# Patient Record
Sex: MALE | Race: BLACK OR AFRICAN AMERICAN | NOT HISPANIC OR LATINO | Employment: UNEMPLOYED | URBAN - METROPOLITAN AREA
[De-identification: names, ages, dates, MRNs, and addresses within clinical notes are randomized per-mention and may not be internally consistent; named-entity substitution may affect disease eponyms.]

---

## 2017-01-17 ENCOUNTER — HISTORICAL (OUTPATIENT)
Dept: RADIOLOGY | Facility: HOSPITAL | Age: 62
End: 2017-01-17

## 2017-01-17 ENCOUNTER — HISTORICAL (OUTPATIENT)
Dept: INTERNAL MEDICINE | Facility: CLINIC | Age: 62
End: 2017-01-17

## 2017-02-21 ENCOUNTER — HISTORICAL (OUTPATIENT)
Dept: INTERNAL MEDICINE | Facility: CLINIC | Age: 62
End: 2017-02-21

## 2017-06-08 ENCOUNTER — HISTORICAL (OUTPATIENT)
Dept: INTERNAL MEDICINE | Facility: CLINIC | Age: 62
End: 2017-06-08

## 2017-08-17 ENCOUNTER — HISTORICAL (OUTPATIENT)
Dept: RADIOLOGY | Facility: HOSPITAL | Age: 62
End: 2017-08-17

## 2017-11-16 ENCOUNTER — HISTORICAL (OUTPATIENT)
Dept: INTERNAL MEDICINE | Facility: CLINIC | Age: 62
End: 2017-11-16

## 2017-11-16 LAB
ABS NEUT (OLG): 2 X10(3)/MCL (ref 2.1–9.2)
ALBUMIN SERPL-MCNC: 3.2 GM/DL (ref 3.4–5)
ALBUMIN/GLOB SERPL: 1 RATIO (ref 1–2)
ALP SERPL-CCNC: 72 UNIT/L (ref 45–117)
ALT SERPL-CCNC: 23 UNIT/L (ref 12–78)
APPEARANCE, UA: CLEAR
AST SERPL-CCNC: 25 UNIT/L (ref 15–37)
BACTERIA #/AREA URNS AUTO: ABNORMAL /[HPF]
BASOPHILS # BLD AUTO: 0.06 X10(3)/MCL
BASOPHILS NFR BLD AUTO: 1 % (ref 0–1)
BILIRUB SERPL-MCNC: 0.3 MG/DL (ref 0.2–1)
BILIRUB UR QL STRIP: NEGATIVE
BILIRUBIN DIRECT+TOT PNL SERPL-MCNC: <0.1 MG/DL
BILIRUBIN DIRECT+TOT PNL SERPL-MCNC: ABNORMAL MG/DL
BUN SERPL-MCNC: 3 MG/DL (ref 7–18)
CALCIUM SERPL-MCNC: 8.9 MG/DL (ref 8.5–10.1)
CHLORIDE SERPL-SCNC: 101 MMOL/L (ref 98–107)
CHOLEST SERPL-MCNC: 159 MG/DL
CHOLEST/HDLC SERPL: 3.2 {RATIO} (ref 0–5)
CO2 SERPL-SCNC: 29 MMOL/L (ref 21–32)
COLOR UR: YELLOW
CREAT SERPL-MCNC: 1 MG/DL (ref 0.6–1.3)
EOSINOPHIL # BLD AUTO: 0.12 10*3/UL
EOSINOPHIL NFR BLD AUTO: 2 % (ref 0–5)
ERYTHROCYTE [DISTWIDTH] IN BLOOD BY AUTOMATED COUNT: 13.8 % (ref 11.5–14.5)
EST. AVERAGE GLUCOSE BLD GHB EST-MCNC: 131 MG/DL
GLOBULIN SER-MCNC: 4.6 GM/ML (ref 2.3–3.5)
GLUCOSE (UA): NORMAL
GLUCOSE SERPL-MCNC: 90 MG/DL (ref 74–106)
HAV IGM SERPL QL IA: NONREACTIVE
HBA1C MFR BLD: 6.2 % (ref 4.2–6.3)
HBV CORE IGM SERPL QL IA: NONREACTIVE
HBV SURFACE AG SERPL QL IA: NEGATIVE
HCT VFR BLD AUTO: 48.5 % (ref 40–51)
HCV AB SERPL QL IA: NONREACTIVE
HDLC SERPL-MCNC: 49 MG/DL
HGB BLD-MCNC: 17.6 GM/DL (ref 13.5–17.5)
HGB UR QL STRIP: NEGATIVE
HIV 1+2 AB+HIV1 P24 AG SERPL QL IA: NONREACTIVE
HYALINE CASTS #/AREA URNS LPF: ABNORMAL /[LPF]
IMM GRANULOCYTES # BLD AUTO: 0.01 10*3/UL
IMM GRANULOCYTES NFR BLD AUTO: 0 %
KETONES UR QL STRIP: NEGATIVE
LDLC SERPL CALC-MCNC: 99 MG/DL (ref 0–130)
LEUKOCYTE ESTERASE UR QL STRIP: NEGATIVE
LYMPHOCYTES # BLD AUTO: 2.52 X10(3)/MCL
LYMPHOCYTES NFR BLD AUTO: 47 % (ref 15–40)
MCH RBC QN AUTO: 28.6 PG (ref 26–34)
MCHC RBC AUTO-ENTMCNC: 36.3 GM/DL (ref 31–37)
MCV RBC AUTO: 78.9 FL (ref 80–100)
MONOCYTES # BLD AUTO: 0.68 X10(3)/MCL
MONOCYTES NFR BLD AUTO: 13 % (ref 4–12)
NEUTROPHILS # BLD AUTO: 2 X10(3)/MCL
NEUTROPHILS NFR BLD AUTO: 37 X10(3)/MCL
NITRITE UR QL STRIP: NEGATIVE
PH UR STRIP: 5.5 [PH] (ref 4.5–8)
PLATELET # BLD AUTO: 272 X10(3)/MCL (ref 130–400)
PMV BLD AUTO: 9.2 FL (ref 7.4–10.4)
POTASSIUM SERPL-SCNC: 5.2 MMOL/L (ref 3.5–5.1)
PROT SERPL-MCNC: 7.8 GM/DL (ref 6.4–8.2)
PROT UR QL STRIP: 20 MG/DL
PSA SERPL-MCNC: 0.8 NG/ML
RBC # BLD AUTO: 6.15 X10(6)/MCL (ref 4.5–5.9)
RBC #/AREA URNS AUTO: ABNORMAL /[HPF]
SODIUM SERPL-SCNC: 137 MMOL/L (ref 136–145)
SP GR UR STRIP: 1.01 (ref 1–1.03)
SQUAMOUS #/AREA URNS LPF: ABNORMAL /[LPF]
TRIGL SERPL-MCNC: 57 MG/DL
TSH SERPL-ACNC: 1.02 MIU/L (ref 0.36–3.74)
UROBILINOGEN UR STRIP-ACNC: NORMAL
VLDLC SERPL CALC-MCNC: 11 MG/DL
WBC # SPEC AUTO: 5.4 X10(3)/MCL (ref 4.5–11)
WBC #/AREA URNS AUTO: ABNORMAL /HPF

## 2017-11-28 LAB
COLOR STL: NORMAL
CONSISTENCY STL: NORMAL
HEMOCCULT SP1 STL QL: NEGATIVE

## 2017-11-29 LAB
COLOR STL: NORMAL
CONSISTENCY STL: NORMAL
HEMOCCULT SP2 STL QL: NEGATIVE

## 2017-11-30 LAB
COLOR STL: NORMAL
CONSISTENCY STL: NORMAL

## 2017-12-01 ENCOUNTER — HISTORICAL (OUTPATIENT)
Dept: INTERNAL MEDICINE | Facility: CLINIC | Age: 62
End: 2017-12-01

## 2017-12-01 LAB
ABS NEUT (OLG): 3.34 X10(3)/MCL (ref 2.1–9.2)
ALBUMIN SERPL-MCNC: 3.5 GM/DL (ref 3.4–5)
ALBUMIN/GLOB SERPL: 1 RATIO (ref 1–2)
ALP SERPL-CCNC: 85 UNIT/L (ref 45–117)
ALT SERPL-CCNC: 20 UNIT/L (ref 12–78)
AST SERPL-CCNC: 20 UNIT/L (ref 15–37)
BASOPHILS # BLD AUTO: 0.09 X10(3)/MCL
BASOPHILS NFR BLD AUTO: 1 % (ref 0–1)
BILIRUB SERPL-MCNC: 0.5 MG/DL (ref 0.2–1)
BILIRUBIN DIRECT+TOT PNL SERPL-MCNC: 0.2 MG/DL
BILIRUBIN DIRECT+TOT PNL SERPL-MCNC: 0.3 MG/DL
BUN SERPL-MCNC: 6 MG/DL (ref 7–18)
CALCIUM SERPL-MCNC: 8.8 MG/DL (ref 8.5–10.1)
CHLORIDE SERPL-SCNC: 99 MMOL/L (ref 98–107)
CO2 SERPL-SCNC: 28 MMOL/L (ref 21–32)
CREAT SERPL-MCNC: 1.2 MG/DL (ref 0.6–1.3)
EOSINOPHIL # BLD AUTO: 0.11 X10(3)/MCL
EOSINOPHIL NFR BLD AUTO: 2 % (ref 0–5)
ERYTHROCYTE [DISTWIDTH] IN BLOOD BY AUTOMATED COUNT: 13.9 % (ref 11.5–14.5)
GLOBULIN SER-MCNC: 5.4 GM/ML (ref 2.3–3.5)
GLUCOSE SERPL-MCNC: 127 MG/DL (ref 74–106)
HCT VFR BLD AUTO: 47.2 % (ref 40–51)
HGB BLD-MCNC: 17.3 GM/DL (ref 13.5–17.5)
IMM GRANULOCYTES # BLD AUTO: 0.02 10*3/UL
IMM GRANULOCYTES NFR BLD AUTO: 0 %
LYMPHOCYTES # BLD AUTO: 2.6 X10(3)/MCL
LYMPHOCYTES NFR BLD AUTO: 38 % (ref 15–40)
MCH RBC QN AUTO: 29.1 PG (ref 26–34)
MCHC RBC AUTO-ENTMCNC: 36.7 GM/DL (ref 31–37)
MCV RBC AUTO: 79.3 FL (ref 80–100)
MONOCYTES # BLD AUTO: 0.72 X10(3)/MCL
MONOCYTES NFR BLD AUTO: 10 % (ref 4–12)
NEUTROPHILS # BLD AUTO: 3.34 X10(3)/MCL
NEUTROPHILS NFR BLD AUTO: 48 X10(3)/MCL
PLATELET # BLD AUTO: 240 X10(3)/MCL (ref 130–400)
PMV BLD AUTO: 9.3 FL (ref 7.4–10.4)
POTASSIUM SERPL-SCNC: 4.3 MMOL/L (ref 3.5–5.1)
PROT SERPL-MCNC: 8.9 GM/DL (ref 6.4–8.2)
RBC # BLD AUTO: 5.95 X10(6)/MCL (ref 4.5–5.9)
SODIUM SERPL-SCNC: 136 MMOL/L (ref 136–145)
WBC # SPEC AUTO: 6.9 X10(3)/MCL (ref 4.5–11)

## 2018-05-09 ENCOUNTER — HISTORICAL (OUTPATIENT)
Dept: INTERNAL MEDICINE | Facility: CLINIC | Age: 63
End: 2018-05-09

## 2018-05-09 LAB
ALBUMIN SERPL-MCNC: 3.1 GM/DL (ref 3.4–5)
ALBUMIN/GLOB SERPL: 1 RATIO (ref 1–2)
ALP SERPL-CCNC: 100 UNIT/L (ref 45–117)
ALT SERPL-CCNC: 26 UNIT/L (ref 12–78)
AST SERPL-CCNC: 32 UNIT/L (ref 15–37)
BILIRUB SERPL-MCNC: 0.5 MG/DL (ref 0.2–1)
BILIRUBIN DIRECT+TOT PNL SERPL-MCNC: 0.2 MG/DL
BILIRUBIN DIRECT+TOT PNL SERPL-MCNC: 0.3 MG/DL
BUN SERPL-MCNC: 4 MG/DL (ref 7–18)
CALCIUM SERPL-MCNC: 8.8 MG/DL (ref 8.5–10.1)
CHLORIDE SERPL-SCNC: 100 MMOL/L (ref 98–107)
CO2 SERPL-SCNC: 28 MMOL/L (ref 21–32)
CREAT SERPL-MCNC: 1 MG/DL (ref 0.6–1.3)
EST. AVERAGE GLUCOSE BLD GHB EST-MCNC: 128 MG/DL
GLOBULIN SER-MCNC: 5.8 GM/ML (ref 2.3–3.5)
GLUCOSE SERPL-MCNC: 133 MG/DL (ref 74–106)
HBA1C MFR BLD: 6.1 % (ref 4.2–6.3)
POTASSIUM SERPL-SCNC: 4 MMOL/L (ref 3.5–5.1)
PROT SERPL-MCNC: 8.9 GM/DL (ref 6.4–8.2)
SODIUM SERPL-SCNC: 131 MMOL/L (ref 136–145)

## 2018-11-26 ENCOUNTER — HISTORICAL (OUTPATIENT)
Dept: INTERNAL MEDICINE | Facility: CLINIC | Age: 63
End: 2018-11-26

## 2018-11-26 LAB
ABS NEUT (OLG): 3.66 X10(3)/MCL (ref 2.1–9.2)
ALBUMIN SERPL-MCNC: 3.1 GM/DL (ref 3.4–5)
ALBUMIN/GLOB SERPL: 1 RATIO (ref 1–2)
ALP SERPL-CCNC: 72 UNIT/L (ref 45–117)
ALT SERPL-CCNC: 18 UNIT/L (ref 12–78)
APPEARANCE, UA: CLEAR
AST SERPL-CCNC: 25 UNIT/L (ref 15–37)
BACTERIA #/AREA URNS AUTO: ABNORMAL /[HPF]
BASOPHILS # BLD AUTO: 0.1 X10(3)/MCL
BASOPHILS NFR BLD AUTO: 1 %
BILIRUB SERPL-MCNC: 0.4 MG/DL (ref 0.2–1)
BILIRUB UR QL STRIP: NEGATIVE
BILIRUBIN DIRECT+TOT PNL SERPL-MCNC: 0.2 MG/DL
BILIRUBIN DIRECT+TOT PNL SERPL-MCNC: 0.2 MG/DL
BUN SERPL-MCNC: 4 MG/DL (ref 7–18)
CALCIUM SERPL-MCNC: 8.8 MG/DL (ref 8.5–10.1)
CHLORIDE SERPL-SCNC: 100 MMOL/L (ref 98–107)
CHOLEST SERPL-MCNC: 126 MG/DL
CHOLEST/HDLC SERPL: 3.8 {RATIO} (ref 0–5)
CO2 SERPL-SCNC: 30 MMOL/L (ref 21–32)
COLOR UR: YELLOW
CREAT SERPL-MCNC: 1.1 MG/DL (ref 0.6–1.3)
EOSINOPHIL # BLD AUTO: 0.15 10*3/UL
EOSINOPHIL NFR BLD AUTO: 2 %
ERYTHROCYTE [DISTWIDTH] IN BLOOD BY AUTOMATED COUNT: 15.8 % (ref 11.5–14.5)
EST. AVERAGE GLUCOSE BLD GHB EST-MCNC: 126 MG/DL
GLOBULIN SER-MCNC: 6.1 GM/ML (ref 2.3–3.5)
GLUCOSE (UA): NORMAL
GLUCOSE SERPL-MCNC: 92 MG/DL (ref 74–106)
HBA1C MFR BLD: 6 % (ref 4.2–6.3)
HCT VFR BLD AUTO: 38.1 % (ref 40–51)
HDLC SERPL-MCNC: 33 MG/DL
HGB BLD-MCNC: 12.6 GM/DL (ref 13.5–17.5)
HGB UR QL STRIP: 0.1 MG/DL
HIV 1+2 AB+HIV1 P24 AG SERPL QL IA: NONREACTIVE
HYALINE CASTS #/AREA URNS LPF: ABNORMAL /[LPF]
IMM GRANULOCYTES # BLD AUTO: 0.03 10*3/UL
IMM GRANULOCYTES NFR BLD AUTO: 0 %
KETONES UR QL STRIP: NEGATIVE
LDLC SERPL CALC-MCNC: 80 MG/DL (ref 0–130)
LEUKOCYTE ESTERASE UR QL STRIP: NEGATIVE
LYMPHOCYTES # BLD AUTO: 2.5 X10(3)/MCL
LYMPHOCYTES NFR BLD AUTO: 34 % (ref 13–40)
MCH RBC QN AUTO: 24 PG (ref 26–34)
MCHC RBC AUTO-ENTMCNC: 33.1 GM/DL (ref 31–37)
MCV RBC AUTO: 72.7 FL (ref 80–100)
MONOCYTES # BLD AUTO: 0.82 X10(3)/MCL
MONOCYTES NFR BLD AUTO: 11 % (ref 4–12)
NEUTROPHILS # BLD AUTO: 3.66 X10(3)/MCL
NEUTROPHILS NFR BLD AUTO: 50 X10(3)/MCL
NITRITE UR QL STRIP: NEGATIVE
PH UR STRIP: 5.5 [PH] (ref 4.5–8)
PLATELET # BLD AUTO: 334 X10(3)/MCL (ref 130–400)
PMV BLD AUTO: 8.6 FL (ref 7.4–10.4)
POTASSIUM SERPL-SCNC: 5 MMOL/L (ref 3.5–5.1)
PROT SERPL-MCNC: 9.2 GM/DL (ref 6.4–8.2)
PROT UR QL STRIP: 30 MG/DL
PSA SERPL-MCNC: 0.7 NG/ML
RBC # BLD AUTO: 5.24 X10(6)/MCL (ref 4.5–5.9)
RBC #/AREA URNS AUTO: ABNORMAL /[HPF]
SODIUM SERPL-SCNC: 134 MMOL/L (ref 136–145)
SP GR UR STRIP: 1.02 (ref 1–1.03)
SQUAMOUS #/AREA URNS LPF: ABNORMAL /[LPF]
TRIGL SERPL-MCNC: 65 MG/DL
TSH SERPL-ACNC: 1.88 MIU/L (ref 0.36–3.74)
UROBILINOGEN UR STRIP-ACNC: NORMAL
VLDLC SERPL CALC-MCNC: 13 MG/DL
WBC # SPEC AUTO: 7.3 X10(3)/MCL (ref 4.5–11)
WBC #/AREA URNS AUTO: ABNORMAL /HPF

## 2019-01-17 ENCOUNTER — HISTORICAL (OUTPATIENT)
Dept: INTERNAL MEDICINE | Facility: CLINIC | Age: 64
End: 2019-01-17

## 2019-01-17 LAB
APPEARANCE, UA: CLEAR
BACTERIA #/AREA URNS AUTO: ABNORMAL /[HPF]
BILIRUB UR QL STRIP: NEGATIVE
COLOR UR: NORMAL
GLUCOSE (UA): NORMAL
HGB UR QL STRIP: NEGATIVE
HYALINE CASTS #/AREA URNS LPF: ABNORMAL /[LPF]
KETONES UR QL STRIP: NEGATIVE
LEUKOCYTE ESTERASE UR QL STRIP: NEGATIVE
NITRITE UR QL STRIP: NEGATIVE
PH UR STRIP: 6.5 [PH] (ref 4.5–8)
PROT UR QL STRIP: NEGATIVE
RBC #/AREA URNS AUTO: ABNORMAL /[HPF]
SP GR UR STRIP: 1.01 (ref 1–1.03)
SQUAMOUS #/AREA URNS LPF: ABNORMAL /[LPF]
UROBILINOGEN UR STRIP-ACNC: NORMAL
WBC #/AREA URNS AUTO: ABNORMAL /HPF

## 2019-01-19 LAB — FINAL CULTURE: NORMAL

## 2019-01-31 ENCOUNTER — HISTORICAL (OUTPATIENT)
Dept: RADIOLOGY | Facility: HOSPITAL | Age: 64
End: 2019-01-31

## 2019-02-06 ENCOUNTER — HISTORICAL (OUTPATIENT)
Dept: RADIOLOGY | Facility: HOSPITAL | Age: 64
End: 2019-02-06

## 2019-02-14 ENCOUNTER — HISTORICAL (OUTPATIENT)
Dept: RADIOLOGY | Facility: HOSPITAL | Age: 64
End: 2019-02-14

## 2019-05-13 ENCOUNTER — HISTORICAL (OUTPATIENT)
Dept: INTERNAL MEDICINE | Facility: CLINIC | Age: 64
End: 2019-05-13

## 2019-05-13 LAB
ABS NEUT (OLG): 2.93 X10(3)/MCL (ref 2.1–9.2)
ALBUMIN SERPL-MCNC: 3.2 GM/DL (ref 3.4–5)
ALBUMIN/GLOB SERPL: 0.6 RATIO (ref 1.1–2)
ALP SERPL-CCNC: 62 UNIT/L (ref 45–117)
ALT SERPL-CCNC: 25 UNIT/L (ref 12–78)
APPEARANCE, UA: CLEAR
AST SERPL-CCNC: 43 UNIT/L (ref 15–37)
BACTERIA #/AREA URNS AUTO: ABNORMAL /[HPF]
BASOPHILS # BLD AUTO: 0.07 X10(3)/MCL
BASOPHILS NFR BLD AUTO: 1 %
BILIRUB SERPL-MCNC: 0.4 MG/DL (ref 0.2–1)
BILIRUB UR QL STRIP: NEGATIVE
BILIRUBIN DIRECT+TOT PNL SERPL-MCNC: 0.2 MG/DL
BILIRUBIN DIRECT+TOT PNL SERPL-MCNC: 0.2 MG/DL
BUN SERPL-MCNC: 5 MG/DL (ref 7–18)
CALCIUM SERPL-MCNC: 9 MG/DL (ref 8.5–10.1)
CHLORIDE SERPL-SCNC: 103 MMOL/L (ref 98–107)
CHOLEST SERPL-MCNC: 102 MG/DL
CHOLEST/HDLC SERPL: 2.8 {RATIO} (ref 0–5)
CO2 SERPL-SCNC: 23 MMOL/L (ref 21–32)
COLOR UR: ABNORMAL
CREAT SERPL-MCNC: 1 MG/DL (ref 0.6–1.3)
EOSINOPHIL # BLD AUTO: 0.11 10*3/UL
EOSINOPHIL NFR BLD AUTO: 2 %
ERYTHROCYTE [DISTWIDTH] IN BLOOD BY AUTOMATED COUNT: 19.5 % (ref 11.5–14.5)
EST. AVERAGE GLUCOSE BLD GHB EST-MCNC: 114 MG/DL
GLOBULIN SER-MCNC: 5.8 GM/ML (ref 2.3–3.5)
GLUCOSE (UA): NORMAL
GLUCOSE SERPL-MCNC: 120 MG/DL (ref 74–106)
HBA1C MFR BLD: 5.6 % (ref 4.2–6.3)
HCT VFR BLD AUTO: 30.6 % (ref 40–51)
HDLC SERPL-MCNC: 37 MG/DL
HGB BLD-MCNC: 9 GM/DL (ref 13.5–17.5)
HGB UR QL STRIP: NEGATIVE
HYALINE CASTS #/AREA URNS LPF: ABNORMAL /[LPF]
IMM GRANULOCYTES # BLD AUTO: 0.02 10*3/UL
IMM GRANULOCYTES NFR BLD AUTO: 0 %
KETONES UR QL STRIP: NEGATIVE
LDLC SERPL CALC-MCNC: 52 MG/DL (ref 0–130)
LEUKOCYTE ESTERASE UR QL STRIP: NEGATIVE
LYMPHOCYTES # BLD AUTO: 2.24 X10(3)/MCL
LYMPHOCYTES NFR BLD AUTO: 38 % (ref 13–40)
MCH RBC QN AUTO: 18.6 PG (ref 26–34)
MCHC RBC AUTO-ENTMCNC: 29.4 GM/DL (ref 31–37)
MCV RBC AUTO: 63.4 FL (ref 80–100)
MONOCYTES # BLD AUTO: 0.58 X10(3)/MCL
MONOCYTES NFR BLD AUTO: 10 % (ref 4–12)
NEUTROPHILS # BLD AUTO: 2.93 X10(3)/MCL
NEUTROPHILS NFR BLD AUTO: 49 X10(3)/MCL
NITRITE UR QL STRIP: NEGATIVE
PH UR STRIP: 5 [PH] (ref 4.5–8)
PLATELET # BLD AUTO: 297 X10(3)/MCL (ref 130–400)
PMV BLD AUTO: 9.2 FL (ref 7.4–10.4)
POTASSIUM SERPL-SCNC: 4 MMOL/L (ref 3.5–5.1)
PROT SERPL-MCNC: 9 GM/DL (ref 6.4–8.2)
PROT UR QL STRIP: NEGATIVE
RBC # BLD AUTO: 4.83 X10(6)/MCL (ref 4.5–5.9)
RBC #/AREA URNS AUTO: ABNORMAL /[HPF]
SODIUM SERPL-SCNC: 131 MMOL/L (ref 136–145)
SP GR UR STRIP: 1 (ref 1–1.03)
SQUAMOUS #/AREA URNS LPF: ABNORMAL /[LPF]
TESTOST SERPL-MCNC: 1179 NG/DL (ref 241–827)
TRIGL SERPL-MCNC: 67 MG/DL
UROBILINOGEN UR STRIP-ACNC: NORMAL
VLDLC SERPL CALC-MCNC: 13 MG/DL
WBC # SPEC AUTO: 6 X10(3)/MCL (ref 4.5–11)
WBC #/AREA URNS AUTO: ABNORMAL /HPF

## 2019-05-27 ENCOUNTER — HISTORICAL (OUTPATIENT)
Dept: INTERNAL MEDICINE | Facility: CLINIC | Age: 64
End: 2019-05-27

## 2019-08-19 ENCOUNTER — HISTORICAL (OUTPATIENT)
Dept: INTERNAL MEDICINE | Facility: CLINIC | Age: 64
End: 2019-08-19

## 2019-08-19 LAB
ABS NEUT (OLG): 3.56 X10(3)/MCL
ALBUMIN SERPL-MCNC: 2.9 GM/DL (ref 3.4–5)
ALBUMIN/GLOB SERPL: 0.5 RATIO (ref 1.1–2)
ALP SERPL-CCNC: 53 UNIT/L (ref 45–117)
ALT SERPL-CCNC: 20 UNIT/L (ref 12–78)
ANISOCYTOSIS BLD QL SMEAR: NORMAL
AST SERPL-CCNC: 27 UNIT/L (ref 15–37)
BASOPHILS # BLD AUTO: 0.04 X10(3)/MCL
BASOPHILS NFR BLD AUTO: 1 %
BILIRUB SERPL-MCNC: 0.4 MG/DL (ref 0.2–1)
BILIRUBIN DIRECT+TOT PNL SERPL-MCNC: 0.2 MG/DL
BILIRUBIN DIRECT+TOT PNL SERPL-MCNC: 0.2 MG/DL
BUN SERPL-MCNC: 2 MG/DL (ref 7–18)
CALCIUM SERPL-MCNC: 8.5 MG/DL (ref 8.5–10.1)
CHLORIDE SERPL-SCNC: 105 MMOL/L (ref 98–107)
CHOLEST SERPL-MCNC: 86 MG/DL
CHOLEST/HDLC SERPL: 2.2 {RATIO} (ref 0–5)
CO2 SERPL-SCNC: 22 MMOL/L (ref 21–32)
CREAT SERPL-MCNC: 0.9 MG/DL (ref 0.6–1.3)
EOSINOPHIL # BLD AUTO: 0.09 X10(3)/MCL
EOSINOPHIL NFR BLD AUTO: 2 %
ERYTHROCYTE [DISTWIDTH] IN BLOOD BY AUTOMATED COUNT: 20.2 % (ref 11.5–14.5)
EST. AVERAGE GLUCOSE BLD GHB EST-MCNC: 94 MG/DL
FERRITIN SERPL-MCNC: 6.8 NG/ML (ref 26–388)
FOLATE SERPL-MCNC: 11.4 NG/ML (ref 3.1–17.5)
GLOBULIN SER-MCNC: 5.5 GM/ML (ref 2.3–3.5)
GLUCOSE SERPL-MCNC: 100 MG/DL (ref 74–106)
HAV IGM SERPL QL IA: NONREACTIVE
HBA1C MFR BLD: 4.9 % (ref 4.2–6.3)
HBV CORE IGM SERPL QL IA: NONREACTIVE
HBV SURFACE AG SERPL QL IA: NEGATIVE
HCT VFR BLD AUTO: 32.2 % (ref 40–51)
HCV AB SERPL QL IA: NONREACTIVE
HDLC SERPL-MCNC: 39 MG/DL
HGB BLD-MCNC: 9.4 GM/DL (ref 13.5–17.5)
HYPOCHROMIA BLD QL SMEAR: NORMAL
IMM GRANULOCYTES # BLD AUTO: 0.04 10*3/UL
IMM GRANULOCYTES NFR BLD AUTO: 1 %
IRON SATN MFR SERPL: 5 % (ref 15–50)
IRON SERPL-MCNC: 16 MCG/DL (ref 65–175)
LDLC SERPL CALC-MCNC: 34 MG/DL (ref 0–130)
LYMPHOCYTES # BLD AUTO: 1.88 X10(3)/MCL
LYMPHOCYTES NFR BLD AUTO: 31 % (ref 13–40)
MCH RBC QN AUTO: 19.4 PG (ref 26–34)
MCHC RBC AUTO-ENTMCNC: 29.2 GM/DL (ref 31–37)
MCV RBC AUTO: 66.4 FL (ref 80–100)
MICROCYTES BLD QL SMEAR: NORMAL
MONOCYTES # BLD AUTO: 0.53 X10(3)/MCL
MONOCYTES NFR BLD AUTO: 9 % (ref 0–24)
NEUTROPHILS # BLD AUTO: 3.56 X10(3)/MCL
NEUTROPHILS NFR BLD AUTO: 58 %
PLATELET # BLD AUTO: 381 X10(3)/MCL (ref 130–400)
PLATELET # BLD EST: ADEQUATE 10*3/UL
PMV BLD AUTO: 9.1 FL (ref 7.4–10.4)
POIKILOCYTOSIS BLD QL SMEAR: NORMAL
POLYCHROMASIA BLD QL SMEAR: NORMAL
POTASSIUM SERPL-SCNC: 3.9 MMOL/L (ref 3.5–5.1)
PROT SERPL-MCNC: 8.4 GM/DL (ref 6.4–8.2)
RBC # BLD AUTO: 4.85 X10(6)/MCL (ref 4.5–5.9)
RBC MORPH BLD: NORMAL
SCHISTOCYTES BLD QL AUTO: NORMAL
SODIUM SERPL-SCNC: 134 MMOL/L (ref 136–145)
TARGETS BLD QL SMEAR: NORMAL
TIBC SERPL-MCNC: 320 MCG/DL (ref 250–450)
TRANSFERRIN SERPL-MCNC: 252 MG/DL (ref 200–360)
TRIGL SERPL-MCNC: 63 MG/DL
VIT B12 SERPL-MCNC: 756 PG/ML (ref 193–986)
VLDLC SERPL CALC-MCNC: 13 MG/DL
WBC # SPEC AUTO: 6.1 X10(3)/MCL (ref 4.5–11)

## 2019-10-22 ENCOUNTER — HISTORICAL (OUTPATIENT)
Dept: RADIOLOGY | Facility: HOSPITAL | Age: 64
End: 2019-10-22

## 2019-10-28 ENCOUNTER — HISTORICAL (OUTPATIENT)
Dept: RADIOLOGY | Facility: HOSPITAL | Age: 64
End: 2019-10-28

## 2020-01-13 ENCOUNTER — HISTORICAL (OUTPATIENT)
Dept: INTERNAL MEDICINE | Facility: CLINIC | Age: 65
End: 2020-01-13

## 2020-01-13 LAB
ABS NEUT (OLG): 3.17 X10(3)/MCL (ref 2.1–9.2)
ALBUMIN SERPL-MCNC: 3.3 GM/DL (ref 3.4–5)
ALBUMIN/GLOB SERPL: 0.6 RATIO (ref 1.1–2)
ALP SERPL-CCNC: 106 UNIT/L (ref 45–117)
ALT SERPL-CCNC: 17 UNIT/L (ref 12–78)
AST SERPL-CCNC: 20 UNIT/L (ref 15–37)
BASOPHILS # BLD AUTO: 0.1 X10(3)/MCL (ref 0–0.2)
BASOPHILS NFR BLD AUTO: 1 %
BILIRUB SERPL-MCNC: 0.5 MG/DL (ref 0.2–1)
BILIRUBIN DIRECT+TOT PNL SERPL-MCNC: 0.2 MG/DL (ref 0–0.2)
BILIRUBIN DIRECT+TOT PNL SERPL-MCNC: 0.3 MG/DL
BUN SERPL-MCNC: 4 MG/DL (ref 7–18)
CALCIUM SERPL-MCNC: 9.5 MG/DL (ref 8.5–10.1)
CHLORIDE SERPL-SCNC: 103 MMOL/L (ref 98–107)
CHOLEST SERPL-MCNC: 152 MG/DL
CHOLEST/HDLC SERPL: 3.1 {RATIO} (ref 0–5)
CO2 SERPL-SCNC: 30 MMOL/L (ref 21–32)
CREAT SERPL-MCNC: 0.9 MG/DL (ref 0.6–1.3)
EOSINOPHIL # BLD AUTO: 0.2 X10(3)/MCL (ref 0–0.9)
EOSINOPHIL NFR BLD AUTO: 2 %
ERYTHROCYTE [DISTWIDTH] IN BLOOD BY AUTOMATED COUNT: 18 % (ref 11.5–14.5)
GLOBULIN SER-MCNC: 5.5 GM/ML (ref 2.3–3.5)
GLUCOSE SERPL-MCNC: 100 MG/DL (ref 74–106)
HCT VFR BLD AUTO: 53.6 % (ref 40–51)
HDLC SERPL-MCNC: 49 MG/DL (ref 40–59)
HGB BLD-MCNC: 18.5 GM/DL (ref 13.5–17.5)
IMM GRANULOCYTES # BLD AUTO: 0.02 10*3/UL
IMM GRANULOCYTES NFR BLD AUTO: 0 %
LDLC SERPL CALC-MCNC: 85 MG/DL
LYMPHOCYTES # BLD AUTO: 2.3 X10(3)/MCL (ref 0.6–4.6)
LYMPHOCYTES NFR BLD AUTO: 34 %
MCH RBC QN AUTO: 27.1 PG (ref 26–34)
MCHC RBC AUTO-ENTMCNC: 34.5 GM/DL (ref 31–37)
MCV RBC AUTO: 78.5 FL (ref 80–100)
MONOCYTES # BLD AUTO: 1 X10(3)/MCL (ref 0.1–1.3)
MONOCYTES NFR BLD AUTO: 14 %
NEUTROPHILS # BLD AUTO: 3.17 X10(3)/MCL (ref 2.1–9.2)
NEUTROPHILS NFR BLD AUTO: 48 %
PLATELET # BLD AUTO: 269 X10(3)/MCL (ref 130–400)
PMV BLD AUTO: 8.7 FL (ref 7.4–10.4)
POTASSIUM SERPL-SCNC: 4.1 MMOL/L (ref 3.5–5.1)
PROT SERPL-MCNC: 8.8 GM/DL (ref 6.4–8.2)
PSA SERPL-MCNC: 0.9 NG/ML
RBC # BLD AUTO: 6.83 X10(6)/MCL (ref 4.5–5.9)
SODIUM SERPL-SCNC: 140 MMOL/L (ref 136–145)
TRIGL SERPL-MCNC: 88 MG/DL
TSH SERPL-ACNC: 1.3 MIU/L (ref 0.36–3.74)
VLDLC SERPL CALC-MCNC: 18 MG/DL
WBC # SPEC AUTO: 6.7 X10(3)/MCL (ref 4.5–11)

## 2020-01-20 LAB — CRC RECOMMENDATION EXT: NORMAL

## 2020-02-19 ENCOUNTER — HISTORICAL (OUTPATIENT)
Dept: RADIOLOGY | Facility: HOSPITAL | Age: 65
End: 2020-02-19

## 2020-04-14 ENCOUNTER — HISTORICAL (OUTPATIENT)
Dept: INTERNAL MEDICINE | Facility: CLINIC | Age: 65
End: 2020-04-14

## 2020-04-14 LAB
ABS NEUT (OLG): 2.23 X10(3)/MCL (ref 2.1–9.2)
ALBUMIN SERPL-MCNC: 3.6 GM/DL (ref 3.4–5)
ALBUMIN/GLOB SERPL: 0.6 RATIO (ref 1.1–2)
ALP SERPL-CCNC: 79 UNIT/L (ref 45–117)
ALT SERPL-CCNC: 24 UNIT/L (ref 12–78)
AST SERPL-CCNC: 29 UNIT/L (ref 15–37)
BASOPHILS # BLD AUTO: 0.1 X10(3)/MCL (ref 0–0.2)
BASOPHILS NFR BLD AUTO: 1 %
BILIRUB SERPL-MCNC: 0.5 MG/DL (ref 0.2–1)
BILIRUBIN DIRECT+TOT PNL SERPL-MCNC: 0.1 MG/DL (ref 0–0.2)
BILIRUBIN DIRECT+TOT PNL SERPL-MCNC: 0.4 MG/DL
BUN SERPL-MCNC: 5 MG/DL (ref 7–18)
CALCIUM SERPL-MCNC: 9.6 MG/DL (ref 8.5–10.1)
CHLORIDE SERPL-SCNC: 103 MMOL/L (ref 98–107)
CHOLEST SERPL-MCNC: 164 MG/DL
CHOLEST/HDLC SERPL: 3.1 {RATIO} (ref 0–5)
CO2 SERPL-SCNC: 30 MMOL/L (ref 21–32)
CREAT SERPL-MCNC: 0.9 MG/DL (ref 0.6–1.3)
EOSINOPHIL # BLD AUTO: 0.2 X10(3)/MCL (ref 0–0.9)
EOSINOPHIL NFR BLD AUTO: 4 %
ERYTHROCYTE [DISTWIDTH] IN BLOOD BY AUTOMATED COUNT: 14.7 % (ref 11.5–14.5)
FERRITIN SERPL-MCNC: 49.2 NG/ML (ref 26–388)
GLOBULIN SER-MCNC: 5.7 GM/ML (ref 2.3–3.5)
GLUCOSE SERPL-MCNC: 103 MG/DL (ref 74–106)
HCT VFR BLD AUTO: 52.7 % (ref 40–51)
HDLC SERPL-MCNC: 53 MG/DL (ref 40–59)
HGB BLD-MCNC: 18.5 GM/DL (ref 13.5–17.5)
IMM GRANULOCYTES # BLD AUTO: 0.01 10*3/UL
IMM GRANULOCYTES NFR BLD AUTO: 0 %
IRON SATN MFR SERPL: 43.4 % (ref 15–50)
IRON SERPL-MCNC: 138 MCG/DL (ref 65–175)
LDLC SERPL CALC-MCNC: 94 MG/DL
LYMPHOCYTES # BLD AUTO: 2.3 X10(3)/MCL (ref 0.6–4.6)
LYMPHOCYTES NFR BLD AUTO: 42 %
MCH RBC QN AUTO: 28.5 PG (ref 26–34)
MCHC RBC AUTO-ENTMCNC: 35.1 GM/DL (ref 31–37)
MCV RBC AUTO: 81.1 FL (ref 80–100)
MONOCYTES # BLD AUTO: 0.7 X10(3)/MCL (ref 0.1–1.3)
MONOCYTES NFR BLD AUTO: 13 %
NEUTROPHILS # BLD AUTO: 2.23 X10(3)/MCL (ref 2.1–9.2)
NEUTROPHILS NFR BLD AUTO: 40 %
PLATELET # BLD AUTO: 219 X10(3)/MCL (ref 130–400)
PMV BLD AUTO: 9.4 FL (ref 7.4–10.4)
POTASSIUM SERPL-SCNC: 4.5 MMOL/L (ref 3.5–5.1)
PROT SERPL-MCNC: 9.3 GM/DL (ref 6.4–8.2)
RBC # BLD AUTO: 6.5 X10(6)/MCL (ref 4.5–5.9)
SODIUM SERPL-SCNC: 136 MMOL/L (ref 136–145)
TIBC SERPL-MCNC: 318 MCG/DL (ref 250–450)
TRANSFERRIN SERPL-MCNC: 236 MG/DL (ref 200–360)
TRIGL SERPL-MCNC: 85 MG/DL
VLDLC SERPL CALC-MCNC: 17 MG/DL
WBC # SPEC AUTO: 5.6 X10(3)/MCL (ref 4.5–11)

## 2020-06-03 ENCOUNTER — HISTORICAL (OUTPATIENT)
Dept: RADIOLOGY | Facility: HOSPITAL | Age: 65
End: 2020-06-03

## 2020-06-18 ENCOUNTER — HISTORICAL (OUTPATIENT)
Dept: INTERNAL MEDICINE | Facility: CLINIC | Age: 65
End: 2020-06-18

## 2020-06-18 LAB
AFP-TM SERPL-MCNC: 2.79 NG/ML
APTT PPP: 26.8 SECOND(S) (ref 23.3–37)
B-HCG FREE SERPL-ACNC: <1 MIU/ML
INR PPP: 0.96 (ref 0.9–1.2)
LDH SERPL-CCNC: 159 UNIT/L (ref 87–241)
LH SERPL-ACNC: 9.7 MIU/ML (ref 1.2–10.6)
PROTHROMBIN TIME: 12.4 SECOND(S) (ref 11.9–14.4)

## 2020-06-19 ENCOUNTER — HISTORICAL (OUTPATIENT)
Dept: SLEEP MEDICINE | Facility: HOSPITAL | Age: 65
End: 2020-06-19

## 2020-09-10 ENCOUNTER — HISTORICAL (OUTPATIENT)
Dept: INTERNAL MEDICINE | Facility: CLINIC | Age: 65
End: 2020-09-10

## 2020-09-10 LAB
ABS NEUT (OLG): 4.99 X10(3)/MCL (ref 2.1–9.2)
ALBUMIN SERPL-MCNC: 3.4 GM/DL (ref 3.4–5)
ALBUMIN/GLOB SERPL: 0.7 RATIO (ref 1.1–2)
ALP SERPL-CCNC: 66 UNIT/L (ref 45–117)
ALT SERPL-CCNC: 24 UNIT/L (ref 12–78)
AST SERPL-CCNC: 36 UNIT/L (ref 15–37)
BASOPHILS # BLD AUTO: 0.1 X10(3)/MCL (ref 0–0.2)
BASOPHILS NFR BLD AUTO: 1 %
BILIRUB SERPL-MCNC: 0.5 MG/DL (ref 0.2–1)
BILIRUBIN DIRECT+TOT PNL SERPL-MCNC: <0.1 MG/DL (ref 0–0.2)
BILIRUBIN DIRECT+TOT PNL SERPL-MCNC: ABNORMAL MG/DL
BUN SERPL-MCNC: 7 MG/DL (ref 7–18)
CALCIUM SERPL-MCNC: 9.6 MG/DL (ref 8.5–10.1)
CHLORIDE SERPL-SCNC: 104 MMOL/L (ref 98–107)
CHOLEST SERPL-MCNC: 154 MG/DL
CHOLEST/HDLC SERPL: 3.3 {RATIO} (ref 0–5)
CO2 SERPL-SCNC: 28 MMOL/L (ref 21–32)
CREAT SERPL-MCNC: 1 MG/DL (ref 0.6–1.3)
EOSINOPHIL # BLD AUTO: 0.1 X10(3)/MCL (ref 0–0.9)
EOSINOPHIL NFR BLD AUTO: 1 %
ERYTHROCYTE [DISTWIDTH] IN BLOOD BY AUTOMATED COUNT: 13.8 % (ref 11.5–14.5)
GLOBULIN SER-MCNC: 5.2 GM/ML (ref 2.3–3.5)
GLUCOSE SERPL-MCNC: 89 MG/DL (ref 74–106)
HCT VFR BLD AUTO: 49.8 % (ref 40–51)
HDLC SERPL-MCNC: 46 MG/DL (ref 40–59)
HGB BLD-MCNC: 17.5 GM/DL (ref 13.5–17.5)
HIV 1+2 AB+HIV1 P24 AG SERPL QL IA: NONREACTIVE
IMM GRANULOCYTES # BLD AUTO: 0.02 10*3/UL
IMM GRANULOCYTES NFR BLD AUTO: 0 %
LDLC SERPL CALC-MCNC: 92 MG/DL
LYMPHOCYTES # BLD AUTO: 3 X10(3)/MCL (ref 0.6–4.6)
LYMPHOCYTES NFR BLD AUTO: 34 %
MCH RBC QN AUTO: 28.9 PG (ref 26–34)
MCHC RBC AUTO-ENTMCNC: 35.1 GM/DL (ref 31–37)
MCV RBC AUTO: 82.3 FL (ref 80–100)
MONOCYTES # BLD AUTO: 0.7 X10(3)/MCL (ref 0.1–1.3)
MONOCYTES NFR BLD AUTO: 8 %
NEUTROPHILS # BLD AUTO: 4.99 X10(3)/MCL (ref 2.1–9.2)
NEUTROPHILS NFR BLD AUTO: 56 %
PLATELET # BLD AUTO: 258 X10(3)/MCL (ref 130–400)
PMV BLD AUTO: 9.2 FL (ref 7.4–10.4)
POTASSIUM SERPL-SCNC: 5.1 MMOL/L (ref 3.5–5.1)
PROT SERPL-MCNC: 8.6 GM/DL (ref 6.4–8.2)
RBC # BLD AUTO: 6.05 X10(6)/MCL (ref 4.5–5.9)
SODIUM SERPL-SCNC: 139 MMOL/L (ref 136–145)
T PALLIDUM AB SER QL: NONREACTIVE
TRIGL SERPL-MCNC: 78 MG/DL
VLDLC SERPL CALC-MCNC: 16 MG/DL
WBC # SPEC AUTO: 8.8 X10(3)/MCL (ref 4.5–11)

## 2020-09-11 ENCOUNTER — HISTORICAL (OUTPATIENT)
Dept: ADMINISTRATIVE | Facility: HOSPITAL | Age: 65
End: 2020-09-11

## 2020-09-11 LAB
APPEARANCE, UA: CLEAR
BACTERIA #/AREA URNS AUTO: ABNORMAL /HPF
BILIRUB UR QL STRIP: NEGATIVE
COLOR UR: ABNORMAL
GLUCOSE (UA): ABNORMAL MG/DL
HGB UR QL STRIP: NEGATIVE
HYALINE CASTS #/AREA URNS LPF: ABNORMAL /LPF
KETONES UR QL STRIP: NEGATIVE
LEUKOCYTE ESTERASE UR QL STRIP: NEGATIVE
NITRITE UR QL STRIP: NEGATIVE
PH UR STRIP: 6 [PH] (ref 4.5–8)
PROT UR QL STRIP: NEGATIVE
RBC #/AREA URNS AUTO: ABNORMAL /HPF
SP GR UR STRIP: 1 (ref 1–1.03)
SQUAMOUS #/AREA URNS LPF: ABNORMAL /LPF
UROBILINOGEN UR STRIP-ACNC: NORMAL
WBC #/AREA URNS AUTO: ABNORMAL /HPF

## 2021-01-08 ENCOUNTER — HISTORICAL (OUTPATIENT)
Dept: INTERNAL MEDICINE | Facility: CLINIC | Age: 66
End: 2021-01-08

## 2021-01-08 LAB
ABS NEUT (OLG): 2.69 X10(3)/MCL (ref 2.1–9.2)
ALBUMIN SERPL-MCNC: 3.4 GM/DL (ref 3.4–4.8)
ALBUMIN/GLOB SERPL: 0.6 RATIO (ref 1.1–2)
ALP SERPL-CCNC: 95 UNIT/L (ref 40–150)
ALT SERPL-CCNC: 11 UNIT/L (ref 0–55)
AST SERPL-CCNC: 18 UNIT/L (ref 5–34)
BASOPHILS # BLD AUTO: 0.1 X10(3)/MCL (ref 0–0.2)
BASOPHILS NFR BLD AUTO: 1 %
BILIRUB SERPL-MCNC: 0.7 MG/DL
BILIRUBIN DIRECT+TOT PNL SERPL-MCNC: 0.3 MG/DL (ref 0–0.5)
BILIRUBIN DIRECT+TOT PNL SERPL-MCNC: 0.4 MG/DL (ref 0–0.8)
BUN SERPL-MCNC: 6 MG/DL (ref 8.4–25.7)
CALCIUM SERPL-MCNC: 9.7 MG/DL (ref 8.8–10)
CHLORIDE SERPL-SCNC: 99 MMOL/L (ref 98–107)
CHOLEST SERPL-MCNC: 139 MG/DL
CHOLEST/HDLC SERPL: 4 {RATIO} (ref 0–5)
CO2 SERPL-SCNC: 30 MMOL/L (ref 23–31)
CREAT SERPL-MCNC: 0.96 MG/DL (ref 0.73–1.18)
EOSINOPHIL # BLD AUTO: 0.1 X10(3)/MCL (ref 0–0.9)
EOSINOPHIL NFR BLD AUTO: 2 %
ERYTHROCYTE [DISTWIDTH] IN BLOOD BY AUTOMATED COUNT: 14.6 % (ref 11.5–14.5)
FERRITIN SERPL-MCNC: 17.94 NG/ML (ref 21.81–274.66)
GLOBULIN SER-MCNC: 5.4 GM/DL (ref 2.4–3.5)
GLUCOSE SERPL-MCNC: 83 MG/DL (ref 82–115)
HCT VFR BLD AUTO: 49.7 % (ref 40–51)
HDLC SERPL-MCNC: 31 MG/DL (ref 35–60)
HGB BLD-MCNC: 17.2 GM/DL (ref 13.5–17.5)
IMM GRANULOCYTES # BLD AUTO: 0.01 10*3/UL
IMM GRANULOCYTES NFR BLD AUTO: 0 %
IRON SATN MFR SERPL: 16 % (ref 20–50)
IRON SERPL-MCNC: 48 UG/DL (ref 65–175)
LDLC SERPL CALC-MCNC: 92 MG/DL (ref 50–140)
LYMPHOCYTES # BLD AUTO: 2.5 X10(3)/MCL (ref 0.6–4.6)
LYMPHOCYTES NFR BLD AUTO: 39 %
MCH RBC QN AUTO: 26.5 PG (ref 26–34)
MCHC RBC AUTO-ENTMCNC: 34.6 GM/DL (ref 31–37)
MCV RBC AUTO: 76.6 FL (ref 80–100)
MONOCYTES # BLD AUTO: 1 X10(3)/MCL (ref 0.1–1.3)
MONOCYTES NFR BLD AUTO: 15 %
NEUTROPHILS # BLD AUTO: 2.69 X10(3)/MCL (ref 2.1–9.2)
NEUTROPHILS NFR BLD AUTO: 43 %
PLATELET # BLD AUTO: 308 X10(3)/MCL (ref 130–400)
PMV BLD AUTO: 9.3 FL (ref 7.4–10.4)
POTASSIUM SERPL-SCNC: 4.4 MMOL/L (ref 3.5–5.1)
PROT SERPL-MCNC: 8.8 GM/DL (ref 5.8–7.6)
RBC # BLD AUTO: 6.49 X10(6)/MCL (ref 4.5–5.9)
SODIUM SERPL-SCNC: 136 MMOL/L (ref 136–145)
TIBC SERPL-MCNC: 253 UG/DL (ref 69–240)
TIBC SERPL-MCNC: 301 UG/DL (ref 250–450)
TRANSFERRIN SERPL-MCNC: 275 MG/DL (ref 163–344)
TRIGL SERPL-MCNC: 78 MG/DL (ref 34–140)
VLDLC SERPL CALC-MCNC: 16 MG/DL
WBC # SPEC AUTO: 6.3 X10(3)/MCL (ref 4.5–11)

## 2021-05-06 ENCOUNTER — HISTORICAL (OUTPATIENT)
Dept: INTERNAL MEDICINE | Facility: CLINIC | Age: 66
End: 2021-05-06

## 2021-05-06 LAB
ABS NEUT (OLG): 2.44 X10(3)/MCL (ref 2.1–9.2)
ALBUMIN SERPL-MCNC: 3.7 GM/DL (ref 3.4–4.8)
ALBUMIN/GLOB SERPL: 0.7 RATIO (ref 1.1–2)
ALP SERPL-CCNC: 83 UNIT/L (ref 40–150)
ALT SERPL-CCNC: 17 UNIT/L (ref 0–55)
AST SERPL-CCNC: 29 UNIT/L (ref 5–34)
BASOPHILS # BLD AUTO: 0.1 X10(3)/MCL (ref 0–0.2)
BASOPHILS NFR BLD AUTO: 1 %
BILIRUB SERPL-MCNC: 0.6 MG/DL
BILIRUBIN DIRECT+TOT PNL SERPL-MCNC: 0.3 MG/DL (ref 0–0.5)
BILIRUBIN DIRECT+TOT PNL SERPL-MCNC: 0.3 MG/DL (ref 0–0.8)
BUN SERPL-MCNC: 5.8 MG/DL (ref 8.4–25.7)
CALCIUM SERPL-MCNC: 10.1 MG/DL (ref 8.8–10)
CHLORIDE SERPL-SCNC: 105 MMOL/L (ref 98–107)
CHOLEST SERPL-MCNC: 151 MG/DL
CHOLEST/HDLC SERPL: 4 {RATIO} (ref 0–5)
CO2 SERPL-SCNC: 26 MMOL/L (ref 23–31)
CREAT SERPL-MCNC: 0.86 MG/DL (ref 0.73–1.18)
EOSINOPHIL # BLD AUTO: 0.2 X10(3)/MCL (ref 0–0.9)
EOSINOPHIL NFR BLD AUTO: 4 %
ERYTHROCYTE [DISTWIDTH] IN BLOOD BY AUTOMATED COUNT: 18.3 % (ref 11.5–14.5)
GLOBULIN SER-MCNC: 5.1 GM/DL (ref 2.4–3.5)
GLUCOSE SERPL-MCNC: 117 MG/DL (ref 82–115)
HCT VFR BLD AUTO: 48.5 % (ref 40–51)
HDLC SERPL-MCNC: 43 MG/DL (ref 35–60)
HGB BLD-MCNC: 16.6 GM/DL (ref 13.5–17.5)
IMM GRANULOCYTES # BLD AUTO: 0.01 10*3/UL
IMM GRANULOCYTES NFR BLD AUTO: 0 %
LDLC SERPL CALC-MCNC: 90 MG/DL (ref 50–140)
LYMPHOCYTES # BLD AUTO: 2 X10(3)/MCL (ref 0.6–4.6)
LYMPHOCYTES NFR BLD AUTO: 38 %
MCH RBC QN AUTO: 25.1 PG (ref 26–34)
MCHC RBC AUTO-ENTMCNC: 34.2 GM/DL (ref 31–37)
MCV RBC AUTO: 73.3 FL (ref 80–100)
MONOCYTES # BLD AUTO: 0.5 X10(3)/MCL (ref 0.1–1.3)
MONOCYTES NFR BLD AUTO: 10 %
NEUTROPHILS # BLD AUTO: 2.44 X10(3)/MCL (ref 2.1–9.2)
NEUTROPHILS NFR BLD AUTO: 47 %
PLATELET # BLD AUTO: 303 X10(3)/MCL (ref 130–400)
PMV BLD AUTO: 9.5 FL (ref 7.4–10.4)
POTASSIUM SERPL-SCNC: 4.8 MMOL/L (ref 3.5–5.1)
PROT SERPL-MCNC: 8.8 GM/DL (ref 5.8–7.6)
PSA SERPL-MCNC: 0.66 NG/ML
RBC # BLD AUTO: 6.62 X10(6)/MCL (ref 4.5–5.9)
SODIUM SERPL-SCNC: 139 MMOL/L (ref 136–145)
TRIGL SERPL-MCNC: 89 MG/DL (ref 34–140)
TSH SERPL-ACNC: 1.26 UIU/ML (ref 0.35–4.94)
VLDLC SERPL CALC-MCNC: 18 MG/DL
WBC # SPEC AUTO: 5.2 X10(3)/MCL (ref 4.5–11)

## 2021-06-10 ENCOUNTER — HISTORICAL (OUTPATIENT)
Dept: ADMINISTRATIVE | Facility: HOSPITAL | Age: 66
End: 2021-06-10

## 2021-06-10 LAB
APPEARANCE, UA: CLEAR
BACTERIA #/AREA URNS AUTO: ABNORMAL /HPF
BILIRUB UR QL STRIP: NEGATIVE
COLOR UR: COLORLESS
GLUCOSE (UA): NEGATIVE
HGB UR QL STRIP: NEGATIVE
HYALINE CASTS #/AREA URNS LPF: ABNORMAL /LPF
KETONES UR QL STRIP: NEGATIVE
LEUKOCYTE ESTERASE UR QL STRIP: NEGATIVE
NITRITE UR QL STRIP: NEGATIVE
PH UR STRIP: 7 [PH] (ref 4.5–8)
PROT UR QL STRIP: NEGATIVE
RBC #/AREA URNS AUTO: ABNORMAL /HPF
SP GR UR STRIP: 1 (ref 1–1.03)
SQUAMOUS #/AREA URNS LPF: ABNORMAL /LPF
UROBILINOGEN UR STRIP-ACNC: NORMAL
WBC #/AREA URNS AUTO: ABNORMAL /HPF

## 2021-10-07 ENCOUNTER — HISTORICAL (OUTPATIENT)
Dept: INTERNAL MEDICINE | Facility: CLINIC | Age: 66
End: 2021-10-07

## 2021-10-07 LAB
ABS NEUT (OLG): 2.2 X10(3)/MCL (ref 2.1–9.2)
ALBUMIN SERPL-MCNC: 3.4 GM/DL (ref 3.4–4.8)
ALBUMIN/GLOB SERPL: 0.6 RATIO (ref 1.1–2)
ALP SERPL-CCNC: 89 UNIT/L (ref 40–150)
ALT SERPL-CCNC: 17 UNIT/L (ref 0–55)
AST SERPL-CCNC: 29 UNIT/L (ref 5–34)
BASOPHILS # BLD AUTO: 0.1 X10(3)/MCL (ref 0–0.2)
BASOPHILS NFR BLD AUTO: 2 %
BILIRUB SERPL-MCNC: 0.6 MG/DL
BILIRUBIN DIRECT+TOT PNL SERPL-MCNC: 0.3 MG/DL (ref 0–0.5)
BILIRUBIN DIRECT+TOT PNL SERPL-MCNC: 0.3 MG/DL (ref 0–0.8)
BUN SERPL-MCNC: 3.8 MG/DL (ref 8.4–25.7)
CALCIUM SERPL-MCNC: 10 MG/DL (ref 8.8–10)
CHLORIDE SERPL-SCNC: 102 MMOL/L (ref 98–107)
CHOLEST SERPL-MCNC: 135 MG/DL
CHOLEST/HDLC SERPL: 3 {RATIO} (ref 0–5)
CO2 SERPL-SCNC: 26 MMOL/L (ref 23–31)
CREAT SERPL-MCNC: 0.94 MG/DL (ref 0.73–1.18)
DEPRECATED CALCIDIOL+CALCIFEROL SERPL-MC: 10.8 NG/ML (ref 30–80)
EOSINOPHIL # BLD AUTO: 0.2 X10(3)/MCL (ref 0–0.9)
EOSINOPHIL NFR BLD AUTO: 3 %
ERYTHROCYTE [DISTWIDTH] IN BLOOD BY AUTOMATED COUNT: 17.6 % (ref 11.5–14.5)
EST. AVERAGE GLUCOSE BLD GHB EST-MCNC: 125.5 MG/DL
GLOBULIN SER-MCNC: 5.3 GM/DL (ref 2.4–3.5)
GLUCOSE SERPL-MCNC: 108 MG/DL (ref 82–115)
HAV IGM SERPL QL IA: NONREACTIVE
HBA1C MFR BLD: 6 %
HBV CORE IGM SERPL QL IA: NONREACTIVE
HBV SURFACE AG SERPL QL IA: NONREACTIVE
HCT VFR BLD AUTO: 46.8 % (ref 40–51)
HCV AB SERPL QL IA: NONREACTIVE
HDLC SERPL-MCNC: 40 MG/DL (ref 35–60)
HGB BLD-MCNC: 16 GM/DL (ref 13.5–17.5)
HIV 1+2 AB+HIV1 P24 AG SERPL QL IA: NONREACTIVE
IMM GRANULOCYTES # BLD AUTO: 0.01 10*3/UL
IMM GRANULOCYTES NFR BLD AUTO: 0 %
LDLC SERPL CALC-MCNC: 82 MG/DL (ref 50–140)
LYMPHOCYTES # BLD AUTO: 2 X10(3)/MCL (ref 0.6–4.6)
LYMPHOCYTES NFR BLD AUTO: 39 %
MCH RBC QN AUTO: 24.3 PG (ref 26–34)
MCHC RBC AUTO-ENTMCNC: 34.2 GM/DL (ref 31–37)
MCV RBC AUTO: 71.1 FL (ref 80–100)
MONOCYTES # BLD AUTO: 0.6 X10(3)/MCL (ref 0.1–1.3)
MONOCYTES NFR BLD AUTO: 13 %
NEUTROPHILS # BLD AUTO: 2.2 X10(3)/MCL (ref 2.1–9.2)
NEUTROPHILS NFR BLD AUTO: 43 %
NRBC BLD AUTO-RTO: 0 % (ref 0–0.2)
PLATELET # BLD AUTO: 336 X10(3)/MCL (ref 130–400)
PMV BLD AUTO: 8.6 FL (ref 7.4–10.4)
POTASSIUM SERPL-SCNC: 4.4 MMOL/L (ref 3.5–5.1)
PROT SERPL-MCNC: 8.7 GM/DL (ref 5.8–7.6)
RBC # BLD AUTO: 6.58 X10(6)/MCL (ref 4.5–5.9)
SODIUM SERPL-SCNC: 136 MMOL/L (ref 136–145)
T PALLIDUM AB SER QL: NONREACTIVE
TRIGL SERPL-MCNC: 65 MG/DL (ref 34–140)
VLDLC SERPL CALC-MCNC: 13 MG/DL
WBC # SPEC AUTO: 5.1 X10(3)/MCL (ref 4.5–11)

## 2022-01-01 ENCOUNTER — TELEPHONE (OUTPATIENT)
Dept: INTERNAL MEDICINE | Facility: CLINIC | Age: 67
End: 2022-01-01
Payer: COMMERCIAL

## 2022-01-01 ENCOUNTER — DOCUMENTATION ONLY (OUTPATIENT)
Dept: INTERNAL MEDICINE | Facility: CLINIC | Age: 67
End: 2022-01-01
Payer: COMMERCIAL

## 2022-01-01 DIAGNOSIS — M50.30 DDD (DEGENERATIVE DISC DISEASE), CERVICAL: Primary | ICD-10-CM

## 2022-01-01 LAB
APPEARANCE UR: CLEAR
BACTERIA #/AREA URNS AUTO: NORMAL /HPF
BILIRUB UR QL STRIP.AUTO: NEGATIVE MG/DL
COLOR UR AUTO: NORMAL
GLUCOSE UR QL STRIP.AUTO: NORMAL MG/DL
HYALINE CASTS #/AREA URNS LPF: NORMAL /LPF
KETONES UR QL STRIP.AUTO: NEGATIVE MG/DL
LEUKOCYTE ESTERASE UR QL STRIP.AUTO: NEGATIVE UNIT/L
NITRITE UR QL STRIP.AUTO: NEGATIVE
PH UR STRIP.AUTO: 6 [PH]
PROT UR QL STRIP.AUTO: NEGATIVE MG/DL
RBC #/AREA URNS AUTO: NORMAL /HPF
RBC UR QL AUTO: NEGATIVE UNIT/L
SP GR UR STRIP.AUTO: 1.01
SQUAMOUS #/AREA URNS LPF: NORMAL /HPF
UROBILINOGEN UR STRIP-ACNC: NORMAL MG/DL
WBC #/AREA URNS AUTO: NORMAL /HPF

## 2022-02-09 ENCOUNTER — HISTORICAL (OUTPATIENT)
Dept: INTERNAL MEDICINE | Facility: CLINIC | Age: 67
End: 2022-02-09

## 2022-02-09 LAB
ABS NEUT (OLG): 1.72 (ref 2.1–9.2)
ALBUMIN SERPL-MCNC: 3.5 G/DL (ref 3.4–4.8)
ALBUMIN/GLOB SERPL: 0.7 {RATIO} (ref 1.1–2)
ALP SERPL-CCNC: 72 U/L (ref 40–150)
ALT SERPL-CCNC: 22 U/L (ref 0–55)
AST SERPL-CCNC: 36 U/L (ref 5–34)
BASOPHILS # BLD AUTO: 0.1 10*3/UL (ref 0–0.2)
BASOPHILS NFR BLD AUTO: 2 %
BILIRUB SERPL-MCNC: 0.4 MG/DL
BILIRUBIN DIRECT+TOT PNL SERPL-MCNC: 0.2 (ref 0–0.5)
BILIRUBIN DIRECT+TOT PNL SERPL-MCNC: 0.2 (ref 0–0.8)
BUN SERPL-MCNC: 5.8 MG/DL (ref 8.4–25.7)
CALCIUM SERPL-MCNC: 10 MG/DL (ref 8.7–10.5)
CHLORIDE SERPL-SCNC: 102 MMOL/L (ref 98–107)
CHOLEST SERPL-MCNC: 142 MG/DL
CHOLEST/HDLC SERPL: 4 {RATIO} (ref 0–5)
CO2 SERPL-SCNC: 25 MMOL/L (ref 23–31)
CREAT SERPL-MCNC: 0.94 MG/DL (ref 0.73–1.18)
DEPRECATED CALCIDIOL+CALCIFEROL SERPL-MC: 70.6 NG/ML (ref 30–80)
EOSINOPHIL # BLD AUTO: 0.2 10*3/UL (ref 0–0.9)
EOSINOPHIL NFR BLD AUTO: 4 %
ERYTHROCYTE [DISTWIDTH] IN BLOOD BY AUTOMATED COUNT: 18.7 % (ref 11.5–14.5)
EST. AVERAGE GLUCOSE BLD GHB EST-MCNC: 125.5 MG/DL
FLAG2 (OHS): 50
FLAG3 (OHS): 80
FLAGS (OHS): 80
GLOBULIN SER-MCNC: 5.2 G/DL (ref 2.4–3.5)
GLUCOSE SERPL-MCNC: 101 MG/DL (ref 82–115)
HBA1C MFR BLD: 6 %
HCT VFR BLD AUTO: 45.5 % (ref 40–51)
HDLC SERPL-MCNC: 32 MG/DL (ref 35–60)
HEMOLYSIS INTERF INDEX SERPL-ACNC: 20
HGB BLD-MCNC: 14.9 G/DL (ref 13.5–17.5)
ICTERIC INTERF INDEX SERPL-ACNC: 0
IMM GRANULOCYTES # BLD AUTO: 0.01 10*3/UL
IMM GRANULOCYTES NFR BLD AUTO: 0 %
IMM. NE 2 SUSPECT FLAG (OHS): 30
LDLC SERPL CALC-MCNC: 95 MG/DL (ref 50–140)
LIPEMIC INTERF INDEX SERPL-ACNC: 9
LOW EVENT # SUSPECT FLAG (OHS): 90
LYMPHOCYTES # BLD AUTO: 2.2 10*3/UL (ref 0.6–4.6)
LYMPHOCYTES NFR BLD AUTO: 45 %
MANUAL DIFF? (OHS): NO
MCH RBC QN AUTO: 21.9 PG (ref 26–34)
MCHC RBC AUTO-ENTMCNC: 32.7 G/DL (ref 31–37)
MCV RBC AUTO: 67 FL (ref 80–100)
MO BLASTS SUSPECT FLAG (OHS): 80
MONOCYTES # BLD AUTO: 0.6 10*3/UL (ref 0.1–1.3)
MONOCYTES NFR BLD AUTO: 13 %
NEUTROPHILS # BLD AUTO: 1.72 10*3/UL (ref 2.1–9.2)
NEUTROPHILS NFR BLD AUTO: 36 %
NRBC BLD AUTO-RTO: 0 % (ref 0–0.2)
PLATELET # BLD AUTO: 333 10*3/UL (ref 130–400)
PLATELET CLUMPS SUSPECT FLAG (OHS): 20
PMV BLD AUTO: 8.9 FL (ref 7.4–10.4)
POTASSIUM SERPL-SCNC: 4.5 MMOL/L (ref 3.5–5.1)
PROT SERPL-MCNC: 8.7 G/DL (ref 5.8–7.6)
RBC # BLD AUTO: 6.79 10*6/UL (ref 4.5–5.9)
SODIUM SERPL-SCNC: 136 MMOL/L (ref 136–145)
TRIGL SERPL-MCNC: 74 MG/DL (ref 34–140)
VLDLC SERPL CALC-MCNC: 15 MG/DL
WBC # SPEC AUTO: 4.8 10*3/UL (ref 4.5–11)
ZZGIANT PLATELETS (OHS): 20

## 2022-03-10 ENCOUNTER — HISTORICAL (OUTPATIENT)
Dept: ADMINISTRATIVE | Facility: HOSPITAL | Age: 67
End: 2022-03-10

## 2022-03-10 ENCOUNTER — HISTORICAL (OUTPATIENT)
Dept: RADIOLOGY | Facility: HOSPITAL | Age: 67
End: 2022-03-10

## 2022-04-11 ENCOUNTER — HISTORICAL (OUTPATIENT)
Dept: ADMINISTRATIVE | Facility: HOSPITAL | Age: 67
End: 2022-04-11
Payer: COMMERCIAL

## 2022-04-24 VITALS
WEIGHT: 200.81 LBS | HEIGHT: 70 IN | BODY MASS INDEX: 28.75 KG/M2 | DIASTOLIC BLOOD PRESSURE: 73 MMHG | SYSTOLIC BLOOD PRESSURE: 132 MMHG | OXYGEN SATURATION: 100 %

## 2022-04-25 ENCOUNTER — HISTORICAL (OUTPATIENT)
Dept: ADMINISTRATIVE | Facility: HOSPITAL | Age: 67
End: 2022-04-25
Payer: COMMERCIAL

## 2022-04-25 ENCOUNTER — HISTORICAL (OUTPATIENT)
Dept: RADIOLOGY | Facility: HOSPITAL | Age: 67
End: 2022-04-25
Payer: COMMERCIAL

## 2022-05-19 ENCOUNTER — TELEPHONE (OUTPATIENT)
Dept: INTERNAL MEDICINE | Facility: CLINIC | Age: 67
End: 2022-05-19
Payer: COMMERCIAL

## 2022-05-19 NOTE — TELEPHONE ENCOUNTER
Pt left a VM requesting a refill on Amlodipine 10 mg but it is not in pt medication, preferred pharmacy is [facility] in [place]. Please advise

## 2022-05-27 RX ORDER — AMLODIPINE BESYLATE 10 MG/1
10 TABLET ORAL DAILY
Qty: 30 TABLET | Refills: 3 | Status: SHIPPED | OUTPATIENT
Start: 2022-05-27 | End: 2022-05-27

## 2022-05-27 RX ORDER — AMLODIPINE BESYLATE 10 MG/1
10 TABLET ORAL DAILY
Qty: 30 TABLET | Refills: 11 | Status: SHIPPED | OUTPATIENT
Start: 2022-05-27 | End: 2022-06-16

## 2022-05-27 RX ORDER — AMLODIPINE BESYLATE 10 MG/1
10 TABLET ORAL DAILY
Qty: 30 TABLET | Refills: 3 | Status: SHIPPED | OUTPATIENT
Start: 2022-05-27 | End: 2022-06-16 | Stop reason: SDUPTHER

## 2022-05-27 NOTE — TELEPHONE ENCOUNTER
Amlodipine sent to [facility]. I set it to normal to sent to [facility] but it printed it. Please verify at [facility] that they received script for amlodipine 10 once daily. #30 with 3 refills. If they did not then just give a verbal order. thanks

## 2022-05-31 NOTE — TELEPHONE ENCOUNTER
Called the pharmacy to verify if the script was received, they did not receive anything. Was told the pharmacist was busy and sent to the VM and was unable to leave a VM, will call back later to give verbal.

## 2022-06-09 ENCOUNTER — LAB VISIT (OUTPATIENT)
Dept: LAB | Facility: HOSPITAL | Age: 67
End: 2022-06-09
Attending: NURSE PRACTITIONER
Payer: COMMERCIAL

## 2022-06-09 DIAGNOSIS — E55.9 VITAMIN D DEFICIENCY: ICD-10-CM

## 2022-06-09 DIAGNOSIS — E78.5 HYPERLIPIDEMIA, UNSPECIFIED HYPERLIPIDEMIA TYPE: ICD-10-CM

## 2022-06-09 DIAGNOSIS — I10 HYPERTENSION, UNSPECIFIED TYPE: Primary | ICD-10-CM

## 2022-06-09 LAB
ABS NEUT CALC (OHS): 1.52 X10(3)/MCL (ref 2.1–9.2)
ALBUMIN SERPL-MCNC: 3.7 GM/DL (ref 3.4–4.8)
ALBUMIN/GLOB SERPL: 0.7 RATIO (ref 1.1–2)
ALP SERPL-CCNC: 65 UNIT/L (ref 40–150)
ALT SERPL-CCNC: 20 UNIT/L (ref 0–55)
ANISOCYTOSIS BLD QL SMEAR: ABNORMAL
APPEARANCE UR: CLEAR
AST SERPL-CCNC: 33 UNIT/L (ref 5–34)
BACTERIA #/AREA URNS AUTO: ABNORMAL /HPF
BILIRUB UR QL STRIP.AUTO: NEGATIVE MG/DL
BILIRUBIN DIRECT+TOT PNL SERPL-MCNC: 0.6 MG/DL
BUN SERPL-MCNC: 5.6 MG/DL (ref 8.4–25.7)
CALCIUM SERPL-MCNC: 9.6 MG/DL (ref 8.8–10)
CHLORIDE SERPL-SCNC: 99 MMOL/L (ref 98–107)
CHOLEST SERPL-MCNC: 130 MG/DL
CHOLEST/HDLC SERPL: 4 {RATIO} (ref 0–5)
CO2 SERPL-SCNC: 27 MMOL/L (ref 23–31)
COLOR UR AUTO: YELLOW
CREAT SERPL-MCNC: 0.98 MG/DL (ref 0.73–1.18)
DEPRECATED CALCIDIOL+CALCIFEROL SERPL-MC: 41.4 NG/ML (ref 30–80)
EOSINOPHIL NFR BLD MANUAL: 0.25 X10(3)/MCL (ref 0–0.9)
EOSINOPHIL NFR BLD MANUAL: 6 % (ref 0–8)
ERYTHROCYTE [DISTWIDTH] IN BLOOD BY AUTOMATED COUNT: 18.9 % (ref 11.5–17)
EST. AVERAGE GLUCOSE BLD GHB EST-MCNC: 116.9 MG/DL
GLOBULIN SER-MCNC: 5.3 GM/DL (ref 2.4–3.5)
GLUCOSE SERPL-MCNC: 97 MG/DL (ref 82–115)
GLUCOSE UR QL STRIP.AUTO: NORMAL MG/DL
HBA1C MFR BLD: 5.7 %
HCT VFR BLD AUTO: 44.3 % (ref 42–52)
HDLC SERPL-MCNC: 36 MG/DL (ref 35–60)
HGB BLD-MCNC: 13.8 GM/DL (ref 14–18)
HYALINE CASTS #/AREA URNS LPF: ABNORMAL /LPF
IMM GRANULOCYTES # BLD AUTO: 0.01 X10(3)/MCL (ref 0–0.02)
IMM GRANULOCYTES NFR BLD AUTO: 0.2 % (ref 0–0.43)
KETONES UR QL STRIP.AUTO: NEGATIVE MG/DL
LDLC SERPL CALC-MCNC: 81 MG/DL (ref 50–140)
LEUKOCYTE ESTERASE UR QL STRIP.AUTO: NEGATIVE UNIT/L
LYMPHOCYTES NFR BLD MANUAL: 2.09 X10(3)/MCL
LYMPHOCYTES NFR BLD MANUAL: 51 % (ref 13–40)
MCH RBC QN AUTO: 20.1 PG (ref 27–31)
MCHC RBC AUTO-ENTMCNC: 31.2 MG/DL (ref 33–36)
MCV RBC AUTO: 64.7 FL (ref 80–94)
MONOCYTES NFR BLD MANUAL: 0.25 X10(3)/MCL (ref 0.1–1.3)
MONOCYTES NFR BLD MANUAL: 6 % (ref 2–11)
MUCOUS THREADS URNS QL MICRO: ABNORMAL /LPF
NEUTROPHILS NFR BLD MANUAL: 37 % (ref 47–80)
NITRITE UR QL STRIP.AUTO: NEGATIVE
NRBC BLD AUTO-RTO: 0 %
PH UR STRIP.AUTO: 6.5 [PH]
PLATELET # BLD AUTO: 339 X10(3)/MCL (ref 130–400)
PMV BLD AUTO: 9 FL (ref 9.4–12.4)
POTASSIUM SERPL-SCNC: 4.3 MMOL/L (ref 3.5–5.1)
PROT SERPL-MCNC: 9 GM/DL (ref 5.8–7.6)
PROT UR QL STRIP.AUTO: ABNORMAL MG/DL
RBC # BLD AUTO: 6.85 X10(6)/MCL (ref 4.7–6.1)
RBC #/AREA URNS AUTO: ABNORMAL /HPF
RBC UR QL AUTO: NEGATIVE UNIT/L
ROULEAUX BLD QL SMEAR: ABNORMAL
SODIUM SERPL-SCNC: 136 MMOL/L (ref 136–145)
SP GR UR STRIP.AUTO: 1.02
SQUAMOUS #/AREA URNS LPF: ABNORMAL /HPF
TRIGL SERPL-MCNC: 65 MG/DL (ref 34–140)
TSH SERPL-ACNC: 1.6 UIU/ML (ref 0.35–4.94)
UROBILINOGEN UR STRIP-ACNC: NORMAL MG/DL
VLDLC SERPL CALC-MCNC: 13 MG/DL
WBC # SPEC AUTO: 4.1 X10(3)/MCL (ref 4.5–11.5)
WBC #/AREA URNS AUTO: ABNORMAL /HPF

## 2022-06-09 PROCEDURE — 84443 ASSAY THYROID STIM HORMONE: CPT

## 2022-06-09 PROCEDURE — 80053 COMPREHEN METABOLIC PANEL: CPT

## 2022-06-09 PROCEDURE — 83036 HEMOGLOBIN GLYCOSYLATED A1C: CPT

## 2022-06-09 PROCEDURE — 85027 COMPLETE CBC AUTOMATED: CPT

## 2022-06-09 PROCEDURE — 80061 LIPID PANEL: CPT

## 2022-06-09 PROCEDURE — 81001 URINALYSIS AUTO W/SCOPE: CPT

## 2022-06-09 PROCEDURE — 36415 COLL VENOUS BLD VENIPUNCTURE: CPT

## 2022-06-09 PROCEDURE — 82306 VITAMIN D 25 HYDROXY: CPT

## 2022-06-16 ENCOUNTER — OFFICE VISIT (OUTPATIENT)
Dept: INTERNAL MEDICINE | Facility: CLINIC | Age: 67
End: 2022-06-16
Payer: COMMERCIAL

## 2022-06-16 VITALS
BODY MASS INDEX: 28.54 KG/M2 | TEMPERATURE: 98 F | SYSTOLIC BLOOD PRESSURE: 129 MMHG | HEIGHT: 70 IN | WEIGHT: 199.38 LBS | HEART RATE: 67 BPM | DIASTOLIC BLOOD PRESSURE: 69 MMHG | RESPIRATION RATE: 16 BRPM

## 2022-06-16 DIAGNOSIS — I10 HYPERTENSION, UNSPECIFIED TYPE: ICD-10-CM

## 2022-06-16 DIAGNOSIS — M50.30 DDD (DEGENERATIVE DISC DISEASE), CERVICAL: ICD-10-CM

## 2022-06-16 DIAGNOSIS — E55.9 VITAMIN D DEFICIENCY: ICD-10-CM

## 2022-06-16 DIAGNOSIS — R73.03 PREDIABETES: ICD-10-CM

## 2022-06-16 DIAGNOSIS — E78.5 HYPERLIPIDEMIA, UNSPECIFIED HYPERLIPIDEMIA TYPE: ICD-10-CM

## 2022-06-16 DIAGNOSIS — B49 FUNGUS INFECTION: ICD-10-CM

## 2022-06-16 DIAGNOSIS — J43.9 PULMONARY EMPHYSEMA, UNSPECIFIED EMPHYSEMA TYPE: ICD-10-CM

## 2022-06-16 DIAGNOSIS — F10.10 ETOH ABUSE: ICD-10-CM

## 2022-06-16 DIAGNOSIS — K21.9 GASTROESOPHAGEAL REFLUX DISEASE, UNSPECIFIED WHETHER ESOPHAGITIS PRESENT: ICD-10-CM

## 2022-06-16 DIAGNOSIS — Z12.5 PROSTATE CANCER SCREENING: ICD-10-CM

## 2022-06-16 DIAGNOSIS — Z72.0 TOBACCO ABUSE: ICD-10-CM

## 2022-06-16 PROCEDURE — 99214 OFFICE O/P EST MOD 30 MIN: CPT | Mod: S$PBB,,, | Performed by: NURSE PRACTITIONER

## 2022-06-16 PROCEDURE — 3008F BODY MASS INDEX DOCD: CPT | Mod: CPTII,,, | Performed by: NURSE PRACTITIONER

## 2022-06-16 PROCEDURE — 3078F DIAST BP <80 MM HG: CPT | Mod: CPTII,,, | Performed by: NURSE PRACTITIONER

## 2022-06-16 PROCEDURE — 3008F PR BODY MASS INDEX (BMI) DOCUMENTED: ICD-10-PCS | Mod: CPTII,,, | Performed by: NURSE PRACTITIONER

## 2022-06-16 PROCEDURE — 3074F SYST BP LT 130 MM HG: CPT | Mod: CPTII,,, | Performed by: NURSE PRACTITIONER

## 2022-06-16 PROCEDURE — 1159F PR MEDICATION LIST DOCUMENTED IN MEDICAL RECORD: ICD-10-PCS | Mod: CPTII,,, | Performed by: NURSE PRACTITIONER

## 2022-06-16 PROCEDURE — 99214 PR OFFICE/OUTPT VISIT, EST, LEVL IV, 30-39 MIN: ICD-10-PCS | Mod: S$PBB,,, | Performed by: NURSE PRACTITIONER

## 2022-06-16 PROCEDURE — 4010F ACE/ARB THERAPY RXD/TAKEN: CPT | Mod: CPTII,,, | Performed by: NURSE PRACTITIONER

## 2022-06-16 PROCEDURE — 4010F PR ACE/ARB THEARPY RXD/TAKEN: ICD-10-PCS | Mod: CPTII,,, | Performed by: NURSE PRACTITIONER

## 2022-06-16 PROCEDURE — 1160F PR REVIEW ALL MEDS BY PRESCRIBER/CLIN PHARMACIST DOCUMENTED: ICD-10-PCS | Mod: CPTII,,, | Performed by: NURSE PRACTITIONER

## 2022-06-16 PROCEDURE — 3078F PR MOST RECENT DIASTOLIC BLOOD PRESSURE < 80 MM HG: ICD-10-PCS | Mod: CPTII,,, | Performed by: NURSE PRACTITIONER

## 2022-06-16 PROCEDURE — 1160F RVW MEDS BY RX/DR IN RCRD: CPT | Mod: CPTII,,, | Performed by: NURSE PRACTITIONER

## 2022-06-16 PROCEDURE — 99215 OFFICE O/P EST HI 40 MIN: CPT | Mod: PBBFAC | Performed by: NURSE PRACTITIONER

## 2022-06-16 PROCEDURE — 1159F MED LIST DOCD IN RCRD: CPT | Mod: CPTII,,, | Performed by: NURSE PRACTITIONER

## 2022-06-16 PROCEDURE — 3074F PR MOST RECENT SYSTOLIC BLOOD PRESSURE < 130 MM HG: ICD-10-PCS | Mod: CPTII,,, | Performed by: NURSE PRACTITIONER

## 2022-06-16 RX ORDER — SILDENAFIL 100 MG/1
TABLET, FILM COATED ORAL
COMMUNITY
Start: 2022-06-01 | End: 2022-01-01 | Stop reason: SDUPTHER

## 2022-06-16 RX ORDER — METOPROLOL SUCCINATE 25 MG/1
25 TABLET, EXTENDED RELEASE ORAL DAILY
COMMUNITY
Start: 2022-03-18 | End: 2022-06-16 | Stop reason: SDUPTHER

## 2022-06-16 RX ORDER — CETIRIZINE HYDROCHLORIDE 10 MG/1
10 TABLET ORAL DAILY
COMMUNITY
Start: 2022-04-17 | End: 2022-01-01 | Stop reason: SDUPTHER

## 2022-06-16 RX ORDER — METOPROLOL SUCCINATE 25 MG/1
25 TABLET, EXTENDED RELEASE ORAL DAILY
Qty: 90 TABLET | Refills: 1 | Status: SHIPPED | OUTPATIENT
Start: 2022-06-16 | End: 2022-01-01 | Stop reason: SDUPTHER

## 2022-06-16 RX ORDER — BACLOFEN 10 MG/1
TABLET ORAL
COMMUNITY
Start: 2022-04-22 | End: 2022-06-16 | Stop reason: SDUPTHER

## 2022-06-16 RX ORDER — FLUTICASONE PROPIONATE 50 MCG
1 SPRAY, SUSPENSION (ML) NASAL
COMMUNITY
End: 2022-01-01 | Stop reason: SDUPTHER

## 2022-06-16 RX ORDER — LOSARTAN POTASSIUM 50 MG/1
50 TABLET ORAL DAILY
Qty: 90 TABLET | Refills: 1 | Status: SHIPPED | OUTPATIENT
Start: 2022-06-16 | End: 2022-01-01 | Stop reason: SDUPTHER

## 2022-06-16 RX ORDER — AMLODIPINE BESYLATE 10 MG/1
10 TABLET ORAL DAILY
Qty: 90 TABLET | Refills: 1 | Status: SHIPPED | OUTPATIENT
Start: 2022-06-16 | End: 2022-01-01

## 2022-06-16 RX ORDER — ASPIRIN 81 MG/1
81 TABLET ORAL DAILY
COMMUNITY

## 2022-06-16 RX ORDER — BACLOFEN 10 MG/1
10 TABLET ORAL 3 TIMES DAILY PRN
Qty: 90 TABLET | Refills: 4 | Status: SHIPPED | OUTPATIENT
Start: 2022-06-16 | End: 2022-01-01 | Stop reason: SDUPTHER

## 2022-06-16 RX ORDER — LOVASTATIN 10 MG/1
10 TABLET ORAL NIGHTLY
COMMUNITY
Start: 2022-03-29 | End: 2022-06-16 | Stop reason: SDUPTHER

## 2022-06-16 RX ORDER — LOSARTAN POTASSIUM 50 MG/1
50 TABLET ORAL DAILY
COMMUNITY
Start: 2022-03-27 | End: 2022-06-16 | Stop reason: SDUPTHER

## 2022-06-16 RX ORDER — KETOCONAZOLE 20 MG/G
CREAM TOPICAL 2 TIMES DAILY
Qty: 60 G | Refills: 2 | Status: SHIPPED | OUTPATIENT
Start: 2022-06-16 | End: 2023-01-01 | Stop reason: SDUPTHER

## 2022-06-16 RX ORDER — OMEPRAZOLE 20 MG/1
20 CAPSULE, DELAYED RELEASE ORAL DAILY
Qty: 90 CAPSULE | Refills: 1 | Status: SHIPPED | OUTPATIENT
Start: 2022-06-16 | End: 2022-01-01 | Stop reason: SDUPTHER

## 2022-06-16 RX ORDER — OMEPRAZOLE 20 MG/1
20 CAPSULE, DELAYED RELEASE ORAL DAILY
COMMUNITY
Start: 2022-03-27 | End: 2022-06-16 | Stop reason: SDUPTHER

## 2022-06-16 RX ORDER — LOVASTATIN 10 MG/1
10 TABLET ORAL NIGHTLY
Qty: 90 TABLET | Refills: 1 | Status: SHIPPED | OUTPATIENT
Start: 2022-06-16 | End: 2022-01-01 | Stop reason: SDUPTHER

## 2022-06-16 RX ORDER — ALBUTEROL SULFATE 90 UG/1
1 AEROSOL, METERED RESPIRATORY (INHALATION) EVERY 12 HOURS PRN
Qty: 18 G | Refills: 0 | Status: SHIPPED | OUTPATIENT
Start: 2022-06-16 | End: 2022-01-01 | Stop reason: SDUPTHER

## 2022-06-16 NOTE — PROGRESS NOTES
Internal Medicine Clinic  BRISEYDA Sawant     Patient Name: Tom Anna   : 1955  MRN:[id number]     CC:  Chief Complaint   Patient presents with    Follow-up     Lab results        HPI  66 year old AAM, presents in clinic for lab f/u. c/o chronic neck pain and decrease neck ROM since work related accident, whiplash, driving a tractor, 10 yrs ago. Cervical X-ray displays Multilevel degenerative disc changes with bulky anterior bridging osteophytes from C2 to T1. No evidence of acute injury. Pt plans to see a chiropractor. Denies neck pain, states decrease neck ROM is his main concern.    PMH HTN, HLD, CVA (19), PVD, emphysema, claudication, ED, BPH, obesity, GERD, tinea pedis, tobacco and alcohol abuse.   Smokes 1ppd x 43 years. Unsuccessful attempts to quit in the past. Not ready to quit.  I also noted in 2017 he was seen in hematology clinic for workup of polycythemia, per hematology note polycythemia likely secondary to chronic tobacco use, obstructive sleep apnea. UZMA 2 kinase tests all negative.He was anemic in 2019 and after starting iron and omeprazole his H&H normalized. Pt is no longer on iron supplementation. Admits to drinking (6) 16oz beers per day. Denies chest pain, shortness of breath, cough, fever, night sweats, fatigue, headache, dizziness, weakness, abdominal pain, nausea, vomiting, diarrhea, constipation, dysuria, blood in stool or urine, depression, anxiety.  EGD/colonoscopy done on 2020 per Dr. Covington. EGD showed esophageal hiatal hernia, gastritis (biopsy neg), and esophagitis. Colonoscopy results showed diverticulosis of whole colon, 4mm polyp (polypectomy) and grade 1 internal hemorrhoids.  Following Cardiology, MD Ye, apt every 4-6 months; Rx viagra 100 mg prn.  NEURO apt q6months; last seen 3/12/2021, Dr. Deluna at [facility]. Scheduled 2022 for PT at [place] physical therapy; for huy lower extremity weakness.  Patient denies any family history of  testicular cancer. Pt is a .  Of note, patient was seen by ENDO clinic for elevated testosterone; note reports sex hormone binding globulin is also elevated, likely to his chronic alcohol abuse resulting in increased testosterone production.  Referred to see [facility] UROLOGY clinic for further work up of erectile dysfunction, elevated testosterone levels, and testicular lesion as noted on ultrasound, apt 6/28/2021  Sleep study 6/19/2020 displayed primary snoring without apnea.  Patient reports since Baclofen was added at prior apt, hiccups and chronic low back pain improved.  LDCT: 03/13/2022 Lung-RADS 2: Benign, repeat in 12 months.                     ROS  Review of Systems   Constitutional: Negative.    HENT: Negative.    Eyes: Negative.    Respiratory: Negative.    Cardiovascular: Negative.    Gastrointestinal: Negative.    Endocrine: Negative.    Genitourinary: Negative.    Musculoskeletal: Negative.    Integumentary:  Negative.   Allergic/Immunologic: Negative.    Neurological: Negative.    Hematological: Negative.    Psychiatric/Behavioral: Negative.    All other systems reviewed and are negative.       Physical Examination:  Vitals:    06/16/22 0951   BP: 129/69   Pulse: 67   Resp: 16   Temp: 98.2 °F (36.8 °C)          Physical Exam  Vitals and nursing note reviewed.   Constitutional:       Appearance: Normal appearance.   HENT:      Head: Normocephalic.      Right Ear: Tympanic membrane, ear canal and external ear normal.      Left Ear: Tympanic membrane, ear canal and external ear normal.      Nose: Nose normal.      Mouth/Throat:      Mouth: Mucous membranes are moist.      Pharynx: Oropharynx is clear.   Eyes:      Extraocular Movements: Extraocular movements intact.      Conjunctiva/sclera: Conjunctivae normal.      Pupils: Pupils are equal, round, and reactive to light.   Cardiovascular:      Rate and Rhythm: Normal rate and regular rhythm.      Pulses: Normal pulses.      Heart sounds: Normal heart  sounds.   Pulmonary:      Effort: Pulmonary effort is normal.      Breath sounds: Normal breath sounds.   Abdominal:      General: Bowel sounds are normal.      Palpations: Abdomen is soft.   Musculoskeletal:         General: Normal range of motion.      Cervical back: Normal range of motion and neck supple.   Skin:     General: Skin is warm and dry.      Capillary Refill: Capillary refill takes less than 2 seconds.   Neurological:      General: No focal deficit present.      Mental Status: He is alert and oriented to person, place, and time. Mental status is at baseline.   Psychiatric:         Mood and Affect: Mood normal.         Behavior: Behavior normal.         Thought Content: Thought content normal.         Judgment: Judgment normal.            Labs/Imaging:  Reviewed    Assessment/Plan:  1. Hypertension, unspecified type  - CBC Auto Differential; Future  - Comprehensive Metabolic Panel; Future  - Lipid Panel; Future  - Hemoglobin A1C; Future  BP stable. Med refills.  DASH diet: Eat more fruits, vegetables, and low fat dairy foods.  (Less than 2 grams of sodium per day).  Maintain healthy weight with goal BMI <30.   Exercise 30 minutes per day 5 days per week.  Home medications refilled and continued.   Home BP monitoring encouraged with BP parameters given.       2. Hyperlipidemia, unspecified hyperlipidemia type  - CBC Auto Differential; Future  - Comprehensive Metabolic Panel; Future  - Lipid Panel; Future  - Hemoglobin A1C; Future  Lab Results   Component Value Date    LDL 81.00 06/09/2022    HDL 36 06/09/2022    TRIG 65 06/09/2022       Cont RX daily; refills given. Take Omega 3 daily.   Stressed importance of dietary modifications. Follow a low cholesterol, low saturated fat diet with less that 200mg of cholesterol a day.  Avoid fried foods and high saturated fats (high saturated fats less than 7% of calories).  Add Flax Seed/Fish Oil supplements to diet. Increase dietary fiber.  Regular exercise can  reduce LDL and raise HDL. Stressed importance of physical activity 5 times per week for 30 minutes per day.   3. Gastroesophageal reflux disease, unspecified whether esophagitis present  Continue PPI.   Avoid spicy, acidic, fried foods and alcohol.  Eat 2-3 hours before going to bed.  Avoid tight clothing, chew food thoroughly.  Reduce caffeine intake, avoid soda.    4. BMI 28.0-28.9,adult  Goal BMI <30.  Exercise 5 times a week for 30 minutes per day.  Avoid soda, simple sugars, excessive rice, potatoes or bread. Limit fast foods and fried foods.  Choose complex carbs in moderation (example: green vegetables, beans, oatmeal). Eat plenty of fresh fruits and vegetables with lean meats daily.  Do not skip meals. Eat a balanced portion size.  Avoid fad diets. Consider permanent healthy life style changes.     5. Tobacco abuse  - Complete PFT with bronchodilator; Future  - albuterol (VENTOLIN HFA) 90 mcg/actuation inhaler; Inhale 1 puff into the lungs every 12 (twelve) hours as needed for Wheezing or Shortness of Breath. Rescue  Dispense: 18 g; Refill: 0    6. ETOH abuse  Cessation encouraged.    7. Prediabetes  - Hemoglobin A1C; Future  A1C decreased at 5.7. Close f/u.  Low carb diet.    8. Fungus infection  - ketoconazole (NIZORAL) 2 % cream; Apply topically 2 (two) times daily.  Dispense: 60 g; Refill: 2    9. Pulmonary emphysema, unspecified emphysema type  - Complete PFT with bronchodilator; Future  - albuterol (VENTOLIN HFA) 90 mcg/actuation inhaler; Inhale 1 puff into the lungs every 12 (twelve) hours as needed for Wheezing or Shortness of Breath. Rescue  Dispense: 18 g; Refill: 0  Smoking cessation advised. Instructed on smoking cessation program through [facility] and pharmacological interventions to aid in cessation.    10. Prostate cancer screening  - PSA, Screening; Future    11. Vitamin D deficiency   Vit d 41 at goal. Take OTC VIT D.    12. Cervical DDD  Xray results reviewed.  HEP  Consider PT  Baclofen  prn    Medication List with Changes/Refills   New Medications    ALBUTEROL (VENTOLIN HFA) 90 MCG/ACTUATION INHALER    Inhale 1 puff into the lungs every 12 (twelve) hours as needed for Wheezing or Shortness of Breath. Rescue   Current Medications    ASPIRIN (ECOTRIN) 81 MG EC TABLET    Take 81 mg by mouth.    CETIRIZINE (ZYRTEC) 10 MG TABLET    Take 10 mg by mouth once daily.    FLUTICASONE PROPIONATE (FLONASE) 50 MCG/ACTUATION NASAL SPRAY    1 spray by Nasal route.    MULTIVITAMIN CAPSULE    Take 1 capsule by mouth once daily.    SILDENAFIL (VIAGRA) 100 MG TABLET    TAKE 1 TABLET BY MOUTH AS NEEDED   Changed and/or Refilled Medications    Modified Medication Previous Medication    AMLODIPINE (NORVASC) 10 MG TABLET amLODIPine (NORVASC) 10 MG tablet       Take 1 tablet (10 mg total) by mouth once daily.    Take 1 tablet (10 mg total) by mouth once daily.    BACLOFEN (LIORESAL) 10 MG TABLET baclofen (LIORESAL) 10 MG tablet       Take 1 tablet (10 mg total) by mouth 3 (three) times daily as needed (muscle spasms, hiccups).    TAKE 1 TABLET BY MOUTH THREE TIMES DAILY AS NEEDED FOR MUSCLE SPASM / HICCUPS    KETOCONAZOLE (NIZORAL) 2 % CREAM ketoconazole (NIZORAL) 2 % cream       Apply topically 2 (two) times daily.    APPLY TOPICALLY TO AFFECTED AREA DAILY    LOSARTAN (COZAAR) 50 MG TABLET losartan (COZAAR) 50 MG tablet       Take 1 tablet (50 mg total) by mouth once daily.    Take 50 mg by mouth once daily.    LOVASTATIN (MEVACOR) 10 MG TABLET lovastatin (MEVACOR) 10 MG tablet       Take 1 tablet (10 mg total) by mouth nightly.    Take 10 mg by mouth nightly.    METOPROLOL SUCCINATE (TOPROL-XL) 25 MG 24 HR TABLET metoprolol succinate (TOPROL-XL) 25 MG 24 hr tablet       Take 1 tablet (25 mg total) by mouth once daily.    Take 25 mg by mouth once daily.    OMEPRAZOLE (PRILOSEC) 20 MG CAPSULE omeprazole (PRILOSEC) 20 MG capsule       Take 1 capsule (20 mg total) by mouth once daily.    Take 20 mg by mouth once daily.    Discontinued Medications    AMLODIPINE (NORVASC) 10 MG TABLET    Take 1 tablet (10 mg total) by mouth once daily.        Future Appointments   Date Time Provider Department Center   10/17/2022  9:00 AM [facility]         Labs thoroughly reviewed with patient. Medication refills addressed today.  RTC prn and 3-4 months, with labs 1 week prior to the apt.  COVID 19 precautions given to patient.  Patient voices understanding of all discharge instructions.

## 2022-06-24 NOTE — TELEPHONE ENCOUNTER
At our recent visit, he told me he was going to see a chiropractor, but he wants to see a PT for his neck, he will need to check with his insurance provider to see who is in network and near by his home. He will then need to notify us where he would like the referral sent to. thanks

## 2022-06-29 NOTE — TELEPHONE ENCOUNTER
Pt says he would like the referral to be sent to [facility]. That who he used previously and they accept his insurance.

## 2022-07-01 NOTE — TELEPHONE ENCOUNTER
I ordered the referral to [facility], but I did not know the location, there are multiple sites. Please fax to pts choice location. thanks

## 2023-01-01 ENCOUNTER — TELEMEDICINE (OUTPATIENT)
Dept: NEUROLOGY | Facility: HOSPITAL | Age: 68
End: 2023-01-01
Payer: COMMERCIAL

## 2023-01-01 ENCOUNTER — OFFICE VISIT (OUTPATIENT)
Dept: INTERNAL MEDICINE | Facility: CLINIC | Age: 68
End: 2023-01-01
Payer: COMMERCIAL

## 2023-01-01 ENCOUNTER — OFFICE VISIT (OUTPATIENT)
Dept: CARDIAC SURGERY | Facility: CLINIC | Age: 68
End: 2023-01-01
Payer: COMMERCIAL

## 2023-01-01 ENCOUNTER — HOSPITAL ENCOUNTER (OUTPATIENT)
Dept: TELEMEDICINE | Facility: HOSPITAL | Age: 68
Discharge: HOME | End: 2023-03-15
Payer: COMMERCIAL

## 2023-01-01 ENCOUNTER — TELEPHONE (OUTPATIENT)
Dept: CARDIAC SURGERY | Facility: CLINIC | Age: 68
End: 2023-01-01
Payer: COMMERCIAL

## 2023-01-01 ENCOUNTER — ANESTHESIA (OUTPATIENT)
Dept: SURGERY | Facility: HOSPITAL | Age: 68
DRG: 235 | End: 2023-01-01
Payer: COMMERCIAL

## 2023-01-01 ENCOUNTER — HOSPITAL ENCOUNTER (INPATIENT)
Facility: HOSPITAL | Age: 68
LOS: 18 days | DRG: 235 | End: 2023-06-19
Attending: THORACIC SURGERY (CARDIOTHORACIC VASCULAR SURGERY) | Admitting: THORACIC SURGERY (CARDIOTHORACIC VASCULAR SURGERY)
Payer: COMMERCIAL

## 2023-01-01 ENCOUNTER — ANESTHESIA EVENT (OUTPATIENT)
Dept: SURGERY | Facility: HOSPITAL | Age: 68
DRG: 235 | End: 2023-01-01
Payer: COMMERCIAL

## 2023-01-01 ENCOUNTER — HOSPITAL ENCOUNTER (OUTPATIENT)
Dept: RADIOLOGY | Facility: HOSPITAL | Age: 68
Discharge: HOME | End: 2023-05-26
Attending: THORACIC SURGERY (CARDIOTHORACIC VASCULAR SURGERY)
Payer: COMMERCIAL

## 2023-01-01 VITALS
DIASTOLIC BLOOD PRESSURE: 67 MMHG | BODY MASS INDEX: 29.23 KG/M2 | WEIGHT: 204.19 LBS | HEIGHT: 70 IN | RESPIRATION RATE: 16 BRPM | HEART RATE: 80 BPM | TEMPERATURE: 99 F | SYSTOLIC BLOOD PRESSURE: 117 MMHG

## 2023-01-01 VITALS
SYSTOLIC BLOOD PRESSURE: 138 MMHG | WEIGHT: 189 LBS | DIASTOLIC BLOOD PRESSURE: 67 MMHG | OXYGEN SATURATION: 98 % | HEIGHT: 70 IN | HEART RATE: 67 BPM | BODY MASS INDEX: 27.06 KG/M2

## 2023-01-01 DIAGNOSIS — H51.0 GAZE PALSY: ICD-10-CM

## 2023-01-01 DIAGNOSIS — F17.200 NICOTINE DEPENDENCE, UNCOMPLICATED, UNSPECIFIED NICOTINE PRODUCT TYPE: ICD-10-CM

## 2023-01-01 DIAGNOSIS — R41.82 ALTERED MENTAL STATUS, UNSPECIFIED ALTERED MENTAL STATUS TYPE: ICD-10-CM

## 2023-01-01 DIAGNOSIS — I31.39 PERICARDIAL EFFUSION: ICD-10-CM

## 2023-01-01 DIAGNOSIS — E55.9 VITAMIN D DEFICIENCY: ICD-10-CM

## 2023-01-01 DIAGNOSIS — I63.02 STROKE DUE TO THROMBOSIS OF BASILAR ARTERY: Primary | ICD-10-CM

## 2023-01-01 DIAGNOSIS — I48.91 A-FIB: ICD-10-CM

## 2023-01-01 DIAGNOSIS — I25.10 CAD (CORONARY ARTERY DISEASE): ICD-10-CM

## 2023-01-01 DIAGNOSIS — I10 HYPERTENSION, UNSPECIFIED TYPE: ICD-10-CM

## 2023-01-01 DIAGNOSIS — K21.9 GASTROESOPHAGEAL REFLUX DISEASE, UNSPECIFIED WHETHER ESOPHAGITIS PRESENT: ICD-10-CM

## 2023-01-01 DIAGNOSIS — I25.10 CORONARY ARTERY DISEASE: Primary | ICD-10-CM

## 2023-01-01 DIAGNOSIS — B49 FUNGUS INFECTION: ICD-10-CM

## 2023-01-01 DIAGNOSIS — R06.03 RESPIRATORY DISTRESS: ICD-10-CM

## 2023-01-01 DIAGNOSIS — I25.10 CORONARY ARTERY DISEASE: ICD-10-CM

## 2023-01-01 DIAGNOSIS — F17.200 TOBACCO DEPENDENCY: ICD-10-CM

## 2023-01-01 DIAGNOSIS — E78.5 HYPERLIPIDEMIA, UNSPECIFIED HYPERLIPIDEMIA TYPE: ICD-10-CM

## 2023-01-01 DIAGNOSIS — R09.89 PULSE IRREGULARITY: ICD-10-CM

## 2023-01-01 DIAGNOSIS — I10 PRIMARY HYPERTENSION: ICD-10-CM

## 2023-01-01 DIAGNOSIS — I95.9 HYPOTENSION, UNSPECIFIED HYPOTENSION TYPE: ICD-10-CM

## 2023-01-01 DIAGNOSIS — R73.03 PREDIABETES: ICD-10-CM

## 2023-01-01 DIAGNOSIS — J43.9 PULMONARY EMPHYSEMA, UNSPECIFIED EMPHYSEMA TYPE: ICD-10-CM

## 2023-01-01 DIAGNOSIS — H51.21 INO (INTERNUCLEAR OPHTHALMOPLEGIA), RIGHT: ICD-10-CM

## 2023-01-01 DIAGNOSIS — R73.9 HYPERGLYCEMIA: ICD-10-CM

## 2023-01-01 DIAGNOSIS — Z95.1 HX OF CABG: ICD-10-CM

## 2023-01-01 DIAGNOSIS — R94.31 QT PROLONGATION: ICD-10-CM

## 2023-01-01 DIAGNOSIS — Z86.73 HISTORY OF STROKE WITHOUT RESIDUAL DEFICITS: ICD-10-CM

## 2023-01-01 DIAGNOSIS — Z13.6 SCREENING FOR AAA (ABDOMINAL AORTIC ANEURYSM): ICD-10-CM

## 2023-01-01 DIAGNOSIS — Z95.1 S/P CABG X 3: ICD-10-CM

## 2023-01-01 DIAGNOSIS — Z72.0 TOBACCO ABUSE: ICD-10-CM

## 2023-01-01 LAB
ABO + RH BLD: NORMAL
ABS NEUT (OLG): 11.21 X10(3)/MCL (ref 2.1–9.2)
ABS NEUT (OLG): 15.95 X10(3)/MCL (ref 2.1–9.2)
ABS NEUT (OLG): 22.25 X10(3)/MCL (ref 2.1–9.2)
ABS NEUT (OLG): 4.08 X10(3)/MCL (ref 2.1–9.2)
ABS NEUT (OLG): 9.41 X10(3)/MCL (ref 2.1–9.2)
ACANTHOCYTES (OLG): ABNORMAL
ACANTHOCYTES (OLG): ABNORMAL
ALBUMIN SERPL-MCNC: 1.8 G/DL (ref 3.4–4.8)
ALBUMIN SERPL-MCNC: 1.9 G/DL (ref 3.4–4.8)
ALBUMIN SERPL-MCNC: 1.9 G/DL (ref 3.4–4.8)
ALBUMIN SERPL-MCNC: 2 G/DL (ref 3.4–4.8)
ALBUMIN SERPL-MCNC: 2.2 G/DL (ref 3.4–4.8)
ALBUMIN SERPL-MCNC: 2.2 G/DL (ref 3.4–4.8)
ALBUMIN SERPL-MCNC: 2.3 G/DL (ref 3.4–4.8)
ALBUMIN SERPL-MCNC: 2.4 G/DL (ref 3.4–4.8)
ALBUMIN/GLOB SERPL: 0.3 RATIO (ref 1.1–2)
ALBUMIN/GLOB SERPL: 0.4 RATIO (ref 1.1–2)
ALBUMIN/GLOB SERPL: 0.4 RATIO (ref 1.1–2)
ALBUMIN/GLOB SERPL: 0.5 RATIO (ref 1.1–2)
ALBUMIN/GLOB SERPL: 0.6 RATIO (ref 1.1–2)
ALBUMIN/GLOB SERPL: 1.1 RATIO (ref 1.1–2)
ALLENS TEST BLOOD GAS (OHS): ABNORMAL
ALLENS TEST BLOOD GAS (OHS): ABNORMAL
ALLENS TEST BLOOD GAS (OHS): YES
ALP SERPL-CCNC: 105 UNIT/L (ref 40–150)
ALP SERPL-CCNC: 109 UNIT/L (ref 40–150)
ALP SERPL-CCNC: 45 UNIT/L (ref 40–150)
ALP SERPL-CCNC: 49 UNIT/L (ref 40–150)
ALP SERPL-CCNC: 52 UNIT/L (ref 40–150)
ALP SERPL-CCNC: 55 UNIT/L (ref 40–150)
ALP SERPL-CCNC: 55 UNIT/L (ref 40–150)
ALP SERPL-CCNC: 56 UNIT/L (ref 40–150)
ALP SERPL-CCNC: 65 UNIT/L (ref 40–150)
ALP SERPL-CCNC: 74 UNIT/L (ref 40–150)
ALP SERPL-CCNC: 80 UNIT/L (ref 40–150)
ALP SERPL-CCNC: 90 UNIT/L (ref 40–150)
ALT SERPL-CCNC: 103 UNIT/L (ref 0–55)
ALT SERPL-CCNC: 109 UNIT/L (ref 0–55)
ALT SERPL-CCNC: 112 UNIT/L (ref 0–55)
ALT SERPL-CCNC: 122 UNIT/L (ref 0–55)
ALT SERPL-CCNC: 170 UNIT/L (ref 0–55)
ALT SERPL-CCNC: 178 UNIT/L (ref 0–55)
ALT SERPL-CCNC: 24 UNIT/L (ref 0–55)
ALT SERPL-CCNC: 244 UNIT/L (ref 0–55)
ALT SERPL-CCNC: 287 UNIT/L (ref 0–55)
ALT SERPL-CCNC: 310 UNIT/L (ref 0–55)
ALT SERPL-CCNC: 40 UNIT/L (ref 0–55)
ALT SERPL-CCNC: 5 UNIT/L (ref 0–55)
ANION GAP SERPL CALC-SCNC: 10 MEQ/L
ANION GAP SERPL CALC-SCNC: 11 MEQ/L
ANION GAP SERPL CALC-SCNC: 12 MEQ/L
ANION GAP SERPL CALC-SCNC: 13 MEQ/L
ANION GAP SERPL CALC-SCNC: 14 MEQ/L
ANION GAP SERPL CALC-SCNC: 14 MEQ/L
ANION GAP SERPL CALC-SCNC: 15 MEQ/L
ANION GAP SERPL CALC-SCNC: 16 MEQ/L
ANION GAP SERPL CALC-SCNC: 17 MEQ/L
ANION GAP SERPL CALC-SCNC: 18 MEQ/L
ANION GAP SERPL CALC-SCNC: 19 MEQ/L
ANION GAP SERPL CALC-SCNC: 19 MEQ/L
ANION GAP SERPL CALC-SCNC: 20 MEQ/L
ANION GAP SERPL CALC-SCNC: 24 MEQ/L
ANION GAP SERPL CALC-SCNC: 6 MEQ/L
ANION GAP SERPL CALC-SCNC: 8 MEQ/L
ANION GAP SERPL CALC-SCNC: 8 MEQ/L
ANISOCYTOSIS BLD QL SMEAR: ABNORMAL
APPEARANCE UR: CLEAR
APPEARANCE UR: CLEAR
APTT PPP: 33.3 SECONDS (ref 23.2–33.7)
APTT PPP: 33.6 SECONDS (ref 23.2–33.7)
AST SERPL-CCNC: 148 UNIT/L (ref 5–34)
AST SERPL-CCNC: 18 UNIT/L (ref 5–34)
AST SERPL-CCNC: 210 UNIT/L (ref 5–34)
AST SERPL-CCNC: 213 UNIT/L (ref 5–34)
AST SERPL-CCNC: 347 UNIT/L (ref 5–34)
AST SERPL-CCNC: 35 UNIT/L (ref 5–34)
AST SERPL-CCNC: 56 UNIT/L (ref 5–34)
AST SERPL-CCNC: 56 UNIT/L (ref 5–34)
AST SERPL-CCNC: 64 UNIT/L (ref 5–34)
AST SERPL-CCNC: 66 UNIT/L (ref 5–34)
AST SERPL-CCNC: 67 UNIT/L (ref 5–34)
AST SERPL-CCNC: 78 UNIT/L (ref 5–34)
B PERT.PT PRMT NPH QL NAA+NON-PROBE: NOT DETECTED
BACTERIA #/AREA URNS AUTO: NORMAL /HPF
BACTERIA #/AREA URNS AUTO: NORMAL /HPF
BACTERIA BLD CULT: NORMAL
BACTERIA BLD CULT: NORMAL
BACTERIA SPEC CULT: ABNORMAL
BACTERIA SPEC CULT: ABNORMAL
BACTERIA SPT CULT: ABNORMAL
BASE EXCESS BLD CALC-SCNC: -0.2 MMOL/L (ref -2–2)
BASE EXCESS BLD CALC-SCNC: -0.2 MMOL/L (ref -2–2)
BASE EXCESS BLD CALC-SCNC: -1 MMOL/L
BASE EXCESS BLD CALC-SCNC: -1.5 MMOL/L
BASE EXCESS BLD CALC-SCNC: -13.5 MMOL/L (ref -2–2)
BASE EXCESS BLD CALC-SCNC: -2.1 MMOL/L (ref -2–2)
BASE EXCESS BLD CALC-SCNC: -2.6 MMOL/L
BASE EXCESS BLD CALC-SCNC: -8 MMOL/L (ref -2–2)
BASE EXCESS BLD CALC-SCNC: 0.3 MMOL/L (ref -2–2)
BASE EXCESS BLD CALC-SCNC: 1.5 MMOL/L (ref -2–2)
BASE EXCESS BLD CALC-SCNC: 1.6 MMOL/L (ref -2–2)
BASE EXCESS BLD CALC-SCNC: 2.3 MMOL/L (ref -2–2)
BASE EXCESS BLD CALC-SCNC: 2.5 MMOL/L (ref -2–2)
BASE EXCESS BLD CALC-SCNC: 3.3 MMOL/L (ref -2–2)
BASE EXCESS BLD CALC-SCNC: 4 MMOL/L
BASE EXCESS BLD CALC-SCNC: 4.7 MMOL/L
BASOPHILS # BLD AUTO: 0 X10(3)/MCL
BASOPHILS # BLD AUTO: 0.02 X10(3)/MCL
BASOPHILS # BLD AUTO: 0.03 X10(3)/MCL
BASOPHILS # BLD AUTO: 0.05 X10(3)/MCL
BASOPHILS # BLD AUTO: 0.06 X10(3)/MCL
BASOPHILS # BLD AUTO: 0.08 X10(3)/MCL
BASOPHILS NFR BLD AUTO: 0 %
BASOPHILS NFR BLD AUTO: 0.1 %
BASOPHILS NFR BLD AUTO: 0.2 %
BASOPHILS NFR BLD AUTO: 0.3 %
BASOPHILS NFR BLD AUTO: 0.4 %
BASOPHILS NFR BLD AUTO: 0.5 %
BASOPHILS NFR BLD MANUAL: 0.38 X10(3)/MCL (ref 0–0.2)
BASOPHILS NFR BLD MANUAL: 2 %
BILIRUB UR QL STRIP.AUTO: NEGATIVE MG/DL
BILIRUB UR QL STRIP.AUTO: NEGATIVE MG/DL
BILIRUBIN DIRECT+TOT PNL SERPL-MCNC: 0.7 MG/DL
BILIRUBIN DIRECT+TOT PNL SERPL-MCNC: 0.8 MG/DL
BILIRUBIN DIRECT+TOT PNL SERPL-MCNC: 0.8 MG/DL
BILIRUBIN DIRECT+TOT PNL SERPL-MCNC: 1 MG/DL
BILIRUBIN DIRECT+TOT PNL SERPL-MCNC: 1.1 MG/DL
BILIRUBIN DIRECT+TOT PNL SERPL-MCNC: 1.1 MG/DL
BILIRUBIN DIRECT+TOT PNL SERPL-MCNC: 1.3 MG/DL
BIPAP(E) BLOOD GAS (OHS): 8 CM H2O
BIPAP(I) BLOOD GAS (OHS): 14 CM H2O
BLD PROD TYP BPU: NORMAL
BLOOD GAS SAMPLE TYPE (OHS): ABNORMAL
BLOOD UNIT EXPIRATION DATE: NORMAL
BLOOD UNIT TYPE CODE: 6200
BSA FOR ECHO PROCEDURE: 2.07 M2
BSA FOR ECHO PROCEDURE: 2.07 M2
BUN SERPL-MCNC: 101.4 MG/DL (ref 8.4–25.7)
BUN SERPL-MCNC: 101.8 MG/DL (ref 8.4–25.7)
BUN SERPL-MCNC: 106.8 MG/DL (ref 8.4–25.7)
BUN SERPL-MCNC: 11.2 MG/DL (ref 8.4–25.7)
BUN SERPL-MCNC: 111.2 MG/DL (ref 8.4–25.7)
BUN SERPL-MCNC: 115.8 MG/DL (ref 8.4–25.7)
BUN SERPL-MCNC: 118.1 MG/DL (ref 8.4–25.7)
BUN SERPL-MCNC: 118.7 MG/DL (ref 8.4–25.7)
BUN SERPL-MCNC: 120 MG/DL (ref 8.4–25.7)
BUN SERPL-MCNC: 121.6 MG/DL (ref 8.4–25.7)
BUN SERPL-MCNC: 18.4 MG/DL (ref 8.4–25.7)
BUN SERPL-MCNC: 20.9 MG/DL (ref 8.4–25.7)
BUN SERPL-MCNC: 30 MG/DL (ref 8.4–25.7)
BUN SERPL-MCNC: 30.6 MG/DL (ref 8.4–25.7)
BUN SERPL-MCNC: 31.9 MG/DL (ref 8.4–25.7)
BUN SERPL-MCNC: 33.1 MG/DL (ref 8.4–25.7)
BUN SERPL-MCNC: 34.2 MG/DL (ref 8.4–25.7)
BUN SERPL-MCNC: 34.3 MG/DL (ref 8.4–25.7)
BUN SERPL-MCNC: 35.7 MG/DL (ref 8.4–25.7)
BUN SERPL-MCNC: 36.4 MG/DL (ref 8.4–25.7)
BUN SERPL-MCNC: 5 MG/DL (ref 8.4–25.7)
BUN SERPL-MCNC: 5 MG/DL (ref 8.4–25.7)
BUN SERPL-MCNC: 5.6 MG/DL (ref 8.4–25.7)
BUN SERPL-MCNC: 51.8 MG/DL (ref 8.4–25.7)
BUN SERPL-MCNC: 54.1 MG/DL (ref 8.4–25.7)
BUN SERPL-MCNC: 56.6 MG/DL (ref 8.4–25.7)
BUN SERPL-MCNC: 67.3 MG/DL (ref 8.4–25.7)
BUN SERPL-MCNC: 70.5 MG/DL (ref 8.4–25.7)
BUN SERPL-MCNC: 72.7 MG/DL (ref 8.4–25.7)
BUN SERPL-MCNC: 74.3 MG/DL (ref 8.4–25.7)
BUN SERPL-MCNC: 81.1 MG/DL (ref 8.4–25.7)
BUN SERPL-MCNC: 84.3 MG/DL (ref 8.4–25.7)
BUN SERPL-MCNC: 84.4 MG/DL (ref 8.4–25.7)
BUN SERPL-MCNC: 9.6 MG/DL (ref 8.4–25.7)
BUN SERPL-MCNC: 94.8 MG/DL (ref 8.4–25.7)
BUN SERPL-MCNC: 95.3 MG/DL (ref 8.4–25.7)
BUN SERPL-MCNC: 96.2 MG/DL (ref 8.4–25.7)
BUN SERPL-MCNC: 98 MG/DL (ref 8.4–25.7)
BUN SERPL-MCNC: >125 MG/DL (ref 8.4–25.7)
BURR CELLS (OLG): ABNORMAL
C PNEUM DNA NPH QL NAA+NON-PROBE: NOT DETECTED
CA-I BLD-SCNC: 0.92 MMOL/L (ref 1.12–1.23)
CA-I BLD-SCNC: 1.01 MMOL/L (ref 1.12–1.23)
CA-I BLD-SCNC: 1.06 MMOL/L (ref 1.12–1.23)
CA-I BLD-SCNC: 1.06 MMOL/L (ref 1.12–1.23)
CA-I BLD-SCNC: 1.07 MMOL/L (ref 1.12–1.23)
CA-I BLD-SCNC: 1.09 MMOL/L (ref 1.12–1.23)
CA-I BLD-SCNC: 1.09 MMOL/L (ref 1.12–1.23)
CA-I BLD-SCNC: 1.23 MMOL/L (ref 1.12–1.23)
CA-I BLD-SCNC: 1.24 MMOL/L (ref 1.12–1.23)
CA-I BLD-SCNC: 1.24 MMOL/L (ref 1.12–1.23)
CA-I BLD-SCNC: 1.25 MMOL/L (ref 1.12–1.23)
CA-I BLD-SCNC: 1.31 MMOL/L (ref 1.12–1.23)
CA-I BLD-SCNC: 1.32 MMOL/L (ref 1.12–1.23)
CA-I BLD-SCNC: 1.33 MMOL/L (ref 1.12–1.23)
CALCIUM SERPL-MCNC: 6.7 MG/DL (ref 8.8–10)
CALCIUM SERPL-MCNC: 6.8 MG/DL (ref 8.8–10)
CALCIUM SERPL-MCNC: 6.8 MG/DL (ref 8.8–10)
CALCIUM SERPL-MCNC: 6.9 MG/DL (ref 8.8–10)
CALCIUM SERPL-MCNC: 7.2 MG/DL (ref 8.8–10)
CALCIUM SERPL-MCNC: 7.2 MG/DL (ref 8.8–10)
CALCIUM SERPL-MCNC: 7.4 MG/DL (ref 8.8–10)
CALCIUM SERPL-MCNC: 7.5 MG/DL (ref 8.8–10)
CALCIUM SERPL-MCNC: 7.6 MG/DL (ref 8.8–10)
CALCIUM SERPL-MCNC: 7.7 MG/DL (ref 8.8–10)
CALCIUM SERPL-MCNC: 7.8 MG/DL (ref 8.8–10)
CALCIUM SERPL-MCNC: 7.9 MG/DL (ref 8.8–10)
CALCIUM SERPL-MCNC: 8 MG/DL (ref 8.8–10)
CALCIUM SERPL-MCNC: 8 MG/DL (ref 8.8–10)
CALCIUM SERPL-MCNC: 8.1 MG/DL (ref 8.8–10)
CALCIUM SERPL-MCNC: 8.2 MG/DL (ref 8.8–10)
CALCIUM SERPL-MCNC: 8.2 MG/DL (ref 8.8–10)
CALCIUM SERPL-MCNC: 8.3 MG/DL (ref 8.8–10)
CALCIUM SERPL-MCNC: 8.5 MG/DL (ref 8.8–10)
CALCIUM SERPL-MCNC: 8.9 MG/DL (ref 8.8–10)
CALCIUM SERPL-MCNC: 8.9 MG/DL (ref 8.8–10)
CALCIUM SERPL-MCNC: 9.1 MG/DL (ref 8.8–10)
CALCIUM SERPL-MCNC: 9.1 MG/DL (ref 8.8–10)
CALCIUM SERPL-MCNC: 9.5 MG/DL (ref 8.8–10)
CHLORIDE SERPL-SCNC: 102 MMOL/L (ref 98–107)
CHLORIDE SERPL-SCNC: 103 MMOL/L (ref 98–107)
CHLORIDE SERPL-SCNC: 104 MMOL/L (ref 98–107)
CHLORIDE SERPL-SCNC: 104 MMOL/L (ref 98–107)
CHLORIDE SERPL-SCNC: 105 MMOL/L (ref 98–107)
CHLORIDE SERPL-SCNC: 106 MMOL/L (ref 98–107)
CHLORIDE SERPL-SCNC: 110 MMOL/L (ref 98–107)
CHLORIDE SERPL-SCNC: 111 MMOL/L (ref 98–107)
CHLORIDE SERPL-SCNC: 111 MMOL/L (ref 98–107)
CHLORIDE SERPL-SCNC: 112 MMOL/L (ref 98–107)
CHLORIDE SERPL-SCNC: 112 MMOL/L (ref 98–107)
CHLORIDE SERPL-SCNC: 114 MMOL/L (ref 98–107)
CHLORIDE SERPL-SCNC: 114 MMOL/L (ref 98–107)
CHLORIDE SERPL-SCNC: 115 MMOL/L (ref 98–107)
CHLORIDE SERPL-SCNC: 116 MMOL/L (ref 98–107)
CHLORIDE SERPL-SCNC: 117 MMOL/L (ref 98–107)
CHLORIDE SERPL-SCNC: 118 MMOL/L (ref 98–107)
CHLORIDE SERPL-SCNC: 119 MMOL/L (ref 98–107)
CHLORIDE SERPL-SCNC: 120 MMOL/L (ref 98–107)
CHLORIDE SERPL-SCNC: 122 MMOL/L (ref 98–107)
CHLORIDE SERPL-SCNC: 123 MMOL/L (ref 98–107)
CHLORIDE SERPL-SCNC: 97 MMOL/L (ref 98–107)
CHLORIDE SERPL-SCNC: 98 MMOL/L (ref 98–107)
CO2 BLDA-SCNC: 15.9 MMOL/L
CO2 BLDA-SCNC: 20.2 MMOL/L
CO2 BLDA-SCNC: 23.6 MMOL/L
CO2 BLDA-SCNC: 24.5 MMOL/L
CO2 BLDA-SCNC: 25 MMOL/L
CO2 BLDA-SCNC: 25.4 MMOL/L
CO2 BLDA-SCNC: 25.4 MMOL/L
CO2 BLDA-SCNC: 25.6 MMOL/L
CO2 BLDA-SCNC: 25.6 MMOL/L
CO2 BLDA-SCNC: 25.7 MMOL/L
CO2 BLDA-SCNC: 25.7 MMOL/L
CO2 BLDA-SCNC: 26.1 MMOL/L
CO2 BLDA-SCNC: 28 MMOL/L
CO2 BLDA-SCNC: 28.5 MMOL/L
CO2 BLDA-SCNC: 29.3 MMOL/L
CO2 BLDA-SCNC: 29.4 MMOL/L
CO2 SERPL-SCNC: 13 MMOL/L (ref 23–31)
CO2 SERPL-SCNC: 15 MMOL/L (ref 23–31)
CO2 SERPL-SCNC: 15 MMOL/L (ref 23–31)
CO2 SERPL-SCNC: 16 MMOL/L (ref 23–31)
CO2 SERPL-SCNC: 16 MMOL/L (ref 23–31)
CO2 SERPL-SCNC: 17 MMOL/L (ref 23–31)
CO2 SERPL-SCNC: 18 MMOL/L (ref 23–31)
CO2 SERPL-SCNC: 19 MMOL/L (ref 23–31)
CO2 SERPL-SCNC: 20 MMOL/L (ref 23–31)
CO2 SERPL-SCNC: 21 MMOL/L (ref 23–31)
CO2 SERPL-SCNC: 22 MMOL/L (ref 23–31)
CO2 SERPL-SCNC: 22 MMOL/L (ref 23–31)
CO2 SERPL-SCNC: 23 MMOL/L (ref 23–31)
CO2 SERPL-SCNC: 24 MMOL/L (ref 23–31)
CO2 SERPL-SCNC: 25 MMOL/L (ref 23–31)
CO2 SERPL-SCNC: 25 MMOL/L (ref 23–31)
COHGB MFR BLDA: 1.1 % (ref 0.5–1.5)
COHGB MFR BLDA: 1.4 % (ref 0.5–1.5)
COHGB MFR BLDA: 1.6 % (ref 0.5–1.5)
COHGB MFR BLDA: 1.6 % (ref 0.5–1.5)
COHGB MFR BLDA: 1.7 % (ref 0.5–1.5)
COHGB MFR BLDA: 1.8 % (ref 0.5–1.5)
COHGB MFR BLDA: 1.9 % (ref 0.5–1.5)
COHGB MFR BLDA: 1.9 % (ref 0.5–1.5)
COHGB MFR BLDA: 2.4 % (ref 0.5–1.5)
COHGB MFR BLDA: 4.3 % (ref 0.5–1.5)
COLOR UR: YELLOW
COLOR UR: YELLOW
CREAT SERPL-MCNC: 0.73 MG/DL (ref 0.73–1.18)
CREAT SERPL-MCNC: 0.78 MG/DL (ref 0.73–1.18)
CREAT SERPL-MCNC: 0.83 MG/DL (ref 0.73–1.18)
CREAT SERPL-MCNC: 0.83 MG/DL (ref 0.73–1.18)
CREAT SERPL-MCNC: 0.86 MG/DL (ref 0.73–1.18)
CREAT SERPL-MCNC: 0.86 MG/DL (ref 0.73–1.18)
CREAT SERPL-MCNC: 0.87 MG/DL (ref 0.73–1.18)
CREAT SERPL-MCNC: 0.88 MG/DL (ref 0.73–1.18)
CREAT SERPL-MCNC: 0.9 MG/DL (ref 0.73–1.18)
CREAT SERPL-MCNC: 0.94 MG/DL (ref 0.73–1.18)
CREAT SERPL-MCNC: 0.96 MG/DL (ref 0.73–1.18)
CREAT SERPL-MCNC: 0.98 MG/DL (ref 0.73–1.18)
CREAT SERPL-MCNC: 1.06 MG/DL (ref 0.73–1.18)
CREAT SERPL-MCNC: 1.12 MG/DL (ref 0.73–1.18)
CREAT SERPL-MCNC: 1.35 MG/DL (ref 0.73–1.18)
CREAT SERPL-MCNC: 1.36 MG/DL (ref 0.73–1.18)
CREAT SERPL-MCNC: 1.52 MG/DL (ref 0.73–1.18)
CREAT SERPL-MCNC: 1.79 MG/DL (ref 0.73–1.18)
CREAT SERPL-MCNC: 2.24 MG/DL (ref 0.73–1.18)
CREAT SERPL-MCNC: 2.45 MG/DL (ref 0.73–1.18)
CREAT SERPL-MCNC: 2.48 MG/DL (ref 0.73–1.18)
CREAT SERPL-MCNC: 2.68 MG/DL (ref 0.73–1.18)
CREAT SERPL-MCNC: 3.04 MG/DL (ref 0.73–1.18)
CREAT SERPL-MCNC: 3.21 MG/DL (ref 0.73–1.18)
CREAT SERPL-MCNC: 3.34 MG/DL (ref 0.73–1.18)
CREAT SERPL-MCNC: 3.41 MG/DL (ref 0.73–1.18)
CREAT SERPL-MCNC: 3.52 MG/DL (ref 0.73–1.18)
CREAT SERPL-MCNC: 3.71 MG/DL (ref 0.73–1.18)
CREAT SERPL-MCNC: 3.91 MG/DL (ref 0.73–1.18)
CREAT SERPL-MCNC: 4.14 MG/DL (ref 0.73–1.18)
CREAT SERPL-MCNC: 4.29 MG/DL (ref 0.73–1.18)
CREAT SERPL-MCNC: 4.33 MG/DL (ref 0.73–1.18)
CREAT SERPL-MCNC: 4.39 MG/DL (ref 0.73–1.18)
CREAT SERPL-MCNC: 4.45 MG/DL (ref 0.73–1.18)
CREAT SERPL-MCNC: 4.73 MG/DL (ref 0.73–1.18)
CREAT SERPL-MCNC: 4.74 MG/DL (ref 0.73–1.18)
CREAT SERPL-MCNC: 4.77 MG/DL (ref 0.73–1.18)
CREAT SERPL-MCNC: 4.99 MG/DL (ref 0.73–1.18)
CREAT SERPL-MCNC: 5.11 MG/DL (ref 0.73–1.18)
CREAT/UREA NIT SERPL: 13
CREAT/UREA NIT SERPL: 21
CREAT/UREA NIT SERPL: 23
CREAT/UREA NIT SERPL: 24
CREAT/UREA NIT SERPL: 25
CREAT/UREA NIT SERPL: 26
CREAT/UREA NIT SERPL: 27
CREAT/UREA NIT SERPL: 28
CREAT/UREA NIT SERPL: 29
CREAT/UREA NIT SERPL: 29
CREAT/UREA NIT SERPL: 30
CREAT/UREA NIT SERPL: 32
CREAT/UREA NIT SERPL: 37
CREAT/UREA NIT SERPL: 6
CREAT/UREA NIT SERPL: 6
CREAT/UREA NIT SERPL: 9
CREAT/UREA NIT SERPL: >25
CROSSMATCH INTERPRETATION: NORMAL
D DIMER PPP IA.FEU-MCNC: 3.75 UG/ML FEU (ref 0–0.5)
DISPENSE STATUS: NORMAL
DRAWN BY BLOOD GAS (OHS): ABNORMAL
EJECTION FRACTION: 25 %
EJECTION FRACTION: 25 %
EOSINOPHIL # BLD AUTO: 0 X10(3)/MCL (ref 0–0.9)
EOSINOPHIL # BLD AUTO: 0.01 X10(3)/MCL (ref 0–0.9)
EOSINOPHIL # BLD AUTO: 0.01 X10(3)/MCL (ref 0–0.9)
EOSINOPHIL # BLD AUTO: 0.06 X10(3)/MCL (ref 0–0.9)
EOSINOPHIL # BLD AUTO: 0.07 X10(3)/MCL (ref 0–0.9)
EOSINOPHIL # BLD AUTO: 0.1 X10(3)/MCL (ref 0–0.9)
EOSINOPHIL # BLD AUTO: 0.13 X10(3)/MCL (ref 0–0.9)
EOSINOPHIL # BLD AUTO: 0.14 X10(3)/MCL (ref 0–0.9)
EOSINOPHIL # BLD AUTO: 0.16 X10(3)/MCL (ref 0–0.9)
EOSINOPHIL # BLD AUTO: 0.39 X10(3)/MCL (ref 0–0.9)
EOSINOPHIL NFR BLD AUTO: 0 %
EOSINOPHIL NFR BLD AUTO: 0.3 %
EOSINOPHIL NFR BLD AUTO: 0.5 %
EOSINOPHIL NFR BLD AUTO: 0.6 %
EOSINOPHIL NFR BLD AUTO: 0.7 %
EOSINOPHIL NFR BLD AUTO: 0.7 %
EOSINOPHIL NFR BLD AUTO: 1.2 %
EOSINOPHIL NFR BLD AUTO: 2.1 %
EOSINOPHIL NFR BLD MANUAL: 0.26 X10(3)/MCL (ref 0–0.9)
EOSINOPHIL NFR BLD MANUAL: 2 %
EPAP (OHS): 8 CMH2O
ERYTHROCYTE [DISTWIDTH] IN BLOOD BY AUTOMATED COUNT: 23 % (ref 11.5–17)
ERYTHROCYTE [DISTWIDTH] IN BLOOD BY AUTOMATED COUNT: 24.1 % (ref 11.5–17)
ERYTHROCYTE [DISTWIDTH] IN BLOOD BY AUTOMATED COUNT: 24.6 % (ref 11.5–17)
ERYTHROCYTE [DISTWIDTH] IN BLOOD BY AUTOMATED COUNT: 24.7 % (ref 11.5–17)
ERYTHROCYTE [DISTWIDTH] IN BLOOD BY AUTOMATED COUNT: 25.2 % (ref 11.5–17)
ERYTHROCYTE [DISTWIDTH] IN BLOOD BY AUTOMATED COUNT: 25.2 % (ref 11.5–17)
ERYTHROCYTE [DISTWIDTH] IN BLOOD BY AUTOMATED COUNT: 25.7 % (ref 11.5–17)
ERYTHROCYTE [DISTWIDTH] IN BLOOD BY AUTOMATED COUNT: 25.8 % (ref 11.5–17)
ERYTHROCYTE [DISTWIDTH] IN BLOOD BY AUTOMATED COUNT: 28.7 % (ref 11.5–17)
ERYTHROCYTE [DISTWIDTH] IN BLOOD BY AUTOMATED COUNT: ABNORMAL %
FIO2 BLOOD GAS (OHS): 100 %
FIO2 BLOOD GAS (OHS): 21 %
FIO2 BLOOD GAS (OHS): 30 %
FIO2 BLOOD GAS (OHS): 50 %
FIO2 BLOOD GAS (OHS): 60 %
FIO2 BLOOD GAS (OHS): 60 %
FIO2 BLOOD GAS (OHS): 70 %
FIO2: 60
FIO2: 60
FIO2: 65
GFR SERPLBLD CREATININE-BSD FMLA CKD-EPI: 12 MLS/MIN/1.73/M2
GFR SERPLBLD CREATININE-BSD FMLA CKD-EPI: 12 MLS/MIN/1.73/M2
GFR SERPLBLD CREATININE-BSD FMLA CKD-EPI: 13 MLS/MIN/1.73/M2
GFR SERPLBLD CREATININE-BSD FMLA CKD-EPI: 14 MLS/MIN/1.73/M2
GFR SERPLBLD CREATININE-BSD FMLA CKD-EPI: 15 MLS/MIN/1.73/M2
GFR SERPLBLD CREATININE-BSD FMLA CKD-EPI: 15 MLS/MIN/1.73/M2
GFR SERPLBLD CREATININE-BSD FMLA CKD-EPI: 16 MLS/MIN/1.73/M2
GFR SERPLBLD CREATININE-BSD FMLA CKD-EPI: 17 MLS/MIN/1.73/M2
GFR SERPLBLD CREATININE-BSD FMLA CKD-EPI: 18 MLS/MIN/1.73/M2
GFR SERPLBLD CREATININE-BSD FMLA CKD-EPI: 19 MLS/MIN/1.73/M2
GFR SERPLBLD CREATININE-BSD FMLA CKD-EPI: 20 MLS/MIN/1.73/M2
GFR SERPLBLD CREATININE-BSD FMLA CKD-EPI: 21 MLS/MIN/1.73/M2
GFR SERPLBLD CREATININE-BSD FMLA CKD-EPI: 22 MLS/MIN/1.73/M2
GFR SERPLBLD CREATININE-BSD FMLA CKD-EPI: 26 MLS/MIN/1.73/M2
GFR SERPLBLD CREATININE-BSD FMLA CKD-EPI: 28 MLS/MIN/1.73/M2
GFR SERPLBLD CREATININE-BSD FMLA CKD-EPI: 28 MLS/MIN/1.73/M2
GFR SERPLBLD CREATININE-BSD FMLA CKD-EPI: 32 MLS/MIN/1.73/M2
GFR SERPLBLD CREATININE-BSD FMLA CKD-EPI: 42 MLS/MIN/1.73/M2
GFR SERPLBLD CREATININE-BSD FMLA CKD-EPI: 51 MLS/MIN/1.73/M2
GFR SERPLBLD CREATININE-BSD FMLA CKD-EPI: 58 MLS/MIN/1.73/M2
GFR SERPLBLD CREATININE-BSD FMLA CKD-EPI: 58 MLS/MIN/1.73/M2
GFR SERPLBLD CREATININE-BSD FMLA CKD-EPI: >60 MLS/MIN/1.73/M2
GLOBULIN SER-MCNC: 2.2 GM/DL (ref 2.4–3.5)
GLOBULIN SER-MCNC: 3.4 GM/DL (ref 2.4–3.5)
GLOBULIN SER-MCNC: 3.5 GM/DL (ref 2.4–3.5)
GLOBULIN SER-MCNC: 3.9 GM/DL (ref 2.4–3.5)
GLOBULIN SER-MCNC: 4 GM/DL (ref 2.4–3.5)
GLOBULIN SER-MCNC: 4 GM/DL (ref 2.4–3.5)
GLOBULIN SER-MCNC: 4.1 GM/DL (ref 2.4–3.5)
GLOBULIN SER-MCNC: 4.2 GM/DL (ref 2.4–3.5)
GLOBULIN SER-MCNC: 4.4 GM/DL (ref 2.4–3.5)
GLOBULIN SER-MCNC: 4.7 GM/DL (ref 2.4–3.5)
GLOBULIN SER-MCNC: 4.9 GM/DL (ref 2.4–3.5)
GLOBULIN SER-MCNC: 5.3 GM/DL (ref 2.4–3.5)
GLUCOSE SERPL-MCNC: 120 MG/DL (ref 70–110)
GLUCOSE SERPL-MCNC: 126 MG/DL (ref 82–115)
GLUCOSE SERPL-MCNC: 129 MG/DL (ref 82–115)
GLUCOSE SERPL-MCNC: 144 MG/DL (ref 82–115)
GLUCOSE SERPL-MCNC: 145 MG/DL (ref 82–115)
GLUCOSE SERPL-MCNC: 151 MG/DL (ref 82–115)
GLUCOSE SERPL-MCNC: 152 MG/DL (ref 82–115)
GLUCOSE SERPL-MCNC: 157 MG/DL (ref 82–115)
GLUCOSE SERPL-MCNC: 159 MG/DL (ref 82–115)
GLUCOSE SERPL-MCNC: 162 MG/DL (ref 82–115)
GLUCOSE SERPL-MCNC: 163 MG/DL (ref 82–115)
GLUCOSE SERPL-MCNC: 166 MG/DL (ref 82–115)
GLUCOSE SERPL-MCNC: 169 MG/DL (ref 82–115)
GLUCOSE SERPL-MCNC: 176 MG/DL (ref 82–115)
GLUCOSE SERPL-MCNC: 185 MG/DL (ref 82–115)
GLUCOSE SERPL-MCNC: 186 MG/DL (ref 70–110)
GLUCOSE SERPL-MCNC: 187 MG/DL (ref 70–110)
GLUCOSE SERPL-MCNC: 190 MG/DL (ref 82–115)
GLUCOSE SERPL-MCNC: 194 MG/DL (ref 82–115)
GLUCOSE SERPL-MCNC: 195 MG/DL (ref 70–110)
GLUCOSE SERPL-MCNC: 195 MG/DL (ref 82–115)
GLUCOSE SERPL-MCNC: 208 MG/DL (ref 82–115)
GLUCOSE SERPL-MCNC: 221 MG/DL (ref 82–115)
GLUCOSE SERPL-MCNC: 230 MG/DL (ref 82–115)
GLUCOSE SERPL-MCNC: 234 MG/DL (ref 82–115)
GLUCOSE SERPL-MCNC: 239 MG/DL (ref 82–115)
GLUCOSE SERPL-MCNC: 250 MG/DL (ref 82–115)
GLUCOSE SERPL-MCNC: 269 MG/DL (ref 82–115)
GLUCOSE SERPL-MCNC: 273 MG/DL (ref 82–115)
GLUCOSE SERPL-MCNC: 279 MG/DL (ref 82–115)
GLUCOSE SERPL-MCNC: 287 MG/DL (ref 82–115)
GLUCOSE SERPL-MCNC: 287 MG/DL (ref 82–115)
GLUCOSE SERPL-MCNC: 295 MG/DL (ref 82–115)
GLUCOSE SERPL-MCNC: 305 MG/DL (ref 82–115)
GLUCOSE SERPL-MCNC: 306 MG/DL (ref 82–115)
GLUCOSE SERPL-MCNC: 310 MG/DL (ref 82–115)
GLUCOSE SERPL-MCNC: 339 MG/DL (ref 82–115)
GLUCOSE SERPL-MCNC: 341 MG/DL (ref 82–115)
GLUCOSE SERPL-MCNC: 351 MG/DL (ref 82–115)
GLUCOSE SERPL-MCNC: 374 MG/DL (ref 82–115)
GLUCOSE SERPL-MCNC: 378 MG/DL (ref 82–115)
GLUCOSE SERPL-MCNC: 399 MG/DL (ref 70–110)
GLUCOSE SERPL-MCNC: 399 MG/DL (ref 82–115)
GLUCOSE SERPL-MCNC: 474 MG/DL (ref 82–115)
GLUCOSE UR QL STRIP.AUTO: ABNORMAL MG/DL
GLUCOSE UR QL STRIP.AUTO: NEGATIVE MG/DL
GRAM STN SPEC: ABNORMAL
GRAM STN SPEC: NORMAL
GROUP & RH: NORMAL
GROUP & RH: NORMAL
HADV DNA NPH QL NAA+NON-PROBE: NOT DETECTED
HBV SURFACE AG SERPL QL IA: NONREACTIVE
HCO3 BLDA-SCNC: 14.6 MMOL/L (ref 22–26)
HCO3 BLDA-SCNC: 18.9 MMOL/L (ref 22–26)
HCO3 BLDA-SCNC: 22.6 MMOL/L (ref 22–26)
HCO3 BLDA-SCNC: 23.5 MMOL/L (ref 22–26)
HCO3 BLDA-SCNC: 23.9 MMOL/L (ref 22–26)
HCO3 BLDA-SCNC: 24.2 MMOL/L (ref 22–26)
HCO3 BLDA-SCNC: 24.3 MMOL/L
HCO3 BLDA-SCNC: 24.3 MMOL/L
HCO3 BLDA-SCNC: 24.4 MMOL/L (ref 22–26)
HCO3 BLDA-SCNC: 24.5 MMOL/L (ref 22–26)
HCO3 BLDA-SCNC: 24.6 MMOL/L (ref 22–26)
HCO3 BLDA-SCNC: 25.1 MMOL/L (ref 22–26)
HCO3 BLDA-SCNC: 26.9 MMOL/L (ref 22–26)
HCO3 BLDA-SCNC: 27.2 MMOL/L
HCO3 BLDA-SCNC: 27.4 MMOL/L
HCO3 BLDA-SCNC: 28.2 MMOL/L
HCO3 UR-SCNC: 19.9 MMOL/L (ref 24–28)
HCO3 UR-SCNC: 21 MMOL/L (ref 24–28)
HCO3 UR-SCNC: 21.8 MMOL/L (ref 24–28)
HCO3 UR-SCNC: 28.5 MMOL/L (ref 24–28)
HCOV 229E RNA NPH QL NAA+NON-PROBE: NOT DETECTED
HCOV HKU1 RNA NPH QL NAA+NON-PROBE: NOT DETECTED
HCOV NL63 RNA NPH QL NAA+NON-PROBE: NOT DETECTED
HCOV OC43 RNA NPH QL NAA+NON-PROBE: NOT DETECTED
HCT VFR BLD AUTO: 23.1 % (ref 42–52)
HCT VFR BLD AUTO: 23.5 % (ref 42–52)
HCT VFR BLD AUTO: 24.2 % (ref 42–52)
HCT VFR BLD AUTO: 25.2 % (ref 42–52)
HCT VFR BLD AUTO: 26.7 % (ref 42–52)
HCT VFR BLD AUTO: 26.7 % (ref 42–52)
HCT VFR BLD AUTO: 26.8 % (ref 42–52)
HCT VFR BLD AUTO: 30.4 % (ref 42–52)
HCT VFR BLD AUTO: 30.4 % (ref 42–52)
HCT VFR BLD AUTO: 32.2 % (ref 42–52)
HCT VFR BLD AUTO: 36.8 % (ref 42–52)
HCT VFR BLD AUTO: 37.4 % (ref 42–52)
HCT VFR BLD AUTO: 41.5 % (ref 42–52)
HCT VFR BLD AUTO: 42 % (ref 42–52)
HCT VFR BLD AUTO: 42.2 % (ref 42–52)
HCT VFR BLD AUTO: 42.9 % (ref 42–52)
HCT VFR BLD AUTO: 42.9 % (ref 42–52)
HCT VFR BLD AUTO: 44.5 % (ref 42–52)
HCT VFR BLD AUTO: 47.9 % (ref 42–52)
HCT VFR BLD AUTO: 51.5 % (ref 42–52)
HCT VFR BLD CALC: 21 %PCV (ref 36–54)
HCT VFR BLD CALC: 22 %PCV (ref 36–54)
HCT VFR BLD CALC: 26 %PCV (ref 36–54)
HCT VFR BLD CALC: 26 %PCV (ref 36–54)
HGB BLD-MCNC: 10.7 G/DL (ref 14–18)
HGB BLD-MCNC: 11.7 G/DL (ref 14–18)
HGB BLD-MCNC: 11.9 G/DL (ref 14–18)
HGB BLD-MCNC: 12.5 G/DL (ref 14–18)
HGB BLD-MCNC: 13.3 G/DL (ref 14–18)
HGB BLD-MCNC: 13.4 G/DL (ref 14–18)
HGB BLD-MCNC: 13.7 G/DL (ref 14–18)
HGB BLD-MCNC: 13.7 G/DL (ref 14–18)
HGB BLD-MCNC: 13.9 G/DL (ref 14–18)
HGB BLD-MCNC: 14.7 G/DL (ref 14–18)
HGB BLD-MCNC: 15.6 G/DL (ref 14–18)
HGB BLD-MCNC: 7 G/DL
HGB BLD-MCNC: 7.2 G/DL (ref 14–18)
HGB BLD-MCNC: 7.3 G/DL (ref 14–18)
HGB BLD-MCNC: 7.7 G/DL (ref 14–18)
HGB BLD-MCNC: 7.7 G/DL (ref 14–18)
HGB BLD-MCNC: 8 G/DL
HGB BLD-MCNC: 8.2 G/DL (ref 14–18)
HGB BLD-MCNC: 8.6 G/DL (ref 14–18)
HGB BLD-MCNC: 8.6 G/DL (ref 14–18)
HGB BLD-MCNC: 9 G/DL
HGB BLD-MCNC: 9 G/DL
HGB BLD-MCNC: 9.3 G/DL (ref 14–18)
HGB BLD-MCNC: 9.8 G/DL (ref 14–18)
HMPV RNA NPH QL NAA+NON-PROBE: NOT DETECTED
HPIV1 RNA NPH QL NAA+NON-PROBE: NOT DETECTED
HPIV2 RNA NPH QL NAA+NON-PROBE: NOT DETECTED
HPIV3 RNA NPH QL NAA+NON-PROBE: NOT DETECTED
HPIV4 RNA NPH QL NAA+NON-PROBE: NOT DETECTED
HYPOCHROMIA BLD QL SMEAR: ABNORMAL
IMM GRANULOCYTES # BLD AUTO: 0.03 X10(3)/MCL (ref 0–0.04)
IMM GRANULOCYTES # BLD AUTO: 0.04 X10(3)/MCL (ref 0–0.04)
IMM GRANULOCYTES # BLD AUTO: 0.07 X10(3)/MCL (ref 0–0.04)
IMM GRANULOCYTES # BLD AUTO: 0.08 X10(3)/MCL (ref 0–0.04)
IMM GRANULOCYTES # BLD AUTO: 0.1 X10(3)/MCL (ref 0–0.04)
IMM GRANULOCYTES # BLD AUTO: 0.14 X10(3)/MCL (ref 0–0.04)
IMM GRANULOCYTES # BLD AUTO: 0.16 X10(3)/MCL (ref 0–0.04)
IMM GRANULOCYTES # BLD AUTO: 0.18 X10(3)/MCL (ref 0–0.04)
IMM GRANULOCYTES # BLD AUTO: 0.21 X10(3)/MCL (ref 0–0.04)
IMM GRANULOCYTES # BLD AUTO: 0.22 X10(3)/MCL (ref 0–0.04)
IMM GRANULOCYTES # BLD AUTO: 0.23 X10(3)/MCL (ref 0–0.04)
IMM GRANULOCYTES # BLD AUTO: 0.28 X10(3)/MCL (ref 0–0.04)
IMM GRANULOCYTES # BLD AUTO: 0.38 X10(3)/MCL (ref 0–0.04)
IMM GRANULOCYTES # BLD AUTO: 0.39 X10(3)/MCL (ref 0–0.04)
IMM GRANULOCYTES NFR BLD AUTO: 0.3 %
IMM GRANULOCYTES NFR BLD AUTO: 0.4 %
IMM GRANULOCYTES NFR BLD AUTO: 0.6 %
IMM GRANULOCYTES NFR BLD AUTO: 0.6 %
IMM GRANULOCYTES NFR BLD AUTO: 0.8 %
IMM GRANULOCYTES NFR BLD AUTO: 0.8 %
IMM GRANULOCYTES NFR BLD AUTO: 1 %
IMM GRANULOCYTES NFR BLD AUTO: 1 %
IMM GRANULOCYTES NFR BLD AUTO: 1.1 %
IMM GRANULOCYTES NFR BLD AUTO: 1.1 %
IMM GRANULOCYTES NFR BLD AUTO: 1.3 %
IMM GRANULOCYTES NFR BLD AUTO: 1.5 %
IMM GRANULOCYTES NFR BLD AUTO: 2.1 %
IMM GRANULOCYTES NFR BLD AUTO: 2.2 %
INDIRECT COOMBS GEL: NORMAL
INDIRECT COOMBS GEL: NORMAL
INR BLD: 1.19 (ref 0–1.3)
INR BLD: 1.59 (ref 0–1.3)
INSTRUMENT WBC (OLG): 10.46 X10(3)/MCL
INSTRUMENT WBC (OLG): 12.89 X10(3)/MCL
INSTRUMENT WBC (OLG): 18.99 X10(3)/MCL
INSTRUMENT WBC (OLG): 23.42 X10(3)/MCL
INSTRUMENT WBC (OLG): 4.44 X10(3)/MCL
IPAP (OHS): 14 CMH2O
KETONES UR QL STRIP.AUTO: NEGATIVE MG/DL
KETONES UR QL STRIP.AUTO: NEGATIVE MG/DL
LACTATE SERPL-SCNC: 2.2 MMOL/L (ref 0.5–2.2)
LACTATE SERPL-SCNC: 2.2 MMOL/L (ref 0.5–2.2)
LACTATE SERPL-SCNC: 3.6 MMOL/L (ref 0.5–2.2)
LEUKOCYTE ESTERASE UR QL STRIP.AUTO: NEGATIVE UNIT/L
LEUKOCYTE ESTERASE UR QL STRIP.AUTO: NEGATIVE UNIT/L
LPM (OHS): 10
LPM (OHS): 7
LYMPHOCYTES # BLD AUTO: 0.46 X10(3)/MCL (ref 0.6–4.6)
LYMPHOCYTES # BLD AUTO: 0.55 X10(3)/MCL (ref 0.6–4.6)
LYMPHOCYTES # BLD AUTO: 0.55 X10(3)/MCL (ref 0.6–4.6)
LYMPHOCYTES # BLD AUTO: 0.63 X10(3)/MCL (ref 0.6–4.6)
LYMPHOCYTES # BLD AUTO: 0.66 X10(3)/MCL (ref 0.6–4.6)
LYMPHOCYTES # BLD AUTO: 0.85 X10(3)/MCL (ref 0.6–4.6)
LYMPHOCYTES # BLD AUTO: 0.86 X10(3)/MCL (ref 0.6–4.6)
LYMPHOCYTES # BLD AUTO: 0.94 X10(3)/MCL (ref 0.6–4.6)
LYMPHOCYTES # BLD AUTO: 1.03 X10(3)/MCL (ref 0.6–4.6)
LYMPHOCYTES # BLD AUTO: 1.24 X10(3)/MCL (ref 0.6–4.6)
LYMPHOCYTES # BLD AUTO: 1.4 X10(3)/MCL (ref 0.6–4.6)
LYMPHOCYTES # BLD AUTO: 1.4 X10(3)/MCL (ref 0.6–4.6)
LYMPHOCYTES # BLD AUTO: 1.56 X10(3)/MCL (ref 0.6–4.6)
LYMPHOCYTES # BLD AUTO: 2.21 X10(3)/MCL (ref 0.6–4.6)
LYMPHOCYTES NFR BLD AUTO: 16.4 %
LYMPHOCYTES NFR BLD AUTO: 18.9 %
LYMPHOCYTES NFR BLD AUTO: 3.7 %
LYMPHOCYTES NFR BLD AUTO: 4.3 %
LYMPHOCYTES NFR BLD AUTO: 4.4 %
LYMPHOCYTES NFR BLD AUTO: 4.6 %
LYMPHOCYTES NFR BLD AUTO: 4.6 %
LYMPHOCYTES NFR BLD AUTO: 4.7 %
LYMPHOCYTES NFR BLD AUTO: 4.9 %
LYMPHOCYTES NFR BLD AUTO: 5.7 %
LYMPHOCYTES NFR BLD AUTO: 5.8 %
LYMPHOCYTES NFR BLD AUTO: 6.4 %
LYMPHOCYTES NFR BLD AUTO: 6.5 %
LYMPHOCYTES NFR BLD AUTO: 7.3 %
LYMPHOCYTES NFR BLD MANUAL: 0.31 X10(3)/MCL
LYMPHOCYTES NFR BLD MANUAL: 0.73 X10(3)/MCL
LYMPHOCYTES NFR BLD MANUAL: 0.94 X10(3)/MCL
LYMPHOCYTES NFR BLD MANUAL: 1.03 X10(3)/MCL
LYMPHOCYTES NFR BLD MANUAL: 1.52 X10(3)/MCL
LYMPHOCYTES NFR BLD MANUAL: 4 %
LYMPHOCYTES NFR BLD MANUAL: 7 %
LYMPHOCYTES NFR BLD MANUAL: 7 %
LYMPHOCYTES NFR BLD MANUAL: 8 %
LYMPHOCYTES NFR BLD MANUAL: 8 %
M PNEUMO DNA NPH QL NAA+NON-PROBE: NOT DETECTED
MACROCYTES BLD QL SMEAR: ABNORMAL
MAGNESIUM SERPL-MCNC: 1.8 MG/DL (ref 1.6–2.6)
MAGNESIUM SERPL-MCNC: 2.1 MG/DL (ref 1.6–2.6)
MAGNESIUM SERPL-MCNC: 2.5 MG/DL (ref 1.6–2.6)
MAGNESIUM SERPL-MCNC: 2.5 MG/DL (ref 1.6–2.6)
MAGNESIUM SERPL-MCNC: 2.6 MG/DL (ref 1.6–2.6)
MAGNESIUM SERPL-MCNC: 2.7 MG/DL (ref 1.6–2.6)
MAGNESIUM SERPL-MCNC: 2.7 MG/DL (ref 1.6–2.6)
MAGNESIUM SERPL-MCNC: 2.9 MG/DL (ref 1.6–2.6)
MCH RBC QN AUTO: 19.1 PG (ref 27–31)
MCH RBC QN AUTO: 19.2 PG (ref 27–31)
MCH RBC QN AUTO: 19.6 PG (ref 27–31)
MCH RBC QN AUTO: 19.9 PG (ref 27–31)
MCH RBC QN AUTO: 19.9 PG (ref 27–31)
MCH RBC QN AUTO: 20.1 PG (ref 27–31)
MCH RBC QN AUTO: 22.1 PG (ref 27–31)
MCH RBC QN AUTO: 23.7 PG (ref 27–31)
MCH RBC QN AUTO: 23.9 PG (ref 27–31)
MCH RBC QN AUTO: 24 PG (ref 27–31)
MCH RBC QN AUTO: 24 PG (ref 27–31)
MCH RBC QN AUTO: 24.1 PG (ref 27–31)
MCH RBC QN AUTO: 24.2 PG (ref 27–31)
MCHC RBC AUTO-ENTMCNC: 29.8 G/DL (ref 33–36)
MCHC RBC AUTO-ENTMCNC: 30.3 G/DL (ref 33–36)
MCHC RBC AUTO-ENTMCNC: 30.6 G/DL (ref 33–36)
MCHC RBC AUTO-ENTMCNC: 30.7 G/DL (ref 33–36)
MCHC RBC AUTO-ENTMCNC: 30.8 G/DL (ref 33–36)
MCHC RBC AUTO-ENTMCNC: 31.1 G/DL (ref 33–36)
MCHC RBC AUTO-ENTMCNC: 31.2 G/DL (ref 33–36)
MCHC RBC AUTO-ENTMCNC: 31.2 G/DL (ref 33–36)
MCHC RBC AUTO-ENTMCNC: 31.8 G/DL (ref 33–36)
MCHC RBC AUTO-ENTMCNC: 31.9 G/DL (ref 33–36)
MCHC RBC AUTO-ENTMCNC: 32 G/DL (ref 33–36)
MCHC RBC AUTO-ENTMCNC: 32.2 G/DL (ref 33–36)
MCHC RBC AUTO-ENTMCNC: 32.2 G/DL (ref 33–36)
MCHC RBC AUTO-ENTMCNC: 32.9 G/DL (ref 33–36)
MCHC RBC AUTO-ENTMCNC: 33.2 G/DL (ref 33–36)
MCV RBC AUTO: 61.5 FL (ref 80–94)
MCV RBC AUTO: 61.8 FL (ref 80–94)
MCV RBC AUTO: 62.5 FL (ref 80–94)
MCV RBC AUTO: 62.7 FL (ref 80–94)
MCV RBC AUTO: 62.7 FL (ref 80–94)
MCV RBC AUTO: 62.8 FL (ref 80–94)
MCV RBC AUTO: 66.4 FL (ref 80–94)
MCV RBC AUTO: 72.6 FL (ref 80–94)
MCV RBC AUTO: 74.7 FL (ref 80–94)
MCV RBC AUTO: 74.8 FL (ref 80–94)
MCV RBC AUTO: 75.1 FL (ref 80–94)
MCV RBC AUTO: 75.6 FL (ref 80–94)
MCV RBC AUTO: 75.9 FL (ref 80–94)
MCV RBC AUTO: 77.9 FL (ref 80–94)
MCV RBC AUTO: 78.5 FL (ref 80–94)
MCV RBC AUTO: 79.5 FL (ref 80–94)
MCV RBC AUTO: 80.2 FL (ref 80–94)
MECH RR (OHS): 20 B/MIN
MECH RR (OHS): 20 B/MIN
MECH RR (OHS): 26 B/MIN
MECH VT (OHS): 450 ML
MECH VT (OHS): 500 ML
METAMYELOCYTES NFR BLD MANUAL: 1 %
METAMYELOCYTES NFR BLD MANUAL: 7 %
METHGB MFR BLDA: 1 % (ref 0.4–1.5)
METHGB MFR BLDA: 1 % (ref 0.4–1.5)
METHGB MFR BLDA: 1.4 % (ref 0.4–1.5)
METHGB MFR BLDA: 1.5 % (ref 0.4–1.5)
METHGB MFR BLDA: 1.5 % (ref 0.4–1.5)
METHGB MFR BLDA: 1.7 % (ref 0.4–1.5)
METHGB MFR BLDA: 2.3 % (ref 0.4–1.5)
MICROCYTES BLD QL SMEAR: ABNORMAL
MICROCYTES BLD QL SMEAR: ABNORMAL
MODE (OHS): ABNORMAL
MODE (OHS): AC
MONOCYTES # BLD AUTO: 0.23 X10(3)/MCL (ref 0.1–1.3)
MONOCYTES # BLD AUTO: 0.38 X10(3)/MCL (ref 0.1–1.3)
MONOCYTES # BLD AUTO: 0.56 X10(3)/MCL (ref 0.1–1.3)
MONOCYTES # BLD AUTO: 0.58 X10(3)/MCL (ref 0.1–1.3)
MONOCYTES # BLD AUTO: 0.61 X10(3)/MCL (ref 0.1–1.3)
MONOCYTES # BLD AUTO: 0.61 X10(3)/MCL (ref 0.1–1.3)
MONOCYTES # BLD AUTO: 0.65 X10(3)/MCL (ref 0.1–1.3)
MONOCYTES # BLD AUTO: 0.69 X10(3)/MCL (ref 0.1–1.3)
MONOCYTES # BLD AUTO: 0.87 X10(3)/MCL (ref 0.1–1.3)
MONOCYTES # BLD AUTO: 0.95 X10(3)/MCL (ref 0.1–1.3)
MONOCYTES # BLD AUTO: 0.95 X10(3)/MCL (ref 0.1–1.3)
MONOCYTES # BLD AUTO: 1.1 X10(3)/MCL (ref 0.1–1.3)
MONOCYTES # BLD AUTO: 1.47 X10(3)/MCL (ref 0.1–1.3)
MONOCYTES # BLD AUTO: 1.47 X10(3)/MCL (ref 0.1–1.3)
MONOCYTES NFR BLD AUTO: 2 %
MONOCYTES NFR BLD AUTO: 3.3 %
MONOCYTES NFR BLD AUTO: 3.5 %
MONOCYTES NFR BLD AUTO: 4 %
MONOCYTES NFR BLD AUTO: 4.1 %
MONOCYTES NFR BLD AUTO: 4.1 %
MONOCYTES NFR BLD AUTO: 4.3 %
MONOCYTES NFR BLD AUTO: 5 %
MONOCYTES NFR BLD AUTO: 5 %
MONOCYTES NFR BLD AUTO: 5.7 %
MONOCYTES NFR BLD AUTO: 6.7 %
MONOCYTES NFR BLD AUTO: 6.9 %
MONOCYTES NFR BLD AUTO: 7.9 %
MONOCYTES NFR BLD AUTO: 7.9 %
MONOCYTES NFR BLD MANUAL: 0.04 X10(3)/MCL (ref 0.1–1.3)
MONOCYTES NFR BLD MANUAL: 0.31 X10(3)/MCL (ref 0.1–1.3)
MONOCYTES NFR BLD MANUAL: 0.39 X10(3)/MCL (ref 0.1–1.3)
MONOCYTES NFR BLD MANUAL: 0.47 X10(3)/MCL (ref 0.1–1.3)
MONOCYTES NFR BLD MANUAL: 1 %
MONOCYTES NFR BLD MANUAL: 1.33 X10(3)/MCL (ref 0.1–1.3)
MONOCYTES NFR BLD MANUAL: 2 %
MONOCYTES NFR BLD MANUAL: 3 %
MONOCYTES NFR BLD MANUAL: 3 %
MONOCYTES NFR BLD MANUAL: 7 %
MRSA PCR SCRN (OHS): NOT DETECTED
MRSA PCR SCRN (OHS): NOT DETECTED
MYELOCYTES NFR BLD MANUAL: 1 %
MYELOCYTES NFR BLD MANUAL: 1 %
NEUTROPHILS # BLD AUTO: 10.39 X10(3)/MCL (ref 2.1–9.2)
NEUTROPHILS # BLD AUTO: 13.11 X10(3)/MCL (ref 2.1–9.2)
NEUTROPHILS # BLD AUTO: 15.89 X10(3)/MCL (ref 2.1–9.2)
NEUTROPHILS # BLD AUTO: 16.16 X10(3)/MCL (ref 2.1–9.2)
NEUTROPHILS # BLD AUTO: 18.64 X10(3)/MCL (ref 2.1–9.2)
NEUTROPHILS # BLD AUTO: 18.7 X10(3)/MCL (ref 2.1–9.2)
NEUTROPHILS # BLD AUTO: 18.76 X10(3)/MCL (ref 2.1–9.2)
NEUTROPHILS # BLD AUTO: 19.14 X10(3)/MCL (ref 2.1–9.2)
NEUTROPHILS # BLD AUTO: 20.02 X10(3)/MCL (ref 2.1–9.2)
NEUTROPHILS # BLD AUTO: 7.3 X10(3)/MCL (ref 2.1–9.2)
NEUTROPHILS # BLD AUTO: 7.42 X10(3)/MCL (ref 2.1–9.2)
NEUTROPHILS # BLD AUTO: 8.85 X10(3)/MCL (ref 2.1–9.2)
NEUTROPHILS # BLD AUTO: 9.62 X10(3)/MCL (ref 2.1–9.2)
NEUTROPHILS # BLD AUTO: 9.92 X10(3)/MCL (ref 2.1–9.2)
NEUTROPHILS NFR BLD AUTO: 75.9 %
NEUTROPHILS NFR BLD AUTO: 77.9 %
NEUTROPHILS NFR BLD AUTO: 84.2 %
NEUTROPHILS NFR BLD AUTO: 85.4 %
NEUTROPHILS NFR BLD AUTO: 86.5 %
NEUTROPHILS NFR BLD AUTO: 87.3 %
NEUTROPHILS NFR BLD AUTO: 87.3 %
NEUTROPHILS NFR BLD AUTO: 87.6 %
NEUTROPHILS NFR BLD AUTO: 87.6 %
NEUTROPHILS NFR BLD AUTO: 88.7 %
NEUTROPHILS NFR BLD AUTO: 89.3 %
NEUTROPHILS NFR BLD AUTO: 89.6 %
NEUTROPHILS NFR BLD AUTO: 89.9 %
NEUTROPHILS NFR BLD AUTO: 90.1 %
NEUTROPHILS NFR BLD MANUAL: 84 %
NEUTROPHILS NFR BLD MANUAL: 84 %
NEUTROPHILS NFR BLD MANUAL: 87 %
NEUTROPHILS NFR BLD MANUAL: 88 %
NEUTROPHILS NFR BLD MANUAL: 95 %
NITRITE UR QL STRIP.AUTO: NEGATIVE
NITRITE UR QL STRIP.AUTO: NEGATIVE
NRBC BLD AUTO-RTO: 0.1 %
NRBC BLD AUTO-RTO: 0.1 %
NRBC BLD AUTO-RTO: 0.2 %
NRBC BLD AUTO-RTO: 0.3 %
NRBC BLD AUTO-RTO: 0.4 %
NRBC BLD AUTO-RTO: 0.4 %
NRBC BLD AUTO-RTO: 0.5 %
NRBC BLD AUTO-RTO: 0.5 %
NRBC BLD AUTO-RTO: 0.9 %
NRBC BLD AUTO-RTO: 0.9 %
NRBC BLD AUTO-RTO: 1.2 %
NRBC BLD AUTO-RTO: 1.3 %
NRBC BLD AUTO-RTO: 2.2 %
NRBC BLD AUTO-RTO: 4.1 %
NRBC BLD AUTO-RTO: 5.4 %
NRBC BLD AUTO-RTO: 5.4 %
NRBC BLD AUTO-RTO: 7.2 %
NRBC BLD MANUAL-RTO: 1 %
NRBC BLD MANUAL-RTO: 1 %
NRBC BLD MANUAL-RTO: 27 %
O2 HB BLOOD GAS (OHS): 86.4 % (ref 94–97)
O2 HB BLOOD GAS (OHS): 91.2 % (ref 94–97)
O2 HB BLOOD GAS (OHS): 94.2 % (ref 94–97)
O2 HB BLOOD GAS (OHS): 94.2 % (ref 94–97)
O2 HB BLOOD GAS (OHS): 94.6 % (ref 94–97)
O2 HB BLOOD GAS (OHS): 94.9 % (ref 94–97)
O2 HB BLOOD GAS (OHS): 95.2 % (ref 94–97)
O2 HB BLOOD GAS (OHS): 96.2 % (ref 94–97)
O2 HB BLOOD GAS (OHS): 96.3 % (ref 94–97)
O2 HB BLOOD GAS (OHS): 96.4 % (ref 94–97)
O2 HB BLOOD GAS (OHS): 98 % (ref 94–97)
OXYGEN DEVICE BLOOD GAS (OHS): ABNORMAL
OXYHGB MFR BLDA: 12.1 G/DL (ref 12–16)
OXYHGB MFR BLDA: 12.4 G/DL (ref 12–16)
OXYHGB MFR BLDA: 12.9 G/DL (ref 12–16)
OXYHGB MFR BLDA: 14 G/DL (ref 12–16)
OXYHGB MFR BLDA: 14.2 G/DL (ref 12–16)
OXYHGB MFR BLDA: 14.2 G/DL (ref 12–16)
OXYHGB MFR BLDA: 14.4 G/DL (ref 12–16)
OXYHGB MFR BLDA: 14.8 G/DL (ref 12–16)
OXYHGB MFR BLDA: 8 G/DL (ref 12–16)
OXYHGB MFR BLDA: 9.5 G/DL (ref 12–16)
PCO2 BLDA: 30 MMHG (ref 35–45)
PCO2 BLDA: 31 MMHG (ref 35–45)
PCO2 BLDA: 32 MMHG (ref 35–45)
PCO2 BLDA: 33 MMHG (ref 35–45)
PCO2 BLDA: 36 MMHG (ref 35–45)
PCO2 BLDA: 36 MMHG (ref 35–45)
PCO2 BLDA: 37 MMHG (ref 35–45)
PCO2 BLDA: 37 MMHG (ref 35–45)
PCO2 BLDA: 41 MMHG (ref 35–45)
PCO2 BLDA: 42 MMHG (ref 35–45)
PCO2 BLDA: 43 MMHG (ref 35–45)
PCO2 BLDA: 43.4 MMHG (ref 35–45)
PCO2 BLDA: 44 MMHG (ref 35–45)
PCO2 BLDA: 46 MMHG (ref 35–45)
PCO2 BLDA: 47.9 MMHG (ref 35–45)
PCO2 BLDA: 48 MMHG (ref 35–45)
PCO2 BLDA: 49.1 MMHG (ref 35–45)
PCO2 BLDA: 73 MMHG (ref 35–45)
PEEP (OHS): 10 CMH2O
PEEP (OHS): 14 CMH2O
PEEP (OHS): 14 CMH2O
PEEP (OHS): 5 CMH2O
PEEP (OHS): 7 CMH2O
PEEP (OHS): 8 CMH2O
PH BLDA: 7.16 [PH] (ref 7.35–7.45)
PH BLDA: 7.18 [PH] (ref 7.35–7.45)
PH BLDA: 7.25 [PH] (ref 7.35–7.45)
PH BLDA: 7.31 [PH] (ref 7.35–7.45)
PH BLDA: 7.35 [PH] (ref 7.35–7.45)
PH BLDA: 7.37 [PH] (ref 7.35–7.45)
PH BLDA: 7.43 [PH] (ref 7.35–7.45)
PH BLDA: 7.46 [PH] (ref 7.35–7.45)
PH BLDA: 7.47 [PH] (ref 7.35–7.45)
PH BLDA: 7.47 [PH] (ref 7.35–7.45)
PH BLDA: 7.48 [PH] (ref 7.35–7.45)
PH BLDA: 7.49 [PH] (ref 7.35–7.45)
PH BLDA: 7.52 [PH] (ref 7.35–7.45)
PH SMN: 7.22 [PH] (ref 7.35–7.45)
PH SMN: 7.25 [PH] (ref 7.35–7.45)
PH SMN: 7.28 [PH] (ref 7.35–7.45)
PH SMN: 7.42 [PH] (ref 7.35–7.45)
PH UR STRIP.AUTO: 5.5 [PH]
PH UR STRIP.AUTO: 5.5 [PH]
PHOSPHATE SERPL-MCNC: 10.5 MG/DL (ref 2.3–4.7)
PHOSPHATE SERPL-MCNC: 2.2 MG/DL (ref 2.3–4.7)
PHOSPHATE SERPL-MCNC: 2.5 MG/DL (ref 2.3–4.7)
PHOSPHATE SERPL-MCNC: 2.9 MG/DL (ref 2.3–4.7)
PHOSPHATE SERPL-MCNC: 3.4 MG/DL (ref 2.3–4.7)
PHOSPHATE SERPL-MCNC: 3.4 MG/DL (ref 2.3–4.7)
PHOSPHATE SERPL-MCNC: 8.1 MG/DL (ref 2.3–4.7)
PIP (OHS): 14 CMH20
PLATELET # BLD AUTO: 149 X10(3)/MCL (ref 130–400)
PLATELET # BLD AUTO: 157 X10(3)/MCL (ref 130–400)
PLATELET # BLD AUTO: 160 X10(3)/MCL (ref 130–400)
PLATELET # BLD AUTO: 184 X10(3)/MCL (ref 130–400)
PLATELET # BLD AUTO: 185 X10(3)/MCL (ref 130–400)
PLATELET # BLD AUTO: 188 X10(3)/MCL (ref 130–400)
PLATELET # BLD AUTO: 191 X10(3)/MCL (ref 130–400)
PLATELET # BLD AUTO: 194 X10(3)/MCL (ref 130–400)
PLATELET # BLD AUTO: 203 X10(3)/MCL (ref 130–400)
PLATELET # BLD AUTO: 204 X10(3)/MCL (ref 130–400)
PLATELET # BLD AUTO: 210 X10(3)/MCL (ref 130–400)
PLATELET # BLD AUTO: 212 X10(3)/MCL (ref 130–400)
PLATELET # BLD AUTO: 213 X10(3)/MCL (ref 130–400)
PLATELET # BLD AUTO: 217 X10(3)/MCL (ref 130–400)
PLATELET # BLD AUTO: 240 X10(3)/MCL (ref 130–400)
PLATELET # BLD AUTO: 262 X10(3)/MCL (ref 130–400)
PLATELET # BLD AUTO: 296 X10(3)/MCL (ref 130–400)
PLATELET # BLD AUTO: 307 X10(3)/MCL (ref 130–400)
PLATELET # BLD AUTO: 335 X10(3)/MCL (ref 130–400)
PLATELET # BLD EST: ADEQUATE 10*3/UL
PLATELET # BLD EST: NORMAL 10*3/UL
PMV BLD AUTO: 10.1 FL (ref 7.4–10.4)
PMV BLD AUTO: 8.9 FL (ref 7.4–10.4)
PMV BLD AUTO: 9 FL (ref 7.4–10.4)
PMV BLD AUTO: 9.6 FL (ref 7.4–10.4)
PMV BLD AUTO: 9.8 FL (ref 7.4–10.4)
PMV BLD AUTO: ABNORMAL FL
PO2 BLDA: 100 MMHG (ref 80–100)
PO2 BLDA: 109 MMHG (ref 80–100)
PO2 BLDA: 119 MMHG (ref 80–100)
PO2 BLDA: 178 MMHG (ref 80–100)
PO2 BLDA: 196 MMHG (ref 80–100)
PO2 BLDA: 216 MMHG (ref 80–100)
PO2 BLDA: 265 MMHG (ref 80–100)
PO2 BLDA: 404 MMHG (ref 80–100)
PO2 BLDA: 436 MMHG (ref 80–100)
PO2 BLDA: 50 MMHG (ref 40–60)
PO2 BLDA: 61 MMHG (ref 80–100)
PO2 BLDA: 70 MMHG (ref 80–100)
PO2 BLDA: 78 MMHG (ref 80–100)
PO2 BLDA: 84 MMHG (ref 80–100)
PO2 BLDA: 84 MMHG (ref 80–100)
PO2 BLDA: 85 MMHG (ref 80–100)
PO2 BLDA: 89 MMHG (ref 80–100)
PO2 BLDA: 90 MMHG (ref 80–100)
PO2 BLDA: 95 MMHG (ref 80–100)
PO2 BLDA: 99 MMHG (ref 80–100)
POC BE: -5 MMOL/L
POC BE: -6 MMOL/L
POC BE: -8 MMOL/L
POC BE: 4 MMOL/L
POC IONIZED CALCIUM: 0.65 MMOL/L (ref 1.06–1.42)
POC IONIZED CALCIUM: 0.9 MMOL/L (ref 1.06–1.42)
POC IONIZED CALCIUM: 0.92 MMOL/L (ref 1.06–1.42)
POC IONIZED CALCIUM: 1.09 MMOL/L (ref 1.06–1.42)
POC SATURATED O2: 100 % (ref 95–100)
POC SATURATED O2: 80 % (ref 95–100)
POC TCO2: 21 MMOL/L (ref 23–27)
POC TCO2: 22 MMOL/L (ref 23–27)
POC TCO2: 23 MMOL/L (ref 24–29)
POC TCO2: 30 MMOL/L (ref 23–27)
POCT GLUCOSE: 103 MG/DL (ref 70–110)
POCT GLUCOSE: 106 MG/DL (ref 70–110)
POCT GLUCOSE: 115 MG/DL (ref 70–110)
POCT GLUCOSE: 124 MG/DL (ref 70–110)
POCT GLUCOSE: 125 MG/DL (ref 70–110)
POCT GLUCOSE: 125 MG/DL (ref 70–110)
POCT GLUCOSE: 129 MG/DL (ref 70–110)
POCT GLUCOSE: 130 MG/DL (ref 70–110)
POCT GLUCOSE: 133 MG/DL (ref 70–110)
POCT GLUCOSE: 133 MG/DL (ref 70–110)
POCT GLUCOSE: 137 MG/DL (ref 70–110)
POCT GLUCOSE: 139 MG/DL (ref 70–110)
POCT GLUCOSE: 142 MG/DL (ref 70–110)
POCT GLUCOSE: 145 MG/DL (ref 70–110)
POCT GLUCOSE: 146 MG/DL (ref 70–110)
POCT GLUCOSE: 146 MG/DL (ref 70–110)
POCT GLUCOSE: 148 MG/DL (ref 70–110)
POCT GLUCOSE: 150 MG/DL (ref 70–110)
POCT GLUCOSE: 152 MG/DL (ref 70–110)
POCT GLUCOSE: 153 MG/DL (ref 70–110)
POCT GLUCOSE: 154 MG/DL (ref 70–110)
POCT GLUCOSE: 156 MG/DL (ref 70–110)
POCT GLUCOSE: 156 MG/DL (ref 70–110)
POCT GLUCOSE: 157 MG/DL (ref 70–110)
POCT GLUCOSE: 161 MG/DL (ref 70–110)
POCT GLUCOSE: 162 MG/DL (ref 70–110)
POCT GLUCOSE: 170 MG/DL (ref 70–110)
POCT GLUCOSE: 178 MG/DL (ref 70–110)
POCT GLUCOSE: 180 MG/DL (ref 70–110)
POCT GLUCOSE: 183 MG/DL (ref 70–110)
POCT GLUCOSE: 185 MG/DL (ref 70–110)
POCT GLUCOSE: 187 MG/DL (ref 70–110)
POCT GLUCOSE: 191 MG/DL (ref 70–110)
POCT GLUCOSE: 197 MG/DL (ref 70–110)
POCT GLUCOSE: 199 MG/DL (ref 70–110)
POCT GLUCOSE: 207 MG/DL (ref 70–110)
POCT GLUCOSE: 209 MG/DL (ref 70–110)
POCT GLUCOSE: 210 MG/DL (ref 70–110)
POCT GLUCOSE: 211 MG/DL (ref 70–110)
POCT GLUCOSE: 213 MG/DL (ref 70–110)
POCT GLUCOSE: 221 MG/DL (ref 70–110)
POCT GLUCOSE: 225 MG/DL (ref 70–110)
POCT GLUCOSE: 248 MG/DL (ref 70–110)
POCT GLUCOSE: 253 MG/DL (ref 70–110)
POCT GLUCOSE: 256 MG/DL (ref 70–110)
POCT GLUCOSE: 264 MG/DL (ref 70–110)
POCT GLUCOSE: 270 MG/DL (ref 70–110)
POCT GLUCOSE: 274 MG/DL (ref 70–110)
POCT GLUCOSE: 274 MG/DL (ref 70–110)
POCT GLUCOSE: 277 MG/DL (ref 70–110)
POCT GLUCOSE: 286 MG/DL (ref 70–110)
POCT GLUCOSE: 289 MG/DL (ref 70–110)
POCT GLUCOSE: 293 MG/DL (ref 70–110)
POCT GLUCOSE: 295 MG/DL (ref 70–110)
POCT GLUCOSE: 296 MG/DL (ref 70–110)
POCT GLUCOSE: 299 MG/DL (ref 70–110)
POCT GLUCOSE: 305 MG/DL (ref 70–110)
POCT GLUCOSE: 307 MG/DL (ref 70–110)
POCT GLUCOSE: 317 MG/DL (ref 70–110)
POCT GLUCOSE: 325 MG/DL (ref 70–110)
POCT GLUCOSE: 329 MG/DL (ref 70–110)
POCT GLUCOSE: 336 MG/DL (ref 70–110)
POCT GLUCOSE: 341 MG/DL (ref 70–110)
POCT GLUCOSE: 363 MG/DL (ref 70–110)
POCT GLUCOSE: 399 MG/DL (ref 70–110)
POCT GLUCOSE: 406 MG/DL (ref 70–110)
POCT GLUCOSE: 416 MG/DL (ref 70–110)
POCT GLUCOSE: 417 MG/DL (ref 70–110)
POCT GLUCOSE: 418 MG/DL (ref 70–110)
POCT GLUCOSE: 446 MG/DL (ref 70–110)
POCT GLUCOSE: 468 MG/DL (ref 70–110)
POCT GLUCOSE: 93 MG/DL (ref 70–110)
POCT GLUCOSE: 95 MG/DL (ref 70–110)
POCT GLUCOSE: 98 MG/DL (ref 70–110)
POIKILOCYTOSIS BLD QL SMEAR: ABNORMAL
POLYCHROMASIA BLD QL SMEAR: ABNORMAL
POTASSIUM BLD-SCNC: 3.8 MMOL/L (ref 3.5–5.1)
POTASSIUM BLD-SCNC: 5.5 MMOL/L (ref 3.5–5.1)
POTASSIUM BLD-SCNC: 5.5 MMOL/L (ref 3.5–5.1)
POTASSIUM BLD-SCNC: 8.1 MMOL/L (ref 3.5–5.1)
POTASSIUM BLOOD GAS (OHS): 3.4 MMOL/L (ref 3.5–5)
POTASSIUM BLOOD GAS (OHS): 3.5 MMOL/L (ref 3.5–5)
POTASSIUM BLOOD GAS (OHS): 3.9 MMOL/L (ref 3.5–5)
POTASSIUM BLOOD GAS (OHS): 4.1 MMOL/L (ref 3.5–5)
POTASSIUM BLOOD GAS (OHS): 4.2 MMOL/L (ref 3.5–5)
POTASSIUM BLOOD GAS (OHS): 4.2 MMOL/L (ref 3.5–5)
POTASSIUM BLOOD GAS (OHS): 4.3 MMOL/L (ref 3.5–5)
POTASSIUM BLOOD GAS (OHS): 4.4 MMOL/L (ref 3.5–5)
POTASSIUM BLOOD GAS (OHS): 4.6 MMOL/L (ref 3.5–5)
POTASSIUM BLOOD GAS (OHS): 4.6 MMOL/L (ref 3.5–5)
POTASSIUM BLOOD GAS (OHS): 4.7 MMOL/L (ref 3.5–5)
POTASSIUM BLOOD GAS (OHS): 4.8 MMOL/L (ref 3.5–5)
POTASSIUM BLOOD GAS (OHS): 4.9 MMOL/L (ref 3.5–5)
POTASSIUM BLOOD GAS (OHS): 5 MMOL/L (ref 3.5–5)
POTASSIUM BLOOD GAS (OHS): 5.4 MMOL/L (ref 3.5–5)
POTASSIUM BLOOD GAS (OHS): 6.2 MMOL/L (ref 3.5–5)
POTASSIUM SERPL-SCNC: 3.6 MMOL/L (ref 3.5–5.1)
POTASSIUM SERPL-SCNC: 3.7 MMOL/L (ref 3.5–5.1)
POTASSIUM SERPL-SCNC: 4 MMOL/L (ref 3.5–5.1)
POTASSIUM SERPL-SCNC: 4.1 MMOL/L (ref 3.5–5.1)
POTASSIUM SERPL-SCNC: 4.3 MMOL/L (ref 3.5–5.1)
POTASSIUM SERPL-SCNC: 4.4 MMOL/L (ref 3.5–5.1)
POTASSIUM SERPL-SCNC: 4.4 MMOL/L (ref 3.5–5.1)
POTASSIUM SERPL-SCNC: 4.5 MMOL/L (ref 3.5–5.1)
POTASSIUM SERPL-SCNC: 4.6 MMOL/L (ref 3.5–5.1)
POTASSIUM SERPL-SCNC: 4.7 MMOL/L (ref 3.5–5.1)
POTASSIUM SERPL-SCNC: 4.7 MMOL/L (ref 3.5–5.1)
POTASSIUM SERPL-SCNC: 4.9 MMOL/L (ref 3.5–5.1)
POTASSIUM SERPL-SCNC: 5 MMOL/L (ref 3.5–5.1)
POTASSIUM SERPL-SCNC: 5.1 MMOL/L (ref 3.5–5.1)
POTASSIUM SERPL-SCNC: 5.1 MMOL/L (ref 3.5–5.1)
POTASSIUM SERPL-SCNC: 5.2 MMOL/L (ref 3.5–5.1)
POTASSIUM SERPL-SCNC: 5.2 MMOL/L (ref 3.5–5.1)
POTASSIUM SERPL-SCNC: 5.3 MMOL/L (ref 3.5–5.1)
POTASSIUM SERPL-SCNC: 5.5 MMOL/L (ref 3.5–5.1)
POTASSIUM SERPL-SCNC: 5.6 MMOL/L (ref 3.5–5.1)
POTASSIUM SERPL-SCNC: 5.7 MMOL/L (ref 3.5–5.1)
POTASSIUM SERPL-SCNC: 5.7 MMOL/L (ref 3.5–5.1)
POTASSIUM SERPL-SCNC: 5.8 MMOL/L (ref 3.5–5.1)
POTASSIUM SERPL-SCNC: 6 MMOL/L (ref 3.5–5.1)
POTASSIUM SERPL-SCNC: 6.1 MMOL/L (ref 3.5–5.1)
POTASSIUM SERPL-SCNC: 6.2 MMOL/L (ref 3.5–5.1)
POTASSIUM SERPL-SCNC: 6.3 MMOL/L (ref 3.5–5.1)
POTASSIUM SERPL-SCNC: 6.3 MMOL/L (ref 3.5–5.1)
POTASSIUM SERPL-SCNC: 6.4 MMOL/L (ref 3.5–5.1)
POTASSIUM SERPL-SCNC: 6.6 MMOL/L (ref 3.5–5.1)
POTASSIUM SERPL-SCNC: 6.7 MMOL/L (ref 3.5–5.1)
PROT SERPL-MCNC: 4.6 GM/DL (ref 5.8–7.6)
PROT SERPL-MCNC: 5.6 GM/DL (ref 5.8–7.6)
PROT SERPL-MCNC: 5.7 GM/DL (ref 5.8–7.6)
PROT SERPL-MCNC: 5.9 GM/DL (ref 5.8–7.6)
PROT SERPL-MCNC: 6 GM/DL (ref 5.8–7.6)
PROT SERPL-MCNC: 6.1 GM/DL (ref 5.8–7.6)
PROT SERPL-MCNC: 6.2 GM/DL (ref 5.8–7.6)
PROT SERPL-MCNC: 6.3 GM/DL (ref 5.8–7.6)
PROT SERPL-MCNC: 6.4 GM/DL (ref 5.8–7.6)
PROT SERPL-MCNC: 6.6 GM/DL (ref 5.8–7.6)
PROT SERPL-MCNC: 6.8 GM/DL (ref 5.8–7.6)
PROT SERPL-MCNC: 7.1 GM/DL (ref 5.8–7.6)
PROT UR QL STRIP.AUTO: NEGATIVE MG/DL
PROT UR QL STRIP.AUTO: NEGATIVE MG/DL
PROTHROMBIN TIME: 15 SECONDS (ref 12.5–14.5)
PROTHROMBIN TIME: 18.7 SECONDS (ref 12.5–14.5)
PS (OHS): 10 CMH2O
RA PRESSURE: 3 MMHG
RBC # BLD AUTO: 3.68 X10(6)/MCL (ref 4.7–6.1)
RBC # BLD AUTO: 3.82 X10(6)/MCL (ref 4.7–6.1)
RBC # BLD AUTO: 3.86 X10(6)/MCL (ref 4.7–6.1)
RBC # BLD AUTO: 4.02 X10(6)/MCL (ref 4.7–6.1)
RBC # BLD AUTO: 4.27 X10(6)/MCL (ref 4.7–6.1)
RBC # BLD AUTO: 4.27 X10(6)/MCL (ref 4.7–6.1)
RBC # BLD AUTO: 4.32 X10(6)/MCL (ref 4.7–6.1)
RBC # BLD AUTO: 4.84 X10(6)/MCL (ref 4.7–6.1)
RBC # BLD AUTO: 4.85 X10(6)/MCL (ref 4.7–6.1)
RBC # BLD AUTO: 4.9 X10(6)/MCL (ref 4.7–6.1)
RBC # BLD AUTO: 4.95 X10(6)/MCL (ref 4.7–6.1)
RBC # BLD AUTO: 5.24 X10(6)/MCL (ref 4.7–6.1)
RBC # BLD AUTO: 5.55 X10(6)/MCL (ref 4.7–6.1)
RBC # BLD AUTO: 5.65 X10(6)/MCL (ref 4.7–6.1)
RBC # BLD AUTO: 5.67 X10(6)/MCL (ref 4.7–6.1)
RBC # BLD AUTO: 5.74 X10(6)/MCL (ref 4.7–6.1)
RBC # BLD AUTO: 5.81 X10(6)/MCL (ref 4.7–6.1)
RBC # BLD AUTO: 6.15 X10(6)/MCL (ref 4.7–6.1)
RBC # BLD AUTO: 6.48 X10(6)/MCL (ref 4.7–6.1)
RBC #/AREA URNS AUTO: <5 /HPF
RBC #/AREA URNS AUTO: <5 /HPF
RBC MORPH BLD: ABNORMAL
RBC UR QL AUTO: NEGATIVE UNIT/L
RBC UR QL AUTO: NEGATIVE UNIT/L
RSV RNA NPH QL NAA+NON-PROBE: NOT DETECTED
RV+EV RNA NPH QL NAA+NON-PROBE: NOT DETECTED
SAMPLE SITE BLOOD GAS (OHS): ABNORMAL
SAMPLE: ABNORMAL
SAO2 % BLDA: 100 %
SAO2 % BLDA: 82.7 %
SAO2 % BLDA: 93 %
SAO2 % BLDA: 95 %
SAO2 % BLDA: 96 %
SAO2 % BLDA: 96.3 %
SAO2 % BLDA: 96.9 %
SAO2 % BLDA: 97.2 %
SAO2 % BLDA: 97.3 %
SAO2 % BLDA: 97.8 %
SAO2 % BLDA: 98 %
SAO2 % BLDA: 98.2 %
SAO2 % BLDA: 99 %
SAO2 % BLDA: 99.6 %
SAO2 % BLDA: 99.6 %
SODIUM BLD-SCNC: 135 MMOL/L (ref 136–145)
SODIUM BLD-SCNC: 136 MMOL/L (ref 136–145)
SODIUM BLD-SCNC: 137 MMOL/L (ref 136–145)
SODIUM BLD-SCNC: 140 MMOL/L (ref 136–145)
SODIUM BLOOD GAS (OHS): 129 MMOL/L (ref 137–145)
SODIUM BLOOD GAS (OHS): 131 MMOL/L (ref 137–145)
SODIUM BLOOD GAS (OHS): 131 MMOL/L (ref 137–145)
SODIUM BLOOD GAS (OHS): 132 MMOL/L (ref 137–145)
SODIUM BLOOD GAS (OHS): 133 MMOL/L (ref 137–145)
SODIUM BLOOD GAS (OHS): 134 MMOL/L (ref 137–145)
SODIUM BLOOD GAS (OHS): 135 MMOL/L (ref 137–145)
SODIUM BLOOD GAS (OHS): 138 MMOL/L (ref 137–145)
SODIUM BLOOD GAS (OHS): 139 MMOL/L (ref 137–145)
SODIUM BLOOD GAS (OHS): 145 MMOL/L (ref 137–145)
SODIUM BLOOD GAS (OHS): 148 MMOL/L (ref 137–145)
SODIUM BLOOD GAS (OHS): 148 MMOL/L (ref 137–145)
SODIUM BLOOD GAS (OHS): 150 MMOL/L (ref 137–145)
SODIUM BLOOD GAS (OHS): 157 MMOL/L (ref 137–145)
SODIUM SERPL-SCNC: 133 MMOL/L (ref 136–145)
SODIUM SERPL-SCNC: 133 MMOL/L (ref 136–145)
SODIUM SERPL-SCNC: 134 MMOL/L (ref 136–145)
SODIUM SERPL-SCNC: 135 MMOL/L (ref 136–145)
SODIUM SERPL-SCNC: 136 MMOL/L (ref 136–145)
SODIUM SERPL-SCNC: 137 MMOL/L (ref 136–145)
SODIUM SERPL-SCNC: 137 MMOL/L (ref 136–145)
SODIUM SERPL-SCNC: 139 MMOL/L (ref 136–145)
SODIUM SERPL-SCNC: 140 MMOL/L (ref 136–145)
SODIUM SERPL-SCNC: 141 MMOL/L (ref 136–145)
SODIUM SERPL-SCNC: 142 MMOL/L (ref 136–145)
SODIUM SERPL-SCNC: 143 MMOL/L (ref 136–145)
SODIUM SERPL-SCNC: 143 MMOL/L (ref 136–145)
SODIUM SERPL-SCNC: 146 MMOL/L (ref 136–145)
SODIUM SERPL-SCNC: 147 MMOL/L (ref 136–145)
SODIUM SERPL-SCNC: 148 MMOL/L (ref 136–145)
SODIUM SERPL-SCNC: 148 MMOL/L (ref 136–145)
SODIUM SERPL-SCNC: 149 MMOL/L (ref 136–145)
SODIUM SERPL-SCNC: 150 MMOL/L (ref 136–145)
SODIUM SERPL-SCNC: 150 MMOL/L (ref 136–145)
SODIUM SERPL-SCNC: 151 MMOL/L (ref 136–145)
SODIUM SERPL-SCNC: 151 MMOL/L (ref 136–145)
SODIUM SERPL-SCNC: 153 MMOL/L (ref 136–145)
SODIUM SERPL-SCNC: 155 MMOL/L (ref 136–145)
SODIUM SERPL-SCNC: 160 MMOL/L (ref 136–145)
SP GR UR STRIP.AUTO: 1.01 (ref 1–1.03)
SP GR UR STRIP.AUTO: 1.02 (ref 1–1.03)
SPECIMEN OUTDATE: NORMAL
SPECIMEN OUTDATE: NORMAL
SPONT RR (OHS): 22 B/MIN
SPONT RR (OHS): 28 B/MIN
SPONT VT (OHS): 680 ML
SQUAMOUS #/AREA URNS AUTO: <5 /HPF
SQUAMOUS #/AREA URNS AUTO: <5 /HPF
TARGETS BLD QL SMEAR: ABNORMAL
TOTAL MINUTE VOL (OHS): 16.1 L
TRICUSPID ANNULAR PLANE SYSTOLIC EXCURSION: 1.24 CM
UNIT NUMBER: NORMAL
UROBILINOGEN UR STRIP-ACNC: 0.2 MG/DL
UROBILINOGEN UR STRIP-ACNC: 0.2 MG/DL
VANCOMYCIN TROUGH SERPL-MCNC: 13.7 UG/ML (ref 15–20)
VANCOMYCIN TROUGH SERPL-MCNC: 32.3 UG/ML (ref 15–20)
WBC # SPEC AUTO: 10.46 X10(3)/MCL (ref 4.5–11.5)
WBC # SPEC AUTO: 10.73 X10(3)/MCL (ref 4.5–11.5)
WBC # SPEC AUTO: 11.12 X10(3)/MCL (ref 4.5–11.5)
WBC # SPEC AUTO: 11.67 X10(3)/MCL (ref 4.5–11.5)
WBC # SPEC AUTO: 12.01 X10(3)/MCL (ref 4.5–11.5)
WBC # SPEC AUTO: 12.89 X10(3)/MCL (ref 4.5–11.5)
WBC # SPEC AUTO: 14.78 X10(3)/MCL (ref 4.5–11.5)
WBC # SPEC AUTO: 17.68 X10(3)/MCL (ref 4.5–11.5)
WBC # SPEC AUTO: 18.49 X10(3)/MCL (ref 4.5–11.5)
WBC # SPEC AUTO: 18.99 X10(3)/MCL (ref 4.5–11.5)
WBC # SPEC AUTO: 21.38 X10(3)/MCL (ref 4.5–11.5)
WBC # SPEC AUTO: 21.42 X10(3)/MCL (ref 4.5–11.5)
WBC # SPEC AUTO: 21.83 X10(3)/MCL (ref 4.5–11.5)
WBC # SPEC AUTO: 21.91 X10(3)/MCL (ref 4.5–11.5)
WBC # SPEC AUTO: 22.23 X10(3)/MCL (ref 4.5–11.5)
WBC # SPEC AUTO: 23.42 X10(3)/MCL (ref 4.5–11.5)
WBC # SPEC AUTO: 4.44 X10(3)/MCL (ref 4.5–11.5)
WBC # SPEC AUTO: 8.68 X10(3)/MCL (ref 4.5–11.5)
WBC # SPEC AUTO: 9.53 X10(3)/MCL (ref 4.5–11.5)
WBC #/AREA URNS AUTO: <5 /HPF
WBC #/AREA URNS AUTO: <5 /HPF
WBC VACUOLES (OHS): ABNORMAL

## 2023-01-01 PROCEDURE — 99900026 HC AIRWAY MAINTENANCE (STAT)

## 2023-01-01 PROCEDURE — 94003 VENT MGMT INPAT SUBQ DAY: CPT

## 2023-01-01 PROCEDURE — 82803 BLOOD GASES ANY COMBINATION: CPT

## 2023-01-01 PROCEDURE — 63600175 PHARM REV CODE 636 W HCPCS: Performed by: THORACIC SURGERY (CARDIOTHORACIC VASCULAR SURGERY)

## 2023-01-01 PROCEDURE — 63600175 PHARM REV CODE 636 W HCPCS: Performed by: STUDENT IN AN ORGANIZED HEALTH CARE EDUCATION/TRAINING PROGRAM

## 2023-01-01 PROCEDURE — D9220A PRA ANESTHESIA: ICD-10-PCS | Mod: CRNA,,, | Performed by: NURSE ANESTHETIST, CERTIFIED REGISTERED

## 2023-01-01 PROCEDURE — 80048 BASIC METABOLIC PNL TOTAL CA: CPT | Performed by: THORACIC SURGERY (CARDIOTHORACIC VASCULAR SURGERY)

## 2023-01-01 PROCEDURE — 25000003 PHARM REV CODE 250: Performed by: NURSE PRACTITIONER

## 2023-01-01 PROCEDURE — 25000003 PHARM REV CODE 250: Performed by: STUDENT IN AN ORGANIZED HEALTH CARE EDUCATION/TRAINING PROGRAM

## 2023-01-01 PROCEDURE — 25000003 PHARM REV CODE 250: Performed by: INTERNAL MEDICINE

## 2023-01-01 PROCEDURE — 94761 N-INVAS EAR/PLS OXIMETRY MLT: CPT

## 2023-01-01 PROCEDURE — 21400001 HC TELEMETRY ROOM

## 2023-01-01 PROCEDURE — 27100092 HC HIGH FLOW DELIVERY CANNULA

## 2023-01-01 PROCEDURE — 83735 ASSAY OF MAGNESIUM: CPT | Performed by: STUDENT IN AN ORGANIZED HEALTH CARE EDUCATION/TRAINING PROGRAM

## 2023-01-01 PROCEDURE — 99024 POSTOP FOLLOW-UP VISIT: CPT | Mod: ,,, | Performed by: PHYSICIAN ASSISTANT

## 2023-01-01 PROCEDURE — 63600175 PHARM REV CODE 636 W HCPCS: Performed by: NURSE PRACTITIONER

## 2023-01-01 PROCEDURE — 87070 CULTURE OTHR SPECIMN AEROBIC: CPT | Performed by: STUDENT IN AN ORGANIZED HEALTH CARE EDUCATION/TRAINING PROGRAM

## 2023-01-01 PROCEDURE — 27000221 HC OXYGEN, UP TO 24 HOURS

## 2023-01-01 PROCEDURE — 3288F PR FALLS RISK ASSESSMENT DOCUMENTED: ICD-10-PCS | Mod: CPTII,,, | Performed by: NURSE PRACTITIONER

## 2023-01-01 PROCEDURE — 84100 ASSAY OF PHOSPHORUS: CPT | Performed by: STUDENT IN AN ORGANIZED HEALTH CARE EDUCATION/TRAINING PROGRAM

## 2023-01-01 PROCEDURE — 1101F PR PT FALLS ASSESS DOC 0-1 FALLS W/OUT INJ PAST YR: ICD-10-PCS | Mod: CPTII,,, | Performed by: NURSE PRACTITIONER

## 2023-01-01 PROCEDURE — 99900035 HC TECH TIME PER 15 MIN (STAT)

## 2023-01-01 PROCEDURE — 97112 NEUROMUSCULAR REEDUCATION: CPT

## 2023-01-01 PROCEDURE — 86900 BLOOD TYPING SEROLOGIC ABO: CPT | Performed by: THORACIC SURGERY (CARDIOTHORACIC VASCULAR SURGERY)

## 2023-01-01 PROCEDURE — 85730 THROMBOPLASTIN TIME PARTIAL: CPT | Performed by: THORACIC SURGERY (CARDIOTHORACIC VASCULAR SURGERY)

## 2023-01-01 PROCEDURE — 36600 WITHDRAWAL OF ARTERIAL BLOOD: CPT

## 2023-01-01 PROCEDURE — 94640 AIRWAY INHALATION TREATMENT: CPT

## 2023-01-01 PROCEDURE — 86923 COMPATIBILITY TEST ELECTRIC: CPT | Mod: 91 | Performed by: STUDENT IN AN ORGANIZED HEALTH CARE EDUCATION/TRAINING PROGRAM

## 2023-01-01 PROCEDURE — 51702 INSERT TEMP BLADDER CATH: CPT

## 2023-01-01 PROCEDURE — 80048 BASIC METABOLIC PNL TOTAL CA: CPT | Performed by: INTERNAL MEDICINE

## 2023-01-01 PROCEDURE — 25000242 PHARM REV CODE 250 ALT 637 W/ HCPCS: Performed by: INTERNAL MEDICINE

## 2023-01-01 PROCEDURE — 85025 COMPLETE CBC W/AUTO DIFF WBC: CPT | Performed by: STUDENT IN AN ORGANIZED HEALTH CARE EDUCATION/TRAINING PROGRAM

## 2023-01-01 PROCEDURE — 87040 BLOOD CULTURE FOR BACTERIA: CPT | Performed by: STUDENT IN AN ORGANIZED HEALTH CARE EDUCATION/TRAINING PROGRAM

## 2023-01-01 PROCEDURE — 94660 CPAP INITIATION&MGMT: CPT

## 2023-01-01 PROCEDURE — 63600175 PHARM REV CODE 636 W HCPCS: Performed by: PHYSICIAN ASSISTANT

## 2023-01-01 PROCEDURE — 27000190 HC CPAP FULL FACE MASK W/VALVE

## 2023-01-01 PROCEDURE — A4216 STERILE WATER/SALINE, 10 ML: HCPCS | Performed by: INTERNAL MEDICINE

## 2023-01-01 PROCEDURE — D9220A PRA ANESTHESIA: ICD-10-PCS | Mod: ANES,,, | Performed by: ANESTHESIOLOGY

## 2023-01-01 PROCEDURE — 97116 GAIT TRAINING THERAPY: CPT | Mod: CQ

## 2023-01-01 PROCEDURE — 93010 ELECTROCARDIOGRAM REPORT: CPT | Mod: ,,, | Performed by: INTERNAL MEDICINE

## 2023-01-01 PROCEDURE — 99214 OFFICE O/P EST MOD 30 MIN: CPT | Mod: S$PBB,25,, | Performed by: NURSE PRACTITIONER

## 2023-01-01 PROCEDURE — 81001 URINALYSIS AUTO W/SCOPE: CPT | Performed by: STUDENT IN AN ORGANIZED HEALTH CARE EDUCATION/TRAINING PROGRAM

## 2023-01-01 PROCEDURE — 25000003 PHARM REV CODE 250

## 2023-01-01 PROCEDURE — 99900031 HC PATIENT EDUCATION (STAT)

## 2023-01-01 PROCEDURE — 3074F SYST BP LT 130 MM HG: CPT | Mod: CPTII,,, | Performed by: NURSE PRACTITIONER

## 2023-01-01 PROCEDURE — 25000242 PHARM REV CODE 250 ALT 637 W/ HCPCS: Performed by: PHYSICIAN ASSISTANT

## 2023-01-01 PROCEDURE — 85025 COMPLETE CBC W/AUTO DIFF WBC: CPT | Performed by: PHYSICIAN ASSISTANT

## 2023-01-01 PROCEDURE — 25000003 PHARM REV CODE 250: Performed by: PHYSICIAN ASSISTANT

## 2023-01-01 PROCEDURE — 94799 UNLISTED PULMONARY SVC/PX: CPT

## 2023-01-01 PROCEDURE — 85025 COMPLETE CBC W/AUTO DIFF WBC: CPT | Performed by: INTERNAL MEDICINE

## 2023-01-01 PROCEDURE — 27200966 HC CLOSED SUCTION SYSTEM

## 2023-01-01 PROCEDURE — 1159F MED LIST DOCD IN RCRD: CPT | Mod: CPTII,,, | Performed by: NURSE PRACTITIONER

## 2023-01-01 PROCEDURE — 3008F BODY MASS INDEX DOCD: CPT | Mod: CPTII,,, | Performed by: THORACIC SURGERY (CARDIOTHORACIC VASCULAR SURGERY)

## 2023-01-01 PROCEDURE — 3078F PR MOST RECENT DIASTOLIC BLOOD PRESSURE < 80 MM HG: ICD-10-PCS | Mod: CPTII,,, | Performed by: THORACIC SURGERY (CARDIOTHORACIC VASCULAR SURGERY)

## 2023-01-01 PROCEDURE — 80202 ASSAY OF VANCOMYCIN: CPT | Performed by: INTERNAL MEDICINE

## 2023-01-01 PROCEDURE — 87340 HEPATITIS B SURFACE AG IA: CPT | Performed by: STUDENT IN AN ORGANIZED HEALTH CARE EDUCATION/TRAINING PROGRAM

## 2023-01-01 PROCEDURE — 97530 THERAPEUTIC ACTIVITIES: CPT

## 2023-01-01 PROCEDURE — 63600175 PHARM REV CODE 636 W HCPCS: Performed by: INTERNAL MEDICINE

## 2023-01-01 PROCEDURE — C1751 CATH, INF, PER/CENT/MIDLINE: HCPCS

## 2023-01-01 PROCEDURE — 86923 COMPATIBILITY TEST ELECTRIC: CPT | Mod: 91 | Performed by: THORACIC SURGERY (CARDIOTHORACIC VASCULAR SURGERY)

## 2023-01-01 PROCEDURE — 93005 ELECTROCARDIOGRAM TRACING: CPT

## 2023-01-01 PROCEDURE — 1160F RVW MEDS BY RX/DR IN RCRD: CPT | Mod: CPTII,,, | Performed by: THORACIC SURGERY (CARDIOTHORACIC VASCULAR SURGERY)

## 2023-01-01 PROCEDURE — 3078F DIAST BP <80 MM HG: CPT | Mod: CPTII,,, | Performed by: THORACIC SURGERY (CARDIOTHORACIC VASCULAR SURGERY)

## 2023-01-01 PROCEDURE — 25000003 PHARM REV CODE 250: Performed by: NURSE ANESTHETIST, CERTIFIED REGISTERED

## 2023-01-01 PROCEDURE — 99024 PR POST-OP FOLLOW-UP VISIT: ICD-10-PCS | Mod: ,,, | Performed by: PHYSICIAN ASSISTANT

## 2023-01-01 PROCEDURE — 80048 BASIC METABOLIC PNL TOTAL CA: CPT | Performed by: PHYSICIAN ASSISTANT

## 2023-01-01 PROCEDURE — 84100 ASSAY OF PHOSPHORUS: CPT | Performed by: NURSE PRACTITIONER

## 2023-01-01 PROCEDURE — 97530 THERAPEUTIC ACTIVITIES: CPT | Mod: CQ

## 2023-01-01 PROCEDURE — 97110 THERAPEUTIC EXERCISES: CPT

## 2023-01-01 PROCEDURE — 63600175 PHARM REV CODE 636 W HCPCS: Performed by: HOSPITALIST

## 2023-01-01 PROCEDURE — 63600175 PHARM REV CODE 636 W HCPCS: Performed by: NURSE ANESTHETIST, CERTIFIED REGISTERED

## 2023-01-01 PROCEDURE — 25000003 PHARM REV CODE 250: Performed by: HOSPITALIST

## 2023-01-01 PROCEDURE — 80048 BASIC METABOLIC PNL TOTAL CA: CPT | Performed by: STUDENT IN AN ORGANIZED HEALTH CARE EDUCATION/TRAINING PROGRAM

## 2023-01-01 PROCEDURE — 93503 INSERT/PLACE HEART CATHETER: CPT | Mod: 59,,, | Performed by: ANESTHESIOLOGY

## 2023-01-01 PROCEDURE — 93312 TEE: ICD-10-PCS | Mod: 26,59,, | Performed by: ANESTHESIOLOGY

## 2023-01-01 PROCEDURE — C1751 CATH, INF, PER/CENT/MIDLINE: HCPCS | Performed by: THORACIC SURGERY (CARDIOTHORACIC VASCULAR SURGERY)

## 2023-01-01 PROCEDURE — 87205 SMEAR GRAM STAIN: CPT | Performed by: STUDENT IN AN ORGANIZED HEALTH CARE EDUCATION/TRAINING PROGRAM

## 2023-01-01 PROCEDURE — 93503 SWAN GANZ LINE: ICD-10-PCS | Mod: 59,,, | Performed by: ANESTHESIOLOGY

## 2023-01-01 PROCEDURE — 3008F PR BODY MASS INDEX (BMI) DOCUMENTED: ICD-10-PCS | Mod: CPTII,,, | Performed by: NURSE PRACTITIONER

## 2023-01-01 PROCEDURE — 94667 MNPJ CHEST WALL 1ST: CPT

## 2023-01-01 PROCEDURE — 25000242 PHARM REV CODE 250 ALT 637 W/ HCPCS

## 2023-01-01 PROCEDURE — 84100 ASSAY OF PHOSPHORUS: CPT | Performed by: INTERNAL MEDICINE

## 2023-01-01 PROCEDURE — 92610 EVALUATE SWALLOWING FUNCTION: CPT

## 2023-01-01 PROCEDURE — 93325 DOPPLER ECHO COLOR FLOW MAPG: CPT | Mod: 26,,, | Performed by: ANESTHESIOLOGY

## 2023-01-01 PROCEDURE — 33518 CABG ARTERY-VEIN TWO: CPT | Mod: ,,, | Performed by: THORACIC SURGERY (CARDIOTHORACIC VASCULAR SURGERY)

## 2023-01-01 PROCEDURE — 87077 CULTURE AEROBIC IDENTIFY: CPT | Performed by: INTERNAL MEDICINE

## 2023-01-01 PROCEDURE — 87641 MR-STAPH DNA AMP PROBE: CPT | Performed by: NURSE PRACTITIONER

## 2023-01-01 PROCEDURE — 27100171 HC OXYGEN HIGH FLOW UP TO 24 HOURS

## 2023-01-01 PROCEDURE — 83735 ASSAY OF MAGNESIUM: CPT | Performed by: INTERNAL MEDICINE

## 2023-01-01 PROCEDURE — 3008F BODY MASS INDEX DOCD: CPT | Mod: CPTII,,, | Performed by: NURSE PRACTITIONER

## 2023-01-01 PROCEDURE — D9220A PRA ANESTHESIA: Mod: CRNA,,, | Performed by: NURSE ANESTHETIST, CERTIFIED REGISTERED

## 2023-01-01 PROCEDURE — 33533 PR CABG, ARTERIAL, SINGLE: ICD-10-PCS | Mod: ,,, | Performed by: THORACIC SURGERY (CARDIOTHORACIC VASCULAR SURGERY)

## 2023-01-01 PROCEDURE — 27200667 HC PACEMAKER, TEMPORARY MONITORING, PER SHIFT

## 2023-01-01 PROCEDURE — G0427 INPT/ED TELECONSULT70: HCPCS | Mod: 95,,, | Performed by: NURSE PRACTITIONER

## 2023-01-01 PROCEDURE — 94668 MNPJ CHEST WALL SBSQ: CPT

## 2023-01-01 PROCEDURE — 83605 ASSAY OF LACTIC ACID: CPT | Performed by: INTERNAL MEDICINE

## 2023-01-01 PROCEDURE — 92611 MOTION FLUOROSCOPY/SWALLOW: CPT

## 2023-01-01 PROCEDURE — 1159F PR MEDICATION LIST DOCUMENTED IN MEDICAL RECORD: ICD-10-PCS | Mod: CPTII,,, | Performed by: THORACIC SURGERY (CARDIOTHORACIC VASCULAR SURGERY)

## 2023-01-01 PROCEDURE — 80053 COMPREHEN METABOLIC PANEL: CPT | Performed by: INTERNAL MEDICINE

## 2023-01-01 PROCEDURE — 80053 COMPREHEN METABOLIC PANEL: CPT | Performed by: NURSE PRACTITIONER

## 2023-01-01 PROCEDURE — 87641 MR-STAPH DNA AMP PROBE: CPT | Performed by: STUDENT IN AN ORGANIZED HEALTH CARE EDUCATION/TRAINING PROGRAM

## 2023-01-01 PROCEDURE — 3288F PR FALLS RISK ASSESSMENT DOCUMENTED: ICD-10-PCS | Mod: CPTII,,, | Performed by: THORACIC SURGERY (CARDIOTHORACIC VASCULAR SURGERY)

## 2023-01-01 PROCEDURE — 80053 COMPREHEN METABOLIC PANEL: CPT | Performed by: STUDENT IN AN ORGANIZED HEALTH CARE EDUCATION/TRAINING PROGRAM

## 2023-01-01 PROCEDURE — 1101F PR PT FALLS ASSESS DOC 0-1 FALLS W/OUT INJ PAST YR: ICD-10-PCS | Mod: CPTII,,, | Performed by: THORACIC SURGERY (CARDIOTHORACIC VASCULAR SURGERY)

## 2023-01-01 PROCEDURE — 85610 PROTHROMBIN TIME: CPT | Performed by: HOSPITALIST

## 2023-01-01 PROCEDURE — 3288F FALL RISK ASSESSMENT DOCD: CPT | Mod: CPTII,,, | Performed by: NURSE PRACTITIONER

## 2023-01-01 PROCEDURE — 33533 CABG ARTERIAL SINGLE: CPT | Mod: AS,,, | Performed by: PHYSICIAN ASSISTANT

## 2023-01-01 PROCEDURE — 3078F DIAST BP <80 MM HG: CPT | Mod: CPTII,,, | Performed by: NURSE PRACTITIONER

## 2023-01-01 PROCEDURE — 93320 PR DOPPLER ECHO HEART,COMPLETE: ICD-10-PCS | Mod: 26,,, | Performed by: ANESTHESIOLOGY

## 2023-01-01 PROCEDURE — 93010 EKG 12-LEAD: ICD-10-PCS | Mod: ,,, | Performed by: INTERNAL MEDICINE

## 2023-01-01 PROCEDURE — 99215 OFFICE O/P EST HI 40 MIN: CPT | Mod: PBBFAC | Performed by: NURSE PRACTITIONER

## 2023-01-01 PROCEDURE — 25000003 PHARM REV CODE 250: Performed by: THORACIC SURGERY (CARDIOTHORACIC VASCULAR SURGERY)

## 2023-01-01 PROCEDURE — 80053 COMPREHEN METABOLIC PANEL: CPT | Performed by: THORACIC SURGERY (CARDIOTHORACIC VASCULAR SURGERY)

## 2023-01-01 PROCEDURE — 71046 X-RAY EXAM CHEST 2 VIEWS: CPT | Mod: TC

## 2023-01-01 PROCEDURE — P9016 RBC LEUKOCYTES REDUCED: HCPCS | Performed by: THORACIC SURGERY (CARDIOTHORACIC VASCULAR SURGERY)

## 2023-01-01 PROCEDURE — 93325 TEE: ICD-10-PCS | Mod: 26,,, | Performed by: ANESTHESIOLOGY

## 2023-01-01 PROCEDURE — 27800903 OPTIME MED/SURG SUP & DEVICES OTHER IMPLANTS: Performed by: THORACIC SURGERY (CARDIOTHORACIC VASCULAR SURGERY)

## 2023-01-01 PROCEDURE — 31720 CLEARANCE OF AIRWAYS: CPT

## 2023-01-01 PROCEDURE — 3008F PR BODY MASS INDEX (BMI) DOCUMENTED: ICD-10-PCS | Mod: CPTII,,, | Performed by: THORACIC SURGERY (CARDIOTHORACIC VASCULAR SURGERY)

## 2023-01-01 PROCEDURE — C1887 CATHETER, GUIDING: HCPCS | Performed by: THORACIC SURGERY (CARDIOTHORACIC VASCULAR SURGERY)

## 2023-01-01 PROCEDURE — 37000009 HC ANESTHESIA EA ADD 15 MINS: Performed by: THORACIC SURGERY (CARDIOTHORACIC VASCULAR SURGERY)

## 2023-01-01 PROCEDURE — 37799 UNLISTED PX VASCULAR SURGERY: CPT

## 2023-01-01 PROCEDURE — 33508 PR ENDOSCOPY W/VIDEO-ASST VEIN HARVEST,CABG: ICD-10-PCS | Mod: 59,,, | Performed by: THORACIC SURGERY (CARDIOTHORACIC VASCULAR SURGERY)

## 2023-01-01 PROCEDURE — 86900 BLOOD TYPING SEROLOGIC ABO: CPT | Performed by: STUDENT IN AN ORGANIZED HEALTH CARE EDUCATION/TRAINING PROGRAM

## 2023-01-01 PROCEDURE — 20000000 HC ICU ROOM

## 2023-01-01 PROCEDURE — 36569 INSJ PICC 5 YR+ W/O IMAGING: CPT

## 2023-01-01 PROCEDURE — 3075F PR MOST RECENT SYSTOLIC BLOOD PRESS GE 130-139MM HG: ICD-10-PCS | Mod: CPTII,,, | Performed by: THORACIC SURGERY (CARDIOTHORACIC VASCULAR SURGERY)

## 2023-01-01 PROCEDURE — 36000712 HC OR TIME LEV V 1ST 15 MIN: Performed by: THORACIC SURGERY (CARDIOTHORACIC VASCULAR SURGERY)

## 2023-01-01 PROCEDURE — 80202 ASSAY OF VANCOMYCIN: CPT | Performed by: HOSPITALIST

## 2023-01-01 PROCEDURE — 36000713 HC OR TIME LEV V EA ADD 15 MIN: Performed by: THORACIC SURGERY (CARDIOTHORACIC VASCULAR SURGERY)

## 2023-01-01 PROCEDURE — 97163 PT EVAL HIGH COMPLEX 45 MIN: CPT

## 2023-01-01 PROCEDURE — 85027 COMPLETE CBC AUTOMATED: CPT | Performed by: STUDENT IN AN ORGANIZED HEALTH CARE EDUCATION/TRAINING PROGRAM

## 2023-01-01 PROCEDURE — 99214 PR OFFICE/OUTPT VISIT, EST, LEVL IV, 30-39 MIN: ICD-10-PCS | Mod: S$PBB,25,, | Performed by: NURSE PRACTITIONER

## 2023-01-01 PROCEDURE — C9248 INJ, CLEVIDIPINE BUTYRATE: HCPCS | Mod: JZ,JG | Performed by: THORACIC SURGERY (CARDIOTHORACIC VASCULAR SURGERY)

## 2023-01-01 PROCEDURE — 90935 HEMODIALYSIS ONE EVALUATION: CPT

## 2023-01-01 PROCEDURE — 33533 CABG ARTERIAL SINGLE: CPT | Mod: ,,, | Performed by: THORACIC SURGERY (CARDIOTHORACIC VASCULAR SURGERY)

## 2023-01-01 PROCEDURE — 85014 HEMATOCRIT: CPT | Performed by: PHYSICIAN ASSISTANT

## 2023-01-01 PROCEDURE — 93312 ECHO TRANSESOPHAGEAL: CPT | Mod: 26,59,, | Performed by: ANESTHESIOLOGY

## 2023-01-01 PROCEDURE — P9045 ALBUMIN (HUMAN), 5%, 250 ML: HCPCS | Mod: JZ,JG | Performed by: PHYSICIAN ASSISTANT

## 2023-01-01 PROCEDURE — 33518 PR CABG, ARTERY-VEIN, TWO: ICD-10-PCS | Mod: AS,,, | Performed by: PHYSICIAN ASSISTANT

## 2023-01-01 PROCEDURE — 97167 OT EVAL HIGH COMPLEX 60 MIN: CPT

## 2023-01-01 PROCEDURE — 87798 DETECT AGENT NOS DNA AMP: CPT | Performed by: STUDENT IN AN ORGANIZED HEALTH CARE EDUCATION/TRAINING PROGRAM

## 2023-01-01 PROCEDURE — 1159F PR MEDICATION LIST DOCUMENTED IN MEDICAL RECORD: ICD-10-PCS | Mod: CPTII,,, | Performed by: NURSE PRACTITIONER

## 2023-01-01 PROCEDURE — 85379 FIBRIN DEGRADATION QUANT: CPT | Performed by: STUDENT IN AN ORGANIZED HEALTH CARE EDUCATION/TRAINING PROGRAM

## 2023-01-01 PROCEDURE — 99900025 HC BRONCHOSCOPY-ASST (STAT)

## 2023-01-01 PROCEDURE — 1101F PT FALLS ASSESS-DOCD LE1/YR: CPT | Mod: CPTII,,, | Performed by: THORACIC SURGERY (CARDIOTHORACIC VASCULAR SURGERY)

## 2023-01-01 PROCEDURE — 63600175 PHARM REV CODE 636 W HCPCS

## 2023-01-01 PROCEDURE — 3078F PR MOST RECENT DIASTOLIC BLOOD PRESSURE < 80 MM HG: ICD-10-PCS | Mod: CPTII,,, | Performed by: NURSE PRACTITIONER

## 2023-01-01 PROCEDURE — C1729 CATH, DRAINAGE: HCPCS | Performed by: THORACIC SURGERY (CARDIOTHORACIC VASCULAR SURGERY)

## 2023-01-01 PROCEDURE — 3074F PR MOST RECENT SYSTOLIC BLOOD PRESSURE < 130 MM HG: ICD-10-PCS | Mod: CPTII,,, | Performed by: NURSE PRACTITIONER

## 2023-01-01 PROCEDURE — 33518 PR CABG, ARTERY-VEIN, TWO: ICD-10-PCS | Mod: ,,, | Performed by: THORACIC SURGERY (CARDIOTHORACIC VASCULAR SURGERY)

## 2023-01-01 PROCEDURE — 25000242 PHARM REV CODE 250 ALT 637 W/ HCPCS: Performed by: STUDENT IN AN ORGANIZED HEALTH CARE EDUCATION/TRAINING PROGRAM

## 2023-01-01 PROCEDURE — C1894 INTRO/SHEATH, NON-LASER: HCPCS | Performed by: THORACIC SURGERY (CARDIOTHORACIC VASCULAR SURGERY)

## 2023-01-01 PROCEDURE — 99204 OFFICE O/P NEW MOD 45 MIN: CPT | Mod: ,,, | Performed by: THORACIC SURGERY (CARDIOTHORACIC VASCULAR SURGERY)

## 2023-01-01 PROCEDURE — 25500020 PHARM REV CODE 255: Performed by: INTERNAL MEDICINE

## 2023-01-01 PROCEDURE — 36430 TRANSFUSION BLD/BLD COMPNT: CPT

## 2023-01-01 PROCEDURE — 4010F PR ACE/ARB THEARPY RXD/TAKEN: ICD-10-PCS | Mod: CPTII,,, | Performed by: THORACIC SURGERY (CARDIOTHORACIC VASCULAR SURGERY)

## 2023-01-01 PROCEDURE — 1159F MED LIST DOCD IN RCRD: CPT | Mod: CPTII,,, | Performed by: THORACIC SURGERY (CARDIOTHORACIC VASCULAR SURGERY)

## 2023-01-01 PROCEDURE — 85610 PROTHROMBIN TIME: CPT | Performed by: PHYSICIAN ASSISTANT

## 2023-01-01 PROCEDURE — 1101F PT FALLS ASSESS-DOCD LE1/YR: CPT | Mod: CPTII,,, | Performed by: NURSE PRACTITIONER

## 2023-01-01 PROCEDURE — 97168 OT RE-EVAL EST PLAN CARE: CPT

## 2023-01-01 PROCEDURE — 37000008 HC ANESTHESIA 1ST 15 MINUTES: Performed by: THORACIC SURGERY (CARDIOTHORACIC VASCULAR SURGERY)

## 2023-01-01 PROCEDURE — 27000249 HC VAPOTHERM CIRCUIT

## 2023-01-01 PROCEDURE — 3288F FALL RISK ASSESSMENT DOCD: CPT | Mod: CPTII,,, | Performed by: THORACIC SURGERY (CARDIOTHORACIC VASCULAR SURGERY)

## 2023-01-01 PROCEDURE — D9220A PRA ANESTHESIA: Mod: ANES,,, | Performed by: ANESTHESIOLOGY

## 2023-01-01 PROCEDURE — 93320 DOPPLER ECHO COMPLETE: CPT | Mod: 26,,, | Performed by: ANESTHESIOLOGY

## 2023-01-01 PROCEDURE — 1160F RVW MEDS BY RX/DR IN RCRD: CPT | Mod: CPTII,,, | Performed by: NURSE PRACTITIONER

## 2023-01-01 PROCEDURE — G0426 INPT/ED TELECONSULT50: HCPCS | Mod: GT,,, | Performed by: PSYCHIATRY & NEUROLOGY

## 2023-01-01 PROCEDURE — 85025 COMPLETE CBC W/AUTO DIFF WBC: CPT | Performed by: THORACIC SURGERY (CARDIOTHORACIC VASCULAR SURGERY)

## 2023-01-01 PROCEDURE — 3075F SYST BP GE 130 - 139MM HG: CPT | Mod: CPTII,,, | Performed by: THORACIC SURGERY (CARDIOTHORACIC VASCULAR SURGERY)

## 2023-01-01 PROCEDURE — 1160F PR REVIEW ALL MEDS BY PRESCRIBER/CLIN PHARMACIST DOCUMENTED: ICD-10-PCS | Mod: CPTII,,, | Performed by: NURSE PRACTITIONER

## 2023-01-01 PROCEDURE — 33508 ENDOSCOPIC VEIN HARVEST: CPT | Mod: 59,,, | Performed by: THORACIC SURGERY (CARDIOTHORACIC VASCULAR SURGERY)

## 2023-01-01 PROCEDURE — 33533 PR CABG, ARTERIAL, SINGLE: ICD-10-PCS | Mod: AS,,, | Performed by: PHYSICIAN ASSISTANT

## 2023-01-01 PROCEDURE — 94760 N-INVAS EAR/PLS OXIMETRY 1: CPT

## 2023-01-01 PROCEDURE — 82962 GLUCOSE BLOOD TEST: CPT | Performed by: THORACIC SURGERY (CARDIOTHORACIC VASCULAR SURGERY)

## 2023-01-01 PROCEDURE — 85027 COMPLETE CBC AUTOMATED: CPT | Performed by: INTERNAL MEDICINE

## 2023-01-01 PROCEDURE — 27202055 HC BRONCHOSCOPE, DISP

## 2023-01-01 PROCEDURE — 36620 INSERTION CATHETER ARTERY: CPT

## 2023-01-01 PROCEDURE — 84100 ASSAY OF PHOSPHORUS: CPT | Performed by: PHYSICIAN ASSISTANT

## 2023-01-01 PROCEDURE — 27000200 HC HIGH FLOW DEL DISP CIRCUIT

## 2023-01-01 PROCEDURE — 97164 PT RE-EVAL EST PLAN CARE: CPT

## 2023-01-01 PROCEDURE — 99406 PR TOBACCO USE CESSATION INTERMEDIATE 3-10 MINUTES: ICD-10-PCS | Mod: S$PBB,,, | Performed by: NURSE PRACTITIONER

## 2023-01-01 PROCEDURE — 85027 COMPLETE CBC AUTOMATED: CPT

## 2023-01-01 PROCEDURE — S5010 5% DEXTROSE AND 0.45% SALINE: HCPCS | Performed by: PHYSICIAN ASSISTANT

## 2023-01-01 PROCEDURE — 27000646 HC AEROBIKA DEVICE

## 2023-01-01 PROCEDURE — 99204 PR OFFICE/OUTPT VISIT, NEW, LEVL IV, 45-59 MIN: ICD-10-PCS | Mod: ,,, | Performed by: THORACIC SURGERY (CARDIOTHORACIC VASCULAR SURGERY)

## 2023-01-01 PROCEDURE — 31500 INSERT EMERGENCY AIRWAY: CPT

## 2023-01-01 PROCEDURE — 1126F AMNT PAIN NOTED NONE PRSNT: CPT | Mod: CPTII,,, | Performed by: NURSE PRACTITIONER

## 2023-01-01 PROCEDURE — 83605 ASSAY OF LACTIC ACID: CPT | Performed by: NURSE PRACTITIONER

## 2023-01-01 PROCEDURE — 94660 CPAP INITIATION&MGMT: CPT | Mod: XB

## 2023-01-01 PROCEDURE — 94664 DEMO&/EVAL PT USE INHALER: CPT

## 2023-01-01 PROCEDURE — G0427 PR INPT TELEHEALTH CON 70/>M: ICD-10-PCS | Mod: 95,,, | Performed by: NURSE PRACTITIONER

## 2023-01-01 PROCEDURE — 99406 BEHAV CHNG SMOKING 3-10 MIN: CPT | Mod: S$PBB,,, | Performed by: NURSE PRACTITIONER

## 2023-01-01 PROCEDURE — 97530 THERAPEUTIC ACTIVITIES: CPT | Mod: CO

## 2023-01-01 PROCEDURE — 11000001 HC ACUTE MED/SURG PRIVATE ROOM

## 2023-01-01 PROCEDURE — 4010F ACE/ARB THERAPY RXD/TAKEN: CPT | Mod: CPTII,,, | Performed by: THORACIC SURGERY (CARDIOTHORACIC VASCULAR SURGERY)

## 2023-01-01 PROCEDURE — 84132 ASSAY OF SERUM POTASSIUM: CPT | Performed by: PHYSICIAN ASSISTANT

## 2023-01-01 PROCEDURE — 33518 CABG ARTERY-VEIN TWO: CPT | Mod: AS,,, | Performed by: PHYSICIAN ASSISTANT

## 2023-01-01 PROCEDURE — G0426 PR INPT TELEHEALTH CONSULT 50M: ICD-10-PCS | Mod: GT,,, | Performed by: PSYCHIATRY & NEUROLOGY

## 2023-01-01 PROCEDURE — 92526 ORAL FUNCTION THERAPY: CPT

## 2023-01-01 PROCEDURE — 25000242 PHARM REV CODE 250 ALT 637 W/ HCPCS: Performed by: HOSPITALIST

## 2023-01-01 PROCEDURE — 85730 THROMBOPLASTIN TIME PARTIAL: CPT | Performed by: PHYSICIAN ASSISTANT

## 2023-01-01 PROCEDURE — C1768 GRAFT, VASCULAR: HCPCS | Performed by: THORACIC SURGERY (CARDIOTHORACIC VASCULAR SURGERY)

## 2023-01-01 PROCEDURE — 85027 COMPLETE CBC AUTOMATED: CPT | Performed by: NURSE PRACTITIONER

## 2023-01-01 PROCEDURE — 27201423 OPTIME MED/SURG SUP & DEVICES STERILE SUPPLY: Performed by: THORACIC SURGERY (CARDIOTHORACIC VASCULAR SURGERY)

## 2023-01-01 PROCEDURE — 63600175 PHARM REV CODE 636 W HCPCS: Mod: JZ,JG | Performed by: THORACIC SURGERY (CARDIOTHORACIC VASCULAR SURGERY)

## 2023-01-01 PROCEDURE — 1160F PR REVIEW ALL MEDS BY PRESCRIBER/CLIN PHARMACIST DOCUMENTED: ICD-10-PCS | Mod: CPTII,,, | Performed by: THORACIC SURGERY (CARDIOTHORACIC VASCULAR SURGERY)

## 2023-01-01 PROCEDURE — 83735 ASSAY OF MAGNESIUM: CPT | Performed by: NURSE PRACTITIONER

## 2023-01-01 PROCEDURE — 83735 ASSAY OF MAGNESIUM: CPT | Performed by: PHYSICIAN ASSISTANT

## 2023-01-01 PROCEDURE — 80100014 HC HEMODIALYSIS 1:1

## 2023-01-01 PROCEDURE — 86713 LEGIONELLA ANTIBODY: CPT | Performed by: STUDENT IN AN ORGANIZED HEALTH CARE EDUCATION/TRAINING PROGRAM

## 2023-01-01 PROCEDURE — 94002 VENT MGMT INPAT INIT DAY: CPT

## 2023-01-01 PROCEDURE — 1126F PR PAIN SEVERITY QUANTIFIED, NO PAIN PRESENT: ICD-10-PCS | Mod: CPTII,,, | Performed by: NURSE PRACTITIONER

## 2023-01-01 DEVICE — GRAFT THORAGRAFT STRNL FUS: Type: IMPLANTABLE DEVICE | Site: CHEST | Status: FUNCTIONAL

## 2023-01-01 DEVICE — CLIP LAA EXCLUSION SYS 45 FLEX: Type: IMPLANTABLE DEVICE | Site: CHEST | Status: FUNCTIONAL

## 2023-01-01 RX ORDER — SUCRALFATE 1 G/1
1 TABLET ORAL
Status: DISCONTINUED | OUTPATIENT
Start: 2023-01-01 | End: 2023-01-01

## 2023-01-01 RX ORDER — FAMOTIDINE 10 MG/ML
20 INJECTION INTRAVENOUS EVERY 12 HOURS
Status: DISCONTINUED | OUTPATIENT
Start: 2023-01-01 | End: 2023-01-01

## 2023-01-01 RX ORDER — HALOPERIDOL 5 MG/ML
5 INJECTION INTRAMUSCULAR
Status: DISPENSED | OUTPATIENT
Start: 2023-01-01 | End: 2023-01-01

## 2023-01-01 RX ORDER — POTASSIUM CHLORIDE 7.45 MG/ML
40 INJECTION INTRAVENOUS
Status: DISCONTINUED | OUTPATIENT
Start: 2023-01-01 | End: 2023-06-19 | Stop reason: HOSPADM

## 2023-01-01 RX ORDER — METOPROLOL TARTRATE 25 MG/1
12.5 TABLET ORAL 2 TIMES DAILY
Status: DISCONTINUED | OUTPATIENT
Start: 2023-01-01 | End: 2023-01-01

## 2023-01-01 RX ORDER — DOCUSATE SODIUM 50 MG/5ML
100 LIQUID ORAL DAILY
Status: DISCONTINUED | OUTPATIENT
Start: 2023-01-01 | End: 2023-06-19 | Stop reason: HOSPADM

## 2023-01-01 RX ORDER — ENOXAPARIN SODIUM 100 MG/ML
30 INJECTION SUBCUTANEOUS EVERY 24 HOURS
Status: DISCONTINUED | OUTPATIENT
Start: 2023-01-01 | End: 2023-06-19 | Stop reason: HOSPADM

## 2023-01-01 RX ORDER — ACETAMINOPHEN 650 MG/20.3ML
650 LIQUID ORAL EVERY 6 HOURS PRN
Status: DISCONTINUED | OUTPATIENT
Start: 2023-01-01 | End: 2023-06-19 | Stop reason: HOSPADM

## 2023-01-01 RX ORDER — CALCIUM GLUCONATE 20 MG/ML
INJECTION, SOLUTION INTRAVENOUS
Status: COMPLETED
Start: 2023-01-01 | End: 2023-01-01

## 2023-01-01 RX ORDER — FUROSEMIDE 10 MG/ML
20 INJECTION INTRAMUSCULAR; INTRAVENOUS ONCE
Status: COMPLETED | OUTPATIENT
Start: 2023-01-01 | End: 2023-01-01

## 2023-01-01 RX ORDER — GLUCAGON 1 MG
1 KIT INJECTION
Status: DISCONTINUED | OUTPATIENT
Start: 2023-01-01 | End: 2023-06-19 | Stop reason: HOSPADM

## 2023-01-01 RX ORDER — INSULIN ASPART 100 [IU]/ML
1-10 INJECTION, SOLUTION INTRAVENOUS; SUBCUTANEOUS
Status: DISCONTINUED | OUTPATIENT
Start: 2023-01-01 | End: 2023-01-01

## 2023-01-01 RX ORDER — FOLIC ACID 1 MG/1
1 TABLET ORAL DAILY
Status: DISCONTINUED | OUTPATIENT
Start: 2023-01-01 | End: 2023-01-01

## 2023-01-01 RX ORDER — CALCIUM GLUCONATE 20 MG/ML
1 INJECTION, SOLUTION INTRAVENOUS
Status: DISCONTINUED | OUTPATIENT
Start: 2023-01-01 | End: 2023-01-01

## 2023-01-01 RX ORDER — FUROSEMIDE 10 MG/ML
40 INJECTION INTRAMUSCULAR; INTRAVENOUS ONCE
Status: COMPLETED | OUTPATIENT
Start: 2023-01-01 | End: 2023-01-01

## 2023-01-01 RX ORDER — PROPOFOL 10 MG/ML
0-50 INJECTION, EMULSION INTRAVENOUS CONTINUOUS
Status: DISCONTINUED | OUTPATIENT
Start: 2023-01-01 | End: 2023-01-01

## 2023-01-01 RX ORDER — FUROSEMIDE 10 MG/ML
40 INJECTION INTRAMUSCULAR; INTRAVENOUS
Status: DISCONTINUED | OUTPATIENT
Start: 2023-01-01 | End: 2023-06-19 | Stop reason: HOSPADM

## 2023-01-01 RX ORDER — IBUPROFEN 200 MG
24 TABLET ORAL
Status: DISCONTINUED | OUTPATIENT
Start: 2023-01-01 | End: 2023-01-01

## 2023-01-01 RX ORDER — DEXTROSE MONOHYDRATE 100 MG/ML
INJECTION, SOLUTION INTRAVENOUS
Status: DISCONTINUED | OUTPATIENT
Start: 2023-01-01 | End: 2023-06-19 | Stop reason: HOSPADM

## 2023-01-01 RX ORDER — POTASSIUM CHLORIDE 750 MG/1
10 TABLET, EXTENDED RELEASE ORAL ONCE
Status: DISCONTINUED | OUTPATIENT
Start: 2023-01-01 | End: 2023-01-01

## 2023-01-01 RX ORDER — PHENYLEPHRINE HYDROCHLORIDE 10 MG/ML
INJECTION INTRAVENOUS
Status: DISCONTINUED | OUTPATIENT
Start: 2023-01-01 | End: 2023-01-01

## 2023-01-01 RX ORDER — OMEPRAZOLE 20 MG/1
20 CAPSULE, DELAYED RELEASE ORAL DAILY
Qty: 90 CAPSULE | Refills: 1 | Status: SHIPPED | OUTPATIENT
Start: 2023-01-01

## 2023-01-01 RX ORDER — MAGNESIUM SULFATE HEPTAHYDRATE 40 MG/ML
4 INJECTION, SOLUTION INTRAVENOUS
Status: DISCONTINUED | OUTPATIENT
Start: 2023-01-01 | End: 2023-01-01

## 2023-01-01 RX ORDER — MIDAZOLAM HYDROCHLORIDE 1 MG/ML
INJECTION INTRAMUSCULAR; INTRAVENOUS
Status: DISCONTINUED | OUTPATIENT
Start: 2023-01-01 | End: 2023-01-01

## 2023-01-01 RX ORDER — ROCURONIUM BROMIDE 10 MG/ML
INJECTION, SOLUTION INTRAVENOUS
Status: DISCONTINUED | OUTPATIENT
Start: 2023-01-01 | End: 2023-01-01

## 2023-01-01 RX ORDER — POTASSIUM CHLORIDE 7.45 MG/ML
60 INJECTION INTRAVENOUS
Status: DISCONTINUED | OUTPATIENT
Start: 2023-01-01 | End: 2023-06-19 | Stop reason: HOSPADM

## 2023-01-01 RX ORDER — CETIRIZINE HYDROCHLORIDE 10 MG/1
10 TABLET, CHEWABLE ORAL DAILY
COMMUNITY

## 2023-01-01 RX ORDER — DEXTROSE MONOHYDRATE AND SODIUM CHLORIDE 5; .45 G/100ML; G/100ML
INJECTION, SOLUTION INTRAVENOUS CONTINUOUS PRN
Status: DISCONTINUED | OUTPATIENT
Start: 2023-01-01 | End: 2023-06-19 | Stop reason: HOSPADM

## 2023-01-01 RX ORDER — POTASSIUM CHLORIDE 14.9 MG/ML
20 INJECTION INTRAVENOUS ONCE
Status: DISCONTINUED | OUTPATIENT
Start: 2023-01-01 | End: 2023-01-01

## 2023-01-01 RX ORDER — PROPOFOL 10 MG/ML
0-50 INJECTION, EMULSION INTRAVENOUS CONTINUOUS
Status: DISCONTINUED | OUTPATIENT
Start: 2023-01-01 | End: 2023-06-19 | Stop reason: HOSPADM

## 2023-01-01 RX ORDER — SODIUM CHLORIDE 0.9 % (FLUSH) 0.9 %
10 SYRINGE (ML) INJECTION EVERY 6 HOURS
Status: DISCONTINUED | OUTPATIENT
Start: 2023-01-01 | End: 2023-06-19 | Stop reason: HOSPADM

## 2023-01-01 RX ORDER — METOPROLOL SUCCINATE 25 MG/1
25 TABLET, EXTENDED RELEASE ORAL DAILY
Qty: 90 TABLET | Refills: 1 | Status: ON HOLD | OUTPATIENT
Start: 2023-01-01 | End: 2023-01-01 | Stop reason: CLARIF

## 2023-01-01 RX ORDER — SODIUM CHLORIDE 9 MG/ML
INJECTION, SOLUTION INTRAVENOUS ONCE
Status: COMPLETED | OUTPATIENT
Start: 2023-01-01 | End: 2023-01-01

## 2023-01-01 RX ORDER — INDOMETHACIN 25 MG/1
25 CAPSULE ORAL ONCE
Status: COMPLETED | OUTPATIENT
Start: 2023-01-01 | End: 2023-01-01

## 2023-01-01 RX ORDER — CALCIUM GLUCONATE 20 MG/ML
1 INJECTION, SOLUTION INTRAVENOUS
Status: COMPLETED | OUTPATIENT
Start: 2023-01-01 | End: 2023-01-01

## 2023-01-01 RX ORDER — DIGOXIN 0.25 MG/ML
500 INJECTION INTRAMUSCULAR; INTRAVENOUS ONCE
Status: COMPLETED | OUTPATIENT
Start: 2023-01-01 | End: 2023-01-01

## 2023-01-01 RX ORDER — MAGNESIUM SULFATE HEPTAHYDRATE 40 MG/ML
2 INJECTION, SOLUTION INTRAVENOUS
Status: DISCONTINUED | OUTPATIENT
Start: 2023-01-01 | End: 2023-01-01

## 2023-01-01 RX ORDER — IBUPROFEN 200 MG
16 TABLET ORAL
Status: DISCONTINUED | OUTPATIENT
Start: 2023-01-01 | End: 2023-01-01

## 2023-01-01 RX ORDER — MUPIROCIN 20 MG/G
OINTMENT TOPICAL
Status: CANCELLED | OUTPATIENT
Start: 2023-01-01 | End: 2023-01-01

## 2023-01-01 RX ORDER — MILRINONE LACTATE 0.2 MG/ML
0.38 INJECTION, SOLUTION INTRAVENOUS CONTINUOUS
Status: DISCONTINUED | OUTPATIENT
Start: 2023-01-01 | End: 2023-06-19 | Stop reason: HOSPADM

## 2023-01-01 RX ORDER — IPRATROPIUM BROMIDE 0.5 MG/2.5ML
0.5 SOLUTION RESPIRATORY (INHALATION) EVERY 6 HOURS
Status: DISCONTINUED | OUTPATIENT
Start: 2023-01-01 | End: 2023-01-01

## 2023-01-01 RX ORDER — DEXMEDETOMIDINE HYDROCHLORIDE 4 UG/ML
0-1.4 INJECTION, SOLUTION INTRAVENOUS CONTINUOUS
Status: DISCONTINUED | OUTPATIENT
Start: 2023-01-01 | End: 2023-01-01

## 2023-01-01 RX ORDER — NAPROXEN SODIUM 220 MG/1
81 TABLET, FILM COATED ORAL DAILY
Status: DISCONTINUED | OUTPATIENT
Start: 2023-01-01 | End: 2023-06-19 | Stop reason: HOSPADM

## 2023-01-01 RX ORDER — HEPARIN SODIUM 1000 [USP'U]/ML
INJECTION, SOLUTION INTRAVENOUS; SUBCUTANEOUS
Status: DISCONTINUED | OUTPATIENT
Start: 2023-01-01 | End: 2023-01-01

## 2023-01-01 RX ORDER — DEXAMETHASONE SODIUM PHOSPHATE 4 MG/ML
8 INJECTION, SOLUTION INTRA-ARTICULAR; INTRALESIONAL; INTRAMUSCULAR; INTRAVENOUS; SOFT TISSUE EVERY 12 HOURS
Status: DISCONTINUED | OUTPATIENT
Start: 2023-01-01 | End: 2023-01-01

## 2023-01-01 RX ORDER — LOSARTAN POTASSIUM 50 MG/1
50 TABLET ORAL DAILY
Qty: 90 TABLET | Refills: 1 | Status: SHIPPED | OUTPATIENT
Start: 2023-01-01 | End: 2023-01-01

## 2023-01-01 RX ORDER — NOREPINEPHRINE BITARTRATE/D5W 8 MG/250ML
PLASTIC BAG, INJECTION (ML) INTRAVENOUS
Status: DISCONTINUED
Start: 2023-01-01 | End: 2023-06-19 | Stop reason: HOSPADM

## 2023-01-01 RX ORDER — ETOMIDATE 2 MG/ML
20 INJECTION INTRAVENOUS ONCE
Status: DISCONTINUED | OUTPATIENT
Start: 2023-01-01 | End: 2023-01-01

## 2023-01-01 RX ORDER — FUROSEMIDE 10 MG/ML
60 INJECTION INTRAMUSCULAR; INTRAVENOUS ONCE
Status: DISCONTINUED | OUTPATIENT
Start: 2023-01-01 | End: 2023-01-01

## 2023-01-01 RX ORDER — POTASSIUM CHLORIDE 14.9 MG/ML
20 INJECTION INTRAVENOUS
Status: DISCONTINUED | OUTPATIENT
Start: 2023-01-01 | End: 2023-01-01

## 2023-01-01 RX ORDER — HYDROCODONE BITARTRATE AND ACETAMINOPHEN 5; 325 MG/1; MG/1
1 TABLET ORAL EVERY 4 HOURS PRN
Status: DISCONTINUED | OUTPATIENT
Start: 2023-01-01 | End: 2023-01-01

## 2023-01-01 RX ORDER — METOCLOPRAMIDE HYDROCHLORIDE 5 MG/ML
5 INJECTION INTRAMUSCULAR; INTRAVENOUS EVERY 6 HOURS PRN
Status: DISCONTINUED | OUTPATIENT
Start: 2023-01-01 | End: 2023-06-19 | Stop reason: HOSPADM

## 2023-01-01 RX ORDER — POTASSIUM CHLORIDE 20 MEQ/1
20 TABLET, EXTENDED RELEASE ORAL ONCE
Status: COMPLETED | OUTPATIENT
Start: 2023-01-01 | End: 2023-01-01

## 2023-01-01 RX ORDER — HEPARIN SODIUM 5000 [USP'U]/ML
INJECTION, SOLUTION INTRAVENOUS; SUBCUTANEOUS
Status: DISCONTINUED | OUTPATIENT
Start: 2023-01-01 | End: 2023-01-01 | Stop reason: HOSPADM

## 2023-01-01 RX ORDER — HALOPERIDOL 5 MG/ML
5 INJECTION INTRAMUSCULAR
Status: COMPLETED | OUTPATIENT
Start: 2023-01-01 | End: 2023-01-01

## 2023-01-01 RX ORDER — LANTHANUM CARBONATE 500 MG/1
1000 TABLET, CHEWABLE ORAL ONCE
Status: DISCONTINUED | OUTPATIENT
Start: 2023-01-01 | End: 2023-06-19 | Stop reason: HOSPADM

## 2023-01-01 RX ORDER — GUAIFENESIN 100 MG/5ML
200 SOLUTION ORAL EVERY 4 HOURS PRN
Status: DISCONTINUED | OUTPATIENT
Start: 2023-01-01 | End: 2023-06-19 | Stop reason: HOSPADM

## 2023-01-01 RX ORDER — IBUPROFEN 200 MG
24 TABLET ORAL
Status: DISCONTINUED | OUTPATIENT
Start: 2023-01-01 | End: 2023-06-19 | Stop reason: HOSPADM

## 2023-01-01 RX ORDER — ACETYLCYSTEINE 100 MG/ML
4 SOLUTION ORAL; RESPIRATORY (INHALATION)
Status: DISCONTINUED | OUTPATIENT
Start: 2023-01-01 | End: 2023-01-01

## 2023-01-01 RX ORDER — BACLOFEN 10 MG/1
10 TABLET ORAL 3 TIMES DAILY PRN
Status: DISCONTINUED | OUTPATIENT
Start: 2023-01-01 | End: 2023-06-19 | Stop reason: HOSPADM

## 2023-01-01 RX ORDER — FENTANYL CITRATE 50 UG/ML
50 INJECTION, SOLUTION INTRAMUSCULAR; INTRAVENOUS
Status: DISPENSED | OUTPATIENT
Start: 2023-01-01 | End: 2023-01-01

## 2023-01-01 RX ORDER — NOREPINEPHRINE BITARTRATE/D5W 8 MG/250ML
0-3 PLASTIC BAG, INJECTION (ML) INTRAVENOUS CONTINUOUS
Status: DISCONTINUED | OUTPATIENT
Start: 2023-01-01 | End: 2023-01-01

## 2023-01-01 RX ORDER — DEXMEDETOMIDINE HYDROCHLORIDE 4 UG/ML
0-1.4 INJECTION, SOLUTION INTRAVENOUS CONTINUOUS
Status: DISCONTINUED | OUTPATIENT
Start: 2023-01-01 | End: 2023-06-19 | Stop reason: HOSPADM

## 2023-01-01 RX ORDER — HYDROCODONE BITARTRATE AND ACETAMINOPHEN 500; 5 MG/1; MG/1
TABLET ORAL
Status: DISCONTINUED | OUTPATIENT
Start: 2023-01-01 | End: 2023-06-19 | Stop reason: HOSPADM

## 2023-01-01 RX ORDER — OXYCODONE HYDROCHLORIDE 10 MG/1
10 TABLET ORAL EVERY 4 HOURS PRN
Status: DISCONTINUED | OUTPATIENT
Start: 2023-01-01 | End: 2023-06-19 | Stop reason: HOSPADM

## 2023-01-01 RX ORDER — FENTANYL CITRATE 50 UG/ML
INJECTION, SOLUTION INTRAMUSCULAR; INTRAVENOUS
Status: DISCONTINUED | OUTPATIENT
Start: 2023-01-01 | End: 2023-01-01

## 2023-01-01 RX ORDER — CALCIUM CARBONATE 200(500)MG
500 TABLET,CHEWABLE ORAL 3 TIMES DAILY
Status: DISCONTINUED | OUTPATIENT
Start: 2023-01-01 | End: 2023-01-01

## 2023-01-01 RX ORDER — DIPHENHYDRAMINE HYDROCHLORIDE 50 MG/ML
12.5 INJECTION INTRAMUSCULAR; INTRAVENOUS EVERY 6 HOURS PRN
Status: DISCONTINUED | OUTPATIENT
Start: 2023-01-01 | End: 2023-01-01

## 2023-01-01 RX ORDER — INSULIN ASPART 100 [IU]/ML
0-5 INJECTION, SOLUTION INTRAVENOUS; SUBCUTANEOUS
Status: DISCONTINUED | OUTPATIENT
Start: 2023-01-01 | End: 2023-01-01

## 2023-01-01 RX ORDER — FUROSEMIDE 20 MG/1
20 TABLET ORAL DAILY
COMMUNITY
Start: 2023-01-01

## 2023-01-01 RX ORDER — HYDROCODONE BITARTRATE AND ACETAMINOPHEN 500; 5 MG/1; MG/1
TABLET ORAL
Status: DISCONTINUED | OUTPATIENT
Start: 2023-01-01 | End: 2023-01-01 | Stop reason: HOSPADM

## 2023-01-01 RX ORDER — CALCIUM GLUCONATE 20 MG/ML
1 INJECTION, SOLUTION INTRAVENOUS ONCE
Status: COMPLETED | OUTPATIENT
Start: 2023-01-01 | End: 2023-01-01

## 2023-01-01 RX ORDER — DEXAMETHASONE SODIUM PHOSPHATE 4 MG/ML
4 INJECTION, SOLUTION INTRA-ARTICULAR; INTRALESIONAL; INTRAMUSCULAR; INTRAVENOUS; SOFT TISSUE
Status: DISCONTINUED | OUTPATIENT
Start: 2023-01-01 | End: 2023-01-01

## 2023-01-01 RX ORDER — SACUBITRIL AND VALSARTAN 49; 51 MG/1; MG/1
1 TABLET, FILM COATED ORAL 2 TIMES DAILY
COMMUNITY
Start: 2023-01-01

## 2023-01-01 RX ORDER — NOREPINEPHRINE BITARTRATE/D5W 8 MG/250ML
PLASTIC BAG, INJECTION (ML) INTRAVENOUS
Status: DISPENSED
Start: 2023-01-01 | End: 2023-01-01

## 2023-01-01 RX ORDER — INSULIN ASPART 100 [IU]/ML
1-10 INJECTION, SOLUTION INTRAVENOUS; SUBCUTANEOUS EVERY 6 HOURS PRN
Status: DISCONTINUED | OUTPATIENT
Start: 2023-01-01 | End: 2023-06-19 | Stop reason: HOSPADM

## 2023-01-01 RX ORDER — HYDROCODONE BITARTRATE AND ACETAMINOPHEN 5; 325 MG/1; MG/1
1 TABLET ORAL EVERY 4 HOURS PRN
Status: DISCONTINUED | OUTPATIENT
Start: 2023-01-01 | End: 2023-06-19 | Stop reason: HOSPADM

## 2023-01-01 RX ORDER — MUPIROCIN 20 MG/G
OINTMENT TOPICAL 2 TIMES DAILY
Status: COMPLETED | OUTPATIENT
Start: 2023-01-01 | End: 2023-01-01

## 2023-01-01 RX ORDER — VANCOMYCIN HCL IN 5 % DEXTROSE 1G/250ML
1000 PLASTIC BAG, INJECTION (ML) INTRAVENOUS
Status: DISCONTINUED | OUTPATIENT
Start: 2023-01-01 | End: 2023-01-01

## 2023-01-01 RX ORDER — LOPERAMIDE HYDROCHLORIDE 2 MG/1
2 CAPSULE ORAL CONTINUOUS PRN
Status: DISCONTINUED | OUTPATIENT
Start: 2023-01-01 | End: 2023-06-19 | Stop reason: HOSPADM

## 2023-01-01 RX ORDER — SODIUM CHLORIDE 0.9 % (FLUSH) 0.9 %
10 SYRINGE (ML) INJECTION
Status: DISCONTINUED | OUTPATIENT
Start: 2023-01-01 | End: 2023-06-19 | Stop reason: HOSPADM

## 2023-01-01 RX ORDER — POTASSIUM CHLORIDE 7.45 MG/ML
80 INJECTION INTRAVENOUS
Status: DISCONTINUED | OUTPATIENT
Start: 2023-01-01 | End: 2023-06-19 | Stop reason: HOSPADM

## 2023-01-01 RX ORDER — ALBUTEROL SULFATE 0.83 MG/ML
2.5 SOLUTION RESPIRATORY (INHALATION) EVERY 6 HOURS
Status: DISCONTINUED | OUTPATIENT
Start: 2023-01-01 | End: 2023-01-01

## 2023-01-01 RX ORDER — FAMOTIDINE 10 MG/ML
20 INJECTION INTRAVENOUS DAILY
Status: DISCONTINUED | OUTPATIENT
Start: 2023-01-01 | End: 2023-06-19 | Stop reason: HOSPADM

## 2023-01-01 RX ORDER — CALCIUM GLUCONATE 20 MG/ML
3 INJECTION, SOLUTION INTRAVENOUS
Status: DISCONTINUED | OUTPATIENT
Start: 2023-01-01 | End: 2023-01-01

## 2023-01-01 RX ORDER — POTASSIUM CHLORIDE 14.9 MG/ML
20 INJECTION INTRAVENOUS
Status: COMPLETED | OUTPATIENT
Start: 2023-01-01 | End: 2023-01-01

## 2023-01-01 RX ORDER — QUETIAPINE FUMARATE 25 MG/1
25 TABLET, FILM COATED ORAL ONCE
Status: COMPLETED | OUTPATIENT
Start: 2023-01-01 | End: 2023-01-01

## 2023-01-01 RX ORDER — CLOPIDOGREL BISULFATE 75 MG/1
75 TABLET ORAL
COMMUNITY
Start: 2023-01-01

## 2023-01-01 RX ORDER — CEFAZOLIN SODIUM 2 G/50ML
2 SOLUTION INTRAVENOUS
Status: COMPLETED | OUTPATIENT
Start: 2023-01-01 | End: 2023-01-01

## 2023-01-01 RX ORDER — POTASSIUM CHLORIDE 14.9 MG/ML
40 INJECTION INTRAVENOUS
Status: DISCONTINUED | OUTPATIENT
Start: 2023-01-01 | End: 2023-01-01

## 2023-01-01 RX ORDER — SODIUM CHLORIDE FOR INHALATION 3 %
4 VIAL, NEBULIZER (ML) INHALATION EVERY 6 HOURS
Status: DISCONTINUED | OUTPATIENT
Start: 2023-01-01 | End: 2023-01-01

## 2023-01-01 RX ORDER — INDOMETHACIN 25 MG/1
CAPSULE ORAL
Status: COMPLETED
Start: 2023-01-01 | End: 2023-01-01

## 2023-01-01 RX ORDER — PROTAMINE SULFATE 10 MG/ML
INJECTION, SOLUTION INTRAVENOUS
Status: DISCONTINUED | OUTPATIENT
Start: 2023-01-01 | End: 2023-01-01

## 2023-01-01 RX ORDER — ASPIRIN 81 MG/1
81 TABLET ORAL DAILY
Status: DISCONTINUED | OUTPATIENT
Start: 2023-01-01 | End: 2023-01-01

## 2023-01-01 RX ORDER — FUROSEMIDE 10 MG/ML
40 INJECTION INTRAMUSCULAR; INTRAVENOUS ONCE
Status: DISCONTINUED | OUTPATIENT
Start: 2023-01-01 | End: 2023-01-01

## 2023-01-01 RX ORDER — GLUCAGON 1 MG
1 KIT INJECTION
Status: DISCONTINUED | OUTPATIENT
Start: 2023-01-01 | End: 2023-01-01

## 2023-01-01 RX ORDER — KETOCONAZOLE 20 MG/G
CREAM TOPICAL 2 TIMES DAILY
Qty: 60 G | Refills: 2 | Status: SHIPPED | OUTPATIENT
Start: 2023-01-01

## 2023-01-01 RX ORDER — MIDAZOLAM HYDROCHLORIDE 1 MG/ML
INJECTION INTRAMUSCULAR; INTRAVENOUS
Status: COMPLETED
Start: 2023-01-01 | End: 2023-01-01

## 2023-01-01 RX ORDER — MUPIROCIN 20 MG/G
OINTMENT TOPICAL
Status: COMPLETED | OUTPATIENT
Start: 2023-01-01 | End: 2023-01-01

## 2023-01-01 RX ORDER — IBUPROFEN 200 MG
16 TABLET ORAL
Status: DISCONTINUED | OUTPATIENT
Start: 2023-01-01 | End: 2023-06-19 | Stop reason: HOSPADM

## 2023-01-01 RX ORDER — VASOPRESSIN 20 [USP'U]/ML
INJECTION, SOLUTION INTRAMUSCULAR; SUBCUTANEOUS
Status: DISCONTINUED | OUTPATIENT
Start: 2023-01-01 | End: 2023-01-01

## 2023-01-01 RX ORDER — CALCIUM CARBONATE 200(500)MG
1000 TABLET,CHEWABLE ORAL 3 TIMES DAILY
Status: COMPLETED | OUTPATIENT
Start: 2023-01-01 | End: 2023-01-01

## 2023-01-01 RX ORDER — PAPAVERINE HYDROCHLORIDE 30 MG/ML
INJECTION INTRAMUSCULAR; INTRAVENOUS
Status: DISCONTINUED | OUTPATIENT
Start: 2023-01-01 | End: 2023-01-01 | Stop reason: HOSPADM

## 2023-01-01 RX ORDER — ALBUTEROL SULFATE 0.83 MG/ML
7.5 SOLUTION RESPIRATORY (INHALATION) ONCE
Status: COMPLETED | OUTPATIENT
Start: 2023-01-01 | End: 2023-01-01

## 2023-01-01 RX ORDER — ETOMIDATE 2 MG/ML
INJECTION INTRAVENOUS
Status: COMPLETED
Start: 2023-01-01 | End: 2023-01-01

## 2023-01-01 RX ORDER — ALBUMIN HUMAN 50 G/1000ML
12.5 SOLUTION INTRAVENOUS
Status: DISCONTINUED | OUTPATIENT
Start: 2023-01-01 | End: 2023-01-01

## 2023-01-01 RX ORDER — AMLODIPINE BESYLATE 10 MG/1
10 TABLET ORAL DAILY
Qty: 90 TABLET | Refills: 1 | Status: SHIPPED | OUTPATIENT
Start: 2023-01-01 | End: 2023-07-08

## 2023-01-01 RX ORDER — IBUPROFEN 200 MG
15 TABLET ORAL
Status: DISCONTINUED | OUTPATIENT
Start: 2023-01-01 | End: 2023-06-19 | Stop reason: HOSPADM

## 2023-01-01 RX ORDER — PROPOFOL 10 MG/ML
INJECTION, EMULSION INTRAVENOUS
Status: DISPENSED
Start: 2023-01-01 | End: 2023-01-01

## 2023-01-01 RX ORDER — ALBUTEROL SULFATE 0.83 MG/ML
2.5 SOLUTION RESPIRATORY (INHALATION) EVERY 4 HOURS PRN
Status: DISCONTINUED | OUTPATIENT
Start: 2023-01-01 | End: 2023-01-01

## 2023-01-01 RX ORDER — DEXMEDETOMIDINE HYDROCHLORIDE 4 UG/ML
INJECTION, SOLUTION INTRAVENOUS
Status: COMPLETED
Start: 2023-01-01 | End: 2023-01-01

## 2023-01-01 RX ORDER — MORPHINE SULFATE 10 MG/ML
4 INJECTION INTRAMUSCULAR; INTRAVENOUS; SUBCUTANEOUS EVERY 4 HOURS PRN
Status: DISCONTINUED | OUTPATIENT
Start: 2023-01-01 | End: 2023-01-01

## 2023-01-01 RX ORDER — ADENOSINE 3 MG/ML
INJECTION, SOLUTION INTRAVENOUS
Status: DISCONTINUED
Start: 2023-01-01 | End: 2023-01-01 | Stop reason: WASHOUT

## 2023-01-01 RX ORDER — FOLIC ACID 1 MG/1
1 TABLET ORAL DAILY
Status: DISCONTINUED | OUTPATIENT
Start: 2023-01-01 | End: 2023-06-19 | Stop reason: HOSPADM

## 2023-01-01 RX ORDER — OXYCODONE HYDROCHLORIDE 10 MG/1
10 TABLET ORAL EVERY 4 HOURS PRN
Status: DISCONTINUED | OUTPATIENT
Start: 2023-01-01 | End: 2023-01-01

## 2023-01-01 RX ORDER — NOREPINEPHRINE BITARTRATE/D5W 8 MG/250ML
PLASTIC BAG, INJECTION (ML) INTRAVENOUS
Status: COMPLETED
Start: 2023-01-01 | End: 2023-01-01

## 2023-01-01 RX ORDER — METOPROLOL TARTRATE 50 MG/1
100 TABLET ORAL 2 TIMES DAILY
Status: DISCONTINUED | OUTPATIENT
Start: 2023-01-01 | End: 2023-06-19 | Stop reason: HOSPADM

## 2023-01-01 RX ORDER — LOVASTATIN 10 MG/1
10 TABLET ORAL NIGHTLY
Qty: 90 TABLET | Refills: 1 | Status: SHIPPED | OUTPATIENT
Start: 2023-01-01

## 2023-01-01 RX ORDER — PROPOFOL 10 MG/ML
VIAL (ML) INTRAVENOUS
Status: DISCONTINUED | OUTPATIENT
Start: 2023-01-01 | End: 2023-01-01

## 2023-01-01 RX ORDER — HYDROCODONE BITARTRATE AND ACETAMINOPHEN 500; 5 MG/1; MG/1
TABLET ORAL
Status: CANCELLED | OUTPATIENT
Start: 2023-01-01

## 2023-01-01 RX ORDER — ONDANSETRON 2 MG/ML
INJECTION INTRAMUSCULAR; INTRAVENOUS
Status: DISCONTINUED | OUTPATIENT
Start: 2023-01-01 | End: 2023-01-01

## 2023-01-01 RX ORDER — NOREPINEPHRINE BITARTRATE/D5W 4MG/250ML
0-.2 PLASTIC BAG, INJECTION (ML) INTRAVENOUS CONTINUOUS
Status: DISCONTINUED | OUTPATIENT
Start: 2023-01-01 | End: 2023-01-01

## 2023-01-01 RX ORDER — BACLOFEN 10 MG/1
10 TABLET ORAL 3 TIMES DAILY PRN
Qty: 90 TABLET | Refills: 4 | Status: SHIPPED | OUTPATIENT
Start: 2023-01-01

## 2023-01-01 RX ORDER — CALCIUM GLUCONATE 20 MG/ML
2 INJECTION, SOLUTION INTRAVENOUS
Status: DISCONTINUED | OUTPATIENT
Start: 2023-01-01 | End: 2023-01-01

## 2023-01-01 RX ORDER — LACTULOSE 10 G/15ML
20 SOLUTION ORAL EVERY 6 HOURS PRN
Status: DISCONTINUED | OUTPATIENT
Start: 2023-01-01 | End: 2023-01-01

## 2023-01-01 RX ORDER — FENTANYL CITRATE 50 UG/ML
50 INJECTION, SOLUTION INTRAMUSCULAR; INTRAVENOUS
Status: DISCONTINUED | OUTPATIENT
Start: 2023-01-01 | End: 2023-06-19 | Stop reason: HOSPADM

## 2023-01-01 RX ORDER — KETOROLAC TROMETHAMINE 30 MG/ML
15 INJECTION, SOLUTION INTRAMUSCULAR; INTRAVENOUS EVERY 6 HOURS PRN
Status: DISPENSED | OUTPATIENT
Start: 2023-01-01 | End: 2023-01-01

## 2023-01-01 RX ORDER — CHLORHEXIDINE GLUCONATE ORAL RINSE 1.2 MG/ML
15 SOLUTION DENTAL 2 TIMES DAILY
Status: DISCONTINUED | OUTPATIENT
Start: 2023-01-01 | End: 2023-06-19 | Stop reason: HOSPADM

## 2023-01-01 RX ORDER — BACLOFEN 10 MG/1
10 TABLET ORAL 3 TIMES DAILY PRN
Status: DISCONTINUED | OUTPATIENT
Start: 2023-01-01 | End: 2023-01-01

## 2023-01-01 RX ORDER — DEXAMETHASONE SODIUM PHOSPHATE 4 MG/ML
4 INJECTION, SOLUTION INTRA-ARTICULAR; INTRALESIONAL; INTRAMUSCULAR; INTRAVENOUS; SOFT TISSUE EVERY 24 HOURS
Status: DISCONTINUED | OUTPATIENT
Start: 2023-01-01 | End: 2023-06-19 | Stop reason: HOSPADM

## 2023-01-01 RX ORDER — LOPERAMIDE HYDROCHLORIDE 2 MG/1
2 CAPSULE ORAL CONTINUOUS PRN
Status: DISCONTINUED | OUTPATIENT
Start: 2023-01-01 | End: 2023-01-01

## 2023-01-01 RX ORDER — ATORVASTATIN CALCIUM 80 MG/1
80 TABLET, FILM COATED ORAL NIGHTLY
COMMUNITY
Start: 2023-01-01

## 2023-01-01 RX ORDER — SPIRONOLACTONE 25 MG/1
25 TABLET ORAL DAILY
COMMUNITY
Start: 2023-01-01

## 2023-01-01 RX ORDER — DEXTROSE MONOHYDRATE AND SODIUM CHLORIDE 5; .45 G/100ML; G/100ML
INJECTION, SOLUTION INTRAVENOUS CONTINUOUS
Status: DISCONTINUED | OUTPATIENT
Start: 2023-01-01 | End: 2023-01-01

## 2023-01-01 RX ORDER — METOPROLOL SUCCINATE 100 MG/1
100 TABLET, EXTENDED RELEASE ORAL DAILY
COMMUNITY
Start: 2023-01-01

## 2023-01-01 RX ORDER — DOCUSATE SODIUM 100 MG/1
100 CAPSULE, LIQUID FILLED ORAL 2 TIMES DAILY
Status: DISCONTINUED | OUTPATIENT
Start: 2023-01-01 | End: 2023-01-01

## 2023-01-01 RX ORDER — EPINEPHRINE 1 MG/ML
INJECTION, SOLUTION, CONCENTRATE INTRAVENOUS
Status: DISCONTINUED | OUTPATIENT
Start: 2023-01-01 | End: 2023-01-01

## 2023-01-01 RX ORDER — ENOXAPARIN SODIUM 100 MG/ML
40 INJECTION SUBCUTANEOUS EVERY 24 HOURS
Status: DISCONTINUED | OUTPATIENT
Start: 2023-01-01 | End: 2023-01-01

## 2023-01-01 RX ORDER — ALBUTEROL SULFATE 90 UG/1
1 AEROSOL, METERED RESPIRATORY (INHALATION) EVERY 12 HOURS PRN
Qty: 18 G | Refills: 6 | Status: SHIPPED | OUTPATIENT
Start: 2023-01-01 | End: 2024-01-09

## 2023-01-01 RX ORDER — METOPROLOL TARTRATE 1 MG/ML
2.5 INJECTION, SOLUTION INTRAVENOUS 2 TIMES DAILY
Status: DISCONTINUED | OUTPATIENT
Start: 2023-01-01 | End: 2023-01-01

## 2023-01-01 RX ORDER — HALOPERIDOL 5 MG/ML
5 INJECTION INTRAMUSCULAR ONCE
Status: COMPLETED | OUTPATIENT
Start: 2023-01-01 | End: 2023-01-01

## 2023-01-01 RX ORDER — METOPROLOL TARTRATE 50 MG/1
50 TABLET ORAL 2 TIMES DAILY
Status: DISCONTINUED | OUTPATIENT
Start: 2023-01-01 | End: 2023-01-01

## 2023-01-01 RX ORDER — FLUTICASONE PROPIONATE 50 MCG
2 SPRAY, SUSPENSION (ML) NASAL DAILY
Status: DISCONTINUED | OUTPATIENT
Start: 2023-01-01 | End: 2023-01-01

## 2023-01-01 RX ORDER — TRANEXAMIC ACID 100 MG/ML
INJECTION, SOLUTION INTRAVENOUS
Status: DISCONTINUED | OUTPATIENT
Start: 2023-01-01 | End: 2023-01-01

## 2023-01-01 RX ORDER — ONDANSETRON 2 MG/ML
4 INJECTION INTRAMUSCULAR; INTRAVENOUS EVERY 4 HOURS PRN
Status: DISCONTINUED | OUTPATIENT
Start: 2023-01-01 | End: 2023-06-19 | Stop reason: HOSPADM

## 2023-01-01 RX ORDER — IBUPROFEN 200 MG
28 TABLET ORAL
Status: DISCONTINUED | OUTPATIENT
Start: 2023-01-01 | End: 2023-06-19 | Stop reason: HOSPADM

## 2023-01-01 RX ORDER — LABETALOL HYDROCHLORIDE 5 MG/ML
10 INJECTION, SOLUTION INTRAVENOUS EVERY 4 HOURS PRN
Status: DISCONTINUED | OUTPATIENT
Start: 2023-01-01 | End: 2023-01-01

## 2023-01-01 RX ORDER — SUCCINYLCHOLINE CHLORIDE 20 MG/ML
200 INJECTION INTRAMUSCULAR; INTRAVENOUS ONCE
Status: COMPLETED | OUTPATIENT
Start: 2023-01-01 | End: 2023-01-01

## 2023-01-01 RX ORDER — LACTULOSE 10 G/15ML
20 SOLUTION ORAL EVERY 6 HOURS PRN
Status: DISCONTINUED | OUTPATIENT
Start: 2023-01-01 | End: 2023-06-19 | Stop reason: HOSPADM

## 2023-01-01 RX ADMIN — INSULIN HUMAN 0.1 UNITS/KG/HR: 1 INJECTION, SOLUTION INTRAVENOUS at 06:06

## 2023-01-01 RX ADMIN — MILRINONE LACTATE IN DEXTROSE 0.38 MCG/KG/MIN: 200 INJECTION, SOLUTION INTRAVENOUS at 02:06

## 2023-01-01 RX ADMIN — FUROSEMIDE 40 MG: 10 INJECTION, SOLUTION INTRAMUSCULAR; INTRAVENOUS at 05:06

## 2023-01-01 RX ADMIN — METOPROLOL TARTRATE 12.5 MG: 25 TABLET, FILM COATED ORAL at 08:06

## 2023-01-01 RX ADMIN — FAMOTIDINE 20 MG: 10 INJECTION, SOLUTION INTRAVENOUS at 08:06

## 2023-01-01 RX ADMIN — FUROSEMIDE 40 MG: 10 INJECTION, SOLUTION INTRAMUSCULAR; INTRAVENOUS at 10:06

## 2023-01-01 RX ADMIN — INSULIN DETEMIR 25 UNITS: 100 INJECTION, SOLUTION SUBCUTANEOUS at 08:06

## 2023-01-01 RX ADMIN — DEXMEDETOMIDINE HYDROCHLORIDE 1.2 MCG/KG/HR: 400 INJECTION INTRAVENOUS at 02:06

## 2023-01-01 RX ADMIN — VASOPRESSIN 1 UNITS: 20 INJECTION INTRAVENOUS at 12:06

## 2023-01-01 RX ADMIN — ASPIRIN 81 MG: 81 TABLET, COATED ORAL at 08:06

## 2023-01-01 RX ADMIN — SODIUM CHLORIDE, PRESERVATIVE FREE 10 ML: 5 INJECTION INTRAVENOUS at 11:06

## 2023-01-01 RX ADMIN — SACUBITRIL AND VALSARTAN 1 TABLET: 24; 26 TABLET, FILM COATED ORAL at 09:06

## 2023-01-01 RX ADMIN — ENOXAPARIN SODIUM 40 MG: 40 INJECTION SUBCUTANEOUS at 05:06

## 2023-01-01 RX ADMIN — DEXTROSE MONOHYDRATE 250 ML: 100 INJECTION, SOLUTION INTRAVENOUS at 08:06

## 2023-01-01 RX ADMIN — HYDROCODONE BITARTRATE AND ACETAMINOPHEN 1 TABLET: 5; 325 TABLET ORAL at 03:06

## 2023-01-01 RX ADMIN — VANCOMYCIN HYDROCHLORIDE 1500 MG: 1.5 INJECTION, POWDER, LYOPHILIZED, FOR SOLUTION INTRAVENOUS at 04:06

## 2023-01-01 RX ADMIN — SODIUM CHLORIDE 30 MG/ML INHALATION SOLUTION 4 ML: 30 SOLUTION INHALANT at 07:06

## 2023-01-01 RX ADMIN — HALOPERIDOL LACTATE 5 MG: 5 INJECTION, SOLUTION INTRAMUSCULAR at 11:06

## 2023-01-01 RX ADMIN — CEFAZOLIN SODIUM 2 G: 2 SOLUTION INTRAVENOUS at 08:06

## 2023-01-01 RX ADMIN — EPINEPHRINE 2 MCG: 1 INJECTION, SOLUTION, CONCENTRATE INTRAVENOUS at 09:06

## 2023-01-01 RX ADMIN — HALOPERIDOL LACTATE 5 MG: 5 INJECTION, SOLUTION INTRAMUSCULAR at 04:06

## 2023-01-01 RX ADMIN — DEXMEDETOMIDINE HYDROCHLORIDE 0.2 MCG/KG/HR: 400 INJECTION INTRAVENOUS at 01:06

## 2023-01-01 RX ADMIN — ALBUTEROL SULFATE 2.5 MG: 2.5 SOLUTION RESPIRATORY (INHALATION) at 02:06

## 2023-01-01 RX ADMIN — ASPIRIN 81 MG CHEWABLE TABLET 81 MG: 81 TABLET CHEWABLE at 08:06

## 2023-01-01 RX ADMIN — INSULIN ASPART 1 UNITS: 100 INJECTION, SOLUTION INTRAVENOUS; SUBCUTANEOUS at 08:06

## 2023-01-01 RX ADMIN — MIDAZOLAM HYDROCHLORIDE 1 MG: 1 INJECTION, SOLUTION INTRAMUSCULAR; INTRAVENOUS at 09:06

## 2023-01-01 RX ADMIN — MUPIROCIN: 20 OINTMENT TOPICAL at 07:06

## 2023-01-01 RX ADMIN — INSULIN HUMAN 10 UNITS: 100 INJECTION, SOLUTION PARENTERAL at 04:06

## 2023-01-01 RX ADMIN — PIPERACILLIN AND TAZOBACTAM 4.5 G: 4; .5 INJECTION, POWDER, LYOPHILIZED, FOR SOLUTION INTRAVENOUS; PARENTERAL at 03:06

## 2023-01-01 RX ADMIN — ALBUTEROL SULFATE 2.5 MG: 2.5 SOLUTION RESPIRATORY (INHALATION) at 07:06

## 2023-01-01 RX ADMIN — IPRATROPIUM BROMIDE 0.5 MG: 0.5 SOLUTION RESPIRATORY (INHALATION) at 12:06

## 2023-01-01 RX ADMIN — FLUTICASONE PROPIONATE 100 MCG: 50 SPRAY, METERED NASAL at 11:06

## 2023-01-01 RX ADMIN — ROCURONIUM BROMIDE 25 MG: 10 SOLUTION INTRAVENOUS at 10:06

## 2023-01-01 RX ADMIN — IPRATROPIUM BROMIDE 0.5 MG: 0.5 SOLUTION RESPIRATORY (INHALATION) at 08:06

## 2023-01-01 RX ADMIN — CALCIUM GLUCONATE 1 G: 20 INJECTION, SOLUTION INTRAVENOUS at 11:06

## 2023-01-01 RX ADMIN — INSULIN HUMAN 0.1 UNITS/KG/HR: 1 INJECTION, SOLUTION INTRAVENOUS at 11:06

## 2023-01-01 RX ADMIN — INSULIN ASPART 3 UNITS: 100 INJECTION, SOLUTION INTRAVENOUS; SUBCUTANEOUS at 06:06

## 2023-01-01 RX ADMIN — SODIUM CHLORIDE, PRESERVATIVE FREE 10 ML: 5 INJECTION INTRAVENOUS at 06:06

## 2023-01-01 RX ADMIN — INSULIN ASPART 2 UNITS: 100 INJECTION, SOLUTION INTRAVENOUS; SUBCUTANEOUS at 09:06

## 2023-01-01 RX ADMIN — FOLIC ACID 1 MG: 1 TABLET ORAL at 08:06

## 2023-01-01 RX ADMIN — SUCRALFATE 1 G: 1 TABLET ORAL at 08:06

## 2023-01-01 RX ADMIN — DOCUSATE SODIUM LIQUID 100 MG: 100 LIQUID ORAL at 11:06

## 2023-01-01 RX ADMIN — DEXMEDETOMIDINE HYDROCHLORIDE 0.6 MCG/KG/HR: 400 INJECTION INTRAVENOUS at 08:06

## 2023-01-01 RX ADMIN — DEXMEDETOMIDINE HYDROCHLORIDE 0.6 MCG/KG/HR: 400 INJECTION INTRAVENOUS at 05:06

## 2023-01-01 RX ADMIN — SUCRALFATE 1 G: 1 TABLET ORAL at 06:06

## 2023-01-01 RX ADMIN — DEXAMETHASONE SODIUM PHOSPHATE 4 MG: 4 INJECTION, SOLUTION INTRA-ARTICULAR; INTRALESIONAL; INTRAMUSCULAR; INTRAVENOUS; SOFT TISSUE at 02:06

## 2023-01-01 RX ADMIN — ACETAMINOPHEN 650 MG: 650 SOLUTION ORAL at 08:06

## 2023-01-01 RX ADMIN — INSULIN HUMAN 10 UNITS: 100 INJECTION, SOLUTION PARENTERAL at 12:06

## 2023-01-01 RX ADMIN — CHLORHEXIDINE GLUCONATE 0.12% ORAL RINSE 15 ML: 1.2 LIQUID ORAL at 08:06

## 2023-01-01 RX ADMIN — CALCIUM GLUCONATE 1 G: 20 INJECTION, SOLUTION INTRAVENOUS at 02:06

## 2023-01-01 RX ADMIN — INSULIN HUMAN 10 UNITS: 100 INJECTION, SOLUTION PARENTERAL at 05:06

## 2023-01-01 RX ADMIN — DEXAMETHASONE SODIUM PHOSPHATE 4 MG: 4 INJECTION, SOLUTION INTRA-ARTICULAR; INTRALESIONAL; INTRAMUSCULAR; INTRAVENOUS; SOFT TISSUE at 08:06

## 2023-01-01 RX ADMIN — MUPIROCIN: 20 OINTMENT TOPICAL at 01:06

## 2023-01-01 RX ADMIN — SACUBITRIL AND VALSARTAN 1 TABLET: 24; 26 TABLET, FILM COATED ORAL at 12:06

## 2023-01-01 RX ADMIN — ALBUTEROL SULFATE 2.5 MG: 2.5 SOLUTION RESPIRATORY (INHALATION) at 08:06

## 2023-01-01 RX ADMIN — SODIUM ZIRCONIUM CYCLOSILICATE 10 G: 10 POWDER, FOR SUSPENSION ORAL at 02:06

## 2023-01-01 RX ADMIN — PIPERACILLIN AND TAZOBACTAM 4.5 G: 4; .5 INJECTION, POWDER, LYOPHILIZED, FOR SOLUTION INTRAVENOUS; PARENTERAL at 06:06

## 2023-01-01 RX ADMIN — CEFEPIME 1 G: 1 INJECTION, POWDER, FOR SOLUTION INTRAMUSCULAR; INTRAVENOUS at 12:06

## 2023-01-01 RX ADMIN — KETOROLAC TROMETHAMINE 15 MG: 30 INJECTION, SOLUTION INTRAMUSCULAR; INTRAVENOUS at 03:06

## 2023-01-01 RX ADMIN — CEFEPIME 1 G: 1 INJECTION, POWDER, FOR SOLUTION INTRAMUSCULAR; INTRAVENOUS at 11:06

## 2023-01-01 RX ADMIN — ONDANSETRON 200 MG: 2 INJECTION INTRAMUSCULAR; INTRAVENOUS at 11:06

## 2023-01-01 RX ADMIN — VANCOMYCIN HYDROCHLORIDE 2250 MG: 1.25 INJECTION, POWDER, LYOPHILIZED, FOR SOLUTION INTRAVENOUS at 06:06

## 2023-01-01 RX ADMIN — DEXAMETHASONE SODIUM PHOSPHATE 4 MG: 4 INJECTION, SOLUTION INTRA-ARTICULAR; INTRALESIONAL; INTRAMUSCULAR; INTRAVENOUS; SOFT TISSUE at 01:06

## 2023-01-01 RX ADMIN — QUETIAPINE FUMARATE 25 MG: 25 TABLET ORAL at 09:06

## 2023-01-01 RX ADMIN — DOCUSATE SODIUM LIQUID 100 MG: 100 LIQUID ORAL at 08:06

## 2023-01-01 RX ADMIN — PROPOFOL 40 MCG/KG/MIN: 10 INJECTION, EMULSION INTRAVENOUS at 01:06

## 2023-01-01 RX ADMIN — DEXTROSE MONOHYDRATE 250 ML: 100 INJECTION, SOLUTION INTRAVENOUS at 12:06

## 2023-01-01 RX ADMIN — MILRINONE LACTATE IN DEXTROSE 0.38 MCG/KG/MIN: 200 INJECTION, SOLUTION INTRAVENOUS at 07:06

## 2023-01-01 RX ADMIN — DOCUSATE SODIUM 100 MG: 100 CAPSULE, LIQUID FILLED ORAL at 08:06

## 2023-01-01 RX ADMIN — NOREPINEPHRINE BITARTRATE 0.12 MCG/KG/MIN: 8 INJECTION, SOLUTION INTRAVENOUS at 04:06

## 2023-01-01 RX ADMIN — FENTANYL CITRATE 150 MCG: 50 INJECTION, SOLUTION INTRAMUSCULAR; INTRAVENOUS at 09:06

## 2023-01-01 RX ADMIN — METOPROLOL TARTRATE 50 MG: 50 TABLET, FILM COATED ORAL at 09:06

## 2023-01-01 RX ADMIN — SACUBITRIL AND VALSARTAN 1 TABLET: 24; 26 TABLET, FILM COATED ORAL at 08:06

## 2023-01-01 RX ADMIN — SODIUM CHLORIDE, PRESERVATIVE FREE 10 ML: 5 INJECTION INTRAVENOUS at 05:06

## 2023-01-01 RX ADMIN — INSULIN HUMAN 0.02 UNITS/KG/HR: 1 INJECTION, SOLUTION INTRAVENOUS at 01:06

## 2023-01-01 RX ADMIN — SODIUM CHLORIDE 30 MG/ML INHALATION SOLUTION 4 ML: 30 SOLUTION INHALANT at 12:06

## 2023-01-01 RX ADMIN — INSULIN ASPART 4 UNITS: 100 INJECTION, SOLUTION INTRAVENOUS; SUBCUTANEOUS at 06:06

## 2023-01-01 RX ADMIN — FAMOTIDINE 20 MG: 10 INJECTION, SOLUTION INTRAVENOUS at 09:06

## 2023-01-01 RX ADMIN — NOREPINEPHRINE BITARTRATE 0.42 MCG/KG/MIN: 8 INJECTION, SOLUTION INTRAVENOUS at 05:06

## 2023-01-01 RX ADMIN — ROCURONIUM BROMIDE 25 MG: 10 SOLUTION INTRAVENOUS at 11:06

## 2023-01-01 RX ADMIN — INSULIN ASPART 3 UNITS: 100 INJECTION, SOLUTION INTRAVENOUS; SUBCUTANEOUS at 12:06

## 2023-01-01 RX ADMIN — IPRATROPIUM BROMIDE 0.5 MG: 0.5 SOLUTION RESPIRATORY (INHALATION) at 02:06

## 2023-01-01 RX ADMIN — VASOPRESSIN 0.5 UNITS: 20 INJECTION INTRAVENOUS at 12:06

## 2023-01-01 RX ADMIN — CALCIUM GLUCONATE 1 G: 20 INJECTION, SOLUTION INTRAVENOUS at 08:06

## 2023-01-01 RX ADMIN — DIPHENHYDRAMINE HYDROCHLORIDE 12.5 MG: 50 INJECTION INTRAMUSCULAR; INTRAVENOUS at 11:06

## 2023-01-01 RX ADMIN — NOREPINEPHRINE BITARTRATE 0.04 MCG/KG/MIN: 8 INJECTION, SOLUTION INTRAVENOUS at 07:06

## 2023-01-01 RX ADMIN — MAGNESIUM SULFATE HEPTAHYDRATE 2 G: 40 INJECTION, SOLUTION INTRAVENOUS at 10:06

## 2023-01-01 RX ADMIN — FLUTICASONE PROPIONATE 100 MCG: 50 SPRAY, METERED NASAL at 09:06

## 2023-01-01 RX ADMIN — CALCIUM GLUCONATE 1 G: 20 INJECTION, SOLUTION INTRAVENOUS at 05:06

## 2023-01-01 RX ADMIN — PIPERACILLIN AND TAZOBACTAM 4.5 G: 4; .5 INJECTION, POWDER, LYOPHILIZED, FOR SOLUTION INTRAVENOUS; PARENTERAL at 12:06

## 2023-01-01 RX ADMIN — SUCRALFATE 1 G: 1 TABLET ORAL at 12:06

## 2023-01-01 RX ADMIN — HALOPERIDOL LACTATE 5 MG: 5 INJECTION, SOLUTION INTRAMUSCULAR at 05:06

## 2023-01-01 RX ADMIN — GUAIFENESIN 200 MG: 200 SOLUTION ORAL at 09:06

## 2023-01-01 RX ADMIN — PIPERACILLIN AND TAZOBACTAM 4.5 G: 4; .5 INJECTION, POWDER, LYOPHILIZED, FOR SOLUTION INTRAVENOUS; PARENTERAL at 02:06

## 2023-01-01 RX ADMIN — IPRATROPIUM BROMIDE 0.5 MG: 0.5 SOLUTION RESPIRATORY (INHALATION) at 09:06

## 2023-01-01 RX ADMIN — MUPIROCIN: 20 OINTMENT TOPICAL at 08:06

## 2023-01-01 RX ADMIN — PIPERACILLIN AND TAZOBACTAM 4.5 G: 4; .5 INJECTION, POWDER, LYOPHILIZED, FOR SOLUTION INTRAVENOUS; PARENTERAL at 10:06

## 2023-01-01 RX ADMIN — DIGOXIN 500 MCG: 0.25 INJECTION INTRAMUSCULAR; INTRAVENOUS at 04:06

## 2023-01-01 RX ADMIN — NOREPINEPHRINE BITARTRATE 0.3 MCG/KG/MIN: 8 INJECTION, SOLUTION INTRAVENOUS at 05:06

## 2023-01-01 RX ADMIN — ANTACID TABLETS 500 MG: 500 TABLET, CHEWABLE ORAL at 03:06

## 2023-01-01 RX ADMIN — PROPOFOL 50 MCG/KG/MIN: 10 INJECTION, EMULSION INTRAVENOUS at 02:06

## 2023-01-01 RX ADMIN — ENOXAPARIN SODIUM 40 MG: 40 INJECTION SUBCUTANEOUS at 04:06

## 2023-01-01 RX ADMIN — INSULIN ASPART 2 UNITS: 100 INJECTION, SOLUTION INTRAVENOUS; SUBCUTANEOUS at 12:06

## 2023-01-01 RX ADMIN — IPRATROPIUM BROMIDE 0.5 MG: 0.5 SOLUTION RESPIRATORY (INHALATION) at 07:06

## 2023-01-01 RX ADMIN — PIPERACILLIN AND TAZOBACTAM 4.5 G: 4; .5 INJECTION, POWDER, LYOPHILIZED, FOR SOLUTION INTRAVENOUS; PARENTERAL at 08:06

## 2023-01-01 RX ADMIN — LEUCINE, PHENYLALANINE, LYSINE, METHIONINE, ISOLEUCINE, VALINE, HISTIDINE, THREONINE, TRYPTOPHAN, ALANINE, GLYCINE, ARGININE, PROLINE, SERINE, TYROSINE, DEXTROSE: 311; 238; 247; 170; 255; 247; 204; 179; 77; 880; 438; 489; 289; 213; 17; 5 INJECTION INTRAVENOUS at 09:06

## 2023-01-01 RX ADMIN — MORPHINE SULFATE 4 MG: 10 INJECTION, SOLUTION INTRAMUSCULAR; INTRAVENOUS at 07:06

## 2023-01-01 RX ADMIN — OXYCODONE HYDROCHLORIDE 10 MG: 5 TABLET ORAL at 12:06

## 2023-01-01 RX ADMIN — PROPOFOL 40 MCG/KG/MIN: 10 INJECTION, EMULSION INTRAVENOUS at 11:06

## 2023-01-01 RX ADMIN — IPRATROPIUM BROMIDE 0.5 MG: 0.5 SOLUTION RESPIRATORY (INHALATION) at 01:06

## 2023-01-01 RX ADMIN — ONDANSETRON 200 MG: 2 INJECTION INTRAMUSCULAR; INTRAVENOUS at 12:06

## 2023-01-01 RX ADMIN — MUPIROCIN: 20 OINTMENT TOPICAL at 09:06

## 2023-01-01 RX ADMIN — ANTACID TABLETS 1000 MG: 500 TABLET, CHEWABLE ORAL at 02:06

## 2023-01-01 RX ADMIN — VANCOMYCIN HYDROCHLORIDE 1500 MG: 1.5 INJECTION, POWDER, LYOPHILIZED, FOR SOLUTION INTRAVENOUS at 02:06

## 2023-01-01 RX ADMIN — INSULIN HUMAN 10 UNITS: 100 INJECTION, SOLUTION PARENTERAL at 08:06

## 2023-01-01 RX ADMIN — MILRINONE LACTATE IN DEXTROSE 0.38 MCG/KG/MIN: 200 INJECTION, SOLUTION INTRAVENOUS at 12:06

## 2023-01-01 RX ADMIN — NOREPINEPHRINE BITARTRATE 0.13 MCG/KG/MIN: 8 INJECTION, SOLUTION INTRAVENOUS at 02:06

## 2023-01-01 RX ADMIN — DEXAMETHASONE SODIUM PHOSPHATE 4 MG: 4 INJECTION, SOLUTION INTRA-ARTICULAR; INTRALESIONAL; INTRAMUSCULAR; INTRAVENOUS; SOFT TISSUE at 12:06

## 2023-01-01 RX ADMIN — PIPERACILLIN AND TAZOBACTAM 4.5 G: 4; .5 INJECTION, POWDER, LYOPHILIZED, FOR SOLUTION INTRAVENOUS; PARENTERAL at 04:06

## 2023-01-01 RX ADMIN — NOREPINEPHRINE BITARTRATE 0.36 MCG/KG/MIN: 8 INJECTION, SOLUTION INTRAVENOUS at 01:06

## 2023-01-01 RX ADMIN — PIPERACILLIN AND TAZOBACTAM 4.5 G: 4; .5 INJECTION, POWDER, LYOPHILIZED, FOR SOLUTION INTRAVENOUS; PARENTERAL at 11:06

## 2023-01-01 RX ADMIN — METOPROLOL TARTRATE 100 MG: 50 TABLET, FILM COATED ORAL at 08:06

## 2023-01-01 RX ADMIN — SODIUM BICARBONATE: 84 INJECTION, SOLUTION INTRAVENOUS at 09:06

## 2023-01-01 RX ADMIN — ALBUTEROL SULFATE 7.5 MG: 2.5 SOLUTION RESPIRATORY (INHALATION) at 06:06

## 2023-01-01 RX ADMIN — CEFAZOLIN SODIUM 2 G: 2 SOLUTION INTRAVENOUS at 10:06

## 2023-01-01 RX ADMIN — DOCUSATE SODIUM LIQUID 100 MG: 100 LIQUID ORAL at 09:06

## 2023-01-01 RX ADMIN — DEXMEDETOMIDINE HYDROCHLORIDE 1.4 MCG/KG/HR: 400 INJECTION INTRAVENOUS at 01:06

## 2023-01-01 RX ADMIN — ASPIRIN 81 MG CHEWABLE TABLET 81 MG: 81 TABLET CHEWABLE at 09:06

## 2023-01-01 RX ADMIN — NOREPINEPHRINE BITARTRATE 3 MCG/KG/MIN: 1 INJECTION, SOLUTION, CONCENTRATE INTRAVENOUS at 09:06

## 2023-01-01 RX ADMIN — CALCIUM GLUCONATE 1 G: 20 INJECTION, SOLUTION INTRAVENOUS at 10:06

## 2023-01-01 RX ADMIN — ROCURONIUM BROMIDE 30 MG: 10 SOLUTION INTRAVENOUS at 09:06

## 2023-01-01 RX ADMIN — DEXAMETHASONE SODIUM PHOSPHATE 4 MG: 4 INJECTION, SOLUTION INTRA-ARTICULAR; INTRALESIONAL; INTRAMUSCULAR; INTRAVENOUS; SOFT TISSUE at 11:06

## 2023-01-01 RX ADMIN — INSULIN ASPART 3 UNITS: 100 INJECTION, SOLUTION INTRAVENOUS; SUBCUTANEOUS at 04:06

## 2023-01-01 RX ADMIN — SODIUM ZIRCONIUM CYCLOSILICATE 10 G: 10 POWDER, FOR SUSPENSION ORAL at 10:06

## 2023-01-01 RX ADMIN — ALBUTEROL SULFATE 2.5 MG: 2.5 SOLUTION RESPIRATORY (INHALATION) at 12:06

## 2023-01-01 RX ADMIN — PHENYLEPHRINE HYDROCHLORIDE 20 MCG: 10 INJECTION INTRAVENOUS at 12:06

## 2023-01-01 RX ADMIN — FOLIC ACID 1 MG: 1 TABLET ORAL at 09:06

## 2023-01-01 RX ADMIN — DEXMEDETOMIDINE HYDROCHLORIDE 0.4 MCG/KG/HR: 400 INJECTION INTRAVENOUS at 07:06

## 2023-01-01 RX ADMIN — SODIUM CHLORIDE, PRESERVATIVE FREE 10 ML: 5 INJECTION INTRAVENOUS at 12:06

## 2023-01-01 RX ADMIN — DEXMEDETOMIDINE HYDROCHLORIDE 1.4 MCG/KG/HR: 400 INJECTION INTRAVENOUS at 09:06

## 2023-01-01 RX ADMIN — INSULIN DETEMIR 25 UNITS: 100 INJECTION, SOLUTION SUBCUTANEOUS at 12:06

## 2023-01-01 RX ADMIN — ACETYLCYSTEINE 4 ML: 100 INHALANT RESPIRATORY (INHALATION) at 07:06

## 2023-01-01 RX ADMIN — SUCCINYLCHOLINE CHLORIDE 200 MG: 20 INJECTION, SOLUTION INTRAMUSCULAR; INTRAVENOUS at 11:06

## 2023-01-01 RX ADMIN — ALBUTEROL SULFATE 2.5 MG: 2.5 SOLUTION RESPIRATORY (INHALATION) at 01:06

## 2023-01-01 RX ADMIN — DEXMEDETOMIDINE HYDROCHLORIDE 1.2 MCG/KG/HR: 400 INJECTION INTRAVENOUS at 05:06

## 2023-01-01 RX ADMIN — PIPERACILLIN AND TAZOBACTAM 4.5 G: 4; .5 INJECTION, POWDER, LYOPHILIZED, FOR SOLUTION INTRAVENOUS; PARENTERAL at 09:06

## 2023-01-01 RX ADMIN — KETOROLAC TROMETHAMINE 15 MG: 30 INJECTION, SOLUTION INTRAMUSCULAR; INTRAVENOUS at 05:06

## 2023-01-01 RX ADMIN — SUCRALFATE 1 G: 1 TABLET ORAL at 04:06

## 2023-01-01 RX ADMIN — PIPERACILLIN AND TAZOBACTAM 4.5 G: 4; .5 INJECTION, POWDER, LYOPHILIZED, FOR SOLUTION INTRAVENOUS; PARENTERAL at 05:06

## 2023-01-01 RX ADMIN — INSULIN ASPART 4 UNITS: 100 INJECTION, SOLUTION INTRAVENOUS; SUBCUTANEOUS at 02:06

## 2023-01-01 RX ADMIN — MILRINONE LACTATE IN DEXTROSE 0.38 MCG/KG/MIN: 200 INJECTION, SOLUTION INTRAVENOUS at 04:06

## 2023-01-01 RX ADMIN — VANCOMYCIN HYDROCHLORIDE 1250 MG: 1.25 INJECTION, POWDER, LYOPHILIZED, FOR SOLUTION INTRAVENOUS at 10:06

## 2023-01-01 RX ADMIN — DEXMEDETOMIDINE HYDROCHLORIDE 1.2 MCG/KG/HR: 400 INJECTION INTRAVENOUS at 06:06

## 2023-01-01 RX ADMIN — LEUCINE, PHENYLALANINE, LYSINE, METHIONINE, ISOLEUCINE, VALINE, HISTIDINE, THREONINE, TRYPTOPHAN, ALANINE, GLYCINE, ARGININE, PROLINE, SERINE, TYROSINE, DEXTROSE: 311; 238; 247; 170; 255; 247; 204; 179; 77; 880; 438; 489; 289; 213; 17; 5 INJECTION INTRAVENOUS at 07:06

## 2023-01-01 RX ADMIN — NOREPINEPHRINE BITARTRATE 3 MCG/KG/MIN: 8 INJECTION, SOLUTION INTRAVENOUS at 09:06

## 2023-01-01 RX ADMIN — VASOPRESSIN 0.04 UNITS/MIN: 20 INJECTION, SOLUTION INTRAMUSCULAR; SUBCUTANEOUS at 09:06

## 2023-01-01 RX ADMIN — DEXMEDETOMIDINE HYDROCHLORIDE 1.4 MCG/KG/HR: 400 INJECTION INTRAVENOUS at 02:06

## 2023-01-01 RX ADMIN — PROPOFOL 25 MCG/KG/MIN: 10 INJECTION, EMULSION INTRAVENOUS at 11:06

## 2023-01-01 RX ADMIN — PROPOFOL 40 MCG/KG/MIN: 10 INJECTION, EMULSION INTRAVENOUS at 09:06

## 2023-01-01 RX ADMIN — INSULIN ASPART 2 UNITS: 100 INJECTION, SOLUTION INTRAVENOUS; SUBCUTANEOUS at 11:06

## 2023-01-01 RX ADMIN — ANTACID TABLETS 500 MG: 500 TABLET, CHEWABLE ORAL at 08:06

## 2023-01-01 RX ADMIN — TRANEXAMIC ACID 90 MG: 100 INJECTION, SOLUTION INTRAVENOUS at 11:06

## 2023-01-01 RX ADMIN — INSULIN ASPART 4 UNITS: 100 INJECTION, SOLUTION INTRAVENOUS; SUBCUTANEOUS at 05:06

## 2023-01-01 RX ADMIN — SUCRALFATE 1 G: 1 TABLET ORAL at 10:06

## 2023-01-01 RX ADMIN — CALCIUM GLUCONATE 1 G: 20 INJECTION, SOLUTION INTRAVENOUS at 04:06

## 2023-01-01 RX ADMIN — PROPOFOL 30 MCG/KG/MIN: 10 INJECTION, EMULSION INTRAVENOUS at 09:06

## 2023-01-01 RX ADMIN — PROPOFOL 50 MCG/KG/MIN: 10 INJECTION, EMULSION INTRAVENOUS at 05:06

## 2023-01-01 RX ADMIN — DEXTROSE MONOHYDRATE 250 ML: 100 INJECTION, SOLUTION INTRAVENOUS at 05:06

## 2023-01-01 RX ADMIN — ROCURONIUM BROMIDE 70 MG: 10 SOLUTION INTRAVENOUS at 09:06

## 2023-01-01 RX ADMIN — INSULIN ASPART 3 UNITS: 100 INJECTION, SOLUTION INTRAVENOUS; SUBCUTANEOUS at 08:06

## 2023-01-01 RX ADMIN — FUROSEMIDE 20 MG: 10 INJECTION, SOLUTION INTRAMUSCULAR; INTRAVENOUS at 12:06

## 2023-01-01 RX ADMIN — MORPHINE SULFATE 2 MG: 10 INJECTION, SOLUTION INTRAMUSCULAR; INTRAVENOUS at 11:06

## 2023-01-01 RX ADMIN — INDOMETHACIN 25 MEQ: 25 CAPSULE ORAL at 08:06

## 2023-01-01 RX ADMIN — DEXMEDETOMIDINE HYDROCHLORIDE 0.4 MCG/KG/HR: 400 INJECTION INTRAVENOUS at 01:06

## 2023-01-01 RX ADMIN — PROPOFOL 35 MCG/KG/MIN: 10 INJECTION, EMULSION INTRAVENOUS at 02:06

## 2023-01-01 RX ADMIN — DEXMEDETOMIDINE HYDROCHLORIDE 0.6 MCG/KG/HR: 400 INJECTION INTRAVENOUS at 04:06

## 2023-01-01 RX ADMIN — DEXMEDETOMIDINE HYDROCHLORIDE 1.4 MCG/KG/HR: 400 INJECTION INTRAVENOUS at 05:06

## 2023-01-01 RX ADMIN — FENTANYL CITRATE 250 MCG: 50 INJECTION, SOLUTION INTRAMUSCULAR; INTRAVENOUS at 09:06

## 2023-01-01 RX ADMIN — PROPOFOL 50 MCG/KG/MIN: 10 INJECTION, EMULSION INTRAVENOUS at 08:06

## 2023-01-01 RX ADMIN — OXYCODONE HYDROCHLORIDE 10 MG: 5 TABLET ORAL at 08:06

## 2023-01-01 RX ADMIN — SODIUM CHLORIDE 30 MG/ML INHALATION SOLUTION 4 ML: 30 SOLUTION INHALANT at 01:06

## 2023-01-01 RX ADMIN — DEXTROSE AND SODIUM CHLORIDE: 5; 450 INJECTION, SOLUTION INTRAVENOUS at 01:06

## 2023-01-01 RX ADMIN — NOREPINEPHRINE BITARTRATE 0.12 MCG/KG/MIN: 8 INJECTION, SOLUTION INTRAVENOUS at 05:06

## 2023-01-01 RX ADMIN — DEXMEDETOMIDINE HYDROCHLORIDE 0.6 MCG/KG/HR: 400 INJECTION INTRAVENOUS at 03:06

## 2023-01-01 RX ADMIN — METOPROLOL TARTRATE 12.5 MG: 25 TABLET, FILM COATED ORAL at 09:06

## 2023-01-01 RX ADMIN — LABETALOL HYDROCHLORIDE 10 MG: 5 INJECTION, SOLUTION INTRAVENOUS at 06:06

## 2023-01-01 RX ADMIN — FUROSEMIDE 40 MG: 10 INJECTION, SOLUTION INTRAMUSCULAR; INTRAVENOUS at 04:06

## 2023-01-01 RX ADMIN — ALBUTEROL SULFATE 2.5 MG: 2.5 SOLUTION RESPIRATORY (INHALATION) at 09:06

## 2023-01-01 RX ADMIN — DEXMEDETOMIDINE HYDROCHLORIDE 0.5 MCG/KG/HR: 400 INJECTION INTRAVENOUS at 11:06

## 2023-01-01 RX ADMIN — INSULIN ASPART 10 UNITS: 100 INJECTION, SOLUTION INTRAVENOUS; SUBCUTANEOUS at 08:06

## 2023-01-01 RX ADMIN — NOREPINEPHRINE BITARTRATE 0.2 MCG/KG/MIN: 8 INJECTION, SOLUTION INTRAVENOUS at 02:06

## 2023-01-01 RX ADMIN — VANCOMYCIN HYDROCHLORIDE 1000 MG: 1 INJECTION, POWDER, LYOPHILIZED, FOR SOLUTION INTRAVENOUS at 07:06

## 2023-01-01 RX ADMIN — POTASSIUM CHLORIDE 20 MEQ: 1500 TABLET, EXTENDED RELEASE ORAL at 04:06

## 2023-01-01 RX ADMIN — METOPROLOL TARTRATE 50 MG: 50 TABLET, FILM COATED ORAL at 12:06

## 2023-01-01 RX ADMIN — DEXMEDETOMIDINE HYDROCHLORIDE 1.4 MCG/KG/HR: 400 INJECTION INTRAVENOUS at 04:06

## 2023-01-01 RX ADMIN — INSULIN ASPART 4 UNITS: 100 INJECTION, SOLUTION INTRAVENOUS; SUBCUTANEOUS at 12:06

## 2023-01-01 RX ADMIN — SODIUM CHLORIDE 30 MG/ML INHALATION SOLUTION 4 ML: 30 SOLUTION INHALANT at 08:06

## 2023-01-01 RX ADMIN — METOPROLOL TARTRATE 75 MG: 50 TABLET, FILM COATED ORAL at 08:06

## 2023-01-01 RX ADMIN — PROPOFOL 40 MCG/KG/MIN: 10 INJECTION, EMULSION INTRAVENOUS at 02:06

## 2023-01-01 RX ADMIN — HALOPERIDOL LACTATE 5 MG: 5 INJECTION, SOLUTION INTRAMUSCULAR at 01:06

## 2023-01-01 RX ADMIN — POTASSIUM CHLORIDE 20 MEQ: 1500 TABLET, EXTENDED RELEASE ORAL at 10:06

## 2023-01-01 RX ADMIN — ANTACID TABLETS 500 MG: 500 TABLET, CHEWABLE ORAL at 02:06

## 2023-01-01 RX ADMIN — PROTAMINE SULFATE 400 MG: 10 INJECTION, SOLUTION INTRAVENOUS at 11:06

## 2023-01-01 RX ADMIN — ACETAMINOPHEN 650 MG: 650 SOLUTION ORAL at 05:06

## 2023-01-01 RX ADMIN — AMIODARONE HYDROCHLORIDE 0.5 MG/MIN: 1.8 INJECTION, SOLUTION INTRAVENOUS at 08:06

## 2023-01-01 RX ADMIN — EPINEPHRINE 0.02 MCG/KG/MIN: 1 INJECTION INTRAMUSCULAR; INTRAVENOUS; SUBCUTANEOUS at 11:06

## 2023-01-01 RX ADMIN — SODIUM CHLORIDE: 9 INJECTION, SOLUTION INTRAVENOUS at 09:06

## 2023-01-01 RX ADMIN — FUROSEMIDE 20 MG: 10 INJECTION, SOLUTION INTRAMUSCULAR; INTRAVENOUS at 04:06

## 2023-01-01 RX ADMIN — PROPOFOL 50 MCG/KG/MIN: 10 INJECTION, EMULSION INTRAVENOUS at 10:06

## 2023-01-01 RX ADMIN — ETOMIDATE 20 MG: 2 INJECTION INTRAVENOUS at 11:06

## 2023-01-01 RX ADMIN — VANCOMYCIN HYDROCHLORIDE 1000 MG: 1 INJECTION, POWDER, LYOPHILIZED, FOR SOLUTION INTRAVENOUS at 06:06

## 2023-01-01 RX ADMIN — METOPROLOL TARTRATE 2.5 MG: 1 INJECTION, SOLUTION INTRAVENOUS at 11:06

## 2023-01-01 RX ADMIN — DEXMEDETOMIDINE HYDROCHLORIDE 1.4 MCG/KG/HR: 400 INJECTION INTRAVENOUS at 11:06

## 2023-01-01 RX ADMIN — NOREPINEPHRINE BITARTRATE 3 MCG/KG/MIN: 8 INJECTION, SOLUTION INTRAVENOUS at 08:06

## 2023-01-01 RX ADMIN — METOPROLOL TARTRATE 100 MG: 50 TABLET, FILM COATED ORAL at 09:06

## 2023-01-01 RX ADMIN — BACLOFEN 10 MG: 10 TABLET ORAL at 07:06

## 2023-01-01 RX ADMIN — IOPAMIDOL 100 ML: 755 INJECTION, SOLUTION INTRAVENOUS at 04:06

## 2023-01-01 RX ADMIN — SODIUM BICARBONATE 50 MEQ: 84 INJECTION, SOLUTION INTRAVENOUS at 10:06

## 2023-01-01 RX ADMIN — AMIODARONE HYDROCHLORIDE 150 MG: 1.5 INJECTION, SOLUTION INTRAVENOUS at 02:06

## 2023-01-01 RX ADMIN — PROPOFOL 40 MCG/KG/MIN: 10 INJECTION, EMULSION INTRAVENOUS at 05:06

## 2023-01-01 RX ADMIN — FENTANYL CITRATE 100 MCG: 50 INJECTION, SOLUTION INTRAMUSCULAR; INTRAVENOUS at 09:06

## 2023-01-01 RX ADMIN — VASOPRESSIN 0.04 UNITS/MIN: 20 INJECTION, SOLUTION INTRAMUSCULAR; SUBCUTANEOUS at 12:06

## 2023-01-01 RX ADMIN — OXYCODONE HYDROCHLORIDE 10 MG: 10 TABLET ORAL at 06:06

## 2023-01-01 RX ADMIN — PROPOFOL 35 MCG/KG/MIN: 10 INJECTION, EMULSION INTRAVENOUS at 04:06

## 2023-01-01 RX ADMIN — AMIODARONE HYDROCHLORIDE 1 MG: 1.8 INJECTION, SOLUTION INTRAVENOUS at 02:06

## 2023-01-01 RX ADMIN — PROPOFOL 40 MCG/KG/MIN: 10 INJECTION, EMULSION INTRAVENOUS at 06:06

## 2023-01-01 RX ADMIN — AMIODARONE HYDROCHLORIDE 0.5 MG/MIN: 1.8 INJECTION, SOLUTION INTRAVENOUS at 04:06

## 2023-01-01 RX ADMIN — INSULIN ASPART 2 UNITS: 100 INJECTION, SOLUTION INTRAVENOUS; SUBCUTANEOUS at 04:06

## 2023-01-01 RX ADMIN — METOPROLOL TARTRATE 2.5 MG: 1 INJECTION, SOLUTION INTRAVENOUS at 09:06

## 2023-01-01 RX ADMIN — ALBUTEROL SULFATE 2.5 MG: 0.83 SOLUTION RESPIRATORY (INHALATION) at 08:06

## 2023-01-01 RX ADMIN — METOPROLOL TARTRATE 75 MG: 50 TABLET, FILM COATED ORAL at 09:06

## 2023-01-01 RX ADMIN — SODIUM BICARBONATE 25 MEQ: 84 INJECTION, SOLUTION INTRAVENOUS at 08:06

## 2023-01-01 RX ADMIN — SODIUM CHLORIDE: 9 INJECTION, SOLUTION INTRAVENOUS at 12:06

## 2023-01-01 RX ADMIN — VASOPRESSIN 0.04 UNITS/MIN: 20 INJECTION, SOLUTION INTRAMUSCULAR; SUBCUTANEOUS at 06:06

## 2023-01-01 RX ADMIN — DEXMEDETOMIDINE HYDROCHLORIDE 1.2 MCG/KG/HR: 400 INJECTION INTRAVENOUS at 09:06

## 2023-01-01 RX ADMIN — VASOPRESSIN 0.04 UNITS/MIN: 20 INJECTION, SOLUTION INTRAMUSCULAR; SUBCUTANEOUS at 02:06

## 2023-01-01 RX ADMIN — DEXMEDETOMIDINE HYDROCHLORIDE 1.4 MCG/KG/HR: 400 INJECTION INTRAVENOUS at 08:06

## 2023-01-01 RX ADMIN — AMIODARONE HYDROCHLORIDE 0.5 MG/MIN: 1.8 INJECTION, SOLUTION INTRAVENOUS at 05:06

## 2023-01-01 RX ADMIN — MIDAZOLAM HYDROCHLORIDE 4 MG: 1 INJECTION, SOLUTION INTRAMUSCULAR; INTRAVENOUS at 09:06

## 2023-01-01 RX ADMIN — NOREPINEPHRINE BITARTRATE 0.02 MCG/KG/MIN: 8 INJECTION, SOLUTION INTRAVENOUS at 01:06

## 2023-01-01 RX ADMIN — ALBUMIN (HUMAN) 12.5 G: 12.5 SOLUTION INTRAVENOUS at 08:06

## 2023-01-01 RX ADMIN — INSULIN HUMAN 0.1 UNITS/KG/HR: 1 INJECTION, SOLUTION INTRAVENOUS at 08:06

## 2023-01-01 RX ADMIN — MORPHINE SULFATE 4 MG: 10 INJECTION, SOLUTION INTRAMUSCULAR; INTRAVENOUS at 02:06

## 2023-01-01 RX ADMIN — FUROSEMIDE 40 MG: 10 INJECTION, SOLUTION INTRAMUSCULAR; INTRAVENOUS at 08:06

## 2023-01-01 RX ADMIN — ENOXAPARIN SODIUM 30 MG: 30 INJECTION SUBCUTANEOUS at 05:06

## 2023-01-01 RX ADMIN — HEPARIN SODIUM 35000 UNITS: 1000 INJECTION, SOLUTION INTRAVENOUS; SUBCUTANEOUS at 10:06

## 2023-01-01 RX ADMIN — MILRINONE LACTATE IN DEXTROSE 0.38 MCG/KG/MIN: 200 INJECTION, SOLUTION INTRAVENOUS at 05:06

## 2023-01-01 RX ADMIN — TRANEXAMIC ACID 90 MG: 100 INJECTION, SOLUTION INTRAVENOUS at 09:06

## 2023-01-01 RX ADMIN — CLEVIPIDINE 2 MG/HR: 0.5 EMULSION INTRAVENOUS at 09:06

## 2023-01-01 RX ADMIN — PROPOFOL 30 MCG/KG/MIN: 10 INJECTION, EMULSION INTRAVENOUS at 05:06

## 2023-01-01 RX ADMIN — INSULIN ASPART 6 UNITS: 100 INJECTION, SOLUTION INTRAVENOUS; SUBCUTANEOUS at 04:06

## 2023-01-01 RX ADMIN — INSULIN ASPART 4 UNITS: 100 INJECTION, SOLUTION INTRAVENOUS; SUBCUTANEOUS at 08:06

## 2023-01-01 RX ADMIN — HYDROCODONE BITARTRATE AND ACETAMINOPHEN 1 TABLET: 5; 325 TABLET ORAL at 01:06

## 2023-01-01 RX ADMIN — DEXAMETHASONE SODIUM PHOSPHATE 4 MG: 4 INJECTION, SOLUTION INTRA-ARTICULAR; INTRALESIONAL; INTRAMUSCULAR; INTRAVENOUS; SOFT TISSUE at 09:06

## 2023-01-01 RX ADMIN — ANTACID TABLETS 1000 MG: 500 TABLET, CHEWABLE ORAL at 08:06

## 2023-01-01 RX ADMIN — AMIODARONE HYDROCHLORIDE 1 MG/MIN: 1.8 INJECTION, SOLUTION INTRAVENOUS at 07:06

## 2023-01-01 RX ADMIN — HALOPERIDOL LACTATE 5 MG: 5 INJECTION, SOLUTION INTRAMUSCULAR at 02:06

## 2023-01-01 RX ADMIN — PROPOFOL 20 MCG/KG/MIN: 10 INJECTION, EMULSION INTRAVENOUS at 11:06

## 2023-01-01 RX ADMIN — PROPOFOL 30 MG: 10 INJECTION, EMULSION INTRAVENOUS at 09:06

## 2023-01-01 RX ADMIN — PHENYLEPHRINE HYDROCHLORIDE 100 MCG: 10 INJECTION INTRAVENOUS at 09:06

## 2023-01-01 RX ADMIN — DEXMEDETOMIDINE HYDROCHLORIDE 1.2 MCG/KG/HR: 400 INJECTION INTRAVENOUS at 01:06

## 2023-01-01 RX ADMIN — SODIUM CHLORIDE 2 UNITS/HR: 9 INJECTION, SOLUTION INTRAVENOUS at 10:06

## 2023-01-01 RX ADMIN — INSULIN ASPART 5 UNITS: 100 INJECTION, SOLUTION INTRAVENOUS; SUBCUTANEOUS at 02:06

## 2023-01-01 RX ADMIN — ACETAMINOPHEN 650 MG: 650 SOLUTION ORAL at 01:06

## 2023-01-01 RX ADMIN — DEXTROSE MONOHYDRATE 1.5 G: 5 INJECTION INTRAVENOUS at 09:06

## 2023-01-01 RX ADMIN — PROPOFOL 25 MCG/KG/MIN: 10 INJECTION, EMULSION INTRAVENOUS at 09:06

## 2023-01-01 RX ADMIN — KETOROLAC TROMETHAMINE 15 MG: 30 INJECTION, SOLUTION INTRAMUSCULAR; INTRAVENOUS at 08:06

## 2023-01-01 RX ADMIN — ACETAMINOPHEN 650 MG: 650 SOLUTION ORAL at 03:06

## 2023-01-01 RX ADMIN — PIPERACILLIN AND TAZOBACTAM 4.5 G: 4; .5 INJECTION, POWDER, LYOPHILIZED, FOR SOLUTION INTRAVENOUS; PARENTERAL at 01:06

## 2023-01-01 RX ADMIN — PROPOFOL 50 MCG/KG/MIN: 10 INJECTION, EMULSION INTRAVENOUS at 04:06

## 2023-01-01 RX ADMIN — NOREPINEPHRINE BITARTRATE 0.14 MCG/KG/MIN: 8 INJECTION, SOLUTION INTRAVENOUS at 03:06

## 2023-01-01 RX ADMIN — PROPOFOL 50 MG: 10 INJECTION, EMULSION INTRAVENOUS at 09:06

## 2023-01-01 RX ADMIN — SODIUM CHLORIDE 30 MG/ML INHALATION SOLUTION 4 ML: 30 SOLUTION INHALANT at 04:06

## 2023-01-01 RX ADMIN — POTASSIUM CHLORIDE 20 MEQ: 14.9 INJECTION, SOLUTION INTRAVENOUS at 02:06

## 2023-01-01 RX ADMIN — INSULIN ASPART 5 UNITS: 100 INJECTION, SOLUTION INTRAVENOUS; SUBCUTANEOUS at 04:06

## 2023-01-01 RX ADMIN — LABETALOL HYDROCHLORIDE 10 MG: 5 INJECTION, SOLUTION INTRAVENOUS at 05:06

## 2023-01-01 RX ADMIN — FUROSEMIDE 40 MG: 10 INJECTION, SOLUTION INTRAMUSCULAR; INTRAVENOUS at 09:06

## 2023-01-01 RX ADMIN — MIDAZOLAM HYDROCHLORIDE 4 MG: 1 INJECTION, SOLUTION INTRAMUSCULAR; INTRAVENOUS at 12:06

## 2023-01-09 NOTE — PROGRESS NOTES
Internal Medicine Clinic  BRISEYDA Sawant     Patient Name: Tom Anna   : 1955  MRN:[id number]     CC:  Chief Complaint   Patient presents with    Follow-up     Lab results        HPI  67 year old AAM, presents in clinic for lab f/u and med refills. No acute complaints.  PMH HTN, HLD, CVA (19), PVD, emphysema, claudication, ED, BPH, obesity, GERD, tinea pedis, tobacco and alcohol abuse.   Smokes 1ppd x 43 years. Unsuccessful attempts to quit in the past. Not ready to quit.  I also noted in 2017 he was seen in hematology clinic for workup of polycythemia, per hematology note polycythemia likely secondary to chronic tobacco use, obstructive sleep apnea. UZMA 2 kinase tests all negative.He was anemic in 2019 and after starting iron and omeprazole his H&H normalized. Pt is no longer on iron supplementation. Admits to drinking (6) 16oz beers per day. Denies chest pain, shortness of breath, cough, fever, night sweats, fatigue, headache, dizziness, weakness, abdominal pain, nausea, vomiting, diarrhea, constipation, dysuria, blood in stool or urine, depression, anxiety.  EGD/colonoscopy done on 2020 per Dr. Covington. EGD showed esophageal hiatal hernia, gastritis (biopsy neg), and esophagitis. Colonoscopy results showed diverticulosis of whole colon, 4mm polyp (polypectomy) and grade 1 internal hemorrhoids.  Following Cardiology, MD Ye, apt every 4-6 months; Rx viagra 100 mg prn.  NEURO apt q6months; last seen 3/12/2021, Dr. Deluna at [facility]. Scheduled 2022 for PT at [place] [facility]; for huy lower extremity weakness.  Patient denies any family history of testicular cancer. Pt is a .  Of note, patient was seen by ENDO clinic for elevated testosterone; note reports sex hormone binding globulin is also elevated, likely to his chronic alcohol abuse resulting in increased testosterone production.  Referred to see [facility] UROLOGY clinic for further work up of erectile  dysfunction, elevated testosterone levels, and testicular lesion as noted on ultrasound, apt 6/28/2021  Sleep study 6/19/2020 displayed primary snoring without apnea.  Patient reports since Baclofen was added at prior apt, hiccups and chronic low back pain improved.  LDCT: 03/13/2022 Lung-RADS 2: Benign, repeat in 12 months.               ROS  Review of Systems   Constitutional: Negative.    HENT: Negative.     Eyes: Negative.    Respiratory: Negative.     Cardiovascular: Negative.    Gastrointestinal: Negative.    Endocrine: Negative.    Genitourinary: Negative.    Musculoskeletal: Negative.    Integumentary:  Negative.   Allergic/Immunologic: Negative.    Neurological: Negative.    Hematological: Negative.    Psychiatric/Behavioral: Negative.     All other systems reviewed and are negative.     Physical Examination:  Vitals:    01/09/23 1248   BP: 117/67   Pulse: 80   Resp: 16   Temp: 98.6 °F (37 °C)          Physical Exam  Vitals and nursing note reviewed.   Constitutional:       Appearance: Normal appearance.   HENT:      Head: Normocephalic.      Right Ear: Tympanic membrane, ear canal and external ear normal.      Left Ear: Tympanic membrane, ear canal and external ear normal.      Nose: Nose normal.      Mouth/Throat:      Mouth: Mucous membranes are moist.      Pharynx: Oropharynx is clear.   Eyes:      Extraocular Movements: Extraocular movements intact.      Conjunctiva/sclera: Conjunctivae normal.      Pupils: Pupils are equal, round, and reactive to light.   Cardiovascular:      Rate and Rhythm: Normal rate and regular rhythm.      Pulses: Normal pulses.      Heart sounds: Normal heart sounds.   Pulmonary:      Effort: Pulmonary effort is normal.      Breath sounds: Normal breath sounds.   Abdominal:      General: Bowel sounds are normal.      Palpations: Abdomen is soft.   Musculoskeletal:         General: Normal range of motion.      Cervical back: Normal range of motion and neck supple.   Skin:      General: Skin is warm and dry.      Capillary Refill: Capillary refill takes less than 2 seconds.   Neurological:      General: No focal deficit present.      Mental Status: He is alert and oriented to person, place, and time. Mental status is at baseline.   Psychiatric:         Mood and Affect: Mood normal.         Behavior: Behavior normal.         Thought Content: Thought content normal.         Judgment: Judgment normal.          Labs/Imaging:  Reviewed    Assessment/Plan:  1. Hypertension, unspecified type  - CBC Auto Differential; Future  - Comprehensive Metabolic Panel; Future  - Lipid Panel; Future  - Hemoglobin A1C; Future  - Urinalysis; Future  - TSH; Future  - Urinalysis  BP stable. Med refills.  DASH diet: Eat more fruits, vegetables, and low fat dairy foods.  (Less than 2 grams of sodium per day).  Maintain healthy weight with goal BMI <30.   Exercise 30 minutes per day 5 days per week.  Home medications refilled and continued.   Home BP monitoring encouraged with BP parameters given.      2. Hyperlipidemia, unspecified hyperlipidemia type  - CBC Auto Differential; Future  - Comprehensive Metabolic Panel; Future  - Lipid Panel; Future  - Hemoglobin A1C; Future  - Urinalysis; Future  - Urinalysis  Lab Results   Component Value Date    LDL 84.00 10/21/2022    HDL 43 10/21/2022    TRIG 64 10/21/2022       Cont RX daily; refills given. Take Omega 3 daily.   Stressed importance of dietary modifications. Follow a low cholesterol, low saturated fat diet with less that 200mg of cholesterol a day.  Avoid fried foods and high saturated fats (high saturated fats less than 7% of calories).  Add Flax Seed/Fish Oil supplements to diet. Increase dietary fiber.  Regular exercise can reduce LDL and raise HDL. Stressed importance of physical activity 5 times per week for 30 minutes per day.      3. Gastroesophageal reflux disease, unspecified whether esophagitis present  Continue PPI.   Avoid spicy, acidic, fried foods  and alcohol.  Eat 2-3 hours before going to bed.  Avoid tight clothing, chew food thoroughly.  Reduce caffeine intake, avoid soda.     4. Prediabetes  - Hemoglobin A1C; Future  A1C 5.7. close f/u.   ADA diet; more fruits and vegetables, lean meats, plant based sources of protein, less added sugar, less processed foods.     5. Screening for AAA (abdominal aortic aneurysm)  - US AAA Screening; Future    6. Vitamin D deficiency  OTC VIT D    7. Nicotine dependence, uncomplicated, unspecified nicotine product type  - CT Chest Lung Screening Low Dose; Future  Smoking cessation advised. Instructed on smoking cessation program through [facility] and pharmacological interventions to aid in cessation.  >5 minutes allotted to educate patient on the harms of smoking, the urgency to quit, and the development of a plan for smoking cessation.    8. Tobacco abuse  - albuterol (VENTOLIN HFA) 90 mcg/actuation inhaler; Inhale 1 puff into the lungs every 12 (twelve) hours as needed for Wheezing or Shortness of Breath. Rescue  Dispense: 18 g; Refill: 6  Smoking cessation advised. Instructed on smoking cessation program through [facility] and pharmacological interventions to aid in cessation.  >5 minutes allotted to educate patient on the harms of smoking, the urgency to quit, and the development of a plan for smoking cessation.    9. Pulmonary emphysema, unspecified emphysema type  - albuterol (VENTOLIN HFA) 90 mcg/actuation inhaler; Inhale 1 puff into the lungs every 12 (twelve) hours as needed for Wheezing or Shortness of Breath. Rescue  Dispense: 18 g; Refill: 6    10. Fungus infection  - ketoconazole (NIZORAL) 2 % cream; Apply topically 2 (two) times daily.  Dispense: 60 g; Refill: 2         Medication List with Changes/Refills   Current Medications    ASPIRIN (ECOTRIN) 81 MG EC TABLET    Take 81 mg by mouth.    CETIRIZINE (ZYRTEC) 10 MG TABLET    Take 1 tablet (10 mg total) by mouth once daily.    FLUTICASONE PROPIONATE (FLONASE) 50  MCG/ACTUATION NASAL SPRAY    1 spray (50 mcg total) by Each Nostril route once daily.    MULTIVITAMIN CAPSULE    Take 1 capsule by mouth once daily.    SILDENAFIL (VIAGRA) 100 MG TABLET    Take 1 tablet (100 mg total) by mouth as needed for Erectile Dysfunction.   Changed and/or Refilled Medications    Modified Medication Previous Medication    ALBUTEROL (VENTOLIN HFA) 90 MCG/ACTUATION INHALER albuterol (VENTOLIN HFA) 90 mcg/actuation inhaler       Inhale 1 puff into the lungs every 12 (twelve) hours as needed for Wheezing or Shortness of Breath. Rescue    Inhale 1 puff into the lungs every 12 (twelve) hours as needed for Wheezing or Shortness of Breath. Rescue    AMLODIPINE (NORVASC) 10 MG TABLET amLODIPine (NORVASC) 10 MG tablet       Take 1 tablet (10 mg total) by mouth once daily.    Take 1 tablet (10 mg total) by mouth once daily.    BACLOFEN (LIORESAL) 10 MG TABLET baclofen (LIORESAL) 10 MG tablet       Take 1 tablet (10 mg total) by mouth 3 (three) times daily as needed (muscle spasms, hiccups).    Take 1 tablet (10 mg total) by mouth 3 (three) times daily as needed (muscle spasms, hiccups).    KETOCONAZOLE (NIZORAL) 2 % CREAM ketoconazole (NIZORAL) 2 % cream       Apply topically 2 (two) times daily.    Apply topically 2 (two) times daily.    LOSARTAN (COZAAR) 50 MG TABLET losartan (COZAAR) 50 MG tablet       Take 1 tablet (50 mg total) by mouth once daily.    Take 1 tablet (50 mg total) by mouth once daily.    LOVASTATIN (MEVACOR) 10 MG TABLET lovastatin (MEVACOR) 10 MG tablet       Take 1 tablet (10 mg total) by mouth nightly.    Take 1 tablet (10 mg total) by mouth nightly.    METOPROLOL SUCCINATE (TOPROL-XL) 25 MG 24 HR TABLET metoprolol succinate (TOPROL-XL) 25 MG 24 hr tablet       Take 1 tablet (25 mg total) by mouth once daily.    Take 1 tablet (25 mg total) by mouth once daily.    OMEPRAZOLE (PRILOSEC) 20 MG CAPSULE omeprazole (PRILOSEC) 20 MG capsule       Take 1 capsule (20 mg total) by mouth once  daily.    Take 1 capsule (20 mg total) by mouth once daily.        Orders Placed This Encounter   Procedures    US AAA Screening    CT Chest Lung Screening Low Dose    CBC Auto Differential    Comprehensive Metabolic Panel    Lipid Panel    Hemoglobin A1C    Urinalysis    TSH         Future Appointments   Date Time Provider Department Center   7/10/2023 12:00 PM BRISEYDA Sawant [facility]        Labs thoroughly reviewed with patient. Medication refills addressed today.  RTC prn and 6 months, with labs 1 week prior to the apt.  COVID 19 precautions given to patient.  Patient voices understanding of all discharge instructions.

## 2023-03-15 PROBLEM — H51.0 GAZE PALSY: Status: ACTIVE | Noted: 2023-01-01

## 2023-03-15 NOTE — CONSULTS
[facility]  Vascular Neurology  Comprehensive Stroke Center  TeleVascular Neurology Acute Consultation Note      Consults    Consulting Provider: ANGEL WIN  Current Providers  No providers found    Patient Location: [facility] TELEMEDICINE ED [facility] Emergency Department  Spoke hospital nurse at bedside with patient assisting consultant.     Patient information was obtained from patient.         Assessment/Plan:       Diagnoses:   Ophtho  Gaze palsy  66 y/o man presenting with gaze palsy and facial droop, likely due to pontine stroke.  Symptoms present for >24 hrs.  Recommend loading with  and Plavix 300 and admitting for MRI brain, MRA head/neck.  OT for visual deficits.        STROKE DOCUMENTATION     Acute Stroke Times:   Acute Stroke Times   Last Known Normal Date: 03/14/23  Last Known Normal Time:  (bedtime)  Stroke Team Called Date: 03/15/23  Stroke Team Called Time: 1233  Stroke Team Arrival Date: 03/15/23  Stroke Team Arrival Time: 1235  Thrombolytic Therapy Recommended: No  Thrombectomy Recommended: No    NIH Scale:  1a. Level of Consciousness: 0-->Alert, keenly responsive  1b. LOC Questions: 0-->Answers both questions correctly  1c. LOC Commands: 0-->Performs both tasks correctly  2. Best Gaze: 1-->Partial gaze palsy, gaze is abnormal in one or both eyes, but forced deviation or total gaze paresis is not present  3. Visual: 0-->No visual loss  4. Facial Palsy: 1-->Minor paralysis (flattened nasolabial fold, asymmetry on smiling)  5a. Motor Arm, Left: 0-->No drift, limb holds 90 (or 45) degrees for full 10 secs  5b. Motor Arm, Right: 0-->No drift, limb holds 90 (or 45) degrees for full 10 secs  6a. Motor Leg, Left: 0-->No drift, leg holds 30 degree position for full 5 secs  6b. Motor Leg, Right: 0-->No drift, leg holds 30 degree position for full 5 secs  7. Limb Ataxia: 0-->Absent  8. Sensory: 0-->Normal, no sensory loss  9. Best  Language: 0-->No aphasia, normal  10. Dysarthria: 0-->Normal  11. Extinction and Inattention (formerly Neglect): 0-->No abnormality  Total (NIH Stroke Scale): 2     Modified Bluefield    Moffat Coma Scale:    ABCD2 Score:    [id number] Score:   HAS -BLED Score:   ICH Score:   Hunt & Womack Classification:       There were no vitals taken for this visit.  Eligible for thrombolytic therapy?: No  Thrombolytic therapy recomended: Thrombolytic therapy not recommended due to Outside of treatment window   Possible Interventional Revascularization Candidate? No; at this time symptoms not suggestive of large vessel occlusion    Disposition Recommendation: admit to inpatient    Subjective:     History of Present Illness:  68 yo man with history of HTN, GERD, tobacco abuse who presents with diplopia.  Patient reports that double vision started yesterday when he woke up.  He reports an episode a few weeks ago that self resolved.  No limb weakness or difficulty speaking.      Woke up with symptoms?: yes    Recent bleeding noted: no  Does the patient take any Blood Thinners? yes  Medications: Antiplatelets:  aspirin      Past Medical History: hypertension    Past Surgical History: no relevant surgical history    Family History: no relevant history    Social History: smoker (active)    Allergies: Ace Inhibitors No relevant allergies    Review of Systems   Eyes: Positive for visual disturbance.   Neurological: Positive for facial asymmetry.   All other systems reviewed and are negative.    Objective:   Vitals: There were no vitals taken for this visit. BP: .    CT READ: not shared    Physical Exam  Constitutional:       General: He is not in acute distress.  HENT:      Head: Normocephalic and atraumatic.   Eyes:      Pupils: Pupils are equal, round, and reactive to light.      Comments: Dysconjugate gaze   Pulmonary:      Effort: Pulmonary effort is normal.   Neurological:      Comments: Mild L facial droop  Inability to look fully  to the Left with R eye  Eyes dysconjugate on primary gaze             Recommended the emergency room physician to have a brief discussion with the patient and/or family if available regarding the  risks and benefits of treatment, and to briefly document the occurrence of that discussion in his clinical encounter note.     The attending portion of this evaluation, treatment, and documentation was performed per Sarah Beth Hill MD via audiovisual.    Billing code:  (moderate to severe stroke, large areas of edema, some mimics)      This patient has a critical neurological condition/illness, with high morbidity and mortality.  There is a high probability for acute neurological change leading to clinical and possibly life-threatening deterioration requiring highest level of physician preparedness for urgent intervention.  Care was coordinated with other physicians involved in the patient's care.  Radiologic studies and laboratory data were reviewed and interpreted, and plan of care was re-assessed based on the results.  Diagnosis, treatment options and prognosis may have been discussed with the patient and/or family members or caregiver.  Further advanced medical management and further evaluation is warranted for his care.    In your opinion, this was a: Tier 2 Van Negative    Consult End Time: 1:27 PM     Sarah Beth Hill MD  Comprehensive Stroke Center  Vascular Neurology   [facility]

## 2023-03-15 NOTE — SUBJECTIVE & OBJECTIVE
Woke up with symptoms?: yes    Recent bleeding noted: no  Does the patient take any Blood Thinners? yes  Medications: Antiplatelets:  aspirin      Past Medical History: hypertension    Past Surgical History: no relevant surgical history    Family History: no relevant history    Social History: smoker (active)    Allergies: Ace Inhibitors No relevant allergies    Review of Systems   Eyes: Positive for visual disturbance.   Neurological: Positive for facial asymmetry.   All other systems reviewed and are negative.    Objective:   Vitals: There were no vitals taken for this visit. BP: .    CT READ: not shared    Physical Exam  Constitutional:       General: He is not in acute distress.  HENT:      Head: Normocephalic and atraumatic.   Eyes:      Pupils: Pupils are equal, round, and reactive to light.      Comments: Dysconjugate gaze   Pulmonary:      Effort: Pulmonary effort is normal.   Neurological:      Comments: Mild L facial droop  Inability to look fully to the Left with R eye  Eyes dysconjugate on primary gaze

## 2023-03-15 NOTE — HPI
66 yo man with history of HTN, GERD, tobacco abuse who presents with diplopia.  Patient reports that double vision started yesterday when he woke up.  He reports an episode a few weeks ago that self resolved.  No limb weakness or difficulty speaking.

## 2023-03-15 NOTE — ASSESSMENT & PLAN NOTE
68 y/o man presenting with gaze palsy and facial droop, likely due to pontine stroke.  Symptoms present for >24 hrs.  Recommend loading with  and Plavix 300 and admitting for MRI brain, MRA head/neck.  OT for visual deficits.

## 2023-03-16 NOTE — TELEMEDICINE CONSULT
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  Denies associated neuro deficits.  NIH 1 for palsy.  MRI with no diffusion restriction.  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  Suspect negative image brainstem stroke causing right VIVI most likely due to small vessel disease.  However, small embolic process also possible.  Patient has prior embolic appearing right PCA infarct.  Review of EMR documented concern for cardioembolic source and there was recommendation by his Neurologist for MARLO and loop recorder.  This does not appear to have been done.      Recommendations  -ASA 81mg with Plavix 75mg daily until follow up with his Neurologist - there is evidence of focal stenosis within the basilar artery that may warrant indefinite use  -Continue Atorvastatin 40mg for goal LDL < 70  -TTE if not already performed (no report sent for review) - any evidence of thrombus or severely low EF would warrant initiation of AC (if this is needed, discontinue DAPT and start AC)  -Aggressive risk factor modification  -Referral to smoking cessation program  -30 day event monitor with autotrigger (CV05).  Please call [phone] to notify the tech (the order does not automatically cross over).  The device will be mailed to the patient.  The company will call from a [phone] number to confirm the address.  Please share this information with the patient.  -Follow up with his neurologist in 4-6 weeks      Right VIVI  -Suspect right brainstem infarct  -Alternating eye patch for comfort  -Follow up with Ophthalmology    HTN  -Stroke risk factor  -SBP < 220 can slowly reduce BP over  next 24-48 hours but would avoid aggressive BP lowering  -Long term goal < 130/80 - this can be achieved over the next 3-4 weeks    HLD  -Stroke risk factor  -LDL 76  -Atorvastatin 40mg for goal LDL < 70    Tobacco dependency  -Stroke risk factor  -Nicotine patch PRN  -In depth discussion regarding need for smoking cessation  -Referral to smoking cessation program if available  -Encourage smoking cessation    History of stroke without residual deficits  -Prior right PCA infarct  -Deficits resolved      NIH Stroke Scale:    Level of Consciousness: 0 - alert  LOC Questions: 0 - answers both correctly  LOC Commands: 0 - performs both correctly  Best Gaze: 1 - partial gaze palsy  Visual: 0 - no visual loss  Facial Palsy: 0 - normal  Motor Left Arm: 0 - no drift  Motor Right Arm: 0 - no drift  Motor Left Le - no drift  Motor Right Le - no drift  Limb Ataxia: 0 - absent  Sensory: 0 - normal  Best Language: 0 - no aphasia  Dysarthria: 0 - normal articulation  Extinction and Inattention: 0 - no neglect  NIH Stroke Scale Total: 1  Modified [place] Scale:   Timeline: Prior to symptoms onset  Modified Jeancarlos Score: 0 - no symptoms        Thrombolysis Candidate? No, Out of window - Symptom onset > 4.5 hours    Delays to Thrombolysis?  Not Applicable    Interventional Revascularization Candidate?   Is the patient eligible for mechanical endovascular reperfusion (AUREILA)?  No; No large vessel occlusion identified on imaging     Delays to Thrombectomy? Not Applicable    Hemorrhagic change of an Ischemic Stroke: Does this patient have an ischemic stroke with hemorrhagic changes? No     Subjective:     History of Present Illness:  68 y/o male with prior stroke (no residual deficits), HTN, HLD, tobacco dependency presented to the ED yesterday with complaints of double vision since Tuesday morning between 8a-10a.  Patient denies associated speech changes, swallowing difficulty, facial weakness, arm/leg weakness/numbness, gait  imbalance, dizziness, HA.  He has a history of prior stroke that affected vision of the left eye but has never had these symptoms before.  There were no known triggers.  The symptoms have persisted without improvement.    Reports he is seeing images one on top the other and seem to be present in all gaze directions.  There is no pain in either eye.  The double vision resolves when either eye is closed.      Past Medical History:   Diagnosis Date    Essential (primary) hypertension     GERD (gastroesophageal reflux disease)     HLD (hyperlipidemia)     Personal history of colonic polyps     Stroke     Tobacco abuse        Past Surgical History:   Procedure Laterality Date    COLONOSCOPY W/ BIOPSIES AND POLYPECTOMY  01/20/2020    Dr. Alf Covington    ENDOSCOPY  01/20/2020    PLANTAR'S WART EXCISION  07/17/2017       Family History   Problem Relation Age of Onset    Hypertension Mother     Depression Mother        Social History     Socioeconomic History    Marital status:     Number of children: 4   Occupational History    Occupation: Disabled   Tobacco Use    Smoking status: Every Day     Packs/day: 1.00     Types: Cigarettes    Smokeless tobacco: Never   Substance and Sexual Activity    Alcohol use: Yes     Comment: Beer 6pk daily    Drug use: Never       Current Outpatient Medications   Medication Sig Dispense Refill    albuterol (VENTOLIN HFA) 90 mcg/actuation inhaler Inhale 1 puff into the lungs every 12 (twelve) hours as needed for Wheezing or Shortness of Breath. Rescue 18 g 6    amLODIPine (NORVASC) 10 MG tablet Take 1 tablet (10 mg total) by mouth once daily. 90 tablet 1    aspirin (ECOTRIN) 81 MG EC tablet Take 81 mg by mouth.      baclofen (LIORESAL) 10 MG tablet Take 1 tablet (10 mg total) by mouth 3 (three) times daily as needed (muscle spasms, hiccups). 90 tablet 4    cetirizine (ZYRTEC) 10 MG tablet Take 1 tablet (10 mg total) by mouth once daily. 90 tablet 1    fluticasone propionate (FLONASE) 50  mcg/actuation nasal spray 1 spray (50 mcg total) by Each Nostril route once daily. 16 g 6    ketoconazole (NIZORAL) 2 % cream Apply topically 2 (two) times daily. 60 g 2    losartan (COZAAR) 50 MG tablet Take 1 tablet (50 mg total) by mouth once daily. 90 tablet 1    lovastatin (MEVACOR) 10 MG tablet Take 1 tablet (10 mg total) by mouth nightly. 90 tablet 1    metoprolol succinate (TOPROL-XL) 25 MG 24 hr tablet Take 1 tablet (25 mg total) by mouth once daily. 90 tablet 1    multivitamin capsule Take 1 capsule by mouth once daily.      omeprazole (PRILOSEC) 20 MG capsule Take 1 capsule (20 mg total) by mouth once daily. 90 capsule 1    sildenafiL (VIAGRA) 100 MG tablet Take 1 tablet (100 mg total) by mouth as needed for Erectile Dysfunction. 15 tablet 6     No current facility-administered medications sent for review for this visit.       Review of patient's allergies indicates:   Allergen Reactions    Ace inhibitors         Review of Systems   Constitutional:  Negative for fever and malaise/fatigue.   HENT:  Negative for congestion and tinnitus.    Eyes:  Positive for blurred vision and double vision. Negative for pain.   Respiratory:  Negative for cough and shortness of breath.    Cardiovascular:  Negative for chest pain, palpitations and leg swelling.   Gastrointestinal:  Negative for blood in stool, diarrhea, nausea and vomiting.   Genitourinary:  Negative for dysuria and frequency.   Musculoskeletal:         No gait imbalance   Skin:  Negative for itching and rash.        No drooling  No swallowing difficulty   Neurological:  Negative for dizziness, tingling, sensory change, speech change, focal weakness, weakness and headaches.   Psychiatric/Behavioral:  The patient is not nervous/anxious.         No confusion     Objective:   Vitals: 120/69, 67, 19, 98.6, 100%     Physical Exam  Constitutional:       General: He is not in acute distress.  HENT:      Head: Normocephalic.      Right Ear: External ear normal.       Left Ear: External ear normal.      Nose: Nose normal.   Pulmonary:      Effort: Pulmonary effort is normal. No respiratory distress.   Abdominal:      General: There is no distension.   Musculoskeletal:      Right lower leg: No edema.      Left lower leg: No edema.   Skin:     General: Skin is dry.   Neurological:      Mental Status: He is alert.   Psychiatric:         Mood and Affect: Mood normal.         Behavior: Behavior normal.       Neurological Exam  LOC: alert  Attention Span: Good   Language: No aphasia  Articulation: No dysarthria  Orientation: Person, Place, Time   Visual Fields: Full  EOM (CN III, IV, VI): Right hypertropia; Right VIVI, nystagmus  Facial Sensation (CN V): Normal  Facial Movement (CN VII): Symmetric facial expression    Motor: Arm left    Full antigravity; Full power noted with nurse assistance  Leg left    Full antigravity; Full power noted with nurse assistance  Arm right    Full antigravity; Full power noted with nurse assistance  Leg right   Full antigravity; Full power noted with nurse assistance  Cerebellum: No evidence of appendicular or axial ataxia  Sensation: Intact to light touch    Diagnostic Results     Brain Imaging   3/15/2023 MRI Brain  No diffusion restriction.  Remote infarct right PCA.  Chronic white matter changes.  No hemorrhage.    Vessel Imaging   3/15/2023 CTA head and neck  No occlusion.  There is a focal stenosis within the proximal basilar artery.  Scattered calcified plaque involving the carotid bifurcation, proximal ICA, and distal ICA.  Not flow limiting.    Cardiac Imaging   No report provided      VIRTUAL TELENOTE    Chief complaint: Diplopia  The patient location is: [place]   Present with the patient at the time of the telemed/virtual assessment: Family     The attending portion of this evaluation, treatment, and documentation was performed per Christy Pérez NP via Telemedicine AudioVisual using the secure [employer] software platform with 2 way  audio/video. The provider was located off-site and the patient is located in the hospital. The aforementioned video software was utilized to document the relevant history and physical exam.

## 2023-05-24 NOTE — H&P (VIEW-ONLY)
History & Physical    SUBJECTIVE:     History of Present Illness:  The patient is presenting for evaluation for severe coronary artery disease.  Depressed ejection fraction at 25%.  Dilated cardiomyopathy.  Cardiac catheterization revealed evidence of left main disease lad disease right coronary disease and circumflex disease.  He has life vest at this time.  He reports shortness of breath on exertion    Chief Complaint   Patient presents with    Pre-op Exam     REFERRAL-DR. BUSTOS.  CABG EVAL.  DX:  MV CAD, HTN, , PRIOR CVA, HLD, GERD, SMOKER.  DENIES CP OR SOB.       Review of patient's allergies indicates:   Allergen Reactions    Ace inhibitors Swelling     Facial edema       Current Outpatient Medications   Medication Sig Dispense Refill    albuterol (VENTOLIN HFA) 90 mcg/actuation inhaler Inhale 1 puff into the lungs every 12 (twelve) hours as needed for Wheezing or Shortness of Breath. Rescue 18 g 6    amLODIPine (NORVASC) 10 MG tablet Take 1 tablet (10 mg total) by mouth once daily. 90 tablet 1    aspirin (ECOTRIN) 81 MG EC tablet Take 81 mg by mouth.      atorvastatin (LIPITOR) 80 MG tablet Take 80 mg by mouth.      baclofen (LIORESAL) 10 MG tablet Take 1 tablet (10 mg total) by mouth 3 (three) times daily as needed (muscle spasms, hiccups). 90 tablet 4    clopidogreL (PLAVIX) 75 mg tablet Take 75 mg by mouth.      ENTRESTO 49-51 mg per tablet Take 1 tablet by mouth 2 (two) times daily.      fluticasone propionate (FLONASE) 50 mcg/actuation nasal spray 1 spray (50 mcg total) by Each Nostril route once daily. 16 g 6    furosemide (LASIX) 20 MG tablet Take 20 mg by mouth.      metoprolol succinate (TOPROL-XL) 100 MG 24 hr tablet Take 100 mg by mouth.      multivitamin capsule Take 1 capsule by mouth once daily.      omeprazole (PRILOSEC) 20 MG capsule Take 1 capsule (20 mg total) by mouth once daily. 90 capsule 1    spironolactone (ALDACTONE) 25 MG tablet Take 25 mg by mouth.      cetirizine 10 mg chewable  "tablet Take 10 mg by mouth once daily.      ketoconazole (NIZORAL) 2 % cream Apply topically 2 (two) times daily. (Patient not taking: Reported on 5/24/2023) 60 g 2    losartan (COZAAR) 50 MG tablet Take 1 tablet (50 mg total) by mouth once daily. 90 tablet 1    lovastatin (MEVACOR) 10 MG tablet Take 1 tablet (10 mg total) by mouth nightly. (Patient not taking: Reported on 5/24/2023) 90 tablet 1    metoprolol succinate (TOPROL-XL) 25 MG 24 hr tablet Take 1 tablet (25 mg total) by mouth once daily. (Patient not taking: Reported on 5/24/2023) 90 tablet 1    sildenafiL (VIAGRA) 100 MG tablet Take 1 tablet (100 mg total) by mouth as needed for Erectile Dysfunction. 15 tablet 6     No current facility-administered medications for this visit.       Past Medical History:   Diagnosis Date    Essential (primary) hypertension     GERD (gastroesophageal reflux disease)     HLD (hyperlipidemia)     Personal history of colonic polyps     Stroke     Tobacco abuse      Past Surgical History:   Procedure Laterality Date    COLONOSCOPY W/ BIOPSIES AND POLYPECTOMY  01/20/2020    Dr. Alf Covington    ENDOSCOPY  01/20/2020    PLANTAR'S WART EXCISION  07/17/2017     Family History   Problem Relation Age of Onset    Hypertension Mother     Depression Mother      Social History     Tobacco Use    Smoking status: Every Day     Packs/day: 1.00     Types: Cigarettes    Smokeless tobacco: Never   Substance Use Topics    Alcohol use: Yes     Comment: Beer 6pk daily    Drug use: Never        Review of Systems:  Review of Systems    OBJECTIVE:     Vital Signs (Most Recent)  Pulse: 67 (05/24/23 1019)  BP: 138/67 (05/24/23 1019)  SpO2: 98 % (05/24/23 1019)  5' 10" (1.778 m)  85.7 kg (189 lb)     Physical Exam:  Physical Exam    Laboratory:  Reviewed      Diagnostic Results:  Cardiac catheterization reviewed.  Echo is not available today.  By report normal valves      ASSESSMENT/PLAN:     Severe coronary artery disease with depressed ejection " fraction.  The patient will definitely benefit coronary artery bypass grafting.  He is certainly high-risk for the procedure secondary to his comorbidities.  He understands the risks of bleeding infection myocardial infarction stroke renal failure and death he elected to proceed

## 2023-05-25 NOTE — TELEPHONE ENCOUNTER
Already discussing this case with Irene Harmon who is asking for more information to present to insurance for authorization.   ----- Message from Delmi Angulo sent at 5/25/2023 12:03 PM CDT -----  Regarding: Questions working on Auth  Contact: Insurance  Please return the call [facility] contact Bianca [phone] ref#[id number] sx 6/1/23

## 2023-05-31 NOTE — PRE-PROCEDURE INSTRUCTIONS
[facility]: Outpatient Surgery  Preprocedure Check-In Instructions    Your arrival time for your surgery or procedure is 7:00am as instructed by your surgeon's office.  We ask patients to arrive about 2 hours before surgery to allow for enough time to review your health history & medications, start your IV, complete any outstanding labwork or tests, and meet your Anesthesiologist.  You will arrive at [facility], [address] [place] [address].  Enter through the [place] entrance next to the Emergency Room, and come to the 6th floor to the Outpatient Surgery Department.    Visitory Policy:  You are allowed 2 adult visitors to be with you in the hospital.  All hospital visitors should be in good current health.  No small children.    What to Bring:  Please have your ID, insurance cards, and all home medication bottles with you at check in.  Bring your CPAP machine if one is used at home.    Fasting:  Nothing to eat or drink after midnight the night before your procedure. This includes no ice, gum, hard candies, and/or tobacco products.  Follow your doctor's instructions for taking any medications on the morning of your procedure.  If no instructions for taking medications were given, do not take any medications but bring your medications in their bottles to your procedure check in.    Follow your doctor's preoperative instructions regarding skin prep, bowel prep, bathing, or showering prior to your procedure.  If any special soaps were provided to you, please use according to your doctor's instructions. If no instructions were given from your doctor, take a good bath or shower with antibacterial soap the night before and the morning of your procedure.  On the morning of procedure, wear loose, comfortable clothing.  No lotions, makeup, perfumes, colognes, deodorant, or jewelry to your procedure.  Removable items (glasses, contact lenses, dentures, retainers, hearing aids) need  to be removed for your procedure.  Bring your storage containers for these items if you wear them.    Artificial nails, body jewelry, eyelash extensions, and/or hair extensions with metal clips are not allowed during your surgery.  If you currently wear any of these items, please arrange for them to be removed prior to your arrival to the hospital.    Outpatient or Same Day Surgeries:  Any patients receiving sedation/anesthesia are advised not to drive for 24 hours after their procedure.  We do not allow patients to drive themselves home after discharge.  If you are going home after your procedure, please have someone available to drive you home from the hospital.      You may call the Outpatient Surgery Department at [phone] with any questions or concerns.  We are looking forward to meeting you and taking great care of you for your procedure.  Thank you for choosing [facility] for your surgical needs.        Status:  complete  Spoke with: patient  Time: 1247

## 2023-05-31 NOTE — ANESTHESIA PREPROCEDURE EVALUATION
05/31/2023  Tom Anna is a 67 y.o., male withsevere coronary artery disease.  Depressed ejection fraction at 25%.  Dilated cardiomyopathy.  Cardiac catheterization revealed evidence of left main disease lad disease right coronary disease and circumflex disease.  He has life vest at this time.  He reports shortness of breath on exertion      Pre-op Assessment    I have reviewed the Patient Summary Reports.     I have reviewed the Nursing Notes. I have reviewed the NPO Status.   I have reviewed the Medications.     Review of Systems         Anesthesia Plan  Type of Anesthesia, risks & benefits discussed:    Anesthesia Type: Gen ETT  Intra-op Monitoring Plan: Standard ASA Monitors, Art Line, Central Line, MARLO, CO and PA  Post Op Pain Control Plan: multimodal analgesia and IV/PO Opioids PRN  Induction:  IV  Airway Plan: Direct, Post-Induction  Informed Consent: Informed consent signed with the Patient and all parties understand the risks and agree with anesthesia plan.  All questions answered. Patient consented to blood products? Yes  ASA Score: 4  Anesthesia Plan Notes: DEZ    Ready For Surgery From Anesthesia Perspective.     .

## 2023-05-31 NOTE — TELEPHONE ENCOUNTER
----- Message from Linda Patino sent at 5/31/2023  9:04 AM CDT -----  Shaneka paige sx called needs H&P on this pt.    Shaneka [phone]

## 2023-06-01 NOTE — ANESTHESIA PROCEDURE NOTES
[place] [facility]    Diagnosis: CAD  Patient location during procedure: done in OR  Procedure start time: 6/1/2023 9:24 AM  Timeout: 6/1/2023 9:24 AM  Procedure end time: 6/1/2023 9:30 AM    Staffing  Authorizing Provider: Roxy Wetzel MD  Performing Provider: Roxy Wetzel MD    Anesthesiologist was present at the time of the procedure.  Preanesthetic Checklist  Completed: patient identified, IV checked, site marked, risks and benefits discussed, surgical consent, monitors and equipment checked, pre-op evaluation, timeout performed and anesthesia consent given  [place] [facility]  Skin Prep: chlorhexidine gluconate  Location: right,  internal jugular vein  Vessel Caliber: medium  Introducer: 9.5 Fr,   Device: CCO/Oximetric Catheter   Catheter Size: 8 Fr  Catheter placement by yes. Heme positive aspiration all ports. PAC floated with balloon up not wedgedSterile sheath used  Locked at: 42 cm.Insertion Attempts: 1  Indication: hemodynamic monitoring, intravenous therapy  Ultrasound Guidance  Needle advanced into vessel with real time Ultrasound guidance.  Guidewire confirmed in vessel.  Sterile sheath used.  Assessment  Central Line Bundle Protocol followed. Hand hygiene before procedure, surgical cap worn, surgical mask worn, sterile surgical gloves worn, large sterile drape used.  Verification: chest x-ray  Dressing: secured with tape and tegaderm  Patient: Tolerated Well

## 2023-06-01 NOTE — ANESTHESIA PROCEDURE NOTES
MARLO    Diagnosis: Coronary artery disease  Patient location during procedure: OR  Surgery related to: Post-Operative Surgical Evaluation  Exam type: Final    Staffing  Performed: anesthesiologist     Anesthesiologist: Roxy Wetzel MD        Anesthesiologist Present  Yes      Setup & InductionDoppler Echo: color flow mapping, 2D and 3D.        After removal of the aortic cross-clamp, patient was found to have a sinus rhythm at a rate of 40, ventricular wires were placed to provide a ventricular rate of 70 beats per minute.  Patient was weaned from cardiopulmonary bypass with inotropic support using Milrinone and epinephrine in mild doses.    1. There remains mild tricuspid regurgitation, mild mitral regurgitation and mild aortic insufficiency.  These are unchanged from preoperative evaluation.      2. Right ventricular function remains mildly depressed but perhaps slightly improved.      3. Left ventricle is moderately dilated overall anterior wall appears to be modestly improved, and lateral wall significantly improved ejection fraction is 35%.      Heparin was reversed with protamine and the chest was closed with sternal wires.  The exam was once again repeated with no interval changes and the patient remained hemodynamically stable.  At the conclusion of the operation the transesophageal echo probe was removed atraumatically.

## 2023-06-01 NOTE — CONSULTS
[facility] - [facility] ICU  Critical Care - Medicine  Progress Note    Patient Name: Tom Anna  MRN: [id number]  Admission Date: 6/1/2023  Hospital Length of Stay: 0 days  Code Status: Full Code  Attending Provider: Xochitl Grossman MD  Primary Care Provider: Primary Doctor No   Principal Problem: <principal problem not specified>    Subjective:     HPI:  This is a 67-year-old male with a past medical history of heart failure with reduced ejection fraction.  Recently underwent left heart catheterization showing multivessel coronary artery disease and left main disease.  Reportedly had CABG x3 however operative report still pending.  Not currently on any vasopressor therapy at this time.        Interval History/Significant Events:  No major events after surgical intervention.  Off vasopressors.    Objective:     Vital Signs (Most Recent):  Temp: 97.7 °F (36.5 °C) (06/01/23 1255)  Pulse: 78 (06/01/23 1255)  Resp: (!) 22 (06/01/23 1448)  BP: 114/72 (06/01/23 1255)  SpO2: 99 % (06/01/23 1256) Vital Signs (24h Range):  Temp:  [97.7 °F (36.5 °C)-99 °F (37.2 °C)] 97.7 °F (36.5 °C)  Pulse:  [69-78] 78  Resp:  [18-22] 22  SpO2:  [99 %-100 %] 99 %  BP: (114-146)/(56-72) 114/72     Weight: 86.5 kg (190 lb 11.2 oz)  Body mass index is 27.36 kg/m².      Intake/Output Summary (Last 24 hours) at 6/1/2023 1549  Last data filed at 6/1/2023 1303  Gross per 24 hour   Intake 2010 ml   Output 1680 ml   Net 330 ml       Physical Exam  GENERAL: NAD, on mechanical ventilation, sedated  CARDIAC: RRR, NO m/g/r  PULM: CTA BL, No wheezing or rales  ABD: NT/ND/S. Bowel sounds heard  EXT: no cyanosis, clubbing, or edema, peripheral pulses intact  NEURO: no obvious neurosensory deficits, no dysarthria noted  PSYCH: no agitation or anxiety   EYES: tracks examiner around room, pupil reactive to light  NECK: trachea midline, no obvious JVD     Vents:  Vent Mode: SIMV (06/01/23 1256)  Ventilator Initiated: Yes (06/01/23 1256)  Set  Rate: 20 BPM (06/01/23 1256)  Vt Set: 500 mL (06/01/23 1256)  Pressure Support: 10 cmH20 (06/01/23 1256)  PEEP/CPAP: 5 cmH20 (06/01/23 1256)  Oxygen Concentration (%): 60 (06/01/23 1256)  Peak Airway Pressure: 19 cmH20 (06/01/23 1256)  Total Ve: 9.1 L/m (06/01/23 1256)    Lines/Drains/Airways       Central Venous Catheter Line  Duration              Introducer with Double Lumen 06/01/23 1300 Internal Jugular Right <1 day              Drain  Duration                  Chest Tube 06/01/23 1203 Tube - 1 Mediastinal 28 Fr. <1 day         Chest Tube 06/01/23 1300 Tube - 2 Left Mediastinal <1 day         NG/OG Tube 06/01/23 1300 Center mouth <1 day         Urethral Catheter 06/01/23 0925 Silicone 16 Fr. <1 day              Airway  Duration                  Airway - Non-Surgical 06/01/23 0924 <1 day              Arterial Line  Duration             Arterial Line 06/01/23 0917 Right Radial <1 day              Line  Duration                  Pacer Wires 06/01/23 1300 <1 day              Peripheral Intravenous Line  Duration                  Peripheral IV - Single Lumen 06/01/23 0825 18 G Anterior;Left Forearm <1 day                    Significant Labs:    CBC/Anemia Profile:  Recent Labs   Lab 06/01/23  1013 06/01/23  1022 06/01/23  1051 06/01/23  1113 06/01/23  1325   WBC 11.67*  --   --   --  9.53   HGB 8.6*  --   --   --  7.7*   HCT 26.7*   < > 26* 21* 24.2*     --   --   --  185   MCV 62.5*  --   --   --  62.7*   RDW 24.6*  --   --   --  24.1*    < > = values in this interval not displayed.        Chemistries:  Recent Labs   Lab 06/01/23  1013 06/01/23  1325    139   K 4.3 3.6   CO2 21* 20*   BUN 5.0* 5.0*   CREATININE 0.78 0.73   CALCIUM 9.5 8.2*   ALBUMIN  --  2.4*   BILITOT  --  1.3   ALKPHOS  --  49   ALT  --  5   AST  --  18   GLUCOSE 166* 162*   MG  --  2.10   PHOS  --  2.9       All pertinent labs within the past 24 hours have been reviewed.    Significant Imaging:  I have reviewed all pertinent imaging  results/findings within the past 24 hours.    Medication: Current meds reviewed    Assessment/Plan:     A/P  1) status post CABG x3 on 06/01/2023  2) heart failure with reduced ejection fraction  3) prior multivessel coronary artery disease with left main component    -patient is now status post CABG x3, postoperative report pending.  Recent hemoglobin 7.7 down from 8.6.  No need for transfusion unless below 7.0 or if increased chest tube output occurs.  No signs of shock at this time.  Extremities are warm.  Will monitor urine output.  -postoperative chest x-ray unremarkable without any obvious consolidation.  -if acute decompensation occurs would recommend ABG, lactate, CBC, and repeat chest x-ray.  Will also notify CT surgery if any changes that occur.  -if everything goes according to plan should be extubated shortly      Critical Care Time: 31 minutes spent reviewing chart, coordinating care, and in medical decision-making.     Efraín Elizabeth MD  Critical Care - Medicine  [facility] - [address] [place] [address]

## 2023-06-01 NOTE — TRANSFER OF CARE
"Anesthesia Transfer of Care Note    Patient: Tom Anna    Procedure(s) Performed: Procedure(s) (LRB):  CORONARY ARTERY BYPASS GRAFT (CABG) (N/A)    Patient location: ICU    Anesthesia Type: general    Transport from OR: Transported from OR intubated on 100% O2 by AMBU with adequate controlled ventilation. Upon arrival to PACU/ICU, patient attached to ventilator and auscultated to confirm bilateral breath sounds and adequate TV. Continuous ECG monitoring in transport. Continuous SpO2 monitoring in transport. Continuos invasive BP monitoring in transport    Post pain: adequate analgesia    Post assessment: no apparent anesthetic complications    Post vital signs: stable    Level of consciousness: sedated    Nausea/Vomiting: no nausea/vomiting    Complications: none    Transfer of care protocol was followed      Last vitals:   Visit Vitals  /72 (BP Location: Right arm, Patient Position: Lying)   Pulse 78   Temp 36.5 °C (97.7 °F) (Skin)   Resp 18   Ht 5' 10" (1.778 m)   Wt 86.5 kg (190 lb 11.2 oz)   SpO2 100%   BMI 27.36 kg/m²     "

## 2023-06-01 NOTE — ANESTHESIA PROCEDURE NOTES
Intubation    Date/Time: 6/1/2023 9:24 AM  Performed by: Raymond Luna CRNA  Authorized by: Roxy Wetzel MD     Intubation:     Induction:  Intravenous    Intubated:  Postinduction    Mask Ventilation:  Easy mask    Attempts:  1    Attempted By:  CRNA    Method of Intubation:  Direct    Blade:  Ce 3    Laryngeal View Grade: Grade I - full view of cords      Difficult Airway Encountered?: No      Complications:  None    Airway Device:  Oral endotracheal tube    Airway Device Size:  8.0    Style/Cuff Inflation:  Cuffed (inflated to minimal occlusive pressure)    Tube secured:  21    Secured at:  The lips    Placement Verified By:  Capnometry    Complicating Factors:  None    Findings Post-Intubation:  BS equal bilateral and atraumatic/condition of teeth unchanged

## 2023-06-01 NOTE — ANESTHESIA PROCEDURE NOTES
MARLO    Diagnosis: Coronary artery disease  Patient location during procedure: OR  Exam type: Baseline    Staffing  Performed: anesthesiologist     Anesthesiologist: Roxy Wetzel MD        Anesthesiologist Present  Yes      Setup & Induction  Patient preparation: bite block inserted  Probe Insertion: easy  Exam: completeDoppler Echo: 2D, 3D and color flow mapping.      After induction of Endotracheal anesthesia in the operating room, a transesophageal echo probe was placed atraumatically into the esophagus for evaluation of cardiovascular structures.    Ascending aorta, arch and desc aorta are normal in caliber, scattered grade 2 atherosclerotic plaques seen throughout, with proximal asc aorta free of significant calcifications.  2.  Right atrium, Left atrium and left atrial appendage are well visualized.  There is no thrombus, tumor or evidence of atrial septal defect.  3.  Tricuspid valve has mild regurgitation, leaflets are normal, annulus mildly dilated.  4.  PA catheter visualized in RVOT, exiting into the main PA  5. Mild PI present  6. RV is mildly dilated with mild depression of function.  7. Mitral valve annulus upper limits of normal in diameter, leaflets appear normal, mild MR present. No stenosis.  8. Aortic valve is trileaflet, without stenosis.  There is mild aortic insufficiency.   9. Left ventricle is moderately dilated there is severe reduction of overall systolic function ejection fraction is 26%.  There is severe mid anterior hypokinesis, there is some lateral wall hypokinesis as well there is inferior akinesis, probable scar.    10. No pericardial or pleural effusions are identified.

## 2023-06-02 NOTE — PROGRESS NOTES
Tom Anna is a 67 y.o. male patient.   1. Coronary artery disease    2. CAD (coronary artery disease)    3. S/P CABG x 3      Past Medical History:   Diagnosis Date    Allergies     CHF (congestive heart failure)     Coronary artery disease     Essential (primary) hypertension     Fatigue     GERD (gastroesophageal reflux disease)     HLD (hyperlipidemia)     Personal history of colonic polyps     SOB (shortness of breath) on exertion     Stroke     Tobacco abuse     Weakness of both legs      No past surgical history pertinent negatives on file.  Scheduled Meds:   aspirin  81 mg Oral Daily    docusate sodium  100 mg Oral BID    famotidine (PF)  20 mg Intravenous Q12H    fluticasone propionate  2 spray Each Nostril Daily    folic acid  1 mg Oral Daily    metoprolol tartrate  12.5 mg Oral BID    mupirocin   Nasal BID    sucralfate  1 g Oral QID (AC & HS)     Continuous Infusions:   clevidipine Stopped (06/02/23 0130)    dexmedeTOMIDine (Precedex) infusion (titrating)      dextrose 5 % and 0.45 % NaCl 100 mL/hr at 06/01/23 1300    EPINEPHrine 0.02 mcg/kg/min (06/01/23 1300)    insulin regular 1 units/mL infusion orderable (CTS POST-OP) Stopped (06/02/23 0622)    loperamide       PRN Meds:acetaminophen, albumin human 5%, albuterol sulfate, calcium gluconate IVPB, calcium gluconate IVPB, calcium gluconate IVPB, dextrose 10%, dextrose 10%, HYDROcodone-acetaminophen, lactulose 10 gram/15 ml, loperamide, magnesium sulfate IVPB, magnesium sulfate IVPB, metoclopramide HCl, morphine, ondansetron, oxyCODONE, potassium chloride in water, potassium chloride in water, sodium phosphate IVPB, sodium phosphate IVPB, sodium phosphate IVPB    Review of patient's allergies indicates:   Allergen Reactions    Ace inhibitors Swelling     Facial edema     There are no hospital problems to display for this patient.    Blood pressure 116/65, pulse 79, temperature 98.8 °F (37.1 °C), temperature source Oral, resp. rate 20, height 5'  "10" (1.778 m), weight 90.3 kg (199 lb), SpO2 95 %.    Subjective:    POD #1  Awake. Alert.  Sitting up in chair    Objective:   AFVSS. 95% on 3L NC  Heart: RRR  Lungs: respirations nonlabored, clear  Incision: c/d/I  H/h: 8/26  Cr: 0.87  CT draining  Cxr: stable    Assesment/Plan:    S/p CAB  - mobilize  - IS            JONNY Seo  6/2/2023    "

## 2023-06-02 NOTE — PLAN OF CARE
06/02/23 1120   Discharge Assessment   Assessment Type Discharge Planning Assessment   Confirmed/corrected address, phone number and insurance Yes   Confirmed Demographics Correct on Facesheet  ([address] [place] [address])   Source of Information family  (pt's wifeAshley)   Communicated MEGHNA with patient/caregiver Date not available/Unable to determine   Reason For Admission s/p CAB   People in Home spouse  (Pt lives in a single story home with 2 steps to enter and no rails)   Do you expect to return to your current living situation? Yes   Do you have help at home or someone to help you manage your care at home? Yes   Who are your caregiver(s) and their phone number(s)? pt's wifeAshley---[phone]   Prior to hospitilization cognitive status: Alert/Oriented   Current cognitive status: Alert/Oriented   Walking or Climbing Stairs ambulation difficulty, requires equipment   Mobility Management RW   Home Layout Able to live on 1st floor   Patient currently being followed by outpatient case management? No   Do you currently have service(s) that help you manage your care at home? No   Who is going to help you get home at discharge? pt's wifeAshley   How do you get to doctors appointments? car, drives self   Are you on dialysis? No   Discharge Plan A Home Health   Discharge Plan B Home Health   DME Needed Upon Discharge  rollator   Discharge Plan discussed with: Patient;Spouse/sig other   Name(s) and Number(s) Ashley--[phone]   Housing Stability   In the last 12 months, was there a time when you were not able to pay the mortgage or rent on time? N   Transportation Needs   In the past 12 months, has lack of transportation kept you from medical appointments or from getting medications? no   Food Insecurity   Within the past 12 months, you worried that your food would run out before you got the money to buy more. Never true   OTHER   Name(s) of People in Home wifeAshley     Pt's  PCP is Deonna Villarreal NP who is [facility] at [facility]. Pt's  is his wife, Ashley ([phone]). He has a RW but would like a rollator. He had [facility]  in the past when he had a stroke. He uses [facility] in [place]. He does drive however does not work.   FOC obtained. [facility] CM to follow for dc needs.

## 2023-06-02 NOTE — NURSING
Nurses Note -- 4 Eyes      6/2/2023   12:29 AM      Skin assessed during: Q Shift Change      [x] No Altered Skin Integrity Present    [x]Prevention Measures Documented      [] Yes- Altered Skin Integrity Present or Discovered   [] LDA Added if Not in Epic (Describe Wound)   [] New Altered Skin Integrity was Present on Admit and Documented in LDA   [] Wound Image Taken    Wound Care Consulted? No    Attending Nurse:  Cuca Wisdom RN     Second RN/Staff Member:  Nhung SHAW RN

## 2023-06-02 NOTE — NURSING
Nurses Note -- 4 Eyes      6/2/2023   3:59 PM      Skin assessed during: Daily Assessment      [x] No Altered Skin Integrity Present    [x]Prevention Measures Documented      [] Yes- Altered Skin Integrity Present or Discovered   [] LDA Added if Not in Epic (Describe Wound)   [] New Altered Skin Integrity was Present on Admit and Documented in LDA   [] Wound Image Taken    Wound Care Consulted? No    Attending Nurse:  Palmer Fowler RN     Second RN/Staff Member:  MEI Rice

## 2023-06-02 NOTE — PROGRESS NOTES
06/02/23 1030   Pre Exercise Vitals   /73   Pulse 80   Supplemental O2? Yes   O2 Device nasal cannula   O2 Flow (L/min) 2   During Exercise Vitals   Pulse 92   Supplemental O2? Yes   O2 Device nasal cannula   O2 Flow (L/min) 2   Distance Walked 8 feet   Post Exercise Vitals   /82   Pulse 94   Modality   Modality   (rollator)     Assisted per pt's nurse.  CT x2 cont, [place] cont, 02 cont.  Mod assist x2 sit to stand.  Chair followed behind for safety.  Very poor tolerance of activity.  Congested, non-productive cough.  Unable to obtain 02 sat during activity using 2 monitors.  Nurse in room post activity, warm pad placed on fingers to assist 02 sat reading.  Encouraged increased activity and use of IS.   Wife present.

## 2023-06-02 NOTE — PT/OT/SLP PROGRESS
Physical Therapy      Patient Name:  Tmo Anna   MRN:  [id number]    Pt just walked with cardiac rehab. F/u tomorrow

## 2023-06-02 NOTE — ANESTHESIA POSTPROCEDURE EVALUATION
Anesthesia Post Evaluation    Patient: Tom Anna    Procedure(s) Performed: Procedure(s) (LRB):  CORONARY ARTERY BYPASS GRAFT (CABG) (N/A)    Final Anesthesia Type: general      Patient location during evaluation: PACU  Patient participation: Yes- Able to Participate  Level of consciousness: awake and alert  Post-procedure vital signs: reviewed and stable  Pain management: adequate  Airway patency: patent      Anesthetic complications: no      Cardiovascular status: hemodynamically stable  Respiratory status: unassisted  Hydration status: euvolemic  Follow-up not needed.          Vitals Value Taken Time   /86 06/02/23 0732   Temp 37.1 °C (98.8 °F) 06/02/23 0400   Pulse 83 06/02/23 0751   Resp 20 06/02/23 0751   SpO2 96 % 06/02/23 0746   Vitals shown include unvalidated device data.      No case tracking events are documented in the log.      Pain/El Score: Pain Rating Prior to Med Admin: 7 (6/2/2023 12:55 AM)  Pain Rating Post Med Admin: 2 (6/2/2023  1:55 AM)

## 2023-06-02 NOTE — PROGRESS NOTES
[facility] - [facility]  Pulmonary Critical Care Note    Patient Name: Tom Anna  MRN: [id number]  Admission Date: 6/1/2023  Hospital Length of Stay: 1 days  Code Status: Full Code  Attending Provider: Xochitl Grossman MD  Primary Care Provider: Primary Doctor No     Subjective:     HPI:   67-year-old with a history of severe coronary artery disease mitral regurgitation, and ejection fraction 20-25% underwent CABG yesterday by Dr. Grossman.  Per Dr. Felix's notes CABG x3 with a LIMA to the LAD saphenous vein graft to OM1 and PDA.  Left atrial appendage was clipped with size 45 atrial clip.      24 Hour Interval History:  Patient is sitting in the chair at the bedside.  Having difficulty coughing due to chest pain.  Air leak noted per the mediastinal tube in the Pleur-evac.  On no vasopressor support.  Breathing easily on nasal cannula    Past Medical History:   Diagnosis Date    Allergies     CHF (congestive heart failure)     Coronary artery disease     Essential (primary) hypertension     Fatigue     GERD (gastroesophageal reflux disease)     HLD (hyperlipidemia)     Personal history of colonic polyps     SOB (shortness of breath) on exertion     Stroke     Tobacco abuse     Weakness of both legs        Past Surgical History:   Procedure Laterality Date    COLONOSCOPY W/ BIOPSIES AND POLYPECTOMY  01/20/2020    Dr. Alf Covington    ENDOSCOPY  01/20/2020    ESOPHAGOGASTRODUODENOSCOPY      PLANTAR'S WART EXCISION  07/17/2017       Social History     Socioeconomic History    Marital status:     Number of children: 4   Occupational History    Occupation: Disabled   Tobacco Use    Smoking status: Every Day     Packs/day: 1.00     Types: Cigarettes    Smokeless tobacco: Never   Substance and Sexual Activity    Alcohol use: Yes     Comment: Beer 6pk daily    Drug use: Never           Current Outpatient Medications   Medication Instructions    albuterol (VENTOLIN HFA) 90 mcg/actuation inhaler 1  puff, Inhalation, Every 12 hours PRN, Rescue    amLODIPine (NORVASC) 10 mg, Oral, Daily    aspirin (ECOTRIN) 81 mg, Oral, Daily    atorvastatin (LIPITOR) 80 mg, Oral, Nightly    baclofen (LIORESAL) 10 mg, Oral, 3 times daily PRN    cetirizine 10 mg, Oral, Daily    clopidogreL (PLAVIX) 75 mg, Oral    ENTRESTO 49-51 mg per tablet 1 tablet, Oral, 2 times daily    fluticasone propionate (FLONASE) 50 mcg, Each Nostril, Daily    furosemide (LASIX) 20 mg, Oral, Daily    ketoconazole (NIZORAL) 2 % cream Topical (Top), 2 times daily    losartan (COZAAR) 50 mg, Oral, Daily    lovastatin (MEVACOR) 10 mg, Oral, Nightly    metoprolol succinate (TOPROL-XL) 100 mg, Oral, Daily    multivitamin capsule 1 capsule, Oral, Daily    omeprazole (PRILOSEC) 20 mg, Oral, Daily    sildenafiL (VIAGRA) 100 mg, Oral, As needed (PRN)    spironolactone (ALDACTONE) 25 mg, Oral, Daily       Current Inpatient Medications   aspirin  81 mg Oral Daily    ceFAZolin (ANCEF) IVPB  2 g Intravenous Q12H    docusate sodium  100 mg Oral BID    famotidine (PF)  20 mg Intravenous Q12H    folic acid  1 mg Oral Daily    metoprolol tartrate  12.5 mg Oral BID    mupirocin   Nasal BID    sucralfate  1 g Oral QID (AC & HS)       Current Intravenous Infusions   clevidipine Stopped (06/02/23 0130)    dexmedeTOMIDine (Precedex) infusion (titrating)      dextrose 5 % and 0.45 % NaCl 100 mL/hr at 06/01/23 1300    EPINEPHrine 0.02 mcg/kg/min (06/01/23 1300)    insulin regular 1 units/mL infusion orderable (CTS POST-OP) Stopped (06/02/23 0622)    loperamide           Review of Systems   All other systems reviewed and are negative.       Objective:       Intake/Output Summary (Last 24 hours) at 6/2/2023 0709  Last data filed at 6/2/2023 0622  Gross per 24 hour   Intake 5450.9 ml   Output 4585 ml   Net 865.9 ml         Vital Signs (Most Recent):  Temp: 98.8 °F (37.1 °C) (06/02/23 0400)  Pulse: 86 (06/02/23 0630)  Resp: 19 (06/02/23 0630)  BP: (!) 126/59 (06/02/23 0630)  SpO2:  98 % (06/02/23 0630)  Body mass index is 28.55 kg/m².  Weight: 90.3 kg (199 lb) Vital Signs (24h Range):  Temp:  [97.7 °F (36.5 °C)-99 °F (37.2 °C)] 98.8 °F (37.1 °C)  Pulse:  [69-98] 86  Resp:  [0-28] 19  SpO2:  [94 %-100 %] 98 %  BP: (109-157)/(56-88) 126/59  Arterial Line BP: ()/(42-68) 131/51     Physical Exam  GENERAL: NAD, on mechanical ventilation, sedated  CARDIAC: RRR, NO m/g/r  PULM: CTA BL, No wheezing or rales  ABD: NT/ND/S. Bowel sounds heard  EXT: no cyanosis, clubbing, or edema, peripheral pulses intact    Lines/Drains/Airways       Central Venous Catheter Line  Duration              Introducer with Double Lumen 06/01/23 1300 Internal Jugular Right <1 day              Drain  Duration                  Chest Tube 06/01/23 1203 Tube - 1 Mediastinal 28 Fr. <1 day         Chest Tube 06/01/23 1300 Tube - 2 Left Mediastinal <1 day         Urethral Catheter 06/01/23 0925 Silicone 16 Fr. <1 day              Line  Duration                  Pacer Wires 06/01/23 1300 <1 day              Peripheral Intravenous Line  Duration                  Peripheral IV - Single Lumen 06/01/23 0825 18 G Anterior;Left Forearm <1 day                    Significant Labs:    Lab Results   Component Value Date    WBC 8.68 06/02/2023    HGB 8.6 (L) 06/02/2023    HCT 26.7 (L) 06/02/2023    MCV 61.8 (L) 06/02/2023     06/02/2023         BMP  Lab Results   Component Value Date     (L) 06/02/2023    K 4.6 06/02/2023    CHLORIDE 106 06/02/2023    CO2 21 (L) 06/02/2023    BUN 5.6 (L) 06/02/2023    CREATININE 0.87 06/02/2023    CALCIUM 8.2 (L) 06/02/2023    AGAP 6.0 06/02/2023    EGFRNONAA 86 02/09/2022       ABG  Recent Labs   Lab 06/01/23  1113   PH 7.425   PO2 404*   PCO2 43.4   HCO3 28.5*   BE 4       Significant Imaging:  I have reviewed the pertinent imaging within the past 24 hours.  Chest x-ray by my review has limited inspiration with blunting of both costophrenic angles.  Chest tubes in good  position.      Assessment/Plan:     Assessment  Status post CABG x3 on 06/01/2023.  LIMA to the LAD, saphenous vein graft to OM1 and PDA.  Heart failure with reduced ejection fraction-EF 20-25%      Plan  Encouraged incentive spirometry.  Flonase for nasal congestion.  Begin neb treatments.  Mobilize as tolerated.       32 minutes of critical care was time spent personally by me on the following activities: development of treatment plan with patient or surrogate and bedside caregivers, discussions with consultants, evaluation of patient's response to treatment, examination of patient, ordering and performing treatments and interventions, ordering and review of laboratory studies, ordering and review of radiographic studies, pulse oximetry, re-evaluation of patient's condition.  This critical care time did not overlap with that of any other provider or involve time for any procedures.     I have personally seen and examined this patient on 6/2/2023.     SUDHIR Saunders MD  Pulmonary Critical Care Medicine  [facility] - [facility]

## 2023-06-02 NOTE — PLAN OF CARE
Problem: Occupational Therapy  Goal: Occupational Therapy Goal  Description: LTG: Pt will perform basic ADLs and ADL transfers with min A and good compliance to sternal precautions using LRAD by discharge.    STG: to be met in 2 weeks    Pt will complete grooming standing at sink with RW with mod A.  Pt will complete UB dressing with mod A.  Pt will complete LB dressing with mod A using LRAD.  Pt will complete toileting with mod A using RW and BSC.  Pt will complete toilet transfer with mod A using RW and BSC.   Pt will independently verbalize sternal precautions with >90% accuracy.   Outcome: Ongoing, Progressing

## 2023-06-02 NOTE — PLAN OF CARE
A new referral has been submitted to [facility] via [facility].

## 2023-06-02 NOTE — OP NOTE
[facility]                       [address] [place] [address]    PATIENT NAME:      INGA COURTNEY  YOB: 1955  CSN:               [id number]  MRN:               [id number]  ADMIT DATE:        06/01/2023 07:05:00  PHYSICIAN:         Xochitl Grossman MD                          OPERATIVE REPORT      DATE OF SURGERY:    06/01/2023 00:00:00    SURGEON:  Xochitl Grossman MD    PREOPERATIVE DIAGNOSES:  Severe coronary artery disease, enlarged left atrial   chamber, mild-to-moderate mitral regurgitation, depressed ejection fraction, EF   20-25%.    PROCEDURE:    1. Coronary artery bypass grafting x3 with left internal mammary artery to the   LAD, saphenous venous graft to ramus/OM 1 and saphenous venous graft to PDA.  2. Endoscopic venous harvesting left greater saphenous vein.  3. Left atrial appendage was clipped with a size 45 AtriClip.    POSTOPERATIVE DIAGNOSES:  Severe coronary artery disease, enlarged left atrial   chamber, mild-to-moderate mitral regurgitation, depressed ejection fraction.    Ejection fraction in the 40s at the end of the case.    ASSISTANT:  JONNY Elizabeth.    BLOOD LOSS:  Per perfusion.    ANESTHESIA:  General.    TECHNIQUE:  Under informed consent, the patient was taken to the OR supine   position, anesthesia was induced and therefore maintained for remainder of   procedure.  All pressure points padded equally.  Skin over chest, abdomen, legs   prepped and draped in a sterile fashion.  IV antibiotics were administered.    Anesthesia placed appropriate lines.  Endoscopic venous harvesting was performed   left lower extremity, good quality vein.  Midline incision was made followed by   taken down subcutaneous tissue and fascia, exposing the sternum that was   penetrated the midline.  The mammary was harvested pedicle.  The patient was   heparinized.  Midline retractor was placed.   Pericardium was opened.    Pericardial stay suture placed.  Ascending aortic cannulation was performed.    The mammary was cut.  The distal pedicle was ligated and clipped.  Proximal   pedicle was controlled with bulldog.  It was shaped for later anastomosis.  The   mammary had good flow in it.  Dual-stage venous cannula was placed right atrium   when the ACT was therapeutic.  The patient went on full cardiopulmonary bypass,   good emptying.  Cross-clamp was placed followed by antegrade dose of   cardioplegia with 1 L of cold blood, repeated after 20-30 minutes with another   dose with good isoelectricity throughout the whole case.  The preop MARLO revealed   evidence of enlarged left atrial appendage and left atrial chamber.  Atrial   clip #45 was used to ligate left atrial appendage.  Next, second OM vessel was   examined.  This is too small, not suitable for bypass.  The first OM/ramus was   opened.  This is 1.75 mm in diameter.  It was anastomosed to reverse segment of   saphenous vein with good flow down the graft.  The vein was cut to the   appropriate length.  Next, right coronary artery system was examined.  This was   acceptable at the level of the PDA at 1.5 mm.  This was opened and anastomosed   to reverse segment of saphenous vein, good flow down the graft.  The vein was   cut to the appropriate lengths.  A slit was made in the pericardium.  The LAD   was opened in the mid to distal epicardium.  This is a good vessel at that   level, approximately 2.25 mm in diameter.  It was anastomosed to mammary in   running Prolene fashion.  Mammary pedicle was tacked to the epicardium.  There   was brisk blood flow down the LAD when the mammary bulldog was released.  The   patient was being rewarmed.  Cross-clamp was removed.  Partial occlusion clamp   was placed. Two arteriotomies were performed.  A 4 mm punch was used.  Proximal   anastomoses were made and marked.  Partial occlusion clamp was released.  Left   chest  evacuated of fluid.  Proximal distal anastomoses were checked for   hemostasis.  When the patient was warmed, he came off pump with no problem.    Heparin was reversed with protamine.  Mediastinum and left chest were drained.    The patient was decannulated.  The sternum was wired and the wounds were closed   in layers of absorbable sutures.  Postop MARLO revealed EF in the 45%.        ______________________________  MD ANNY Ann/JOSE  DD:  06/01/2023  Time:  01:31PM  DT:  06/01/2023  Time:  05:09PM  Job #:  [id number]      OPERATIVE REPORT

## 2023-06-02 NOTE — PT/OT/SLP EVAL
Occupational Therapy  Evaluation    Name: Tom Anna  MRN: [id number]  Admitting Diagnosis: CABG  Recent Surgery: Procedure(s) (LRB):  CORONARY ARTERY BYPASS GRAFT (CABG) (N/A) 1 Day Post-Op    Recommendations:     Discharge Recommendations: rehabilitation facility  Discharge Equipment Recommendations:  to be determined by next level of care  Barriers to discharge:  None    Assessment:     Tom Anna is a 67 y.o. male with a medical diagnosis of CAD s/p CABG x 3. PMHx relevant for CVA.  He presents with the following performance deficits affecting function: weakness, impaired endurance, impaired self care skills, impaired functional mobility, impaired balance, decreased upper extremity function, decreased lower extremity function, impaired cardiopulmonary response to activity. Pt requires significantly increased assist post op as compared to his baseline level of function. He would benefit from rehab placement to maximize functional recovery when medically stable for d/c.    Rehab Prognosis: Good; patient would benefit from acute skilled OT services to address these deficits and reach maximum level of function.       Plan:     Patient to be seen 5 x/week to address the above listed problems via self-care/home management, therapeutic activities, therapeutic exercises  Plan of Care Expires: 06/16/23  Plan of Care Reviewed with: spouse, patient    Subjective     Chief Complaint: tired  Patient/Family Comments/goals: to get back to normal    Occupational Profile:  Living Environment: Pt lives with spouse in a single story home with 2 steps to enter, no rail. Tub/combo  Previous level of function: min A for ADLs, independent with mobility using no DME in the home and RW in the community  Roles and Routines: ADLs  Equipment Used at Home: walker, rolling  Assistance upon Discharge: spouse    Pain/Comfort:  Pain Rating 1: 3/10  Location 1: chest    Patients cultural, spiritual, Shinto conflicts given the  current situation: no    Objective:     Communicated with: RN prior to session.  Patient found up in chair with blood pressure cuff, pulse ox (continuous), telemetry, peripheral IV, chest tube, shaw catheter upon OT entry to room.    General Precautions: Standard, sternal  Orthopedic Precautions: N/A  Braces: N/A    Vital Signs  Blood Pressure: 109/90  HR: 85  Sp02: 99%  Supplemental 02: 3L NC    Occupational Performance:    Functional Mobility/Transfers:  Anterior/Posterior Scooting: maximal assistance   Sit<>Stand Transition: maximal assistance     Activities of Daily Living:  Upper Body Dressing: maximal assistance   Lower Body Dressing: total assistance      AMPAC 6 Click ADL Scale:  Total Score: 11    Physical Exam:  Sitting Balance: CGA    Standing Balance: Mod A with post lean    UE Function:  BUE grossly 3+/5 distally, 3-/5 at shoulders    Therapeutic Positioning  Risk for acquired pressure injuries is increased due to impaired mobility.    OT interventions performed during the course of today's session in an effort to prevent and/or reduce acquired pressure injuries:  education on PIP provided    Skin assessment:  unable to safely perform with assist x 1 person at this time, defer to nursing   Findings: incisions noted to chest, UE. Ace wrap to LLE and TEDs to RLE    OT recommendations for therapeutic positioning throughout hospitalization:  geomat UIC in addition to standard pressure injury prevention measures    Patient Education:  Patient provided with verbal education regarding OT role/goals/POC, post op precautions, fall prevention, safety awareness, and Discharge/DME recommendations.  Understanding was verbalized, however additional teaching warranted.     Patient left up in chair with all lines intact    GOALS:   Multidisciplinary Problems       Occupational Therapy Goals          Problem: Occupational Therapy    Goal Priority Disciplines Outcome Interventions   Occupational Therapy Goal     OT,  PT/OT Ongoing, Progressing    Description: LTG: Pt will perform basic ADLs and ADL transfers with min A and good compliance to sternal precautions using LRAD by discharge.    STG: to be met in 2 weeks    Pt will complete grooming standing at sink with RW with mod A.  Pt will complete UB dressing with mod A.  Pt will complete LB dressing with mod A using LRAD.  Pt will complete toileting with mod A using RW and BSC.  Pt will complete toilet transfer with mod A using RW and BSC.   Pt will independently verbalize sternal precautions with >90% accuracy.                        History:     Past Medical History:   Diagnosis Date    Allergies     CHF (congestive heart failure)     Coronary artery disease     Essential (primary) hypertension     Fatigue     GERD (gastroesophageal reflux disease)     HLD (hyperlipidemia)     Personal history of colonic polyps     SOB (shortness of breath) on exertion     Stroke     Tobacco abuse     Weakness of both legs          Past Surgical History:   Procedure Laterality Date    COLONOSCOPY W/ BIOPSIES AND POLYPECTOMY  01/20/2020    Dr. Alf Covington    CORONARY ARTERY BYPASS GRAFT (CABG) N/A 6/1/2023    Procedure: CORONARY ARTERY BYPASS GRAFT (CABG);  Surgeon: Xochitl Grossman MD;  [facility];  Service: Cardiothoracic;  Laterality: N/A;    ENDOSCOPY  01/20/2020    ESOPHAGOGASTRODUODENOSCOPY      PLANTAR'S WART EXCISION  07/17/2017       Time Tracking:     OT Date of Treatment: 06/02/23  OT Start Time: 1327  OT Stop Time: 1349  OT Total Time (min): 22 min    Billable Minutes:Evaluation high    6/2/2023

## 2023-06-03 NOTE — PT/OT/SLP PROGRESS
Physical Therapy    Missed Treatment Session    Patient Name:  Tom Anna   MRN:  [id number]      Patient not seen at this time secondary to hold per nursing. Pt with decline in respiratory status overnight now returned to ICU level care with full Bipap support. Nurse states they attempted cardiac chair position in bed this AM with poor tolerance. PT will follow up 6/4.    Will follow-up as patient is available to participate and as therapists' schedule allows.

## 2023-06-03 NOTE — PT/OT/SLP PROGRESS
SLP orders received, chart reviewed, and nursing consulted. Pt currently requiring BIPAP for respiratory support and remains unsafe for PO intake at this time. Cont rec: NPO. SLP will continue to follow and treat as appropriate.

## 2023-06-03 NOTE — PROGRESS NOTES
Pharmacokinetic Initial Assessment: IV Vancomycin    Assessment/Plan:    Initiate intravenous vancomycin with loading dose of 2250 mg once followed by a maintenance dose of vancomycin 1000mg IV every 12 hours  Desired empiric serum trough concentration is 15 to 20 mcg/mL  Draw vancomycin trough level 60 min prior to 5th dose on 06/05 at approximately 0600  Pharmacy will continue to follow and monitor vancomycin.      Please contact pharmacy at extension [phone] with any questions regarding this assessment.     Thank you for the consult,   Brandy Henley       Patient brief summary:  Tom Anna is a 67 y.o. male initiated on antimicrobial therapy with IV Vancomycin for treatment of suspected lower respiratory infection    Drug Allergies:   Review of patient's allergies indicates:   Allergen Reactions    Ace inhibitors Swelling     Facial edema       Actual Body Weight:   90.3 kg    Renal Function:   Estimated Creatinine Clearance: 78.5 mL/min (based on SCr of 1.06 mg/dL).,     Dialysis Method (if applicable):  N/A    CBC (last 72 hours):  Recent Labs   Lab Result Units 06/01/23  1013 06/01/23  1325 06/01/23  1612 06/02/23  0052 06/03/23  0123   WBC x10(3)/mcL 11.67* 9.53  --  8.68 14.78*   Hgb g/dL 8.6* 7.7* 9.8* 8.6* 9.3*   Hct % 26.7* 24.2* 30.4* 26.7* 30.4*   Platelet x10(3)/mcL 191 185  --  203 217   Mono % % 2.0 4.0  --  7.9 4.1   Eos % % 1.2 0.6  --  0.0 0.0   Basophil % % 0.5 0.3  --  0.3 0.3       Metabolic Panel (last 72 hours):  Recent Labs   Lab Result Units 06/01/23  0858 06/01/23  1013 06/01/23  1325 06/01/23  1338 06/01/23  1605 06/01/23  1612 06/02/23  0052 06/03/23  0045 06/03/23  0123   Sodium Level mmol/L  --  137 139  --   --   --  133*  --  134*   Sodium, Blood Gas mmol/L 134*  --   --  139 138  --   --  131*  --    Potassium Level mmol/L  --  4.3 3.6  --   --  5.1 4.6  --  4.5   Potassium, Blood Gas mmol/L 4.4  --   --  3.4* 4.6  --   --  4.2  --    Chloride mmol/L  --  105 110*  --   --   --   106  --  102   Carbon Dioxide mmol/L  --  21* 20*  --   --   --  21*  --  19*   Glucose Level mg/dL  --  166* 162*  --   --   --  195*  --  126*   Blood Urea Nitrogen mg/dL  --  5.0* 5.0*  --   --   --  5.6*  --  9.6   Creatinine mg/dL  --  0.78 0.73  --   --   --  0.87  --  1.06   Albumin Level g/dL  --   --  2.4*  --   --   --   --   --   --    Bilirubin Total mg/dL  --   --  1.3  --   --   --   --   --   --    Alkaline Phosphatase unit/L  --   --  49  --   --   --   --   --   --    Aspartate Aminotransferase unit/L  --   --  18  --   --   --   --   --   --    Alanine Aminotransferase unit/L  --   --  5  --   --   --   --   --   --    Magnesium Level mg/dL  --   --  2.10  --   --   --   --   --   --    Phosphorus Level mg/dL  --   --  2.9  --   --   --   --   --   --        Drug levels (last 3 results):  No results for input(s): VANCOMYCINRA, VANCORANDOM, VANCOMYCINPE, VANCOPEAK, VANCOMYCINTR, VANCOTROUGH in the last 72 hours.    Microbiologic Results:  Microbiology Results (last 7 days)       Procedure Component Value Units Date/Time    Blood Culture [[id number]] Collected: 06/03/23 0610    Order Status: Sent Specimen: Blood from Arm, Right Updated: 06/03/23 0718    Blood Culture [[id number]] Collected: 06/03/23 0610    Order Status: Sent Specimen: Blood from Arm, Left Updated: 06/03/23 0718    Respiratory Culture [[id number]]     Order Status: Sent Specimen: Sputum

## 2023-06-03 NOTE — PROGRESS NOTES
Tom Anna is a 67 y.o. male patient.   1. Coronary artery disease    2. CAD (coronary artery disease)    3. S/P CABG x 3    4. Respiratory distress      Past Medical History:   Diagnosis Date    Allergies     CHF (congestive heart failure)     Coronary artery disease     Essential (primary) hypertension     Fatigue     GERD (gastroesophageal reflux disease)     HLD (hyperlipidemia)     Personal history of colonic polyps     SOB (shortness of breath) on exertion     Stroke     Tobacco abuse     Weakness of both legs      No past surgical history pertinent negatives on file.  Scheduled Meds:   albuterol sulfate  2.5 mg Nebulization Q6H    aspirin  81 mg Oral Daily    dexAMETHasone  4 mg Intravenous Q12H    docusate sodium  100 mg Oral BID    famotidine (PF)  20 mg Intravenous Q12H    fluticasone propionate  2 spray Each Nostril Daily    folic acid  1 mg Oral Daily    metoprolol  2.5 mg Intravenous BID    metoprolol tartrate  12.5 mg Oral BID    mupirocin   Nasal BID    piperacillin-tazobactam (ZOSYN) IVPB  4.5 g Intravenous Q8H    potassium chloride in water  20 mEq Intravenous Once    sucralfate  1 g Oral QID (AC & HS)    vancomycin (VANCOCIN) IVPB  1,000 mg Intravenous Q12H     Continuous Infusions:   clevidipine Stopped (06/02/23 0130)    dexmedeTOMIDine (Precedex) infusion (titrating)      dextrose 5 % and 0.45 % NaCl 100 mL/hr at 06/01/23 1300    EPINEPHrine 0.02 mcg/kg/min (06/01/23 1300)    insulin regular 1 units/mL infusion orderable (CTS POST-OP) Stopped (06/02/23 0622)    loperamide       PRN Meds:acetaminophen, albumin human 5%, baclofen, calcium gluconate IVPB, calcium gluconate IVPB, calcium gluconate IVPB, dextrose 10%, dextrose 10%, HYDROcodone-acetaminophen, lactulose 10 gram/15 ml, loperamide, magnesium sulfate IVPB, magnesium sulfate IVPB, metoclopramide HCl, morphine, ondansetron, oxyCODONE, potassium chloride in water, potassium chloride in water, sodium phosphate IVPB, sodium phosphate  "IVPB, sodium phosphate IVPB, Pharmacy to dose Vancomycin consult **AND** vancomycin - pharmacy to dose    Review of patient's allergies indicates:   Allergen Reactions    Ace inhibitors Swelling     Facial edema     There are no hospital problems to display for this patient.    Blood pressure 117/72, pulse 94, temperature 97.9 °F (36.6 °C), temperature source Axillary, resp. rate (!) 40, height 5' 10" (1.778 m), weight 90.3 kg (199 lb), SpO2 96 %.    Subjective:    POD #2  Awake. Alert.  On BiPAP  Diuresing  Suspected aspiration      Objective:   AFVSS. 96% on BiPAP  Heart: RRR  Lungs: respirations nonlabored, clear  Incision: c/d/I  H/h: 9/30  Cr: 1.06  CT: 550cc/24hrs  Cxr: stable     Assesment/Plan:    S/p CAB  - continue Cts  - mobilize  - BiPAP per JONNY Schilling  6/3/2023    "

## 2023-06-03 NOTE — PROGRESS NOTES
[facility] - [facility]  Pulmonary Critical Care Note    Patient Name: Tom Anna  MRN: [id number]  Admission Date: 6/1/2023  Hospital Length of Stay: 2 days  Code Status: Full Code  Attending Provider: REILLY Saunders MD  Primary Care Provider: Primary Doctor No     Subjective:     HPI:   67-year-old with a history of severe coronary artery disease mitral regurgitation, and ejection fraction 20-25% underwent CABG yesterday by Dr. Grossman.  Per Dr. Felix's notes CABG x3 with a LIMA to the LAD saphenous vein graft to OM1 and PDA.  Left atrial appendage was clipped with size 45 atrial clip.      24 Hour Interval History:  Patient was downgraded yesterday however during the day became more and more hypoxic with increased O2 requirements.  Ultimately required BiPAP last evening.  He is breathing easily with the BiPAP this morning on 50% FiO2.  Still with weak cough effort.    Past Medical History:   Diagnosis Date    Allergies     CHF (congestive heart failure)     Coronary artery disease     Essential (primary) hypertension     Fatigue     GERD (gastroesophageal reflux disease)     HLD (hyperlipidemia)     Personal history of colonic polyps     SOB (shortness of breath) on exertion     Stroke     Tobacco abuse     Weakness of both legs        Past Surgical History:   Procedure Laterality Date    COLONOSCOPY W/ BIOPSIES AND POLYPECTOMY  01/20/2020    Dr. Alf Covington    CORONARY ARTERY BYPASS GRAFT (CABG) N/A 6/1/2023    Procedure: CORONARY ARTERY BYPASS GRAFT (CABG);  Surgeon: Xochitl Grossman MD;  Location: [facility];  Service: Cardiothoracic;  Laterality: N/A;    ENDOSCOPY  01/20/2020    ESOPHAGOGASTRODUODENOSCOPY      PLANTAR'S WART EXCISION  07/17/2017       Social History     Socioeconomic History    Marital status:     Number of children: 4   Occupational History    Occupation: Disabled   Tobacco Use    Smoking status: Every Day     Packs/day: 1.00     Types: Cigarettes    Smokeless  tobacco: Never   Substance and Sexual Activity    Alcohol use: Yes     Comment: Beer 6pk daily    Drug use: Never     Social Determinants of Health     Food Insecurity: Unknown    Worried About Running Out of Food in the Last Year: Never true   Transportation Needs: Unknown    Lack of Transportation (Medical): No   Housing Stability: Unknown    Unable to Pay for Housing in the Last Year: No           Current Outpatient Medications   Medication Instructions    albuterol (VENTOLIN HFA) 90 mcg/actuation inhaler 1 puff, Inhalation, Every 12 hours PRN, Rescue    amLODIPine (NORVASC) 10 mg, Oral, Daily    aspirin (ECOTRIN) 81 mg, Oral, Daily    atorvastatin (LIPITOR) 80 mg, Oral, Nightly    baclofen (LIORESAL) 10 mg, Oral, 3 times daily PRN    cetirizine 10 mg, Oral, Daily    clopidogreL (PLAVIX) 75 mg, Oral    ENTRESTO 49-51 mg per tablet 1 tablet, Oral, 2 times daily    fluticasone propionate (FLONASE) 50 mcg, Each Nostril, Daily    furosemide (LASIX) 20 mg, Oral, Daily    ketoconazole (NIZORAL) 2 % cream Topical (Top), 2 times daily    losartan (COZAAR) 50 mg, Oral, Daily    lovastatin (MEVACOR) 10 mg, Oral, Nightly    metoprolol succinate (TOPROL-XL) 100 mg, Oral, Daily    multivitamin capsule 1 capsule, Oral, Daily    omeprazole (PRILOSEC) 20 mg, Oral, Daily    sildenafiL (VIAGRA) 100 mg, Oral, As needed (PRN)    spironolactone (ALDACTONE) 25 mg, Oral, Daily       Current Inpatient Medications   albuterol sulfate  2.5 mg Nebulization Q6H    aspirin  81 mg Oral Daily    docusate sodium  100 mg Oral BID    famotidine (PF)  20 mg Intravenous Q12H    fluticasone propionate  2 spray Each Nostril Daily    folic acid  1 mg Oral Daily    metoprolol tartrate  12.5 mg Oral BID    mupirocin   Nasal BID    piperacillin-tazobactam (ZOSYN) IVPB  4.5 g Intravenous Q8H    potassium chloride in water  20 mEq Intravenous Once    sucralfate  1 g Oral QID (AC & HS)    vancomycin (VANCOCIN) IVPB  2,250 mg Intravenous Once       Current  Intravenous Infusions   clevidipine Stopped (06/02/23 0130)    dexmedeTOMIDine (Precedex) infusion (titrating)      dextrose 5 % and 0.45 % NaCl 100 mL/hr at 06/01/23 1300    EPINEPHrine 0.02 mcg/kg/min (06/01/23 1300)    insulin regular 1 units/mL infusion orderable (CTS POST-OP) Stopped (06/02/23 0622)    loperamide           Review of Systems   All other systems reviewed and are negative.       Objective:       Intake/Output Summary (Last 24 hours) at 6/3/2023 0635  Last data filed at 6/3/2023 0558  Gross per 24 hour   Intake 1800 ml   Output 1010 ml   Net 790 ml         Vital Signs (Most Recent):  Temp: 97.9 °F (36.6 °C) (06/03/23 0030)  Pulse: 100 (06/03/23 0600)  Resp: (!) 32 (06/03/23 0600)  BP: 117/72 (06/03/23 0600)  SpO2: 100 % (06/03/23 0600)  Body mass index is 28.55 kg/m².  Weight: 90.3 kg (199 lb) Vital Signs (24h Range):  Temp:  [97.9 °F (36.6 °C)-100.1 °F (37.8 °C)] 97.9 °F (36.6 °C)  Pulse:  [] 100  Resp:  [20-46] 32  SpO2:  [73 %-100 %] 100 %  BP: (109-150)/(55-96) 117/72     Physical Exam  GENERAL: NAD, on mechanical ventilation, sedated BiPAP in place  CARDIAC: RRR, NO m/g/r  PULM: CTA BL, crackles heard bilaterally anterior chest  ABD: NT/ND/S. Bowel sounds heard  EXT: no cyanosis, clubbing, or edema, peripheral pulses intact    Lines/Drains/Airways       Drain  Duration                  Chest Tube 06/01/23 1203 Tube - 1 Mediastinal 28 Fr. 1 day         Chest Tube 06/01/23 1300 Tube - 2 Left Mediastinal 1 day    Male External Urinary Catheter 06/03/23 0000 <1 day              Line  Duration                  Pacer Wires 06/01/23 1300 1 day              Peripheral Intravenous Line  Duration                  Peripheral IV - Single Lumen 06/03/23 0420 18 G Anterior;Left Forearm <1 day                    Significant Labs:    Lab Results   Component Value Date    WBC 14.78 (H) 06/03/2023    HGB 9.3 (L) 06/03/2023    HCT 30.4 (L) 06/03/2023    MCV 62.8 (L) 06/03/2023     06/03/2023          BMP  Lab Results   Component Value Date     (L) 06/03/2023    K 4.5 06/03/2023    CHLORIDE 102 06/03/2023    CO2 19 (L) 06/03/2023    BUN 9.6 06/03/2023    CREATININE 1.06 06/03/2023    CALCIUM 7.8 (L) 06/03/2023    AGAP 13.0 06/03/2023    EGFRNONAA 86 02/09/2022       ABG  Recent Labs   Lab 06/01/23  1113   PH 7.425   PO2 404*   PCO2 43.4   HCO3 28.5*   BE 4             Significant Imaging:  I have reviewed the pertinent imaging within the past 24 hours.  I reviewed the CT and diffuse alveolar filling right greater than left.      Assessment/Plan:     Assessment  Status post CABG x3 on 06/01/2023.  LIMA to the LAD, saphenous vein graft to OM1 and PDA.  Heart failure with reduced ejection fraction-EF 20-25%  Suspected aspiration event with associated hypoxemic respiratory failure now requiring BiPAP.      Plan  Agree with antibiotic therapy.  Will wean BiPAP off as tolerated.  Again encouraged incentive spirometry and will attempt to mobilize to the chair today.  Chest tubes remain in place.  He has been Delicia upgraded to ICU due to respiratory failure.       34 minutes of critical care was time spent personally by me on the following activities: development of treatment plan with patient or surrogate and bedside caregivers, discussions with consultants, evaluation of patient's response to treatment, examination of patient, ordering and performing treatments and interventions, ordering and review of laboratory studies, ordering and review of radiographic studies, pulse oximetry, re-evaluation of patient's condition.  This critical care time did not overlap with that of any other provider or involve time for any procedures.     I have personally seen and examined this patient on 6/3/2023.     SUDHIR Saunders MD  Pulmonary Critical Care Medicine  [facility] - [facility]

## 2023-06-04 NOTE — PROGRESS NOTES
Tom Anna is a 67 y.o. male patient.   1. Coronary artery disease    2. CAD (coronary artery disease)    3. S/P CABG x 3    4. Respiratory distress      Past Medical History:   Diagnosis Date    Allergies     CHF (congestive heart failure)     Coronary artery disease     Essential (primary) hypertension     Fatigue     GERD (gastroesophageal reflux disease)     HLD (hyperlipidemia)     Personal history of colonic polyps     SOB (shortness of breath) on exertion     Stroke     Tobacco abuse     Weakness of both legs      No past surgical history pertinent negatives on file.  Scheduled Meds:   albuterol sulfate  2.5 mg Nebulization Q6H    aspirin  81 mg Oral Daily    dexAMETHasone  4 mg Intravenous Q12H    docusate sodium  100 mg Oral BID    famotidine (PF)  20 mg Intravenous Q12H    fluticasone propionate  2 spray Each Nostril Daily    folic acid  1 mg Oral Daily    metoprolol  2.5 mg Intravenous BID    metoprolol tartrate  12.5 mg Oral BID    mupirocin   Nasal BID    piperacillin-tazobactam (ZOSYN) IVPB  4.5 g Intravenous Q8H    potassium chloride in water  20 mEq Intravenous Once    sucralfate  1 g Oral QID (AC & HS)    vancomycin (VANCOCIN) IVPB  1,000 mg Intravenous Q12H     Continuous Infusions:   clevidipine Stopped (06/02/23 0130)    dexmedeTOMIDine (Precedex) infusion (titrating)      dextrose 5 % and 0.45 % NaCl 100 mL/hr at 06/01/23 1300    EPINEPHrine 0.02 mcg/kg/min (06/01/23 1300)    insulin regular 1 units/mL infusion orderable (CTS POST-OP) Stopped (06/02/23 0622)    loperamide       PRN Meds:acetaminophen, albumin human 5%, baclofen, calcium gluconate IVPB, calcium gluconate IVPB, calcium gluconate IVPB, dextrose 10%, dextrose 10%, HYDROcodone-acetaminophen, ketorolac, lactulose 10 gram/15 ml, loperamide, magnesium sulfate IVPB, magnesium sulfate IVPB, metoclopramide HCl, morphine, ondansetron, oxyCODONE, potassium chloride in water, potassium chloride in water, sodium phosphate IVPB, sodium  "phosphate IVPB, sodium phosphate IVPB, Pharmacy to dose Vancomycin consult **AND** vancomycin - pharmacy to dose    Review of patient's allergies indicates:   Allergen Reactions    Ace inhibitors Swelling     Facial edema     There are no hospital problems to display for this patient.    Blood pressure 100/86, pulse 74, temperature 98.1 °F (36.7 °C), temperature source Oral, resp. rate (!) 26, height 5' 10" (1.778 m), weight 88.5 kg (195 lb 1.7 oz), SpO2 98 %.    Subjective:    POD #3  Awake. Alert.  Sitting up in chair on oxymizer  On antibiotics/nebs/decadron     Objective:   AFVSS. 99% on 6L oxymizer  Heart: RRR  Lungs: respirations nonlabored, coarse  Incision: c/d/I  H/h: 8/26  Cr: 0.86  CT: 370cc/24hrs  Cxr: Bilateral dense airspace opacities appears similar to prior.  Left-sided thoracostomy tube remains in place     Assesment/Plan:    S/p CAB  - continue Cts  - mobilize  - diurese  - watch h/h  - oxygen per JONNY Schilling  6/4/2023    "

## 2023-06-04 NOTE — PLAN OF CARE
Problem: Physical Therapy  Goal: Physical Therapy Goal  Description: Goals to be met by: 2023     Patient will increase functional independence with mobility by performin. Supine to sit with MInimal Assistance  2. Sit to stand transfer with Minimal Assistance  3. Bed to chair transfer with Minimal Assistance using Rolling Walker  4. Gait  x 100 feet with Minimal Assistance using Rolling Walker.   5. Ascend/descend 2 stair with bilateral Handrails Moderate Assistance using No Assistive Device.     Outcome: Ongoing, Progressing

## 2023-06-04 NOTE — PROGRESS NOTES
[facility] - [facility]  Pulmonary Critical Care Note    Patient Name: Tmo Anna  MRN: [id number]  Admission Date: 6/1/2023  Hospital Length of Stay: 3 days  Code Status: Full Code  Attending Provider: REILLY Saunders MD  Primary Care Provider: Primary Doctor No     Subjective:     HPI:   67-year-old with a history of severe coronary artery disease mitral regurgitation, and ejection fraction 20-25% underwent CABG yesterday by Dr. Grossman.  Per Dr. Felix's notes CABG x3 with a LIMA to the LAD saphenous vein graft to OM1 and PDA.  Left atrial appendage was clipped with size 45 atrial clip.    Hospital Course/Significant events:  6/2 increased O2 requirements suspected aspiration placed on BiPAP and reupgraded to ICU.    24 Hour Interval History:  Now Vapotherm 60%.  Sitting in a chair at the bedside awake and alert.  Improved cough effort with mucopurulent sputum production.    Past Medical History:   Diagnosis Date    Allergies     CHF (congestive heart failure)     Coronary artery disease     Essential (primary) hypertension     Fatigue     GERD (gastroesophageal reflux disease)     HLD (hyperlipidemia)     Personal history of colonic polyps     SOB (shortness of breath) on exertion     Stroke     Tobacco abuse     Weakness of both legs        Past Surgical History:   Procedure Laterality Date    COLONOSCOPY W/ BIOPSIES AND POLYPECTOMY  01/20/2020    Dr. Alf Covington    CORONARY ARTERY BYPASS GRAFT (CABG) N/A 6/1/2023    Procedure: CORONARY ARTERY BYPASS GRAFT (CABG);  Surgeon: Xochitl Grossman MD;  Location: [facility];  Service: Cardiothoracic;  Laterality: N/A;    ENDOSCOPY  01/20/2020    ESOPHAGOGASTRODUODENOSCOPY      PLANTAR'S WART EXCISION  07/17/2017       Social History     Socioeconomic History    Marital status:     Number of children: 4   Occupational History    Occupation: Disabled   Tobacco Use    Smoking status: Every Day     Packs/day: 1.00     Types: Cigarettes    Smokeless  tobacco: Never   Substance and Sexual Activity    Alcohol use: Yes     Comment: Beer 6pk daily    Drug use: Never     Social Determinants of Health     Food Insecurity: Unknown    Worried About Running Out of Food in the Last Year: Never true   Transportation Needs: Unknown    Lack of Transportation (Medical): No   Housing Stability: Unknown    Unable to Pay for Housing in the Last Year: No           Current Outpatient Medications   Medication Instructions    albuterol (VENTOLIN HFA) 90 mcg/actuation inhaler 1 puff, Inhalation, Every 12 hours PRN, Rescue    amLODIPine (NORVASC) 10 mg, Oral, Daily    aspirin (ECOTRIN) 81 mg, Oral, Daily    atorvastatin (LIPITOR) 80 mg, Oral, Nightly    baclofen (LIORESAL) 10 mg, Oral, 3 times daily PRN    cetirizine 10 mg, Oral, Daily    clopidogreL (PLAVIX) 75 mg, Oral    ENTRESTO 49-51 mg per tablet 1 tablet, Oral, 2 times daily    fluticasone propionate (FLONASE) 50 mcg, Each Nostril, Daily    furosemide (LASIX) 20 mg, Oral, Daily    ketoconazole (NIZORAL) 2 % cream Topical (Top), 2 times daily    losartan (COZAAR) 50 mg, Oral, Daily    lovastatin (MEVACOR) 10 mg, Oral, Nightly    metoprolol succinate (TOPROL-XL) 100 mg, Oral, Daily    multivitamin capsule 1 capsule, Oral, Daily    omeprazole (PRILOSEC) 20 mg, Oral, Daily    sildenafiL (VIAGRA) 100 mg, Oral, As needed (PRN)    spironolactone (ALDACTONE) 25 mg, Oral, Daily       Current Inpatient Medications   albuterol sulfate  2.5 mg Nebulization Q6H    aspirin  81 mg Oral Daily    dexAMETHasone  4 mg Intravenous Q12H    docusate sodium  100 mg Oral BID    famotidine (PF)  20 mg Intravenous Q12H    fluticasone propionate  2 spray Each Nostril Daily    folic acid  1 mg Oral Daily    metoprolol  2.5 mg Intravenous BID    metoprolol tartrate  12.5 mg Oral BID    mupirocin   Nasal BID    piperacillin-tazobactam (ZOSYN) IVPB  4.5 g Intravenous Q8H    potassium chloride in water  20 mEq Intravenous Once    sucralfate  1 g Oral QID (AC  & HS)    vancomycin (VANCOCIN) IVPB  1,000 mg Intravenous Q12H       Current Intravenous Infusions   clevidipine Stopped (06/02/23 0130)    dexmedeTOMIDine (Precedex) infusion (titrating)      dextrose 5 % and 0.45 % NaCl 100 mL/hr at 06/01/23 1300    EPINEPHrine 0.02 mcg/kg/min (06/01/23 1300)    insulin regular 1 units/mL infusion orderable (CTS POST-OP) Stopped (06/02/23 0622)    loperamide           Review of Systems   All other systems reviewed and are negative.       Objective:       Intake/Output Summary (Last 24 hours) at 6/4/2023 0700  Last data filed at 6/4/2023 0527  Gross per 24 hour   Intake 550.05 ml   Output 1870 ml   Net -1319.95 ml         Vital Signs (Most Recent):  Temp: 98.1 °F (36.7 °C) (06/04/23 0400)  Pulse: 93 (06/04/23 0600)  Resp: (!) 31 (06/04/23 0600)  BP: (!) 122/54 (06/04/23 0600)  SpO2: 99 % (06/04/23 0600)  Body mass index is 27.99 kg/m².  Weight: 88.5 kg (195 lb 1.7 oz) Vital Signs (24h Range):  Temp:  [98.1 °F (36.7 °C)-98.7 °F (37.1 °C)] 98.1 °F (36.7 °C)  Pulse:  [] 93  Resp:  [27-42] 31  SpO2:  [77 %-100 %] 99 %  BP: ()/(44-88) 122/54     Physical Exam  GENERAL: NAD, on mechanical ventilation, sedated BiPAP in place  CARDIAC: RRR, NO m/g/r  PULM: CTA BL, crackles heard bilaterally anterior chest  ABD: NT/ND/S. Bowel sounds heard  EXT: no cyanosis, clubbing, or edema, peripheral pulses intact    Lines/Drains/Airways       Drain  Duration                  Chest Tube 06/01/23 1203 Tube - 1 Mediastinal 28 Fr. 2 days         Chest Tube 06/01/23 1300 Tube - 2 Left Mediastinal 2 days    Male External Urinary Catheter 06/03/23 0000 1 day              Line  Duration                  Pacer Wires 06/01/23 1300 2 days              Peripheral Intravenous Line  Duration                  Peripheral IV - Single Lumen 06/03/23 0420 18 G Anterior;Left Forearm 1 day                    Significant Labs:    Lab Results   Component Value Date    WBC 11.12 06/04/2023    HGB 8.2 (L)  06/04/2023    HCT 26.8 (L) 06/04/2023    MCV 62.8 (L) 06/04/2023     06/04/2023         BMP  Lab Results   Component Value Date     (L) 06/04/2023    K 4.1 06/04/2023    CHLORIDE 102 06/04/2023    CO2 23 06/04/2023    BUN 11.2 06/04/2023    CREATININE 0.86 06/04/2023    CALCIUM 7.6 (L) 06/04/2023    AGAP 8.0 06/04/2023    EGFRNONAA 86 02/09/2022       ABG  Recent Labs   Lab 06/01/23  1113   PH 7.425   PO2 404*   PCO2 43.4   HCO3 28.5*   BE 4       Significant Imaging:  I have reviewed the pertinent imaging within the past 24 hours.  Chest x-ray by my review has bilateral infiltrates right greater than left chest tubes remain in place on the left.      Assessment/Plan:     Assessment  Status post CABG x3 on 06/01/2023.  LIMA to the LAD, saphenous vein graft to OM1 and PDA.  Heart failure with reduced ejection fraction-EF 20-25%  Suspected aspiration event with associated hypoxemic respiratory failure now requiring high-flow O2.      Plan  1. Continue antibiotics for now.  Mobilize and encourage incentive spirometry.  Wean FiO2 as tolerated.  Chest tubes per CV surgery.       32 minutes of critical care was time spent personally by me on the following activities: development of treatment plan with patient or surrogate and bedside caregivers, discussions with consultants, evaluation of patient's response to treatment, examination of patient, ordering and performing treatments and interventions, ordering and review of laboratory studies, ordering and review of radiographic studies, pulse oximetry, re-evaluation of patient's condition.  This critical care time did not overlap with that of any other provider or involve time for any procedures.    I have personally seen and examined this patient on 6/4/2023.      SUDHIR Saunders MD  Pulmonary Critical Care Medicine  [facility]

## 2023-06-04 NOTE — PT/OT/SLP EVAL
Speech Language Pathology Department  Clinical Swallow Evaluation    Patient Name:  Tom Anna   MRN:  [id number]    Recommendations:     General recommendations:  Modified Barium Swallow Study  Diet recommendations:  NPO  Precautions: Standard,      History:     Tom Anna is a/n 67 y.o. male admitted s/p CABG x3. Post-operatively patient required BIPAP and was able to transition to vapotherm, 45% as of SLP arrival. Nursing able to transition patient to an oxymizer without respiratory decline.     Past Medical History:   Diagnosis Date    Allergies     CHF (congestive heart failure)     Coronary artery disease     Essential (primary) hypertension     Fatigue     GERD (gastroesophageal reflux disease)     HLD (hyperlipidemia)     Personal history of colonic polyps     SOB (shortness of breath) on exertion     Stroke     Tobacco abuse     Weakness of both legs      Past Surgical History:   Procedure Laterality Date    COLONOSCOPY W/ BIOPSIES AND POLYPECTOMY  01/20/2020    Dr. Alf Covington    CORONARY ARTERY BYPASS GRAFT (CABG) N/A 6/1/2023    Procedure: CORONARY ARTERY BYPASS GRAFT (CABG);  Surgeon: Xochitl Grossman MD;  Location: [facility];  Service: Cardiothoracic;  Laterality: N/A;    ENDOSCOPY  01/20/2020    ESOPHAGOGASTRODUODENOSCOPY      PLANTAR'S WART EXCISION  07/17/2017     Prior Intubation HX:  intubated post-operatively, weaned to bipap>vapotherm>oxymizer    Chest X-Rays: 6/2/23: Right IJ Cordis is unchanged in position as is the left-sided chest tube.  Cardiomediastinal silhouette and pulmonary vasculature are unchanged.  Bibasilar atelectatic changes with small effusions.  Overall decreased aeration with increasing right-sided effusion.    Home Diet: Regular  Current Method of Nutrition: Tube feeding      Patient complaint: none    Subjective     Patient awake, alert, and oriented x4 .    Patient goals: to eat     Spiritual/Cultural/Yazidi Beliefs/Practices that affect care:  no  Pain/Comfort: Pain Rating 1: 0/10    Respiratory status: mild increased WOB on oxymizer  Restraints/positioning devices: none    Objective:     ORAL MUSCULATURE  Dentition: edentulous  Secretion Management: adequate  Mucosal Quality: good  Facial Movement: general weakness  Buccal Strength & Mobility: WFL  Mandibular Strength & Mobility: WFL  Oral Labial Strength & Mobility: impaired seal  Lingual Strength & Mobility: WFL  Velar Elevation: WFL  Vocal Quality: hoarse  Volitional Cough: Weak  Volitional Swallow: WNL    Consistency Fed By Oral Symptoms Pharyngeal Symptoms   Ice chips SLP Bolus holding  Slowed oral transit time None   Puree SLP None None     Assessment:     Mr. Anna presents with improved respiratory status and tolerance of PO intake. Generalized facial weakness and hoarse vocal quality limit ability to r/o dysphagia clinically. MBS recommended to further evaluate swallow function.     Time Tracking:     SLP Treatment Date:   06/04/23  Speech Start Time:  0845  Speech Stop Time:  0900     Speech Total Time (min):  15 min    Billable minutes:  Swallow and Oral Function Evaluation, 15 minutes     06/04/2023

## 2023-06-04 NOTE — NURSING
Nurses Note -- 4 Eyes      6/4/2023   5:30 AM      Skin assessed during: Daily Assessment      [x] No Altered Skin Integrity Present    []Prevention Measures Documented      [] Yes- Altered Skin Integrity Present or Discovered   [] LDA Added if Not in Epic (Describe Wound)   [] New Altered Skin Integrity was Present on Admit and Documented in LDA   [] Wound Image Taken    Wound Care Consulted? No    Attending Nurse:  Ileana Salazar RN     Second RN/Staff Member:  Howie Gonzalez RN

## 2023-06-04 NOTE — PT/OT/SLP EVAL
Physical Therapy Evaluation    Patient Name:  Tom Anna   MRN:  [id number]    Recommendations:     Discharge Recommendations: rehabilitation facility   Discharge Equipment Recommendations: to be determined by next level of care   Barriers to discharge: Decreased caregiver support, Impaired mobility, and Ongoing medical needs    Assessment:     Tom Anna is a 67 y.o. male admitted with a medical diagnosis of s/p CABG x 3 on 6/1/23. Pt initially doing well post-operatively but experienced respiratory decline requiring BiPap for pulmonary support. Pt's PMH also significant for CVA. He presents with the following impairments/functional limitations: weakness, impaired endurance, impaired self care skills, impaired functional mobility, gait instability, impaired balance, impaired cardiopulmonary response to activity. Pt requires max A for mobility and is easily fatigued with min exertion. Pt requires ongoing skilled services to improve level of function and wound benefit from placement upon discharge in order to return to prior level of function.    Rehab Prognosis: Good; patient would benefit from acute skilled PT services to address these deficits and reach maximum level of function.    Recent Surgery: Procedure(s) (LRB):  CORONARY ARTERY BYPASS GRAFT (CABG) (N/A) 3 Days Post-Op    Plan:     During this hospitalization, patient to be seen 6 x/week to address the identified rehab impairments via gait training, therapeutic activities, therapeutic exercises, neuromuscular re-education and progress toward the following goals:    Plan of Care Expires:  07/04/23    Subjective     Chief Complaint: fatigue  Patient/Family Comments/goals: wife at bedside requesting placement to improve level of mobility prior to discharging home  Pain/Comfort:  Pain Rating 1: 0/10  Pain Rating Post-Intervention 1: 0/10    Patients cultural, spiritual, Scientologist conflicts given the current situation: no    Living Environment:  Pt  lived with wife in single level home with 2 steps to enter, no railings available. Wife at bedside assisted with providing history as pt fatigued with increased WOB during conversation. Pt required assist for ADLs but was able to mobilize within the home Ind/mod Ind. She reports limited mobility outside of the home but did use RW if needed.  Equipment used at home: walker, rolling.  DME owned (not currently used): none.  Upon discharge, patient will have assistance from wife but requesting placement in order to improve functional independence before returning home.    Objective:     Communicated with nurse prior to session.  Patient found up in chair with blood pressure cuff, chest tube x 2, oxygen, PureWick, peripheral IV, pulse ox (continuous), telemetry  upon PT entry to room.    General Precautions: Standard, sternal  Orthopedic Precautions:N/A   Braces: N/A  Respiratory Status:  oxymizer @ 4L, resting 97% with exertion 91%  Blood Pressure: 110/62      Exams:  Cognitive Exam:  Patient is oriented to Person, Place, Time, and Situation  Gross Motor Coordination:  WFL  RLE ROM: WFL  RLE Strength: grossly3+/5  LLE ROM: WFL  LLE Strength: grossly 3+/5  Skin integrity:  sternal dressing C/D/I      Functional Mobility:  Bed Mobility:     Rolling Left:  maximal assistance and of 2 persons  Rolling Right: maximal assistance  Sit to Supine: maximal assistance  Transfers:     Sit to Stand:  maximal assistance and verbal cues for sternal precautions and increasing anterior weight shift for initiation using rolling walker  Gait: forward 5 steps backward 10 steps and laterally 3 steps with RW and mod A. Verbal cues for sequencing required  Balance: Static supported stand mod A with wide SERA. Mild posterior lean with verbal cues to improve weight distribution within SERA.      AM-PAC 6 CLICK MOBILITY  Total Score:11       Treatment & Education:  Pt returned to bed at end of session per nurse request as pt pending transport for  MBS.    Patient and spouse provided with verbal education regarding PT POC.  Understanding was verbalized.     Patient left HOB elevated with all lines intact, call button in reach, and nurse notified.    GOALS:   Multidisciplinary Problems       Physical Therapy Goals          Problem: Physical Therapy    Goal Priority Disciplines Outcome Goal Variances Interventions   Physical Therapy Goal     PT, PT/OT Ongoing, Progressing     Description: Goals to be met by: 2023     Patient will increase functional independence with mobility by performin. Supine to sit with MInimal Assistance  2. Sit to stand transfer with Minimal Assistance  3. Bed to chair transfer with Minimal Assistance using Rolling Walker  4. Gait  x 100 feet with Minimal Assistance using Rolling Walker.   5. Ascend/descend 2 stair with bilateral Handrails Moderate Assistance using No Assistive Device.                          History:     Past Medical History:   Diagnosis Date    Allergies     CHF (congestive heart failure)     Coronary artery disease     Essential (primary) hypertension     Fatigue     GERD (gastroesophageal reflux disease)     HLD (hyperlipidemia)     Personal history of colonic polyps     SOB (shortness of breath) on exertion     Stroke     Tobacco abuse     Weakness of both legs        Past Surgical History:   Procedure Laterality Date    COLONOSCOPY W/ BIOPSIES AND POLYPECTOMY  2020    Dr. Alf Covington    CORONARY ARTERY BYPASS GRAFT (CABG) N/A 2023    Procedure: CORONARY ARTERY BYPASS GRAFT (CABG);  Surgeon: Xochitl Grossman MD;  Location: [facility];  Service: Cardiothoracic;  Laterality: N/A;    ENDOSCOPY  2020    ESOPHAGOGASTRODUODENOSCOPY      PLANTAR'S WART EXCISION  2017       Time Tracking:     PT Received On: 23  PT Start Time: 1055     PT Stop Time: 1111  PT Total Time (min): 16 min     Billable Minutes: Evaluation high      2023

## 2023-06-04 NOTE — PT/OT/SLP EVAL
Speech Language Pathology Department  Modified Barium Swallow (MBS) Study    Patient Name:  Tom Anna   MRN:  [id number]    Recommendations:     General recommendations:  dysphagia therapy and independent exercise program  Repeat MBS study: 1-2 weeks   Diet recommendations:  Minced & Moist Diet - IDDSI Level 5, Liquid Diet Level: Mildly thick liquids - IDDSI Level 2   Swallow strategies/precautions: small bites/sips, slow rate, additional swallow per bite/sip, and alternate solids/liquids  General precautions: Standard,      History/Reason for Referral:     Tom Anna is a/n 67 y.o. male s/p CABG with subsequent respiratory decline requiring BIPAP. Pt's respiratory status improved and he was able to be weaned to an oxymizer. Pt with hoarse vocal quality and mild dysarthria which is suspected to be baseline. Clinical swallowing evaluation revealed signs of aspiration warranting comprehensive evaluation of swallow function.   Past Medical History:   Diagnosis Date    Allergies     CHF (congestive heart failure)     Coronary artery disease     Essential (primary) hypertension     Fatigue     GERD (gastroesophageal reflux disease)     HLD (hyperlipidemia)     Personal history of colonic polyps     SOB (shortness of breath) on exertion     Stroke     Tobacco abuse     Weakness of both legs      Past Surgical History:   Procedure Laterality Date    COLONOSCOPY W/ BIOPSIES AND POLYPECTOMY  01/20/2020    Dr. Alf Covington    CORONARY ARTERY BYPASS GRAFT (CABG) N/A 6/1/2023    Procedure: CORONARY ARTERY BYPASS GRAFT (CABG);  Surgeon: Xochitl Grossman MD;  Location: [facility];  Service: Cardiothoracic;  Laterality: N/A;    ENDOSCOPY  01/20/2020    ESOPHAGOGASTRODUODENOSCOPY      PLANTAR'S WART EXCISION  07/17/2017     A MBS Study was completed to assess the efficiency of his swallow function, rule out aspiration and make recommendations regarding safe dietary consistencies, effective compensatory strategies, and  safe eating environment.     Home Diet: Regular and thin liquids  Current Method of Nutrition: NPO    Patient complaint: none    Subjective:     Patient awake, alert, calm, and cooperative.    Spiritual/Cultural/Restorationist Beliefs/Practices that affect care: no  Pain/Comfort: Pain Rating 1: 0/10    Respiratory Status: mildly increased work of breathing; pt with O2 sats >95% on oxymizer  Restraints/positioning devices: none    Fluoroscopic Results:     Oral Musculature  Dentition: edentulous  Secretion Management: adequate  Mucosal Quality: good  Facial Movement: general weakness  Buccal Strength & Mobility: WFL  Mandibular Strength & Mobility: WFL  Oral Labial Strength & Mobility: impaired seal  Lingual Strength & Mobility: WFL  Velar Elevation: WFL  Vocal Quality: hoarse  Volitional Cough: Weak  Volitional Swallow: WNL    Positioning: upright in bed  Visualization  Patient was seen in the lateral view  Supplement O2 in place via oxymizer    Oral Phase:   Reduced lip closure  Prolonged/disorganized bolus formation  Poor anterior-posterior transport  Inadequate mastication  Prolonged mastication    Pharyngeal Phase:   Swallow delay with spill to the pyriform sinuses  Reduced base of tongue retraction  Reduced airway protection  Laryngeal penetration inconsistently clears  Consistency Laryngeal Penetration Aspiration Residue   Thin liquid by spoon None None None   Thin liquid by cup During the swallow  To the vocal folds  Did NOT clear  Cued cough ineffective to clear material from airway None Mild  Base of tongue, Valleculae, and Pyriform sinus  Cleared with additional swallow   Thin liquid by straw During the swallow  Did NOT clear None Mild  Base of tongue, Valleculae, and Pyriform sinus  Cleared with additional swallow   Mildly thick liquid by cup During the swallow  Cleared consistently None None   Mildly thick liquid by straw During the swallow  Cleared consistently None None   Puree None None None   Chewable  solid None None None       Cervical Esophageal Phase:   UES appeared to accommodate all bolus types without stasis or retrograde movement visualized    Compensatory Strategies:  Additional swallow to clear residue--effective        Assessment:     Pt exhibited mild-moderate oropharyngeal dysphagia characterized by the findings noted above.  Laryngeal penetration of thin liquids did NOT clear.   Both swallow safety and efficiency are impaired.  Patient's limiting factor for overall swallow safety appears to be poor respiratory coordination due to reduced respiratory reserve.     Patient appears to be at moderate risk for aspiration related pneumonia when considering fair oral health status, overall health/immune status, reduced laryngeal vestibule closure, and severity of dysphagia.  Prognosis for behavioral swallow rehabilitation is good.    Goals:     Multidisciplinary Problems       SLP Goals          Problem: SLP    Goal Priority Disciplines Outcome   SLP Goal     SLP Ongoing, Progressing   Description: LT. Pt will tolerate the least restrictive PO diet without further respiratory compromise.    ST. Pt will tolerate thermal stimulation and use effortful swallowing to improve timeliness of swallow initiation.   2. Pt will complete laryngeal elevation and tongue base retraction exercises with minimal prompting.   3. Pt will tolerate meal of soft and bite-sized solids with improved bolus control/manipulation.                        Patient Education:     Patient provided with verbal education regarding dysphagia and SLP treatment plan.  Understanding was verbalized.    Plan:     SLP Follow-Up:  Yes    Patient to be seen:  5 x/week   Plan of Care expires:  23  Plan of Care reviewed with:  spouse     Time Tracking:     SLP Treatment Date:   23  Speech Start Time:  1130  Speech Stop Time:  1200     Speech Total Time (min):  30 min    Billable minutes:   Motion Fluoroscopic Evaluation, Video  Recording, 20 minutes     06/04/2023

## 2023-06-05 NOTE — NURSING
Patient downgraded from ICU to 6C. After assessing the patient, CV surgery (Dr. Grossman) was paged for an order for lasix due to patient having wet lung sounds and difficulty breathing. When speaking with the physician on call for Dr. Grossman, Meghann FULLER, they were unaware that the patient had been transferred. The patient was then transferred back to 7S ICU.

## 2023-06-05 NOTE — NURSING
Nurses Note -- 4 Eyes      6/5/2023   4:27 PM      Skin assessed during: Daily Assessment      [x] No Altered Skin Integrity Present    []Prevention Measures Documented      [] Yes- Altered Skin Integrity Present or Discovered   [] LDA Added if Not in Epic (Describe Wound)   [] New Altered Skin Integrity was Present on Admit and Documented in LDA   [] Wound Image Taken    Wound Care Consulted? No    Attending Nurse:  Benny Saleh RN     Second RN/Staff Member:  Nara Haskins RN

## 2023-06-05 NOTE — PROGRESS NOTES
Tom Anna is a 67 y.o. male patient.   1. Coronary artery disease    2. CAD (coronary artery disease)    3. S/P CABG x 3    4. Respiratory distress      Past Medical History:   Diagnosis Date    Allergies     CHF (congestive heart failure)     Coronary artery disease     Essential (primary) hypertension     Fatigue     GERD (gastroesophageal reflux disease)     HLD (hyperlipidemia)     Personal history of colonic polyps     SOB (shortness of breath) on exertion     Stroke     Tobacco abuse     Weakness of both legs      No past surgical history pertinent negatives on file.  Scheduled Meds:   albuterol sulfate  2.5 mg Nebulization Q6H    aspirin  81 mg Oral Daily    dexAMETHasone  4 mg Intravenous Q12H    docusate sodium  100 mg Oral BID    famotidine (PF)  20 mg Intravenous Q12H    fluticasone propionate  2 spray Each Nostril Daily    folic acid  1 mg Oral Daily    metoprolol tartrate  12.5 mg Oral BID    mupirocin   Nasal BID    piperacillin-tazobactam (ZOSYN) IVPB  4.5 g Intravenous Q8H    potassium chloride in water  20 mEq Intravenous Once    potassium chloride  10 mEq Oral Once    sucralfate  1 g Oral QID (AC & HS)    vancomycin (VANCOCIN) IVPB  1,250 mg Intravenous Q12H     Continuous Infusions:   loperamide       PRN Meds:acetaminophen, albumin human 5%, baclofen, calcium gluconate IVPB, calcium gluconate IVPB, calcium gluconate IVPB, dextrose 10%, dextrose 10%, HYDROcodone-acetaminophen, ketorolac, lactulose 10 gram/15 ml, loperamide, magnesium sulfate IVPB, magnesium sulfate IVPB, metoclopramide HCl, morphine, ondansetron, oxyCODONE, potassium chloride in water, potassium chloride in water, sodium phosphate IVPB, sodium phosphate IVPB, sodium phosphate IVPB, Pharmacy to dose Vancomycin consult **AND** vancomycin - pharmacy to dose    Review of patient's allergies indicates:   Allergen Reactions    Ace inhibitors Swelling     Facial edema     There are no hospital problems to display for this  "patient.    Blood pressure (!) 111/56, pulse 80, temperature 98.3 °F (36.8 °C), temperature source Oral, resp. rate 20, height 5' 10" (1.778 m), weight 88.5 kg (195 lb 1.7 oz), SpO2 100 %.    Subjective:    POD #4  Increased O2 requirements last p.m., placed back on BiPAP  Awake. Alert.  Sitting up in chair on vapotherm       Objective:   AFVSS. 100% on 5L oxymizer  Heart: RRR  Lungs: respirations nonlabored, coarse  Incision: c/d/I  CT draining  Cxr: Persistent interstitial and confluent alveolar opacities within the lungs, similar to previous.      Assesment/Plan:    S/p CAB  - continue Cts  - mobilize  - diurese  - f/u labs  - oxygen per JONNY Schilling  6/5/2023    "

## 2023-06-05 NOTE — PROGRESS NOTES
[facility] - [facility]  Pulmonary Critical Care Note    Patient Name: Tom Anna  MRN: [id number]  Admission Date: 6/1/2023  Hospital Length of Stay: 4 days  Code Status: Full Code  Attending Provider: REILLY Saunders MD  Primary Care Provider: Primary Doctor No     Subjective:     HPI:   67-year-old with a history of severe coronary artery disease mitral regurgitation, and ejection fraction 20-25% underwent CABG yesterday by Dr. Grossman.  Per Dr. Felix's notes CABG x3 with a LIMA to the LAD saphenous vein graft to OM1 and PDA.  Left atrial appendage was clipped with size 45 atrial clip.    Hospital Course/Significant events:  6/2 increased O2 requirements suspected aspiration placed on BiPAP and reupgraded to ICU.    24 Hour Interval History:  Patient apparently moved to the floor yesterday without an order.  Developed increased O2 requirements and was read upgraded to ICU on BiPAP.  He is resting comfortably on Vapotherm at present.    Past Medical History:   Diagnosis Date    Allergies     CHF (congestive heart failure)     Coronary artery disease     Essential (primary) hypertension     Fatigue     GERD (gastroesophageal reflux disease)     HLD (hyperlipidemia)     Personal history of colonic polyps     SOB (shortness of breath) on exertion     Stroke     Tobacco abuse     Weakness of both legs        Past Surgical History:   Procedure Laterality Date    COLONOSCOPY W/ BIOPSIES AND POLYPECTOMY  01/20/2020    Dr. Alf Covington    CORONARY ARTERY BYPASS GRAFT (CABG) N/A 6/1/2023    Procedure: CORONARY ARTERY BYPASS GRAFT (CABG);  Surgeon: Xochitl Grossman MD;  Location: [facility];  Service: Cardiothoracic;  Laterality: N/A;    ENDOSCOPY  01/20/2020    ESOPHAGOGASTRODUODENOSCOPY      PLANTAR'S WART EXCISION  07/17/2017       Social History     Socioeconomic History    Marital status:     Number of children: 4   Occupational History    Occupation: Disabled   Tobacco Use    Smoking status:  Every Day     Packs/day: 1.00     Types: Cigarettes    Smokeless tobacco: Never   Substance and Sexual Activity    Alcohol use: Yes     Comment: Beer 6pk daily    Drug use: Never     Social Determinants of Health     Food Insecurity: Unknown    Worried About Running Out of Food in the Last Year: Never true   Transportation Needs: Unknown    Lack of Transportation (Medical): No   Housing Stability: Unknown    Unable to Pay for Housing in the Last Year: No           Current Outpatient Medications   Medication Instructions    albuterol (VENTOLIN HFA) 90 mcg/actuation inhaler 1 puff, Inhalation, Every 12 hours PRN, Rescue    amLODIPine (NORVASC) 10 mg, Oral, Daily    aspirin (ECOTRIN) 81 mg, Oral, Daily    atorvastatin (LIPITOR) 80 mg, Oral, Nightly    baclofen (LIORESAL) 10 mg, Oral, 3 times daily PRN    cetirizine 10 mg, Oral, Daily    clopidogreL (PLAVIX) 75 mg, Oral    ENTRESTO 49-51 mg per tablet 1 tablet, Oral, 2 times daily    fluticasone propionate (FLONASE) 50 mcg, Each Nostril, Daily    furosemide (LASIX) 20 mg, Oral, Daily    ketoconazole (NIZORAL) 2 % cream Topical (Top), 2 times daily    losartan (COZAAR) 50 mg, Oral, Daily    lovastatin (MEVACOR) 10 mg, Oral, Nightly    metoprolol succinate (TOPROL-XL) 100 mg, Oral, Daily    multivitamin capsule 1 capsule, Oral, Daily    omeprazole (PRILOSEC) 20 mg, Oral, Daily    sildenafiL (VIAGRA) 100 mg, Oral, As needed (PRN)    spironolactone (ALDACTONE) 25 mg, Oral, Daily       Current Inpatient Medications   albuterol sulfate  2.5 mg Nebulization Q6H    aspirin  81 mg Oral Daily    dexAMETHasone  4 mg Intravenous Q12H    docusate sodium  100 mg Oral BID    famotidine (PF)  20 mg Intravenous Q12H    fluticasone propionate  2 spray Each Nostril Daily    folic acid  1 mg Oral Daily    metoprolol tartrate  12.5 mg Oral BID    mupirocin   Nasal BID    piperacillin-tazobactam (ZOSYN) IVPB  4.5 g Intravenous Q8H    potassium chloride in water  20 mEq Intravenous Once     potassium chloride  10 mEq Oral Once    sucralfate  1 g Oral QID (AC & HS)    vancomycin (VANCOCIN) IVPB  1,000 mg Intravenous Q12H       Current Intravenous Infusions   loperamide           Review of Systems   All other systems reviewed and are negative.       Objective:       Intake/Output Summary (Last 24 hours) at 6/5/2023 0704  Last data filed at 6/4/2023 2000  Gross per 24 hour   Intake --   Output 1735 ml   Net -1735 ml         Vital Signs (Most Recent):  Temp: 98.7 °F (37.1 °C) (06/05/23 0400)  Pulse: 66 (06/05/23 0600)  Resp: 19 (06/05/23 0600)  BP: (!) 112/55 (06/05/23 0600)  SpO2: 98 % (06/05/23 0600)  Body mass index is 27.99 kg/m².  Weight: 88.5 kg (195 lb 1.7 oz) Vital Signs (24h Range):  Temp:  [97.6 °F (36.4 °C)-99 °F (37.2 °C)] 98.7 °F (37.1 °C)  Pulse:  [] 66  Resp:  [15-32] 19  SpO2:  [88 %-100 %] 98 %  BP: ()/(41-86) 112/55     Physical Exam  GENERAL: NAD, Vapotherm in place.  CARDIAC: RRR, NO m/g/r  PULM: CTA BL, crackles heard bilaterally anterior chest  ABD: NT/ND/S. Bowel sounds heard  EXT: no cyanosis, clubbing, or edema, peripheral pulses intact    Lines/Drains/Airways       Drain  Duration                  Chest Tube 06/01/23 1203 Tube - 1 Mediastinal 28 Fr. 3 days         Chest Tube 06/01/23 1300 Tube - 2 Left Mediastinal 3 days    Male External Urinary Catheter 06/03/23 0000 2 days              Line  Duration                  Pacer Wires 06/01/23 1300 3 days              Peripheral Intravenous Line  Duration                  Peripheral IV - Single Lumen 06/03/23 0420 18 G Anterior;Left Forearm 2 days                    Significant Labs:    Lab Results   Component Value Date    WBC 11.12 06/04/2023    HGB 8.2 (L) 06/04/2023    HCT 26.8 (L) 06/04/2023    MCV 62.8 (L) 06/04/2023     06/04/2023         BMP  Lab Results   Component Value Date     (L) 06/04/2023    K 4.1 06/04/2023    CHLORIDE 102 06/04/2023    CO2 23 06/04/2023    BUN 11.2 06/04/2023    CREATININE  0.86 06/04/2023    CALCIUM 7.6 (L) 06/04/2023    AGAP 8.0 06/04/2023    EGFRNONAA 86 02/09/2022       ABG  Recent Labs   Lab 06/01/23  1113   PH 7.425   PO2 404*   PCO2 43.4   HCO3 28.5*   BE 4         Significant Imaging:  I have reviewed the pertinent imaging within the past 24 hours.        Assessment/Plan:     Assessment  Status post CABG x3 on 06/01/2023.  LIMA to the LAD, saphenous vein graft to OM1 and PDA.  Heart failure with reduced ejection fraction-EF 20-25%  Suspected aspiration event with associated hypoxemic respiratory failure now requiring high-flow O2.      Plan  1. Wean FiO2 as tolerated.  Continue incentive spirometry.  Day 3 of antibiotics.  Chest tubes per CV surgery.  Continue ICU monitoring.       35 minutes of critical care was time spent personally by me on the following activities: development of treatment plan with patient or surrogate and bedside caregivers, discussions with consultants, evaluation of patient's response to treatment, examination of patient, ordering and performing treatments and interventions, ordering and review of laboratory studies, ordering and review of radiographic studies, pulse oximetry, re-evaluation of patient's condition.  This critical care time did not overlap with that of any other provider or involve time for any procedures.    I have personally seen and examined this patient on 6/5/2023.      SUDHIR Saunders MD  Pulmonary Critical Care Medicine  [facility] - [facility] ICU

## 2023-06-05 NOTE — PROGRESS NOTES
Pharmacokinetic Assessment Follow Up: IV Vancomycin    Vancomycin serum concentration assessment(s):    The trough level was drawn correctly and can be used to guide therapy at this time. The measurement is below the desired definitive target range of 15 to 20 mcg/mL.    Vancomycin Regimen Plan:    Change regimen to Vancomycin 1250 mg IV every 12 hours with next serum trough concentration measured at 2100 on 06/06    Scheduled Administration Times    7941-1305    Drug levels (last 3 results):  Recent Labs   Lab Result Units 06/05/23  0541   Vancomycin Trough ug/ml 13.7*       Vancomycin Administrations:  vancomycin given in the last 96 hours                     vancomycin in dextrose 5 % 1 gram/250 mL IVPB 1,000 mg (mg) 1,000 mg New Bag 06/04/23 1852     1,000 mg New Bag  0603     1,000 mg New Bag 06/03/23 1900    vancomycin (VANCOCIN) 2,250 mg in dextrose 5 % (D5W) 500 mL IVPB (mg) 2,250 mg New Bag 06/03/23 0628                    Pharmacy will continue to follow and monitor vancomycin.    Please contact pharmacy at extension [phone] for questions regarding this assessment.    Thank you for the consult,   Gisella Jarquin       Patient brief summary:  Tom Anna is a 67 y.o. male initiated on antimicrobial therapy with IV Vancomycin for treatment of lower respiratory infection    The patient's current regimen is 1250 mg every 12 hours    Drug Allergies:   Review of patient's allergies indicates:   Allergen Reactions    Ace inhibitors Swelling     Facial edema       Actual Body Weight:   79.2 kg    Renal Function:   Estimated Creatinine Clearance: 95.9 mL/min (based on SCr of 0.86 mg/dL).,     Dialysis Method (if applicable):  N/A    CBC (last 72 hours):  Recent Labs   Lab Result Units 06/03/23  0123 06/04/23  0156   WBC x10(3)/mcL 14.78* 11.12   Hgb g/dL 9.3* 8.2*   Hct % 30.4* 26.8*   Platelet x10(3)/mcL 217 188   Mono % % 4.1 5.0   Eos % % 0.0 0.0   Basophil % % 0.3 0.2       Metabolic Panel (last 72  hours):  Recent Labs   Lab Result Units 06/03/23  0045 06/03/23  0123 06/04/23  0157   Sodium Level mmol/L  --  134* 133*   Sodium, Blood Gas mmol/L 131*  --   --    Potassium Level mmol/L  --  4.5 4.1   Potassium, Blood Gas mmol/L 4.2  --   --    Chloride mmol/L  --  102 102   Carbon Dioxide mmol/L  --  19* 23   Glucose Level mg/dL  --  126* 129*   Blood Urea Nitrogen mg/dL  --  9.6 11.2   Creatinine mg/dL  --  1.06 0.86   Magnesium Level mg/dL  --  1.80  --    Phosphorus Level mg/dL  --  2.5  --        Microbiologic Results:  Microbiology Results (last 7 days)       Procedure Component Value Units Date/Time    Blood Culture [[id number]]  (Normal) Collected: 06/03/23 0610    Order Status: Completed Specimen: Blood from Arm, Left Updated: 06/04/23 1000     CULTURE, BLOOD ([facility]) No Growth At 24 Hours    Blood Culture [[id number]]  (Normal) Collected: 06/03/23 0610    Order Status: Completed Specimen: Blood from Arm, Right Updated: 06/04/23 1000     CULTURE, BLOOD ([facility]) No Growth At 24 Hours    Respiratory Culture [[id number]]     Order Status: Sent Specimen: Sputum

## 2023-06-06 NOTE — PHYSICIAN QUERY
PT Name: Tom Anna  MR #: [id number]     DOCUMENTATION CLARIFICATION     CDS/: Gisella Hsu RN CDIS        Contact information: [email or web address]    This form is a permanent document in the medical record.     Query Date: June 6, 2023    By submitting this query, we are merely seeking further clarification of documentation.  Please utilize your independent clinical judgment when addressing the question(s) below.  The Medical Record contains the following   Indicators   Supporting Clinical Findings Location in Medical Record   x Respiratory failure Suspected aspiration event with associated hypoxemic respiratory failure now requiring high-flow O2 Pulm note 6/4     Subjective Respiratory Signs/Symptoms: SOB, CHILDERS, Cough, etc.     x Objective Respiratory Signs/Symptoms: Respiratory distress, Accessory muscle use, tachypnea, wheezing, etc. Received call that patient was starting to become short of breath and hypoxic. Patient was previously in ICU after having 3v CABG on 6/1/23 and clipping of TAMI. He had recovered well and was extubated same day postoperatively. Staff intensivist had evaluated patient this AM and in conjunction with CVS team downgraded from ICU. However this evening patient abruptly became dyspneic and was placed on BIPAP. Also tachycardic to [id number]. He is denying any chest pain. CT output tonight 150 cc. CXR reviewed revealing increase in pulmonary vascular markings and worsening right sided effusion. Given 40 mg IV Lasix. ABG ordered. Stat EKG obtained which looks mostly unchanged from previous though     PULM: CTA BL, crackles heard bilaterally anterior chest    Respiratory distress  CCM Care update 6/2                                 Pulm note 6/3       CTS note 6/4    x RR         ABGs         O2 sat Vital Signs (24h Range):  Resp:  [20-46] 32  SpO2:  [73 %-100 %] 100 %    Vital Signs (24h Range):  Resp:  [27-42] 31  SpO2:  [77 %-100 %] 99 %    Vital Signs (24h  Range):  Resp:  [15-32] 19  SpO2:  [88 %-100 %] 98 %       Latest Reference Range & Units 06/03/23 00:45 06/06/23 07:50   Sample Type  Arterial Blood Arterial Blood   Sample site  Left Brachial Artery Left Radial Artery   pH, Blood gas 7.350 - 7.450  7.490 (H) 7.490 (H)   pCO2, Blood gas 35.0 - 45.0 mmHg 32.0 (L) 36.0   pO2, Blood gas 80.0 - 100.0 mmHg 100.0 70.0 (L)   O2 Hb, Blood Gas 94.0 - 97.0 % 98.0 (H)    Base Excess, Blood gas mmol/L 1.60 4.00   TOC2, Blood gas mmol/L 25.4 28.5   HCO3, Blood gas >=15.0 mmol/L 24.4 27.4   FIO2, Blood gas % 100     Pulm note 6/3         Pulm note 6/4        Pulm note 6/5           Labs    x Hypoxia/Hypercapnia  hypoxic with increased O2 requirements Pulm note 6/3    x BiPAP/Intubation/Mechanical Ventilation  required BiPAP last evening.  He is breathing easily with the BiPAP this morning on 50% FiO2 Pulm note 6/3     Supplemental O2      Home O2, Oxygen Dependence     x Radiology findings CTA which revealed no pulmonary embolus but patient does have evidence of bilateral consolidation bilaterally suspicious for multifocal pneumonia. It is uncertain if he had aspirated.  [facility] Care update 6/2     Acute/Chronic Illness      Treatment      Other         The noted clinical guidelines are only system guidelines and do not replace the providers clinical judgment.    Provider, please specify the diagnosis or diagnoses associated with above clinical findings.   [ x ] Acute Respiratory Failure with Hypoxia - ABG pO2 < 60 mmHg or O2 sat of <91% on room air and respiratory symptoms documented   [    ] Acute Respiratory Distress - Generally describes less severe respiratory symptoms (tachypnea, in respiratory distress, increased work of breathing, unable to speak in complete sentences, labored breathing, use of accessory muscles, RR> 24, cyanosis, dyspnea, wheezing, stridor, lethargy) without sufficient measurements (pO2, SpO2, pH, and pCO2) to meet criteria for respiratory  failure   [    ] Other Respiratory Diagnosis (please specify): _________________         Please document in your progress notes daily for the duration of treatment until resolved and include in your discharge summary.     Reference:    ASHELY Solis MD. (2020, March 13). Acute respiratory distress syndrome: Clinical features, diagnosis, and complications in adults ([id number] KAMAR Aguilar MD & [id number] CECILLE Rondon MD, Eds.). Retrieved November 13, 2020, from https://[email or web address]/contents/acute-respiratory-distress-syndrome-clinical-features-diagnosis-and-complications-in-adults?search=ards&source=search_result&selectedTitle=1~150&usage_type=default&display_rank=1  Form No. [id number]

## 2023-06-06 NOTE — PT/OT/SLP PROGRESS
Physical Therapy Treatment    Patient Name:  Tom Anna   MRN:  [id number]    Recommendations:     Discharge Recommendations: rehabilitation facility  Discharge Equipment Recommendations: to be determined by next level of care  Barriers to discharge: medical status    Assessment:     Tom Anna is a 67 y.o. male admitted with a medical diagnosis of <principal problem not specified>.  He presents with the following impairments/functional limitations: weakness, impaired endurance, impaired self care skills, impaired functional mobility, gait instability, impaired balance, impaired cardiopulmonary response to activity.    Rehab Prognosis: Good; patient would benefit from acute skilled PT services to address these deficits and reach maximum level of function.    Recent Surgery: Procedure(s) (LRB):  CORONARY ARTERY BYPASS GRAFT (CABG) (N/A) 5 Days Post-Op    Plan:     During this hospitalization, patient to be seen 6 x/week to address the identified rehab impairments via gait training, therapeutic activities, therapeutic exercises, neuromuscular re-education and progress toward the following goals:    Plan of Care Expires:  07/04/23    Subjective     Chief Complaint:   Patient/Family Comments/goals:   Pain/Comfort:         Objective:     Communicated with NSG prior to session.  Patient found up in chair with   upon PT entry to room.     General Precautions: Standard, sternal  Orthopedic Precautions: N/A  Braces: N/A  Respiratory Status:  oxymizer 7L/m  Blood Pressure:   Skin Integrity: Visible skin intact      Functional Mobility:  Transfers:     Sit to Stand:  minimum assistance with rolling walker, Sternal pxns maintained to come to standing. Cues for pursed lip breathing to come to standing.   Gait: Pt ambulated ~20ft + 40ft. Step through gait pattern. Decreased step length and abigail. Sats 92-94% throughout seated rest break b/t bouts.RW          Patient left up in chair with all lines intact and call  button in reach..    GOALS:   Multidisciplinary Problems       Physical Therapy Goals          Problem: Physical Therapy    Goal Priority Disciplines Outcome Goal Variances Interventions   Physical Therapy Goal     PT, PT/OT Ongoing, Progressing     Description: Goals to be met by: 2023     Patient will increase functional independence with mobility by performin. Supine to sit with MInimal Assistance  2. Sit to stand transfer with Minimal Assistance  3. Bed to chair transfer with Minimal Assistance using Rolling Walker  4. Gait  x 100 feet with Minimal Assistance using Rolling Walker.   5. Ascend/descend 2 stair with bilateral Handrails Moderate Assistance using No Assistive Device.                          Time Tracking:     PT Received On:    PT Start Time: 1400     PT Stop Time: 1425  PT Total Time (min): 25 min     Billable Minutes: Gait Training 13 and Therapeutic Activity 12    Treatment Type: Treatment  PT/PTA: PTA     Number of PTA visits since last PT visit: 2023

## 2023-06-06 NOTE — PT/OT/SLP PROGRESS
Speech Language Pathology Department  Dysphagia Therapy Progress Note    Patient Name:  Tom Anna   MRN:  [id number]  Admitting Diagnosis: CABG    Recommendations:     General recommendations:  dysphagia therapy  Diet recommendations:  Minced & Moist Diet - IDDSI Level 5, Liquid Diet Level: Mildly thick liquids - IDDSI Level 2   Aspiration precautions: small bites/sips and slow rate    Discharge recommendations:  home health speech therapy, rehabilitation facility   Barriers to safe discharge:  severity of impairment    Subjective     Patient awake and alert.  Pain/Comfort:  0/10  Spiritual/Cultural/Bahai Beliefs/Practices that affect care: no  Objective:     Therapeutic Activities:  Pt completed base of tongue and laryngeal x50 with moderate cues.  Pt tolerated thermal stimulation to the anterior faucial pillars x10 with 100 swallow responses.  Laryngeal excursion fair.  Delay reduced.    Assessment:     Pt continues to present with oropharyngeal dysphagia requiring diet modification to reduce the risk of aspiration.    Goals:     Multidisciplinary Problems       SLP Goals          Problem: SLP    Goal Priority Disciplines Outcome   SLP Goal     SLP Ongoing, Progressing   Description: LT. Pt will tolerate the least restrictive PO diet without further respiratory compromise.    ST. Pt will tolerate thermal stimulation and use effortful swallowing to improve timeliness of swallow initiation.   2. Pt will complete laryngeal elevation and tongue base retraction exercises with minimal prompting.   3. Pt will tolerate meal of soft and bite-sized solids with improved bolus control/manipulation.                        Patient Education:     Patient provided with verbal education regarding POC.  Understanding was verbalized.    Plan:     Will continue to follow and tx as appropriate.    SLP Follow-Up:  Yes   Patient to be seen:  5 x/week   Plan of Care expires:  23  Plan of Care reviewed with:   spouse       Time Tracking:     SLP Treatment Date:    6/6/23  Speech Start Time:   1420  Speech Stop Time:    1430    Speech Total Time (min):   10    Billable minutes:  Treatment of Swallow Dysfunction, 10 minutes       06/06/2023

## 2023-06-06 NOTE — PROGRESS NOTES
CT SURGERY PROGRESS NOTE  Tom Anna  67 y.o.  1955    Patients Procedure: Procedure(s) (LRB):  CORONARY ARTERY BYPASS GRAFT (CABG) (N/A)    Subjective  Interval History: POD 5    ROS    Medication List  Infusions   dexmedeTOMIDine (Precedex) infusion (titrating) 0.2 mcg/kg/hr (06/06/23 0144)    loperamide       Scheduled   albuterol sulfate  2.5 mg Nebulization Q6H    aspirin  81 mg Oral Daily    dexAMETHasone  4 mg Intravenous Q12H    docusate sodium  100 mg Oral BID    famotidine (PF)  20 mg Intravenous Q12H    fluticasone propionate  2 spray Each Nostril Daily    folic acid  1 mg Oral Daily    metoprolol tartrate  12.5 mg Oral BID    piperacillin-tazobactam (ZOSYN) IVPB  4.5 g Intravenous Q8H    potassium chloride in water  20 mEq Intravenous Once    potassium chloride  10 mEq Oral Once    sucralfate  1 g Oral QID (AC & HS)    vancomycin (VANCOCIN) IVPB  1,250 mg Intravenous Q12H       Objective:  Recent Vitals:  Temp:  [97.9 °F (36.6 °C)-98.6 °F (37 °C)] 98.6 °F (37 °C)  Pulse:  [] 65  Resp:  [17-37] 28  SpO2:  [78 %-100 %] 100 %  BP: ()/(47-81) 112/62    Physical Exam     I/O last 24 hrs:  Intake/Output - Last 3 Shifts         06/04 0700  06/05 0659 06/05 0700  06/06 0659    I.V. (mL/kg)  80 (0.9)    IV Piggyback  350    Total Intake(mL/kg)  430 (4.9)    Urine (mL/kg/hr) 1550 (0.7) 1350 (0.6)    Chest Tube 185 100    Total Output 1735 1450    Net -1735 -1020          Urine Occurrence  2 x            Labs  ABGs: No results for input(s): PH, PCO2, PO2, HCO3, POCSATURATED, BE in the last 48 hours.  BMP: No results for input(s): GLU, NA, K, CL, CO2, BUN, CREATININE, CALCIUM, MG in the last 48 hours.  CBC: No results for input(s): WBC, RBC, HGB, HCT, PLT, MCV, MCH, MCHC in the last 48 hours.  CMP: No results for input(s): GLU, CALCIUM, ALBUMIN, PROT, NA, K, CO2, CL, BUN, CREATININE, ALKPHOS, ALT, AST, BILITOT in the last 48 hours.  Coagulation: No results for input(s): PT, INR, APTT in the  last 48 hours.      Imaging:   CXR: No results found in the last 24 hours.        ASSESSMENT/PLAN:    Transferred back to ICU last pm for hypoxia   Agitated , confused last night   On 10 liter O2 - will wean as tolerated   Chest tubes slowing down- possible DC today   Case mgt for disp    Case and plan of care discussed with MD Cleve Ham PA-C

## 2023-06-06 NOTE — PT/OT/SLP PROGRESS
Physical Therapy      Patient Name:  Tom Anna   MRN:  [id number]    Patient not seen today secondary to CV tech present to complete OLIVER UE/LE ultrasound. PT will follow up as schedule allows.

## 2023-06-06 NOTE — PT/OT/SLP PROGRESS
Occupational Therapy      Patient Name:  Tom Anna   MRN:  [id number]    Patient declining at this time 2/2 patient stating having a hard time breathing, O2 in 90s. Will follow-up as able.     CCC held with evaluating OT based on patient being upgraded back to ICU 2/2 oxygen needs, cleared for MELCHOR to see patient.    6/6/2023

## 2023-06-06 NOTE — PROGRESS NOTES
[facility] - [facility]  Pulmonary Critical Care Note    Patient Name: Tom Anna  MRN: [id number]  Admission Date: 6/1/2023  Hospital Length of Stay: 5 days  Code Status: Full Code  Attending Provider: REILLY Saunders MD  Primary Care Provider: Primary Doctor No     Subjective:     HPI:   67-year-old with a history of severe coronary artery disease mitral regurgitation, and ejection fraction 20-25% underwent CABG yesterday by Dr. Grossman.  Per Dr. Felix's notes CABG x3 with a LIMA to the LAD saphenous vein graft to OM1 and PDA.  Left atrial appendage was clipped with size 45 atrial clip.    Hospital Course/Significant events:  6/2 increased O2 requirements suspected aspiration placed on BiPAP and reupgraded to ICU.  6/4 downgraded to the floor but became more hypoxic and transferred back to ICU.    24 Hour Interval History:  Patient with continued delirium.  Did not tolerate BiPAP well overnight.  Currently on Vapotherm.  Chest tube output has diminished significantly.    Past Medical History:   Diagnosis Date    Allergies     CHF (congestive heart failure)     Coronary artery disease     Essential (primary) hypertension     Fatigue     GERD (gastroesophageal reflux disease)     HLD (hyperlipidemia)     Personal history of colonic polyps     SOB (shortness of breath) on exertion     Stroke     Tobacco abuse     Weakness of both legs        Past Surgical History:   Procedure Laterality Date    COLONOSCOPY W/ BIOPSIES AND POLYPECTOMY  01/20/2020    Dr. Alf Covington    CORONARY ARTERY BYPASS GRAFT (CABG) N/A 6/1/2023    Procedure: CORONARY ARTERY BYPASS GRAFT (CABG);  Surgeon: Xochitl Grossman MD;  Location: [facility];  Service: Cardiothoracic;  Laterality: N/A;    ENDOSCOPY  01/20/2020    ESOPHAGOGASTRODUODENOSCOPY      PLANTAR'S WART EXCISION  07/17/2017       Social History     Socioeconomic History    Marital status:     Number of children: 4   Occupational History    Occupation: Disabled    Tobacco Use    Smoking status: Every Day     Packs/day: 1.00     Types: Cigarettes    Smokeless tobacco: Never   Substance and Sexual Activity    Alcohol use: Yes     Comment: Beer 6pk daily    Drug use: Never     Social Determinants of Health     Food Insecurity: Unknown    Worried About Running Out of Food in the Last Year: Never true   Transportation Needs: Unknown    Lack of Transportation (Medical): No   Housing Stability: Unknown    Unable to Pay for Housing in the Last Year: No           Current Outpatient Medications   Medication Instructions    albuterol (VENTOLIN HFA) 90 mcg/actuation inhaler 1 puff, Inhalation, Every 12 hours PRN, Rescue    amLODIPine (NORVASC) 10 mg, Oral, Daily    aspirin (ECOTRIN) 81 mg, Oral, Daily    atorvastatin (LIPITOR) 80 mg, Oral, Nightly    baclofen (LIORESAL) 10 mg, Oral, 3 times daily PRN    cetirizine 10 mg, Oral, Daily    clopidogreL (PLAVIX) 75 mg, Oral    ENTRESTO 49-51 mg per tablet 1 tablet, Oral, 2 times daily    fluticasone propionate (FLONASE) 50 mcg, Each Nostril, Daily    furosemide (LASIX) 20 mg, Oral, Daily    ketoconazole (NIZORAL) 2 % cream Topical (Top), 2 times daily    losartan (COZAAR) 50 mg, Oral, Daily    lovastatin (MEVACOR) 10 mg, Oral, Nightly    metoprolol succinate (TOPROL-XL) 100 mg, Oral, Daily    multivitamin capsule 1 capsule, Oral, Daily    omeprazole (PRILOSEC) 20 mg, Oral, Daily    sildenafiL (VIAGRA) 100 mg, Oral, As needed (PRN)    spironolactone (ALDACTONE) 25 mg, Oral, Daily       Current Inpatient Medications   albuterol sulfate  2.5 mg Nebulization Q6H    aspirin  81 mg Oral Daily    dexAMETHasone  4 mg Intravenous Q12H    docusate sodium  100 mg Oral BID    famotidine (PF)  20 mg Intravenous Q12H    fluticasone propionate  2 spray Each Nostril Daily    folic acid  1 mg Oral Daily    ipratropium  0.5 mg Nebulization Q6H    metoprolol tartrate  12.5 mg Oral BID    piperacillin-tazobactam (ZOSYN) IVPB  4.5 g Intravenous Q8H    potassium  chloride in water  20 mEq Intravenous Once    potassium chloride  10 mEq Oral Once    sucralfate  1 g Oral QID (AC & HS)    vancomycin (VANCOCIN) IVPB  1,250 mg Intravenous Q12H       Current Intravenous Infusions   dexmedeTOMIDine (Precedex) infusion (titrating) 0.2 mcg/kg/hr (06/06/23 0144)    loperamide           Review of Systems   Unable to perform ROS: Medical condition        Objective:       Intake/Output Summary (Last 24 hours) at 6/6/2023 0719  Last data filed at 6/6/2023 0400  Gross per 24 hour   Intake 430 ml   Output 1550 ml   Net -1120 ml         Vital Signs (Most Recent):  Temp: 98.6 °F (37 °C) (06/06/23 0400)  Pulse: 65 (06/06/23 0600)  Resp: (!) 28 (06/06/23 0600)  BP: 112/62 (06/06/23 0600)  SpO2: 100 % (06/06/23 0600)  Body mass index is 27.99 kg/m².  Weight: 88.5 kg (195 lb 1.7 oz) Vital Signs (24h Range):  Temp:  [97.9 °F (36.6 °C)-98.6 °F (37 °C)] 98.6 °F (37 °C)  Pulse:  [] 65  Resp:  [17-37] 28  SpO2:  [78 %-100 %] 100 %  BP: ()/(47-81) 112/62     Physical Exam  GENERAL: NAD, Vapotherm in place.  CARDIAC: RRR, NO m/g/r  PULM: CTA BL, crackles heard bilaterally anterior chest.  Wheezes heard bilaterally  ABD: NT/ND/S. Bowel sounds heard  EXT: no cyanosis, clubbing, or edema, peripheral pulses intact    Lines/Drains/Airways       Drain  Duration                  Chest Tube 06/01/23 1203 Tube - 1 Mediastinal 28 Fr. 4 days         Chest Tube 06/01/23 1300 Tube - 2 Left Mediastinal 4 days    Male External Urinary Catheter 06/03/23 0000 3 days              Line  Duration                  Pacer Wires 06/01/23 1300 4 days              Peripheral Intravenous Line  Duration                  Peripheral IV - Single Lumen 06/03/23 0420 18 G Anterior;Left Forearm 3 days                    Significant Labs:    Lab Results   Component Value Date    WBC 10.73 06/06/2023    HGB 7.3 (L) 06/06/2023    HCT 23.5 (L) 06/06/2023    MCV 61.5 (L) 06/06/2023     06/06/2023         BMP  Lab Results    Component Value Date     (L) 06/06/2023    K 4.0 06/06/2023    CHLORIDE 102 06/06/2023    CO2 23 06/06/2023    BUN 20.9 06/06/2023    CREATININE 0.94 06/06/2023    CALCIUM 7.7 (L) 06/06/2023    AGAP 8.0 06/04/2023    EGFRNONAA 86 02/09/2022       ABG  Recent Labs   Lab 06/01/23  1113   PH 7.425   PO2 404*   PCO2 43.4   HCO3 28.5*   BE 4       Significant Imaging:  I have reviewed the pertinent imaging within the past 24 hours.  Chest x-ray today by my review has extensive right-sided infiltrate.  Also has infiltrate in the left mid lung field but less extensive than the right side.      Assessment/Plan:     Assessment  Status post CABG x3 on 06/01/2023.  LIMA to the LAD, saphenous vein graft to OM1 and PDA.  Heart failure with reduced ejection fraction-EF 20-25%  Suspected aspiration event with associated hypoxemic respiratory failure now requiring high-flow O2.  Delirium postop possibly ICU related.  Ongoing bronchospasm despite IV steroids.      Plan  Continue Decadron.  Add ipratropium to albuterol nebs.  Check ABGs this morning to rule out hypercapnia.  Continue ICU monitoring.       39 minutes of critical care was time spent personally by me on the following activities: development of treatment plan with patient or surrogate and bedside caregivers, discussions with consultants, evaluation of patient's response to treatment, examination of patient, ordering and performing treatments and interventions, ordering and review of laboratory studies, ordering and review of radiographic studies, pulse oximetry, re-evaluation of patient's condition.  This critical care time did not overlap with that of any other provider or involve time for any procedures.    I have personally seen and examined this patient on 6/6/2023.      SUDHIR Saunders MD  Pulmonary Critical Care Medicine  [facility] - [facility]

## 2023-06-06 NOTE — NURSING
Nurses Note -- 4 Eyes      6/6/2023   4:05 PM      Skin assessed during: Daily Assessment      [x] No Altered Skin Integrity Present    [x]Prevention Measures Documented      [] Yes- Altered Skin Integrity Present or Discovered   [] LDA Added if Not in Epic (Describe Wound)   [] New Altered Skin Integrity was Present on Admit and Documented in LDA   [] Wound Image Taken    Wound Care Consulted? No    Attending Nurse:  Nandini Walker RN     Second RN/Staff Member:  Lev Valentine RN

## 2023-06-07 NOTE — PROCEDURE NOTE ADDENDUM
Endotracheal Intubation Procedure Note     Date of Procedure: 6/7/2023    Procedure: Endotracheal Intubation    Perfomed by: Ester Chung MD        Indication: impending airway compromise.    Pre-Procedure Diagnosis: CAD s/p CABG, worsening resp distress        Description of the Findings of the Procedure:     Sedation: etomidate.  Paralytic: succinylcholine.  Lidocaine: no.  Atropine: no.  Equipment: 8.0mm cuffed endotracheal tube.  Cricoid Pressure: no.  Number of attempts: 1.  ETT location confirmed by by auscultation, by CXR, and ETCO2 monitor.        Complications: None; patient tolerated the procedure well.       Condition: Good        Disposition: ICU - intubated and hemodynamically stable.    Ester Chung MD  [employer] IM Resident PGY-3    I was personally there and supervised the above procedure on this patient on 6/7/2023.

## 2023-06-07 NOTE — PROGRESS NOTES
Inpatient Nutrition Assessment    Admit Date: 6/1/2023   Total duration of encounter: 6 days     Nutrition Recommendation/Prescription     Start tube feeding when appropriate.  Tube feeding recommendation:   Impact Peptide 1.5 goal rate 65 ml/hr to provide  1950 kcal/d (100% est needs)  122 g protein/d (92% est needs)  1001 ml free water/d (52% est needs)  (calculations based on estimated 20 hr/d run time)     Communication of Recommendations: reviewed with nurse    Nutrition Assessment     Malnutrition Assessment/Nutrition-Focused Physical Exam    Malnutrition Context: other (see comments) (does not meet criteria)     Energy Intake (Malnutrition): other (see comments) (does not meet criteria)  Weight Loss (Malnutrition): other (see comments) (unable to obtain)  Subcutaneous Fat (Malnutrition): other (see comments) (does not meet criteria)           Muscle Mass (Malnutrition): other (see comments) (does not meet criteria)                          Fluid Accumulation (Malnutrition): other (see comments) (does not meet criteria)        A minimum of two characteristics is recommended for diagnosis of either severe or non-severe malnutrition.    Chart Review    Reason Seen: continuous nutrition monitoring and length of stay    Malnutrition Screening Tool Results   Have you recently lost weight without trying?: No  Have you been eating poorly because of a decreased appetite?: No   MST Score: 0     Diagnosis:  Status post CABG x3 on 06/01/2023.   Heart failure with reduced ejection fraction-EF 20-25%  Suspected aspiration event with associated hypoxemic respiratory failure  Delirium postop possibly ICU related    Relevant Medical History: CHF, CAD, HTN, GERD, HLD    Nutrition-Related Medications: diprivan, decadron, docusate, folic acid  Calorie Containing IV Medications: no significant kcals from medications at this time    Nutrition-Related Labs:  6/7 Glu 144    Diet/PN Order: No diet orders on file  Oral Supplement  "Order: none  Tube Feeding Order: none  Appetite/Oral Intake: not applicable/not applicable  Factors Affecting Nutritional Intake: on mechanical ventilation  Food/Holiness/Cultural Preferences: unable to obtain  Food Allergies: none reported    Skin Integrity: drain/device(s), incision  Wound(s):   noted    Comments    23: Noted good po intake of meals documented previously. At time of RD visit pt actively being intubated. Will provide tube feeding recommendatios in case needed.     Anthropometrics    Height: 5' 10" (177.8 cm) Height Method: Stated  Last Weight: 88.5 kg (195 lb 1.7 oz) (23 0400) Weight Method: Standard Scale  BMI (Calculated): 28  BMI Classification: overweight (BMI 25-29.9)        Ideal Body Weight (IBW), Male: 166 lb     % Ideal Body Weight, Male (lb): 114.88 %                          Usual Weight Provided By: EMR weight history    Wt Readings from Last 5 Encounters:   23 88.5 kg (195 lb 1.7 oz)   23 85.7 kg (189 lb)   23 92.6 kg (204 lb 3.2 oz)   22 90.4 kg (199 lb 6.4 oz)   10/14/21 91.1 kg (200 lb 13.4 oz)     Weight Change(s) Since Admission:  Admit Weight: 88.5 kg (195 lb) (23 1433)    Estimated Needs    Weight Used For Calorie Calculations: 88.5 kg (195 lb 1.7 oz)  Energy Calorie Requirements (kcal): 1942kcal  Energy Need Method: [place]  Weight Used For Protein Calculations: 88.5 kg (195 lb 1.7 oz)  Protein Requirements: 133gm (1.5g/kg)  Fluid Requirements (mL): 1942ml (1ml.kcal)  Temp (24hrs), Av.7 °F (37.1 °C), Min:97.9 °F (36.6 °C), Max:99.5 °F (37.5 °C)    Vtot (L/Min) for [place] Equation Calculation: 9.1    Enteral Nutrition    Patient not receiving enteral nutrition at this time.    Parenteral Nutrition    Patient not receiving parenteral nutrition support at this time.    Evaluation of Received Nutrient Intake    Calories: not meeting estimated needs  Protein: not meeting estimated needs    Patient Education    Not " applicable.    Nutrition Diagnosis     PES: Inadequate oral intake related to acute illness as evidenced by intubation since 6/7/23. (new)    Interventions/Goals     Intervention(s): modified composition of enteral nutrition, modified rate of enteral nutrition, and collaboration with other providers  Goal: Meet greater than 75% of nutritional needs by follow-up. (new)    Monitoring & Evaluation     Dietitian will monitor energy intake.  Nutrition Risk/Follow-Up: high (follow-up in 1-4 days)   Please consult if re-assessment needed sooner.

## 2023-06-07 NOTE — PT/OT/SLP PROGRESS
Physical Therapy Treatment    Patient Name:  Tom Anna   MRN:  [id number]    Pt now intubated. PT will follow up as appropriate.     06/07/2023

## 2023-06-07 NOTE — PLAN OF CARE
Problem: Adult Inpatient Plan of Care  Goal: Plan of Care Review  Outcome: Ongoing, Progressing  Goal: Patient-Specific Goal (Individualized)  Outcome: Ongoing, Progressing  Goal: Absence of Hospital-Acquired Illness or Injury  Outcome: Ongoing, Progressing  Goal: Optimal Comfort and Wellbeing  Outcome: Ongoing, Progressing  Goal: Readiness for Transition of Care  Outcome: Ongoing, Progressing     Problem: Infection  Goal: Absence of Infection Signs and Symptoms  Outcome: Ongoing, Progressing     Problem: Fall Injury Risk  Goal: Absence of Fall and Fall-Related Injury  Outcome: Ongoing, Progressing     Problem: Skin Injury Risk Increased  Goal: Skin Health and Integrity  Outcome: Ongoing, Progressing     Problem: Restraint, Nonbehavioral (Nonviolent)  Goal: Absence of Harm or Injury  Outcome: Ongoing, Progressing

## 2023-06-07 NOTE — PROCEDURES
Arterial Catheter Insertion Procedure Note     Procedure: Insertion of Arterial Catheter     Indications: Hemodynamic Monitoring     Procedure Details   Informed consent was obtained for the procedure: Patient is mechanically ventilated & sedated    The skin above the right brachial artery was prepped with Chloroprep and covered with a sterile drape. Local anesthesia was applied to the skin and subcutaneous tissues. Ultrasound guidance was used to identify the artery. A  22-gauge catheter over a needle was then inserted into the artery. A guide wire was then passed easily through the needle. A arterial catheter was then inserted into the vessel over the guide wire. The needle was then withdrawn and catheter was connected to the monitor to ensure a proper waveform.  A sterile dressing was then applied.     Ultrasound/Sonosite was used during the procedure.      Estimated blood loss: 10 cc    Patient tolerated procedure well.    Ester Chung MD  [facility] IM Resident PGY-3      I was personally present and supervised during this procedure on this patient on 6/7/2023.

## 2023-06-07 NOTE — NURSING
Nurses Note -- 4 Eyes      6/7/2023   5:43 AM      Skin assessed during: Daily Assessment      [x] No Altered Skin Integrity Present    [x]Prevention Measures Documented      [] Yes- Altered Skin Integrity Present or Discovered   [] LDA Added if Not in Epic (Describe Wound)   [] New Altered Skin Integrity was Present on Admit and Documented in LDA   [] Wound Image Taken    Wound Care Consulted? No    Attending Nurse:  Ileana Salazar RN     Second RN/Staff Member:  Pranav Lyons

## 2023-06-07 NOTE — PROGRESS NOTES
Tom Anna is a 67 y.o. male patient.   1. Coronary artery disease    2. CAD (coronary artery disease)    3. S/P CABG x 3    4. Respiratory distress    5. QT prolongation    6. Pulse irregularity      Past Medical History:   Diagnosis Date    Allergies     CHF (congestive heart failure)     Coronary artery disease     Essential (primary) hypertension     Fatigue     GERD (gastroesophageal reflux disease)     HLD (hyperlipidemia)     Personal history of colonic polyps     SOB (shortness of breath) on exertion     Stroke     Tobacco abuse     Weakness of both legs      No past surgical history pertinent negatives on file.  Scheduled Meds:   albuterol sulfate  2.5 mg Nebulization Q6H    aspirin  81 mg Oral Daily    dexAMETHasone  4 mg Intravenous Q12H    docusate sodium  100 mg Oral BID    enoxparin  40 mg Subcutaneous Q24H (prophylaxis, 1700)    famotidine (PF)  20 mg Intravenous Q12H    fluticasone propionate  2 spray Each Nostril Daily    folic acid  1 mg Oral Daily    furosemide (LASIX) injection  40 mg Intravenous Once    ipratropium  0.5 mg Nebulization Q6H    metoprolol tartrate  12.5 mg Oral BID    piperacillin-tazobactam (Zosyn) IV (PEDS and ADULTS) (extended infusion is not appropriate)  4.5 g Intravenous Q8H    potassium chloride in water  20 mEq Intravenous Once    potassium chloride  10 mEq Oral Once    sucralfate  1 g Oral QID (AC & HS)     Continuous Infusions:   dexmedeTOMIDine (Precedex) infusion (titrating) 0.6 mcg/kg/hr (06/07/23 0847)    loperamide       PRN Meds:acetaminophen, baclofen, dextrose 10%, dextrose 10%, dextrose 10%, dextrose 10%, glucagon (human recombinant), glucose, glucose, HYDROcodone-acetaminophen, insulin aspart U-100, lactulose 10 gram/15 ml, loperamide, metoclopramide HCl, ondansetron, oxyCODONE    Review of patient's allergies indicates:   Allergen Reactions    Ace inhibitors Swelling     Facial edema     There are no hospital problems to display for this  "patient.    Blood pressure (!) 132/99, pulse (!) 116, temperature 97.9 °F (36.6 °C), temperature source Axillary, resp. rate (!) 38, height 5' 10" (1.778 m), weight 88.5 kg (195 lb 1.7 oz), SpO2 99 %.      Subjective:    POD #6  Increased O2 requirements again last p.m., placed back on BiPAP  Agitated. Confused.  Haldol prn  On precedex        Objective:   AFVSS. 99% on BiPAP  Heart: RRR  Lungs: tachypneic; coarse  Incision: c/d/I  H/h: 7.7/25.5  Cr: 0.98  K: 4.5  Cxr: Cardiopericardial silhouette appearance is similar.  Interval removal of left chest tube.  There is no pneumothorax.  Minimal left dependent pleural effusion.  Bilateral lungs interstitial and alveolar opacities are without significant interval difference.      Assesment/Plan:    S/p CAB  - diurese  - continue care    JONNY Seo  6/7/2023    "

## 2023-06-07 NOTE — PROGRESS NOTES
[facility] - [facility]  Pulmonary Critical Care Note    Patient Name: Tom Anna  MRN: [id number]  Admission Date: 6/1/2023  Hospital Length of Stay: 6 days  Code Status: Full Code  Attending Provider: REILLY Saunders MD  Primary Care Provider: Primary Doctor No     Subjective:     HPI:   67-year-old with a history of severe coronary artery disease mitral regurgitation, and ejection fraction 20-25% underwent CABG yesterday by Dr. Grossman.  Per Dr. Felix's notes CABG x3 with a LIMA to the LAD saphenous vein graft to OM1 and PDA.  Left atrial appendage was clipped with size 45 atrial clip.    Hospital Course/Significant events:  6/2 increased O2 requirements suspected aspiration placed on BiPAP and reupgraded to ICU.  6/4 downgraded to the floor but became more hypoxic and transferred back to ICU.    24 Hour Interval History:  Patient continues to have intermittent episodes of agitation. Labs this AM: WBC 12k (likely d/t steroids),  H/H 7.7/25.2, corrected calcium 9.3, AST 64.     Past Medical History:   Diagnosis Date    Allergies     CHF (congestive heart failure)     Coronary artery disease     Essential (primary) hypertension     Fatigue     GERD (gastroesophageal reflux disease)     HLD (hyperlipidemia)     Personal history of colonic polyps     SOB (shortness of breath) on exertion     Stroke     Tobacco abuse     Weakness of both legs        Past Surgical History:   Procedure Laterality Date    COLONOSCOPY W/ BIOPSIES AND POLYPECTOMY  01/20/2020    Dr. Alf Covington    CORONARY ARTERY BYPASS GRAFT (CABG) N/A 6/1/2023    Procedure: CORONARY ARTERY BYPASS GRAFT (CABG);  Surgeon: Xochitl Grossman MD;  Location: [facility];  Service: Cardiothoracic;  Laterality: N/A;    ENDOSCOPY  01/20/2020    ESOPHAGOGASTRODUODENOSCOPY      PLANTAR'S WART EXCISION  07/17/2017       Social History     Socioeconomic History    Marital status:     Number of children: 4   Occupational History    Occupation:  Disabled   Tobacco Use    Smoking status: Every Day     Packs/day: 1.00     Types: Cigarettes    Smokeless tobacco: Never   Substance and Sexual Activity    Alcohol use: Yes     Comment: Beer 6pk daily    Drug use: Never     Social Determinants of Health     Food Insecurity: Unknown    Worried About Running Out of Food in the Last Year: Never true   Transportation Needs: Unknown    Lack of Transportation (Medical): No   Housing Stability: Unknown    Unable to Pay for Housing in the Last Year: No       Current Outpatient Medications   Medication Instructions    albuterol (VENTOLIN HFA) 90 mcg/actuation inhaler 1 puff, Inhalation, Every 12 hours PRN, Rescue    amLODIPine (NORVASC) 10 mg, Oral, Daily    aspirin (ECOTRIN) 81 mg, Oral, Daily    atorvastatin (LIPITOR) 80 mg, Oral, Nightly    baclofen (LIORESAL) 10 mg, Oral, 3 times daily PRN    cetirizine 10 mg, Oral, Daily    clopidogreL (PLAVIX) 75 mg, Oral    ENTRESTO 49-51 mg per tablet 1 tablet, Oral, 2 times daily    fluticasone propionate (FLONASE) 50 mcg, Each Nostril, Daily    furosemide (LASIX) 20 mg, Oral, Daily    ketoconazole (NIZORAL) 2 % cream Topical (Top), 2 times daily    losartan (COZAAR) 50 mg, Oral, Daily    lovastatin (MEVACOR) 10 mg, Oral, Nightly    metoprolol succinate (TOPROL-XL) 100 mg, Oral, Daily    multivitamin capsule 1 capsule, Oral, Daily    omeprazole (PRILOSEC) 20 mg, Oral, Daily    sildenafiL (VIAGRA) 100 mg, Oral, As needed (PRN)    spironolactone (ALDACTONE) 25 mg, Oral, Daily       Current Inpatient Medications   albuterol sulfate  2.5 mg Nebulization Q6H    aspirin  81 mg Oral Daily    dexAMETHasone  4 mg Intravenous Q12H    docusate sodium  100 mg Oral BID    enoxparin  40 mg Subcutaneous Q24H (prophylaxis, 1700)    famotidine (PF)  20 mg Intravenous Q12H    fluticasone propionate  2 spray Each Nostril Daily    folic acid  1 mg Oral Daily    ipratropium  0.5 mg Nebulization Q6H    metoprolol tartrate  12.5 mg Oral BID     piperacillin-tazobactam (Zosyn) IV (PEDS and ADULTS) (extended infusion is not appropriate)  4.5 g Intravenous Q8H    potassium chloride in water  20 mEq Intravenous Once    potassium chloride  10 mEq Oral Once    sucralfate  1 g Oral QID (AC & HS)       Current Intravenous Infusions   loperamide         Review of Systems   Unable to perform ROS: Medical condition      Objective:       Intake/Output Summary (Last 24 hours) at 6/7/2023 0346  Last data filed at 6/6/2023 2000  Gross per 24 hour   Intake 273.17 ml   Output 600 ml   Net -326.83 ml       Vital Signs (Most Recent):  Temp: 98.5 °F (36.9 °C) (06/07/23 0000)  Pulse: 102 (06/07/23 0300)  Resp: (!) 26 (06/07/23 0300)  BP: (!) 150/105 (06/07/23 0300)  SpO2: (!) 93 % (06/07/23 0300)  Body mass index is 27.99 kg/m².  Weight: 88.5 kg (195 lb 1.7 oz) Vital Signs (24h Range):  Temp:  [97.5 °F (36.4 °C)-99.5 °F (37.5 °C)] 98.5 °F (36.9 °C)  Pulse:  [] 102  Resp:  [18-36] 26  SpO2:  [87 %-100 %] 93 %  BP: ()/() 150/105     Physical Exam  General: Confused   HEENT: NC/AT; PERRL; nasal and oral mucosa moist and clear  Pulm: Rhonchi in upper lobes bilaterally, normal work of breathing on BiPAP  CV: S1, S2 w/o murmurs or gallops; no edema noted  GI: Bowel sound present in all quadrants  Neuro: Confused  Psych: Intermittent agitation    Lines/Drains/Airways       Drain  Duration             Male External Urinary Catheter 06/03/23 0000 4 days              Line  Duration                  Pacer Wires 06/01/23 1300 5 days              Peripheral Intravenous Line  Duration                  Peripheral IV - Single Lumen 06/03/23 0420 18 G Anterior;Left Forearm 3 days                    Significant Labs:    Lab Results   Component Value Date    WBC 12.01 (H) 06/07/2023    HGB 7.7 (L) 06/07/2023    HCT 25.2 (L) 06/07/2023    MCV 62.7 (L) 06/07/2023     06/07/2023         BMP  Lab Results   Component Value Date     06/07/2023    K 4.5 06/07/2023     CHLORIDE 104 06/07/2023    CO2 24 06/07/2023    BUN 18.4 06/07/2023    CREATININE 0.98 06/07/2023    CALCIUM 7.9 (L) 06/07/2023    AGAP 8.0 06/04/2023    EGFRNONAA 86 02/09/2022       ABG  Recent Labs   Lab 06/01/23  1113 06/01/23  1338 06/06/23  0750   PH 7.425   < > 7.490*   PO2 404*   < > 70.0*   PCO2 43.4  --   --    HCO3 28.5*   < > 27.4   BE 4  --   --     < > = values in this interval not displayed.         Significant Imaging:  I have reviewed the pertinent imaging within the past 24 hours.  Chest x-ray today by my review has extensive right-sided infiltrate.  Also has infiltrate in the left mid lung field but less extensive than the right side.      Assessment/Plan:     Assessment  Status post CABG x3 on 06/01/2023.  LIMA to the LAD, saphenous vein graft to OM1 and PDA.  Heart failure with reduced ejection fraction-EF 20-25%  Suspected aspiration event with associated hypoxemic respiratory failure  Delirium postop possibly ICU related    Plan  -Continue ICU level of care for ongoing monitoring   -Continue current therapy and monitor patient's respiratory status     39 minutes of critical care was time spent personally by me on the following activities: development of treatment plan with patient or surrogate and bedside caregivers, discussions with consultants, evaluation of patient's response to treatment, examination of patient, ordering and performing treatments and interventions, ordering and review of laboratory studies, ordering and review of radiographic studies, pulse oximetry, re-evaluation of patient's condition.  This critical care time did not overlap with that of any other provider or involve time for any procedures.    I have personally seen and examined this patient on 6/7/2023. - MD Arianna Macias, DO  Pulmonary Critical Care Medicine  [facility] - [facility]

## 2023-06-07 NOTE — NURSING
Nurses Note -- 4 Eyes      6/7/2023   5:37 PM      Skin assessed during: Q Shift Change      [] No Altered Skin Integrity Present    []Prevention Measures Documented      [x] Yes- Altered Skin Integrity Present or Discovered   [] LDA Added if Not in Epic (Describe Wound)   [] New Altered Skin Integrity was Present on Admit and Documented in LDA   [] Wound Image Taken    Wound Care Consulted? No    Attending Nurse:  Sarah Townsend RN     Second RN/Staff Member:  Randy Valentine RN

## 2023-06-07 NOTE — PROGRESS NOTES
Bronchoscopy performed per endotracheal tube.  Scope was inserted into the trachea which appeared normal.  The tej was sharp.  All segmental and subsegmental bronchi were then visualized and noted to be patent.  There was small to moderate amount of thick mucus.  BAL was performed with 30 cc of saline into the right upper lobe and will be sent for routine culture.  Patient tolerated well.  He was sedated with propofol on mechanical ventilation during the procedure.    I have personally performed the above procedure on this patient on 6/7/2023.

## 2023-06-07 NOTE — PT/OT/SLP PROGRESS
Occupational Therapy      Patient Name:  Tom Anna   MRN:  [id number]    Patient now requiring Bipap and agitated with ICU delirium, RN requesting CATALINA to differ session at this time. Will follow-up tomorrow as appropriate.      @ 1114 patient now intubated. Evaluating OT will follow up as appropriate.   6/7/2023

## 2023-06-08 NOTE — NURSING
Nurses Note -- 4 Eyes      6/8/2023   11:18 AM      Skin assessed during: Daily Assessment      [x] No Altered Skin Integrity Present    []Prevention Measures Documented      [] Yes- Altered Skin Integrity Present or Discovered   [] LDA Added if Not in Epic (Describe Wound)   [] New Altered Skin Integrity was Present on Admit and Documented in LDA   [] Wound Image Taken    Wound Care Consulted? No    Attending Nurse:  Benny Saleh RN     Second RN/Staff Member:  MEI Glynn

## 2023-06-08 NOTE — PROGRESS NOTES
[facility] - [facility]  Pulmonary Critical Care Note    Patient Name: Tom Anna  MRN: [id number]  Admission Date: 6/1/2023  Hospital Length of Stay: 7 days  Code Status: Full Code  Attending Provider: REILLY Saunders MD  Primary Care Provider: Primary Doctor No     Subjective:     HPI:   67-year-old with a history of severe coronary artery disease mitral regurgitation, and ejection fraction 20-25% underwent CABG yesterday by Dr. Grossman.  Per Dr. Felix's notes CABG x3 with a LIMA to the LAD saphenous vein graft to OM1 and PDA.  Left atrial appendage was clipped with size 45 atrial clip.    Hospital Course/Significant events:  6/2 increased O2 requirements suspected aspiration placed on BiPAP and reupgraded to ICU.  6/4 downgraded to the floor but became more hypoxic and transferred back to ICU.    24 Hour Interval History:  No acute events overnight. Current drips: propofol at 60, precedex at 1.4, levophed at 0.07, milrinone at 0.375. Labs this AM: WBC 10.46k, H/H 7.2/23.1, creatinine 1.52, BUN 34.4, corrected calcium 8.9.     Past Medical History:   Diagnosis Date    Allergies     CHF (congestive heart failure)     Coronary artery disease     Essential (primary) hypertension     Fatigue     GERD (gastroesophageal reflux disease)     HLD (hyperlipidemia)     Personal history of colonic polyps     SOB (shortness of breath) on exertion     Stroke     Tobacco abuse     Weakness of both legs        Past Surgical History:   Procedure Laterality Date    COLONOSCOPY W/ BIOPSIES AND POLYPECTOMY  01/20/2020    Dr. Alf Covington    CORONARY ARTERY BYPASS GRAFT (CABG) N/A 6/1/2023    Procedure: CORONARY ARTERY BYPASS GRAFT (CABG);  Surgeon: Xochitl Grossman MD;  Location: [facility];  Service: Cardiothoracic;  Laterality: N/A;    ENDOSCOPY  01/20/2020    ESOPHAGOGASTRODUODENOSCOPY      PLANTAR'S WART EXCISION  07/17/2017       Social History     Socioeconomic History    Marital status:     Number of  children: 4   Occupational History    Occupation: Disabled   Tobacco Use    Smoking status: Every Day     Packs/day: 1.00     Types: Cigarettes    Smokeless tobacco: Never   Substance and Sexual Activity    Alcohol use: Yes     Comment: Beer 6pk daily    Drug use: Never     Social Determinants of Health     Food Insecurity: Unknown    Worried About Running Out of Food in the Last Year: Never true   Transportation Needs: Unknown    Lack of Transportation (Medical): No   Housing Stability: Unknown    Unable to Pay for Housing in the Last Year: No       Current Outpatient Medications   Medication Instructions    albuterol (VENTOLIN HFA) 90 mcg/actuation inhaler 1 puff, Inhalation, Every 12 hours PRN, Rescue    amLODIPine (NORVASC) 10 mg, Oral, Daily    aspirin (ECOTRIN) 81 mg, Oral, Daily    atorvastatin (LIPITOR) 80 mg, Oral, Nightly    baclofen (LIORESAL) 10 mg, Oral, 3 times daily PRN    cetirizine 10 mg, Oral, Daily    clopidogreL (PLAVIX) 75 mg, Oral    ENTRESTO 49-51 mg per tablet 1 tablet, Oral, 2 times daily    fluticasone propionate (FLONASE) 50 mcg, Each Nostril, Daily    furosemide (LASIX) 20 mg, Oral, Daily    ketoconazole (NIZORAL) 2 % cream Topical (Top), 2 times daily    losartan (COZAAR) 50 mg, Oral, Daily    lovastatin (MEVACOR) 10 mg, Oral, Nightly    metoprolol succinate (TOPROL-XL) 100 mg, Oral, Daily    multivitamin capsule 1 capsule, Oral, Daily    omeprazole (PRILOSEC) 20 mg, Oral, Daily    sildenafiL (VIAGRA) 100 mg, Oral, As needed (PRN)    spironolactone (ALDACTONE) 25 mg, Oral, Daily       Current Inpatient Medications   albuterol sulfate  2.5 mg Nebulization Q6H    aspirin  81 mg Oral Daily    dexAMETHasone  4 mg Intravenous Q12H    docusate sodium  100 mg Oral BID    enoxparin  40 mg Subcutaneous Q24H (prophylaxis, 1700)    etomidate  20 mg Intravenous Once    famotidine (PF)  20 mg Intravenous Q12H    fluticasone propionate  2 spray Each Nostril Daily    folic acid  1 mg Oral Daily     ipratropium  0.5 mg Nebulization Q6H    metoprolol tartrate  12.5 mg Oral BID    perflutren lipid microspheres  1.3 mL Intravenous Once    piperacillin-tazobactam (Zosyn) IV (PEDS and ADULTS) (extended infusion is not appropriate)  4.5 g Intravenous Q8H    potassium chloride in water  20 mEq Intravenous Once    potassium chloride  10 mEq Oral Once    sucralfate  1 g Oral QID (AC & HS)       Current Intravenous Infusions   dexmedeTOMIDine (Precedex) infusion (titrating) 1.4 mcg/kg/hr (06/08/23 0256)    loperamide      milrinone 0.375 mcg/kg/min (06/08/23 0008)    NORepinephrine bitartrate-D5W 0.1 mcg/kg/min (06/07/23 1615)    propofoL 50 mcg/kg/min (06/08/23 0256)       Review of Systems   Reason unable to perform ROS: Sedated and intubated.      Objective:       Intake/Output Summary (Last 24 hours) at 6/8/2023 0439  Last data filed at 6/7/2023 1600  Gross per 24 hour   Intake 685.23 ml   Output 431 ml   Net 254.23 ml       Vital Signs (Most Recent):  Temp: 98.1 °F (36.7 °C) (06/08/23 0400)  Pulse: 80 (06/08/23 0426)  Resp: (!) 31 (06/08/23 0426)  BP: (!) 149/64 (06/08/23 0415)  SpO2: 100 % (06/08/23 0426)  Body mass index is 27.99 kg/m².  Weight: 88.5 kg (195 lb 1.7 oz) Vital Signs (24h Range):  Temp:  [98.1 °F (36.7 °C)-98.5 °F (36.9 °C)] 98.1 °F (36.7 °C)  Pulse:  [] 80  Resp:  [11-44] 31  SpO2:  [45 %-100 %] 100 %  BP: ()/(45-99) 149/64  Arterial Line BP: ()/() 137/57     Physical Exam  General: Sedated and intubated  HEENT: NC/AT; PERRL; nasal and oral mucosa moist and clear; ET tube in place  Pulm: Corase in upper lobes bilaterally  CV: S1, S2 w/o murmurs or gallops; no edema noted  GI: Bowel sound present in all quadrants  Neuro: Limited d/t sedation    Lines/Drains/Airways       Peripherally Inserted Central Catheter Line  Duration             PICC Triple Lumen 06/07/23 2318 left brachial <1 day              Drain  Duration                  NG/OG Tube 06/07/23 1130 [facility]  mouth <1 day         Urethral Catheter 06/07/23 1130 Latex 16 Fr. <1 day              Airway  Duration                  Airway - Non-Surgical 06/07/23 1107 Endotracheal Tube <1 day              Arterial Line  Duration             Arterial Line 06/07/23 1630 Right Brachial <1 day              Line  Duration                  Pacer Wires 06/01/23 1300 6 days              Peripheral Intravenous Line  Duration                  Peripheral IV - Single Lumen 06/07/23 0250 20 G Anterior;Left Upper Arm 1 day         Peripheral IV - Single Lumen 06/07/23 1500 20 G Left;Posterior Hand <1 day         Peripheral IV - Single Lumen 06/07/23 1544 20 G Anterior;Right Forearm <1 day                    Significant Labs:    Lab Results   Component Value Date    WBC 10.46 06/08/2023    HGB 7.2 (L) 06/08/2023    HCT 23.1 (L) 06/08/2023    MCV 62.8 (L) 06/08/2023     06/08/2023         BMP  Lab Results   Component Value Date     (L) 06/08/2023    K 4.4 06/08/2023    CHLORIDE 103 06/08/2023    CO2 25 06/08/2023    BUN 34.3 (H) 06/08/2023    CREATININE 1.52 (H) 06/08/2023    CALCIUM 7.5 (L) 06/08/2023    AGAP 8.0 06/04/2023    EGFRNONAA 86 02/09/2022       ABG  Recent Labs   Lab 06/01/23  1113 06/01/23  1338 06/07/23  1225   PH 7.425   < > 7.310*   PO2 404*   < > 89.0   PCO2 43.4  --   --    HCO3 28.5*   < > 24.2   BE 4  --   --     < > = values in this interval not displayed.         Significant Imaging:  I have reviewed the pertinent imaging within the past 24 hours.  Chest x-ray today by my review has extensive right-sided infiltrate.  Also has infiltrate in the left mid lung field but less extensive than the right side.      Assessment/Plan:     Assessment  Status post CABG x3 on 06/01/2023.  LIMA to the LAD, saphenous vein graft to OM1 and PDA.  Heart failure with reduced ejection fraction-EF 20-25%  Suspected aspiration event with associated hypoxemic respiratory failure  Delirium postop possibly ICU  related    Plan  -Continue ICU level of care for ongoing monitoring   -CT surgery team following  -Will obtain type and screen as H/H are trending down; will prepare 1 unit PRBC for possible transfusion  -Will trend daily ABG and adjust vent as needed     39 minutes of critical care was time spent personally by me on the following activities: development of treatment plan with patient or surrogate and bedside caregivers, discussions with consultants, evaluation of patient's response to treatment, examination of patient, ordering and performing treatments and interventions, ordering and review of laboratory studies, ordering and review of radiographic studies, pulse oximetry, re-evaluation of patient's condition.  This critical care time did not overlap with that of any other provider or involve time for any procedures.     Arianna Peters, DO  Pulmonary Critical Care Medicine  [facility] - [address] [place] [address]

## 2023-06-08 NOTE — PROGRESS NOTES
Inpatient Nutrition Assessment    Admit Date: 6/1/2023   Total duration of encounter: 7 days     Nutrition Recommendation/Prescription     Start tube feeding when appropriate.  Tube feeding recommendation:   Impact Peptide 1.5 goal rate 50 ml/hr to provide  1500 kcal/d (77% est needs, 112% with meds)  94 g protein/d (71% est needs)  770 ml free water/d (40% est needs)  (calculations based on estimated 20 hr/d run time)     Communication of Recommendations: reviewed with nurse    Nutrition Assessment     Malnutrition Assessment/Nutrition-Focused Physical Exam    Malnutrition Context: other (see comments) (does not meet criteria)     Energy Intake (Malnutrition): other (see comments) (does not meet criteria)  Weight Loss (Malnutrition): other (see comments) (unable to obtain)  Subcutaneous Fat (Malnutrition): other (see comments) (does not meet criteria)           Muscle Mass (Malnutrition): other (see comments) (does not meet criteria)                          Fluid Accumulation (Malnutrition): other (see comments) (does not meet criteria)        A minimum of two characteristics is recommended for diagnosis of either severe or non-severe malnutrition.    Chart Review    Reason Seen: continuous nutrition monitoring and length of stay    Malnutrition Screening Tool Results   Have you recently lost weight without trying?: No  Have you been eating poorly because of a decreased appetite?: No   MST Score: 0     Diagnosis:  Status post CABG x3 on 06/01/2023.   Heart failure with reduced ejection fraction-EF 20-25%  Suspected aspiration event with associated hypoxemic respiratory failure  Delirium postop possibly ICU related    Relevant Medical History: CHF, CAD, HTN, GERD, HLD    Nutrition-Related Medications: levophed @ 0.02mcg/kg/min, diprivan, decadron, docusate, folic acid  Calorie Containing IV Medications: Diprivan @ 26 ml/hr (provides 680 kcal/d)    Nutrition-Related Labs:  6/7 Glu 144  6/8 Na 134, Glu 185, Mg  "2.7    Diet/PN Order: Diet NPO  Oral Supplement Order: none  Tube Feeding Order: none  Appetite/Oral Intake: not applicable/not applicable  Factors Affecting Nutritional Intake: on mechanical ventilation  Food/Episcopal/Cultural Preferences: unable to obtain  Food Allergies: none reported    Skin Integrity: drain/device(s), incision  Wound(s):   noted    Comments    23: Noted good po intake of meals documented previously. At time of RD visit pt actively being intubated. Will provide tube feeding recommendatios in case needed.     23: Tube feeding to start today. Discussed with RN. Receiving kcal from meds. Updated TF recs.    Anthropometrics    Height: 5' 10" (177.8 cm) Height Method: Stated  Last Weight: 88.5 kg (195 lb 1.7 oz) (23 0400) Weight Method: Standard Scale  BMI (Calculated): 28  BMI Classification: overweight (BMI 25-29.9)        Ideal Body Weight (IBW), Male: 166 lb     % Ideal Body Weight, Male (lb): 114.88 %                          Usual Weight Provided By: EMR weight history    Wt Readings from Last 5 Encounters:   23 88.5 kg (195 lb 1.7 oz)   23 85.7 kg (189 lb)   23 92.6 kg (204 lb 3.2 oz)   22 90.4 kg (199 lb 6.4 oz)   10/14/21 91.1 kg (200 lb 13.4 oz)     Weight Change(s) Since Admission:  Admit Weight: 88.5 kg (195 lb) (23 1433)  23: no new    Estimated Needs    Weight Used For Calorie Calculations: 88.5 kg (195 lb 1.7 oz)  Energy Calorie Requirements (kcal): 1942kcal  Energy Need Method: [place]  Weight Used For Protein Calculations: 88.5 kg (195 lb 1.7 oz)  Protein Requirements: 133gm (1.5g/kg)  Fluid Requirements (mL): 1942ml (1ml.kcal)  Temp (24hrs), Av.3 °F (36.8 °C), Min:98 °F (36.7 °C), Max:98.5 °F (36.9 °C)    Vtot (L/Min) for [place] Equation Calculation: 9.1    Enteral Nutrition    Patient not receiving enteral nutrition at this time.    Parenteral Nutrition    Patient not receiving parenteral nutrition support at this " time.    Evaluation of Received Nutrient Intake    Calories: not meeting estimated needs  Protein: not meeting estimated needs    Patient Education    Not applicable.    Nutrition Diagnosis     PES: Inadequate oral intake related to acute illness as evidenced by intubation since 6/7/23. (continues)    Interventions/Goals     Intervention(s): modified composition of enteral nutrition, modified rate of enteral nutrition, and collaboration with other providers  Goal: Meet greater than 75% of nutritional needs by follow-up. (goal progressing)    Monitoring & Evaluation     Dietitian will monitor energy intake.  Nutrition Risk/Follow-Up: high (follow-up in 1-4 days)   Please consult if re-assessment needed sooner.

## 2023-06-08 NOTE — PROGRESS NOTES
CT SURGERY PROGRESS NOTE  Tom Anna  67 y.o.  1955    Patients Procedure: Procedure(s) (LRB):  CORONARY ARTERY BYPASS GRAFT (CABG) (N/A)    Subjective  Interval History: reintubated yesrday for resp distress    ROS    Medication List  Infusions   dexmedeTOMIDine (Precedex) infusion (titrating) 1.4 mcg/kg/hr (06/08/23 0537)    loperamide      milrinone 0.375 mcg/kg/min (06/08/23 0713)    NORepinephrine bitartrate-D5W 0.02 mcg/kg/min (06/08/23 0831)    propofoL 50 mcg/kg/min (06/08/23 0808)     Scheduled   albuterol sulfate  2.5 mg Nebulization Q6H    aspirin  81 mg Oral Daily    dexAMETHasone  4 mg Intravenous Q12H    docusate sodium  100 mg Oral BID    enoxparin  40 mg Subcutaneous Q24H (prophylaxis, 1700)    etomidate  20 mg Intravenous Once    famotidine (PF)  20 mg Intravenous Q12H    fluticasone propionate  2 spray Each Nostril Daily    folic acid  1 mg Oral Daily    ipratropium  0.5 mg Nebulization Q6H    metoprolol tartrate  12.5 mg Oral BID    perflutren lipid microspheres  1.3 mL Intravenous Once    piperacillin-tazobactam (Zosyn) IV (PEDS and ADULTS) (extended infusion is not appropriate)  4.5 g Intravenous Q8H    potassium chloride in water  20 mEq Intravenous Once    potassium chloride  10 mEq Oral Once    sucralfate  1 g Oral QID (AC & HS)       Objective:  Recent Vitals:  Temp:  [98 °F (36.7 °C)-98.5 °F (36.9 °C)] 98 °F (36.7 °C)  Pulse:  [] 82  Resp:  [10-44] 35  SpO2:  [86 %-100 %] 100 %  BP: ()/(45-93) 138/72  Arterial Line BP: ()/() 141/58    Physical Exam     I/O last 24 hrs:  Intake/Output - Last 3 Shifts         06/06 0700  06/07 0659 06/07 0700  06/08 0659 06/08 0700  06/09 0659    I.V. (mL/kg) 23.2 (0.3) 1436.2 (16.2)     IV Piggyback 750 237     Total Intake(mL/kg) 773.2 (8.7) 1673.2 (18.9)     Urine (mL/kg/hr) 600 (0.3) 930 (0.4) 250 (1.7)    Drains  150     Stool 2 0     Chest Tube       Total Output 602 1080 250    Net +171.2 +593.2 -250           Urine  Occurrence 1 x      Stool Occurrence  1 x             Labs  ABGs:   Recent Labs   Lab 06/08/23 0622   PH 7.490*   PO2 119.0*   HCO3 28.2     BMP:   Recent Labs   Lab 06/08/23 0131   *   K 4.4   CO2 25   BUN 34.3*   CREATININE 1.52*   CALCIUM 7.5*   MG 2.70*     CBC:   Recent Labs   Lab 06/08/23 0131   WBC 10.46   RBC 3.68*   HGB 7.2*   HCT 23.1*      MCV 62.8*   MCH 19.6*   MCHC 31.2*     CMP:   Recent Labs   Lab 06/08/23 0131   CALCIUM 7.5*   ALBUMIN 2.2*   *   K 4.4   CO2 25   BUN 34.3*   CREATININE 1.52*   ALKPHOS 52   *   *   BILITOT 1.1     Coagulation: No results for input(s): PT, INR, APTT in the last 48 hours.      Imaging:   CXR: X-Ray Chest 1 View    Result Date: 6/7/2023  Interval intubation. Electronically signed by: Jed Grover Date:    06/07/2023 Time:    11:42         ASSESSMENT/PLAN:    Vent mgt  Sedated  No new issues related to CABG    Case and plan of care discussed with MD Cleve Ham PA-C

## 2023-06-08 NOTE — PROCEDURES
"Tom Anna is a 67 y.o. male patient.    Temp: 98.5 °F (36.9 °C) (06/07/23 2000)  Pulse: 84 (06/07/23 2245)  Resp: (!) 23 (06/07/23 2245)  BP: 118/68 (06/07/23 2245)  SpO2: 96 % (06/07/23 2245)  Weight: 88.5 kg (195 lb 1.7 oz) (06/05/23 0400)  Height: 5' 10" (177.8 cm) (06/07/23 1325)    PICC  Date/Time: 6/7/2023 11:18 PM  Performed by: Aman Bueno RN  Consent Done: Yes  Time out: Immediately prior to procedure a time out was called to verify the correct patient, procedure, equipment, support staff and site/side marked as required  Indications: vascular access  Anesthesia: local infiltration  Local anesthetic: lidocaine 1% without epinephrine  Anesthetic Total (mL): 3  Description of findings: PICC  Preparation: skin prepped with ChloraPrep  Skin prep agent dried: skin prep agent completely dried prior to procedure  Sterile barriers: all five maximum sterile barriers used - cap, mask, sterile gown, sterile gloves, and large sterile sheet  Hand hygiene: hand hygiene performed prior to central venous catheter insertion  Location details: left brachial  Catheter type: triple lumen  Catheter size: 5 Fr  Catheter Length: 41cm    Ultrasound guidance: yes  Vessel Caliber: medium and patent, compressibility normal  Needle advanced into vessel with real time Ultrasound guidance.  Guidewire confirmed in vessel.  Sterile sheath used.  Number of attempts: 1  Post-procedure: blood return through all ports, chlorhexidine patch and sterile dressing applied    Assessment: placement verified by x-ray, tip termination and successful placement        Name JEANETTE Bueno RN [facility]  6/7/2023    "

## 2023-06-08 NOTE — PT/OT/SLP PROGRESS
Physical Therapy    Missed Treatment Session    Patient Name:  Tom Anna   MRN:  [id number]      Patient not seen at this time secondary to remains intubated and sedated.    Will follow-up as patient is available to participate and as therapists' schedule allows.

## 2023-06-08 NOTE — PT/OT/SLP PROGRESS
Occupational Therapy      Patient Name:  Tom Anna   MRN:  [id number]    Pt with decline in medical status. He is now intubated/sedated and unable to participate in OT treatment. Will place OT services on hold at this time. Please reconsult if/when appropriate to resume. POC discussed with RN.    6/8/2023

## 2023-06-09 NOTE — NURSING
Nurses Note -- 4 Eyes      6/9/2023   5:39 PM      Skin assessed during: Daily Assessment      [x] No Altered Skin Integrity Present    [x]Prevention Measures Documented      [] Yes- Altered Skin Integrity Present or Discovered   [] LDA Added if Not in Epic (Describe Wound)   [] New Altered Skin Integrity was Present on Admit and Documented in LDA   [] Wound Image Taken    Wound Care Consulted? No    Attending Nurse:  Sarah Beth Bell RN     Second RN/Staff Member:  MEI Gaines

## 2023-06-09 NOTE — PROGRESS NOTES
Tom Anna is a 67 y.o. male patient.   1. Coronary artery disease    2. CAD (coronary artery disease)    3. S/P CABG x 3    4. Respiratory distress    5. QT prolongation    6. Pulse irregularity    7. Hx of CABG    8. Gaze palsy    9. Hypertension, unspecified type    10. Gastroesophageal reflux disease, unspecified whether esophagitis present      Past Medical History:   Diagnosis Date    Allergies     CHF (congestive heart failure)     Coronary artery disease     Essential (primary) hypertension     Fatigue     GERD (gastroesophageal reflux disease)     HLD (hyperlipidemia)     Personal history of colonic polyps     SOB (shortness of breath) on exertion     Stroke     Tobacco abuse     Weakness of both legs      No past surgical history pertinent negatives on file.  Scheduled Meds:   albuterol sulfate  2.5 mg Nebulization Q6H    aspirin  81 mg Per OG tube Daily    dexAMETHasone  4 mg Intravenous Q12H    docusate  100 mg Per G Tube Daily    enoxparin  40 mg Subcutaneous Q24H (prophylaxis, 1700)    etomidate  20 mg Intravenous Once    famotidine (PF)  20 mg Intravenous Q12H    fluticasone propionate  2 spray Each Nostril Daily    folic acid  1 mg Per OG tube Daily    ipratropium  0.5 mg Nebulization Q6H    metoprolol tartrate  12.5 mg Per OG tube BID    perflutren lipid microspheres  1.3 mL Intravenous Once    piperacillin-tazobactam (Zosyn) IV (PEDS and ADULTS) (extended infusion is not appropriate)  4.5 g Intravenous Q8H    potassium chloride in water  20 mEq Intravenous Once    potassium chloride  10 mEq Oral Once     Continuous Infusions:   dexmedeTOMIDine (Precedex) infusion (titrating) 1.2 mcg/kg/hr (06/09/23 0904)    loperamide      milrinone 0.375 mcg/kg/min (06/09/23 0420)    NORepinephrine bitartrate-D5W 0.03 mcg/kg/min (06/09/23 0142)    propofoL 40 mcg/kg/min (06/09/23 0904)     PRN Meds:sodium chloride, sodium chloride, acetaminophen, baclofen, dextrose 10%, dextrose 10%, glucagon (human  "recombinant), glucose, glucose, HYDROcodone-acetaminophen, insulin aspart U-100, lactulose 10 gram/15 ml, loperamide, metoclopramide HCl, ondansetron, oxyCODONE    Review of patient's allergies indicates:   Allergen Reactions    Ace inhibitors Swelling     Facial edema     There are no hospital problems to display for this patient.    Blood pressure 110/75, pulse 86, temperature 99 °F (37.2 °C), temperature source Oral, resp. rate (!) 30, height 5' 10" (1.778 m), weight 88.5 kg (195 lb 1.7 oz), SpO2 100 %.    Subjective:    POD #8  Intubated. Sedated  Off primacor, weaning levo     Objective:   AFVSS.   Heart: RRR  Lungs: bilateral rhonchi  Incision: c/d/I  H/h: 10/32  Cr: 1.12  K: 3.7     Assesment/Plan:    S/p CAB  - needing volume, transfuse 2 units prbc  - continue care        JONNY Seo  6/9/2023    "

## 2023-06-09 NOTE — PROGRESS NOTES
[facility] - [facility] ICU  Pulmonary Critical Care Note    Patient Name: Tom Anna  MRN: [id number]  Admission Date: 6/1/2023  Hospital Length of Stay: 8 days  Code Status: Full Code  Attending Provider: REILLY Saunders MD  Primary Care Provider: Primary Doctor No     Subjective:     HPI:   67-year-old with a history of severe coronary artery disease mitral regurgitation, and ejection fraction 20-25% underwent CABG by Dr. Grossman on 6/1/2023.  Per Dr. Felix's notes CABG x3 with a LIMA to the LAD saphenous vein graft to OM1 and PDA.  Left atrial appendage was clipped with size 45 atrial clip.    Hospital Course/Significant events:  6/2 - Increased O2 requirements suspected aspiration placed on BiPAP and reupgraded to ICU.  6/4 - Downgraded to the floor but became more hypoxic and transferred back to ICU.  6/7 - Intubated    24 Hour Interval History:  No acute events overnight. Current drips: propofol at 40, precedex at 1.2, levophed at 0.01, milrinone at 0.375. Awaiting morning labs.     Past Medical History:   Diagnosis Date    Allergies     CHF (congestive heart failure)     Coronary artery disease     Essential (primary) hypertension     Fatigue     GERD (gastroesophageal reflux disease)     HLD (hyperlipidemia)     Personal history of colonic polyps     SOB (shortness of breath) on exertion     Stroke     Tobacco abuse     Weakness of both legs        Past Surgical History:   Procedure Laterality Date    COLONOSCOPY W/ BIOPSIES AND POLYPECTOMY  01/20/2020    Dr. Alf Covington    CORONARY ARTERY BYPASS GRAFT (CABG) N/A 6/1/2023    Procedure: CORONARY ARTERY BYPASS GRAFT (CABG);  Surgeon: Xochitl Grossman MD;  Location: [facility];  Service: Cardiothoracic;  Laterality: N/A;    ENDOSCOPY  01/20/2020    ESOPHAGOGASTRODUODENOSCOPY      PLANTAR'S WART EXCISION  07/17/2017       Social History     Socioeconomic History    Marital status:     Number of children: 4   Occupational History     Occupation: Disabled   Tobacco Use    Smoking status: Every Day     Packs/day: 1.00     Types: Cigarettes    Smokeless tobacco: Never   Substance and Sexual Activity    Alcohol use: Yes     Comment: Beer 6pk daily    Drug use: Never     Social Determinants of Health     Food Insecurity: Unknown    Worried About Running Out of Food in the Last Year: Never true   Transportation Needs: Unknown    Lack of Transportation (Medical): No   Housing Stability: Unknown    Unable to Pay for Housing in the Last Year: No       Current Outpatient Medications   Medication Instructions    albuterol (VENTOLIN HFA) 90 mcg/actuation inhaler 1 puff, Inhalation, Every 12 hours PRN, Rescue    amLODIPine (NORVASC) 10 mg, Oral, Daily    aspirin (ECOTRIN) 81 mg, Oral, Daily    atorvastatin (LIPITOR) 80 mg, Oral, Nightly    baclofen (LIORESAL) 10 mg, Oral, 3 times daily PRN    cetirizine 10 mg, Oral, Daily    clopidogreL (PLAVIX) 75 mg, Oral    ENTRESTO 49-51 mg per tablet 1 tablet, Oral, 2 times daily    fluticasone propionate (FLONASE) 50 mcg, Each Nostril, Daily    furosemide (LASIX) 20 mg, Oral, Daily    ketoconazole (NIZORAL) 2 % cream Topical (Top), 2 times daily    losartan (COZAAR) 50 mg, Oral, Daily    lovastatin (MEVACOR) 10 mg, Oral, Nightly    metoprolol succinate (TOPROL-XL) 100 mg, Oral, Daily    multivitamin capsule 1 capsule, Oral, Daily    omeprazole (PRILOSEC) 20 mg, Oral, Daily    sildenafiL (VIAGRA) 100 mg, Oral, As needed (PRN)    spironolactone (ALDACTONE) 25 mg, Oral, Daily       Current Inpatient Medications   albuterol sulfate  2.5 mg Nebulization Q6H    aspirin  81 mg Per OG tube Daily    dexAMETHasone  4 mg Intravenous Q12H    docusate  100 mg Per G Tube Daily    enoxparin  40 mg Subcutaneous Q24H (prophylaxis, 1700)    etomidate  20 mg Intravenous Once    famotidine (PF)  20 mg Intravenous Q12H    fluticasone propionate  2 spray Each Nostril Daily    folic acid  1 mg Per OG tube Daily    ipratropium  0.5 mg  Nebulization Q6H    metoprolol tartrate  12.5 mg Per OG tube BID    perflutren lipid microspheres  1.3 mL Intravenous Once    piperacillin-tazobactam (Zosyn) IV (PEDS and ADULTS) (extended infusion is not appropriate)  4.5 g Intravenous Q8H    potassium chloride in water  20 mEq Intravenous Once    potassium chloride  10 mEq Oral Once    sucralfate  1 g Per OG tube QID (AC & HS)       Current Intravenous Infusions   dexmedeTOMIDine (Precedex) infusion (titrating) 1.2 mcg/kg/hr (06/09/23 0125)    loperamide      milrinone 0.375 mcg/kg/min (06/09/23 0420)    NORepinephrine bitartrate-D5W 0.03 mcg/kg/min (06/09/23 0142)    propofoL 40 mcg/kg/min (06/09/23 0124)       Review of Systems   Reason unable to perform ROS: Sedated and intubated.      Objective:       Intake/Output Summary (Last 24 hours) at 6/9/2023 0434  Last data filed at 6/9/2023 0100  Gross per 24 hour   Intake 3232.16 ml   Output 2165 ml   Net 1067.16 ml     Vital Signs (Most Recent):  Temp: 99 °F (37.2 °C) (06/09/23 0400)  Pulse: 88 (06/09/23 0400)  Resp: (!) 21 (06/09/23 0400)  BP: 110/75 (06/09/23 0400)  SpO2: 97 % (06/09/23 0400)  Body mass index is 27.99 kg/m².  Weight: 88.5 kg (195 lb 1.7 oz) Vital Signs (24h Range):  Temp:  [97.6 °F (36.4 °C)-99 °F (37.2 °C)] 99 °F (37.2 °C)  Pulse:  [57-93] 88  Resp:  [10-36] 21  SpO2:  [92 %-100 %] 97 %  BP: ()/(41-91) 110/75  Arterial Line BP: ()/() 121/52     Physical Exam  General: Sedated and intubated  HEENT: NC/AT; PERRL; nasal and oral mucosa moist and clear; ET tube in place  Pulm: Corase in upper lobes bilaterally  CV: S1, S2 w/o murmurs or gallops; no edema noted  GI: Bowel sound present in all quadrants  Neuro: Limited d/t sedation    Lines/Drains/Airways       Peripherally Inserted Central Catheter Line  Duration             PICC Triple Lumen 06/07/23 2318 left brachial 1 day              Drain  Duration                  NG/OG Tube 06/07/23 1130 [facility] mouth 1 day          Urethral Catheter 06/07/23 1130 Latex 16 Fr. 1 day              Airway  Duration                  Airway - Non-Surgical 06/07/23 1107 Endotracheal Tube 1 day              Peripheral Intravenous Line  Duration                  Peripheral IV - Single Lumen 06/07/23 0250 20 G Anterior;Left Upper Arm 2 days         Peripheral IV - Single Lumen 06/07/23 1500 20 G Left;Posterior Hand 1 day         Peripheral IV - Single Lumen 06/07/23 1544 20 G Anterior;Right Forearm 1 day                    Significant Labs:    Lab Results   Component Value Date    WBC 10.46 06/08/2023    HGB 7.2 (L) 06/08/2023    HCT 23.1 (L) 06/08/2023    MCV 62.8 (L) 06/08/2023     06/08/2023         BMP  Lab Results   Component Value Date     (L) 06/08/2023    K 4.4 06/08/2023    CHLORIDE 103 06/08/2023    CO2 25 06/08/2023    BUN 34.3 (H) 06/08/2023    CREATININE 1.52 (H) 06/08/2023    CALCIUM 7.5 (L) 06/08/2023    AGAP 8.0 06/04/2023    EGFRNONAA 86 02/09/2022       ABG  Recent Labs   Lab 06/08/23  0622   PH 7.490*   PO2 119.0*   HCO3 28.2       Significant Imaging:  I have reviewed the pertinent imaging within the past 24 hours.  Chest x-ray today by my review has extensive right-sided infiltrate.  Also has infiltrate in the left mid lung field but less extensive than the right side.      Assessment/Plan:     Assessment  Status post CABG x3 on 06/01/2023.  LIMA to the LAD, saphenous vein graft to OM1 and PDA.  Heart failure with reduced ejection fraction-EF 20-25%  Suspected aspiration event with associated hypoxemic respiratory failure  Delirium postop possibly ICU related    Plan  -Continue ICU level of care for ongoing monitoring   -CT surgery team following  -Continue with SBT and wean from sedation in preparation for extubation  -Transaminitis could be d/t hypoperfusion, will consider liver ultrasound if transaminitis continue to worsen     39 minutes of critical care was time spent personally by me on the following activities:  development of treatment plan with patient or surrogate and bedside caregivers, discussions with consultants, evaluation of patient's response to treatment, examination of patient, ordering and performing treatments and interventions, ordering and review of laboratory studies, ordering and review of radiographic studies, pulse oximetry, re-evaluation of patient's condition.  This critical care time did not overlap with that of any other provider or involve time for any procedures.     Arianna Peters DO  Pulmonary Critical Care Medicine  [facility] - [facility] ICU

## 2023-06-09 NOTE — PT/OT/SLP PROGRESS
Physical Therapy    Missed Treatment Session    Patient Name:  Tom Anna   MRN:  [id number]      Patient remains intubated/sedated, unable to participate in skilled PT sessions. PT to be placed on hold at this time. MD to re-consult pending improvement in pt medical status and ability to participate in skilled interventions.

## 2023-06-09 NOTE — PROCEDURES
"Tom Anna is a 67 y.o. male patient.    Temp: 97.6 °F (36.4 °C) (23)  Pulse: 63 (23)  Resp: (!) 27 (23)  BP: 96/60 (23)  SpO2: (!) 92 % (23)  Weight: 88.5 kg (195 lb 1.7 oz) (23 0400)  Height: 5' 10" (177.8 cm) (23 1325)       Arterial Line    Date/Time: 2023 9:07 PM  Location procedure was performed: [facility] PULMONARY MEDICINE  Performed by: Cathy Lemos MD  Authorized by: Cathy Lemos MD   Pre-op Diagnosis: respiratory failure  Post-operative diagnosis: respiratory failure  Consent Done: Yes  Consent: Verbal consent obtained. Written consent obtained.  Risks and benefits: risks, benefits and alternatives were discussed  Consent given by: patient  Patient understanding: patient states understanding of the procedure being performed  Patient consent: the patient's understanding of the procedure matches consent given  Procedure consent: procedure consent matches procedure scheduled  Relevant documents: relevant documents present and verified  Test results: test results available and properly labeled  Site marked: the operative site was marked  Imaging studies: imaging studies available  Required items: required blood products, implants, devices, and special equipment available  Patient identity confirmed: LAVON MAYORGA, provided demographic data, name and verbally with patient  Time out: Immediately prior to procedure a "time out" was called to verify the correct patient, procedure, equipment, support staff and site/side marked as required.  Preparation: Patient was prepped and draped in the usual sterile fashion.  Indications: multiple ABGs, respiratory failure and hemodynamic monitoring  Location: left radial  Description of findings: patent left radia artery   Pablo's test normal: yes  Needle gauge: 20  Cutdown: cutdown required  Number of attempts: 1  Technical procedures used: US guided seldinger, sterile " technique  Complications: No  Estimated blood loss (mL): 2  Specimens: No  Implants: No  Post-procedure: dressing applied  Post-procedure CMS: normal  Patient tolerance: Patient tolerated the procedure well with no immediate complications      Cathy Lemos MD  [employer] IM PGY II  6/8/2023

## 2023-06-10 NOTE — PROGRESS NOTES
[facility] - [facility]  Pulmonary Critical Care Note    Patient Name: Tom Anna  MRN: [id number]  Admission Date: 6/1/2023  Hospital Length of Stay: 9 days  Code Status: Full Code  Attending Provider: REILLY Saunders MD  Primary Care Provider: Primary Doctor No     Subjective:     HPI:   67-year-old with a history of severe coronary artery disease mitral regurgitation, and ejection fraction 20-25% underwent CABG by Dr. Grossman on 6/1/2023.  Per Dr. Felix's notes CABG x3 with a LIMA to the LAD saphenous vein graft to OM1 and PDA.  Left atrial appendage was clipped with size 45 atrial clip.    Hospital Course/Significant events:  6/2 - Increased O2 requirements suspected aspiration placed on BiPAP and reupgraded to ICU.  6/4 - Downgraded to the floor but became more hypoxic and transferred back to ICU.  6/7 - Intubated    24 Hour Interval History:  No acute events overnight. Current drips: propofol at 40, precedex at 1.2, levophed at 0.01, milrinone at 0.375. Labs this AM: WBC 18.49, H/H 13.9/42.2, bicarb 22, chloride 111, worsening transaminitis. I/O: 1.4 L urine output past 24 hours, net positive 1.1L.     Past Medical History:   Diagnosis Date    Allergies     CHF (congestive heart failure)     Coronary artery disease     Essential (primary) hypertension     Fatigue     GERD (gastroesophageal reflux disease)     HLD (hyperlipidemia)     Personal history of colonic polyps     SOB (shortness of breath) on exertion     Stroke     Tobacco abuse     Weakness of both legs        Past Surgical History:   Procedure Laterality Date    COLONOSCOPY W/ BIOPSIES AND POLYPECTOMY  01/20/2020    Dr. Alf Covington    CORONARY ARTERY BYPASS GRAFT (CABG) N/A 6/1/2023    Procedure: CORONARY ARTERY BYPASS GRAFT (CABG);  Surgeon: Xochitl Grossman MD;  Location: [facility];  Service: Cardiothoracic;  Laterality: N/A;    ENDOSCOPY  01/20/2020    ESOPHAGOGASTRODUODENOSCOPY      PLANTAR'S WART EXCISION  07/17/2017        Social History     Socioeconomic History    Marital status:     Number of children: 4   Occupational History    Occupation: Disabled   Tobacco Use    Smoking status: Every Day     Packs/day: 1.00     Types: Cigarettes    Smokeless tobacco: Never   Substance and Sexual Activity    Alcohol use: Yes     Comment: Beer 6pk daily    Drug use: Never     Social Determinants of Health     Food Insecurity: Unknown    Worried About Running Out of Food in the Last Year: Never true   Transportation Needs: Unknown    Lack of Transportation (Medical): No   Housing Stability: Unknown    Unable to Pay for Housing in the Last Year: No       Current Outpatient Medications   Medication Instructions    albuterol (VENTOLIN HFA) 90 mcg/actuation inhaler 1 puff, Inhalation, Every 12 hours PRN, Rescue    amLODIPine (NORVASC) 10 mg, Oral, Daily    aspirin (ECOTRIN) 81 mg, Oral, Daily    atorvastatin (LIPITOR) 80 mg, Oral, Nightly    baclofen (LIORESAL) 10 mg, Oral, 3 times daily PRN    cetirizine 10 mg, Oral, Daily    clopidogreL (PLAVIX) 75 mg, Oral    ENTRESTO 49-51 mg per tablet 1 tablet, Oral, 2 times daily    fluticasone propionate (FLONASE) 50 mcg, Each Nostril, Daily    furosemide (LASIX) 20 mg, Oral, Daily    ketoconazole (NIZORAL) 2 % cream Topical (Top), 2 times daily    losartan (COZAAR) 50 mg, Oral, Daily    lovastatin (MEVACOR) 10 mg, Oral, Nightly    metoprolol succinate (TOPROL-XL) 100 mg, Oral, Daily    multivitamin capsule 1 capsule, Oral, Daily    omeprazole (PRILOSEC) 20 mg, Oral, Daily    sildenafiL (VIAGRA) 100 mg, Oral, As needed (PRN)    spironolactone (ALDACTONE) 25 mg, Oral, Daily       Current Inpatient Medications   albuterol sulfate  2.5 mg Nebulization Q6H    aspirin  81 mg Per OG tube Daily    dexAMETHasone  4 mg Intravenous Q12H    docusate  100 mg Per G Tube Daily    enoxparin  40 mg Subcutaneous Q24H (prophylaxis, 1700)    etomidate  20 mg Intravenous Once    famotidine (PF)  20 mg Intravenous  Q12H    fluticasone propionate  2 spray Each Nostril Daily    folic acid  1 mg Per OG tube Daily    ipratropium  0.5 mg Nebulization Q6H    metoprolol tartrate  12.5 mg Per OG tube BID    perflutren lipid microspheres  1.3 mL Intravenous Once    piperacillin-tazobactam (Zosyn) IV (PEDS and ADULTS) (extended infusion is not appropriate)  4.5 g Intravenous Q8H    potassium chloride in water  20 mEq Intravenous Once    potassium chloride  10 mEq Oral Once       Current Intravenous Infusions   dexmedeTOMIDine (Precedex) infusion (titrating) 1.2 mcg/kg/hr (06/10/23 0210)    loperamide      milrinone Stopped (06/09/23 1700)    NORepinephrine bitartrate-D5W Stopped (06/09/23 1700)    propofoL 40 mcg/kg/min (06/09/23 2335)       Review of Systems   Reason unable to perform ROS: Sedated and intubated.      Objective:       Intake/Output Summary (Last 24 hours) at 6/10/2023 0252  Last data filed at 6/10/2023 0210  Gross per 24 hour   Intake 3567.8 ml   Output 2343 ml   Net 1224.8 ml       Vital Signs (Most Recent):  Temp: 99.9 °F (37.7 °C) (06/09/23 2045)  Pulse: 71 (06/10/23 0230)  Resp: 19 (06/10/23 0230)  BP: 122/80 (06/10/23 0230)  SpO2: 98 % (06/10/23 0230)  Body mass index is 27.99 kg/m².  Weight: 88.5 kg (195 lb 1.7 oz) Vital Signs (24h Range):  Temp:  [98.2 °F (36.8 °C)-99.9 °F (37.7 °C)] 99.9 °F (37.7 °C)  Pulse:  [65-93] 71  Resp:  [15-41] 19  SpO2:  [90 %-100 %] 98 %  BP: ()/(58-97) 122/80  Arterial Line BP: ()/(44-74) 77/67     Physical Exam  General: Sedated and intubated  HEENT: NC/AT; PERRL; nasal and oral mucosa moist and clear; ET tube in place  Pulm: Corase in upper lobes bilaterally  CV: S1, S2 w/o murmurs or gallops; no edema noted  GI: Bowel sound present in all quadrants  Neuro: Limited d/t sedation    Lines/Drains/Airways       Peripherally Inserted Central Catheter Line  Duration             PICC Triple Lumen 06/07/23 2318 left brachial 2 days              Drain  Duration                   NG/OG Tube 06/07/23 1130 [facility] mouth 2 days         Urethral Catheter 06/07/23 1130 Latex 16 Fr. 2 days         Rectal Tube 06/09/23 0800 rectal tube w/ balloon (indicate number of mLs) <1 day              Airway  Duration                  Airway - Non-Surgical 06/07/23 1107 Endotracheal Tube 2 days              Peripheral Intravenous Line  Duration                  Peripheral IV - Single Lumen 06/07/23 0250 20 G Anterior;Left Upper Arm 3 days         Peripheral IV - Single Lumen 06/07/23 1500 20 G Left;Posterior Hand 2 days         Peripheral IV - Single Lumen 06/07/23 1544 20 G Anterior;Right Forearm 2 days                    Significant Labs:    Lab Results   Component Value Date    WBC 17.68 (H) 06/09/2023    HGB 10.7 (L) 06/09/2023    HCT 32.2 (L) 06/09/2023    MCV 66.4 (L) 06/09/2023     06/09/2023         BMP  Lab Results   Component Value Date     06/09/2023    K 3.7 06/09/2023    CHLORIDE 105 06/09/2023    CO2 21 (L) 06/09/2023    BUN 35.7 (H) 06/09/2023    CREATININE 1.12 06/09/2023    CALCIUM 7.7 (L) 06/09/2023    AGAP 8.0 06/04/2023    EGFRNONAA 86 02/09/2022       ABG  Recent Labs   Lab 06/09/23  1318   PH 7.480*   PO2 78.0*   HCO3 24.6         Significant Imaging:  I have reviewed the pertinent imaging within the past 24 hours.  Chest x-ray today by my review has extensive right-sided infiltrate.  Also has infiltrate in the left mid lung field but less extensive than the right side.      Assessment/Plan:     Assessment  Status post CABG x3 on 06/01/2023.  LIMA to the LAD, saphenous vein graft to OM1 and PDA.  Heart failure with reduced ejection fraction-EF 20-25%  Suspected aspiration event with associated hypoxemic respiratory failure  Delirium postop possibly ICU related    Plan  -Continue ICU level of care for ongoing monitoring   -CT surgery team following  -Continue with SBT and wean from sedation in preparation for extubation  -Transaminitis could be d/t hypoperfusion,  will consider liver ultrasound if transaminitis continue to worsen     39 minutes of critical care was time spent personally by me on the following activities: development of treatment plan with patient or surrogate and bedside caregivers, discussions with consultants, evaluation of patient's response to treatment, examination of patient, ordering and performing treatments and interventions, ordering and review of laboratory studies, ordering and review of radiographic studies, pulse oximetry, re-evaluation of patient's condition.  This critical care time did not overlap with that of any other provider or involve time for any procedures.     Arianna Peters DO  Pulmonary Critical Care Medicine  [facility] - [facility]

## 2023-06-10 NOTE — PROGRESS NOTES
CT SURGERY PROGRESS NOTE  Tom Anna  67 y.o.  1955    Patients Procedure: Procedure(s) (LRB):  CORONARY ARTERY BYPASS GRAFT (CABG) (N/A)    Subjective  Interval History: sedated on vent     ROS    Medication List  Infusions   dexmedeTOMIDine (Precedex) infusion (titrating) 1.2 mcg/kg/hr (06/10/23 0508)    loperamide      milrinone Stopped (06/09/23 1700)    NORepinephrine bitartrate-D5W Stopped (06/09/23 1700)    propofoL 35 mcg/kg/min (06/10/23 0416)     Scheduled   albuterol sulfate  2.5 mg Nebulization Q6H    aspirin  81 mg Per OG tube Daily    dexAMETHasone  4 mg Intravenous Q12H    docusate  100 mg Per G Tube Daily    enoxparin  40 mg Subcutaneous Q24H (prophylaxis, 1700)    etomidate  20 mg Intravenous Once    famotidine (PF)  20 mg Intravenous Q12H    fluticasone propionate  2 spray Each Nostril Daily    folic acid  1 mg Per OG tube Daily    ipratropium  0.5 mg Nebulization Q6H    metoprolol tartrate  12.5 mg Per OG tube BID    perflutren lipid microspheres  1.3 mL Intravenous Once    piperacillin-tazobactam (Zosyn) IV (PEDS and ADULTS) (extended infusion is not appropriate)  4.5 g Intravenous Q8H    potassium chloride in water  20 mEq Intravenous Once    potassium chloride  10 mEq Oral Once       Objective:  Recent Vitals:  Temp:  [98.2 °F (36.8 °C)-99.9 °F (37.7 °C)] 99 °F (37.2 °C)  Pulse:  [65-92] 69  Resp:  [15-41] 23  SpO2:  [90 %-100 %] 99 %  BP: ()/(58-97) 135/75  Arterial Line BP: ()/(58-74) 77/67    Physical Exam     I/O last 24 hrs:  Intake/Output - Last 3 Shifts         06/08 0700  06/09 0659 06/09 0700  06/10 0659 06/10 0700 06/11 0659    I.V. (mL/kg) 1635 (18.5) 1243.8 (14.1)     Blood 704.2 600     NG/ 1577     IV Piggyback 100      Total Intake(mL/kg) 2669.2 (30.2) 3420.8 (38.7)     Urine (mL/kg/hr) 1765 (0.8) 1665 (0.8)     Drains 698 648     Stool 0 0     Total Output 2463 2313     Net +206.2 +1107.8            Stool Occurrence 1 x 1 x             Labs  ABGs:    Recent Labs   Lab 06/10/23  0458   PH 7.460*   PO2 85.0   HCO3 23.5     BMP:   Recent Labs   Lab 06/10/23  0314      K 5.1   CO2 22*   BUN 36.4*   CREATININE 0.96   CALCIUM 7.4*     Cardiac markers: No results for input(s): CKMB, CPKMB, TROPONINT, TROPONINI, MYOGLOBIN in the last 48 hours.  CBC:   Recent Labs   Lab 06/10/23  0314   WBC 18.49*   RBC 5.81   HGB 13.9*   HCT 42.2      MCV 72.6*   MCH 23.9*   MCHC 32.9*     CMP:   Recent Labs   Lab 06/10/23  0314   CALCIUM 7.4*   ALBUMIN 2.0*      K 5.1   CO2 22*   BUN 36.4*   CREATININE 0.96   ALKPHOS 55   *   *   BILITOT 0.7     Coagulation: No results for input(s): PT, INR, APTT in the last 48 hours.      Imaging:   CXR: No results found in the last 24 hours.        ASSESSMENT/PLAN:    Better today   Wean vent as tolerated  Mychal TFs     Case and plan of care discussed with MD Cleve Ham PA-C

## 2023-06-11 PROBLEM — Z95.1 HX OF CABG: Status: ACTIVE | Noted: 2023-01-01

## 2023-06-11 NOTE — PT/OT/SLP PROGRESS
SLP attempting clinical swallow evaluation, however pt presents with hoarse vocal quality and weak cough. SLP to reattempt tomorrow as appropriate.

## 2023-06-11 NOTE — NURSING
Nurses Note -- 4 Eyes      6/11/2023   4:31 PM      Skin assessed during: Q Shift Change      [] No Altered Skin Integrity Present    [x]Prevention Measures Documented      [x] Yes- Altered Skin Integrity Present or Discovered   [] LDA Added if Not in Epic (Describe Wound)   [] New Altered Skin Integrity was Present on Admit and Documented in LDA   [] Wound Image Taken    Wound Care Consulted? No    Attending Nurse:  Liana Aguilar RN     Second RN/Staff Member:  DAVIN Hawkins

## 2023-06-11 NOTE — PROGRESS NOTES
[facility] - [facility]  Pulmonary Critical Care Note    Patient Name: Tom Anna  MRN: [id number]  Admission Date: 6/1/2023  Hospital Length of Stay: 10 days  Code Status: Full Code  Attending Provider: REILLY Saunders MD  Primary Care Provider: Primary Doctor No     Subjective:     HPI:   67-year-old with a history of severe coronary artery disease mitral regurgitation, and ejection fraction 20-25% underwent CABG by Dr. Grossman on 6/1/2023.  Per Dr. Felix's notes CABG x3 with a LIMA to the LAD saphenous vein graft to OM1 and PDA.  Left atrial appendage was clipped with size 45 atrial clip.    Hospital Course/Significant events:  6/2 - Increased O2 requirements suspected aspiration placed on BiPAP and reupgraded to ICU.  6/4 - Downgraded to the floor but became more hypoxic and transferred back to ICU.  6/7 - Intubated  6/10/2023 - Extubated    24 Hour Interval History:  No acute events overnight. Patient extubated yesterday and currently tolerating 2 L/min via nasal cannula. Labs this AM: WBC 21.4k, H/H 13.3/41.5, improvement seen in transaminitis. I/O: 1.9 L urine output past 24 hours, net positive 1.88L.     Past Medical History:   Diagnosis Date    Allergies     CHF (congestive heart failure)     Coronary artery disease     Essential (primary) hypertension     Fatigue     GERD (gastroesophageal reflux disease)     HLD (hyperlipidemia)     Personal history of colonic polyps     SOB (shortness of breath) on exertion     Stroke     Tobacco abuse     Weakness of both legs        Past Surgical History:   Procedure Laterality Date    COLONOSCOPY W/ BIOPSIES AND POLYPECTOMY  01/20/2020    Dr. Alf Covington    CORONARY ARTERY BYPASS GRAFT (CABG) N/A 6/1/2023    Procedure: CORONARY ARTERY BYPASS GRAFT (CABG);  Surgeon: Xochitl Grossman MD;  Location: [facility];  Service: Cardiothoracic;  Laterality: N/A;    ENDOSCOPY  01/20/2020    ESOPHAGOGASTRODUODENOSCOPY      PLANTAR'S WART EXCISION  07/17/2017        Social History     Socioeconomic History    Marital status:     Number of children: 4   Occupational History    Occupation: Disabled   Tobacco Use    Smoking status: Every Day     Packs/day: 1.00     Types: Cigarettes    Smokeless tobacco: Never   Substance and Sexual Activity    Alcohol use: Yes     Comment: Beer 6pk daily    Drug use: Never     Social Determinants of Health     Food Insecurity: Unknown    Worried About Running Out of Food in the Last Year: Never true   Transportation Needs: Unknown    Lack of Transportation (Medical): No   Housing Stability: Unknown    Unable to Pay for Housing in the Last Year: No       Current Outpatient Medications   Medication Instructions    albuterol (VENTOLIN HFA) 90 mcg/actuation inhaler 1 puff, Inhalation, Every 12 hours PRN, Rescue    amLODIPine (NORVASC) 10 mg, Oral, Daily    aspirin (ECOTRIN) 81 mg, Oral, Daily    atorvastatin (LIPITOR) 80 mg, Oral, Nightly    baclofen (LIORESAL) 10 mg, Oral, 3 times daily PRN    cetirizine 10 mg, Oral, Daily    clopidogreL (PLAVIX) 75 mg, Oral    ENTRESTO 49-51 mg per tablet 1 tablet, Oral, 2 times daily    fluticasone propionate (FLONASE) 50 mcg, Each Nostril, Daily    furosemide (LASIX) 20 mg, Oral, Daily    ketoconazole (NIZORAL) 2 % cream Topical (Top), 2 times daily    losartan (COZAAR) 50 mg, Oral, Daily    lovastatin (MEVACOR) 10 mg, Oral, Nightly    metoprolol succinate (TOPROL-XL) 100 mg, Oral, Daily    multivitamin capsule 1 capsule, Oral, Daily    omeprazole (PRILOSEC) 20 mg, Oral, Daily    sildenafiL (VIAGRA) 100 mg, Oral, As needed (PRN)    spironolactone (ALDACTONE) 25 mg, Oral, Daily       Current Inpatient Medications   albuterol sulfate  2.5 mg Nebulization Q6H    aspirin  81 mg Per OG tube Daily    dexAMETHasone  4 mg Intravenous Q12H    docusate  100 mg Per G Tube Daily    enoxparin  40 mg Subcutaneous Q24H (prophylaxis, 1700)    etomidate  20 mg Intravenous Once    famotidine (PF)  20 mg Intravenous  Q12H    fluticasone propionate  2 spray Each Nostril Daily    folic acid  1 mg Per OG tube Daily    ipratropium  0.5 mg Nebulization Q6H    metoprolol tartrate  12.5 mg Per OG tube BID    perflutren lipid microspheres  1.3 mL Intravenous Once    piperacillin-tazobactam (Zosyn) IV (PEDS and ADULTS) (extended infusion is not appropriate)  4.5 g Intravenous Q8H    potassium chloride in water  20 mEq Intravenous Once    potassium chloride  10 mEq Oral Once    sodium chloride 3%  4 mL Nebulization Q6H       Current Intravenous Infusions   amino acid 4.25% - dextrose 5% solution 50 mL/hr at 06/10/23 1915    dexmedeTOMIDine (Precedex) infusion (titrating) 1.2 mcg/kg/hr (06/10/23 0508)    loperamide      milrinone Stopped (06/09/23 1700)    NORepinephrine bitartrate-D5W Stopped (06/09/23 1700)    propofoL 35 mcg/kg/min (06/10/23 0416)       Review of Systems   Reason unable to perform ROS: Sedated and intubated.      Objective:       Intake/Output Summary (Last 24 hours) at 6/11/2023 0939  Last data filed at 6/11/2023 0515  Gross per 24 hour   Intake 1749 ml   Output 1600 ml   Net 149 ml       Vital Signs (Most Recent):  Temp: 98.6 °F (37 °C) (06/11/23 0404)  Pulse: 90 (06/11/23 0841)  Resp: 18 (06/11/23 0841)  BP: (!) 159/79 (06/11/23 0807)  SpO2: (!) 91 % (06/11/23 0841)  Body mass index is 27.99 kg/m².  Weight: 88.5 kg (195 lb 1.7 oz) Vital Signs (24h Range):  Temp:  [97.6 °F (36.4 °C)-98.8 °F (37.1 °C)] 98.6 °F (37 °C)  Pulse:  [] 90  Resp:  [17-43] 18  SpO2:  [87 %-100 %] 91 %  BP: (110-172)/(64-97) 159/79     Physical Exam  General: Sedated and intubated  HEENT: NC/AT; PERRL; nasal and oral mucosa moist and clear; ET tube in place  Pulm: Corase in upper lobes bilaterally  CV: S1, S2 w/o murmurs or gallops; no edema noted  GI: Bowel sound present in all quadrants  Neuro: Limited d/t sedation    Lines/Drains/Airways       Peripherally Inserted Central Catheter Line  Duration             PICC Triple Lumen 06/07/23  2318 left brachial 3 days              Drain  Duration                  Urethral Catheter 06/07/23 1130 Latex 16 Fr. 3 days         Rectal Tube 06/09/23 0800 rectal tube w/ balloon (indicate number of mLs) 2 days              Peripheral Intravenous Line  Duration                  Peripheral IV - Single Lumen 06/07/23 0250 20 G Anterior;Left Upper Arm 4 days         Peripheral IV - Single Lumen 06/07/23 1500 20 G Left;Posterior Hand 3 days         Peripheral IV - Single Lumen 06/07/23 1544 20 G Anterior;Right Forearm 3 days                    Significant Labs:    Lab Results   Component Value Date    WBC 21.38 (H) 06/11/2023    HGB 13.3 (L) 06/11/2023    HCT 41.5 (L) 06/11/2023    MCV 74.8 (L) 06/11/2023     06/11/2023         BMP  Lab Results   Component Value Date     06/11/2023    K 4.1 06/11/2023    CHLORIDE 112 (H) 06/11/2023    CO2 23 06/11/2023    BUN 34.2 (H) 06/11/2023    CREATININE 0.83 06/11/2023    CALCIUM 7.5 (L) 06/11/2023    AGAP 10.0 06/11/2023    EGFRNONAA 86 02/09/2022       ABG  Recent Labs   Lab 06/10/23  0845   PH 7.470*   PO2 95.0   HCO3 22.6         Significant Imaging:  I have reviewed the pertinent imaging within the past 24 hours.  Chest x-ray today by my review has extensive right-sided infiltrate.  Also has infiltrate in the left mid lung field but less extensive than the right side.      Assessment/Plan:     Assessment  Status post CABG x3 on 06/01/2023.  LIMA to the LAD, saphenous vein graft to OM1 and PDA.  Heart failure with reduced ejection fraction-EF 20-25%  Suspected aspiration event with associated hypoxemic respiratory failure  Delirium postop possibly ICU related    Plan  -Patient extubated on 6/10/2023 and currently tolerating 2 L/min via NC; he is not requiring any titratable drips; may downgrade to regular medicine unit at CT surgery team's discretion   -Remainder of care per CT surgery team's recommendations     39 minutes of critical care was time spent  personally by me on the following activities: development of treatment plan with patient or surrogate and bedside caregivers, discussions with consultants, evaluation of patient's response to treatment, examination of patient, ordering and performing treatments and interventions, ordering and review of laboratory studies, ordering and review of radiographic studies, pulse oximetry, re-evaluation of patient's condition.  This critical care time did not overlap with that of any other provider or involve time for any procedures.     Arianna Peters,   Pulmonary Critical Care Medicine  [facility]

## 2023-06-11 NOTE — PT/OT/SLP RE-EVAL
Physical Therapy Re-Evaluation    Patient Name:  Tom Anna   MRN:  [id number]    Recommendations:     Discharge Recommendations: rehabilitation facility, nursing facility, skilled, other (see comments) (pending progress)   Discharge Equipment Recommendations: other (see comments) (pending progress)   Barriers to discharge: Decreased caregiver support, Impaired mobility, and Ongoing medical needs    Assessment:     Tom Anna is a 67 y.o. male admitted with a medical diagnosis of <principal problem not specified>.  He presents with the following impairments/functional limitations: weakness, impaired endurance, impaired functional mobility, impaired balance .    Rehab Prognosis: Good; patient would benefit from acute skilled PT services to address these deficits and reach maximum level of function.    Recent Surgery: Procedure(s) (LRB):  CORONARY ARTERY BYPASS GRAFT (CABG) (N/A) 10 Days Post-Op    Plan:     During this hospitalization, patient to be seen 6 x/week to address the identified rehab impairments via gait training, therapeutic activities, therapeutic exercises, neuromuscular re-education and progress toward the following goals:    Plan of Care Expires:  07/11/23    Subjective     Chief Complaint: Unable to verbalize   Patient/Family Comments/goals: Per wife, to get strong enough to safely go to a rehab facility.  Pain/Comfort:  Pain Rating 1:  (Unable to verbalize)    Patients cultural, spiritual, [employer]ology conflicts given the current situation: no    Objective:     Communicated with nurse prior to session.  Patient found left sidelying with peripheral IV, shaw catheter, telemetry, blood pressure cuff, pulse ox (continuous), SCD, NG tube (rectal tube)  upon PT entry to room.    General Precautions: Standard, sternal  Orthopedic Precautions:N/A   Braces: N/A  Respiratory Status: Nasal cannula, flow 2 L/min  Blood Pressure: 145/69  HR 85  95% Sp02      Exams:    Skin integrity: Tear of L medial  lower leg, nursing staff notified and bandage applied      Functional Mobility:  Bed Mobility:     Rolling Left:  moderate assistance  Rolling Right: moderate assistance  Supine to Sit: maximal assistance  Sit to Supine: moderate assistance      AM-PAC 6 CLICK MOBILITY  Total Score:        Treatment & Education:  Pt with improved transfer ability with cueing and positioning of B UE. Improved cognition noted with sitting EOB allowing for some improved mechanics. Pt able to sit EOB for ~10 minutes in order to challenge balance and core control. Pt able to move B UE around to various targets in order to challenge sitting balance.     Patient and spouse provided with verbal education regarding POC.  Understanding was verbalized.     Patient left right sidelying with all lines intact, call button in reach, nurse notified, and his wife present.    GOALS:   Multidisciplinary Problems       Physical Therapy Goals          Problem: Physical Therapy    Goal Priority Disciplines Outcome Goal Variances Interventions   Physical Therapy Goal     PT, PT/OT Ongoing, Progressing     Description: Goals to be met by: 2023     Patient will increase functional independence with mobility by performin. Supine to sit with MInimal Assistance  2. Sit to stand transfer with Minimal Assistance  3. Bed to chair transfer with Minimal Assistance using Rolling Walker  4. Gait  x 100 feet with Minimal Assistance using Rolling Walker.   5. Ascend/descend 2 stair with bilateral Handrails Moderate Assistance using No Assistive Device.                          History:     Past Medical History:   Diagnosis Date    Allergies     CHF (congestive heart failure)     Coronary artery disease     Essential (primary) hypertension     Fatigue     GERD (gastroesophageal reflux disease)     HLD (hyperlipidemia)     Personal history of colonic polyps     SOB (shortness of breath) on exertion     Stroke     Tobacco abuse     Weakness of both legs         Past Surgical History:   Procedure Laterality Date    COLONOSCOPY W/ BIOPSIES AND POLYPECTOMY  01/20/2020    Dr. Alf Covington    CORONARY ARTERY BYPASS GRAFT (CABG) N/A 6/1/2023    Procedure: CORONARY ARTERY BYPASS GRAFT (CABG);  Surgeon: Xochitl Grossman MD;  Location: [facility];  Service: Cardiothoracic;  Laterality: N/A;    ENDOSCOPY  01/20/2020    ESOPHAGOGASTRODUODENOSCOPY      PLANTAR'S WART EXCISION  07/17/2017       Time Tracking:     PT Received On:    PT Start Time: 1215     PT Stop Time: 1255  PT Total Time (min): 40 min     Billable Minutes: Re-eval 10, Therapeutic Activity 15, and Neuromuscular Re-education 15      06/11/2023

## 2023-06-11 NOTE — ASSESSMENT & PLAN NOTE
Patient with difficult postoperative course following coronary artery bypass grafting.  Significant rales and rhonchi about both lung fields with leukocytosis suggestive of pneumonia.  Patient has not had a chest x-ray for 3 days so I have ordered 1 today.  New evidence of ventricular tachycardia (20 beat run) last night.  Will re-engage [facility] for evaluation and treatment.  Continue Zosyn.  Continue supportive care per Pulmonary/critical Care Service.

## 2023-06-11 NOTE — SUBJECTIVE & OBJECTIVE
Interval History:  Postoperative day 10 from coronary artery bypass grafting.  Echocardiogram performed on June 7, 2023 reveals severely depressed left ventricular function with an ejection fraction in the 20% range.  Overnight the patient has had a 20 beat run of ventricular tachycardia.    Review of Systems   Constitutional: Negative. Negative for chills, diaphoresis, fever and night sweats.   HENT:  Negative for hoarse voice, sore throat and stridor.    Eyes: Negative.  Negative for blurred vision and double vision.   Cardiovascular:  Negative for chest pain, claudication, cyanosis, dyspnea on exertion, irregular heartbeat, leg swelling, orthopnea, palpitations, paroxysmal nocturnal dyspnea and syncope.   Respiratory:  Negative for cough, hemoptysis, shortness of breath, sputum production and wheezing.    Endocrine: Negative for polydipsia and polyuria.   Hematologic/Lymphatic: Negative for adenopathy and bleeding problem.   Gastrointestinal:  Negative for abdominal pain, diarrhea, heartburn, hematemesis, hematochezia, nausea and vomiting.   Neurological:  Negative for brief paralysis, disturbances in coordination, dizziness, focal weakness, headaches, numbness and paresthesias.   Medications:  Continuous Infusions:   amino acid 4.25% - dextrose 5% solution 50 mL/hr at 06/10/23 1915    dexmedeTOMIDine (Precedex) infusion (titrating) 1.2 mcg/kg/hr (06/10/23 0508)    loperamide      milrinone Stopped (06/09/23 1700)    NORepinephrine bitartrate-D5W Stopped (06/09/23 1700)    propofoL 35 mcg/kg/min (06/10/23 0416)     Scheduled Meds:   albuterol sulfate  2.5 mg Nebulization Q6H    aspirin  81 mg Per OG tube Daily    dexAMETHasone  4 mg Intravenous Q12H    docusate  100 mg Per G Tube Daily    enoxparin  40 mg Subcutaneous Q24H (prophylaxis, 1700)    etomidate  20 mg Intravenous Once    famotidine (PF)  20 mg Intravenous Q12H    fluticasone propionate  2 spray Each Nostril Daily    folic acid  1 mg Per OG tube Daily     ipratropium  0.5 mg Nebulization Q6H    metoprolol tartrate  12.5 mg Per OG tube BID    perflutren lipid microspheres  1.3 mL Intravenous Once    piperacillin-tazobactam (Zosyn) IV (PEDS and ADULTS) (extended infusion is not appropriate)  4.5 g Intravenous Q8H    potassium chloride in water  20 mEq Intravenous Once    potassium chloride  10 mEq Oral Once    sodium chloride 3%  4 mL Nebulization Q6H     PRN Meds:sodium chloride, sodium chloride, acetaminophen, baclofen, dextrose 10%, dextrose 10%, glucagon (human recombinant), glucose, glucose, HYDROcodone-acetaminophen, insulin aspart U-100, labetaloL, lactulose 10 gram/15 ml, loperamide, metoclopramide HCl, ondansetron, oxyCODONE     Objective:     Vital Signs (Most Recent):  Temp: 98.6 °F (37 °C) (06/11/23 0404)  Pulse: 87 (06/11/23 0530)  Resp: (!) 34 (06/11/23 0530)  BP: (!) 143/93 (06/11/23 0530)  SpO2: (!) 93 % (06/11/23 0530) Vital Signs (24h Range):  Temp:  [97.6 °F (36.4 °C)-98.8 °F (37.1 °C)] 98.6 °F (37 °C)  Pulse:  [] 87  Resp:  [17-43] 34  SpO2:  [87 %-100 %] 93 %  BP: (110-172)/(64-97) 143/93     Weight: 88.5 kg (195 lb 1.7 oz)  Body mass index is 27.99 kg/m².    SpO2: (!) 93 %       Intake/Output - Last 3 Shifts         06/09 0700  06/10 0659 06/10 0700  06/11 0659 06/11 0700 06/12 0659    I.V. (mL/kg) 1243.8 (14.1)      Blood 600      NG/GT 1577 825     IV Piggyback       TPN  924     Total Intake(mL/kg) 3420.8 (38.7) 1749 (19.8)     Urine (mL/kg/hr) 1665 (0.8) 1850 (0.9)     Drains 648      Stool 0      Total Output 2313 1850     Net +1107.8 -101            Stool Occurrence 1 x              Lines/Drains/Airways       Peripherally Inserted Central Catheter Line  Duration             PICC Triple Lumen 06/07/23 2318 left brachial 3 days              Drain  Duration                  Urethral Catheter 06/07/23 1130 Latex 16 Fr. 3 days         Rectal Tube 06/09/23 0800 rectal tube w/ balloon (indicate number of mLs) 1 day              Peripheral  Intravenous Line  Duration                  Peripheral IV - Single Lumen 06/07/23 0250 20 G Anterior;Left Upper Arm 4 days         Peripheral IV - Single Lumen 06/07/23 1500 20 G Left;Posterior Hand 3 days         Peripheral IV - Single Lumen 06/07/23 1544 20 G Anterior;Right Forearm 3 days                     Physical Exam  Vitals and nursing note reviewed.   Constitutional:       General: He is not in acute distress.     Appearance: Normal appearance.   HENT:      Head: Normocephalic and atraumatic.      Nose: Nose normal. No congestion.      Mouth/Throat:      Mouth: Mucous membranes are moist.   Eyes:      General: No scleral icterus.     Extraocular Movements: Extraocular movements intact.      Conjunctiva/sclera: Conjunctivae normal.      Pupils: Pupils are equal, round, and reactive to light.   Neck:      Thyroid: No thyroid mass or thyromegaly.      Vascular: No carotid bruit or JVD.   Cardiovascular:      Rate and Rhythm: Normal rate and regular rhythm.      Pulses:           Carotid pulses are 1+ on the right side and 1+ on the left side.       Radial pulses are 1+ on the right side and 1+ on the left side.        Femoral pulses are 1+ on the right side and 1+ on the left side.       Popliteal pulses are 1+ on the right side and 1+ on the left side.        Dorsalis pedis pulses are 1+ on the right side and 1+ on the left side.        Posterior tibial pulses are 1+ on the right side and 1+ on the left side.      Heart sounds: Murmur heard.   Systolic murmur is present.     No friction rub. No gallop.   Pulmonary:      Effort: Pulmonary effort is normal. No respiratory distress.      Breath sounds: No stridor. Examination of the right-upper field reveals rhonchi. Examination of the left-upper field reveals rhonchi. Examination of the right-middle field reveals rhonchi. Examination of the left-middle field reveals rhonchi. Examination of the right-lower field reveals rhonchi and rales. Examination of the  left-lower field reveals rhonchi and rales. Rhonchi and rales present. No wheezing.   Chest:      Chest wall: No tenderness.   Abdominal:      General: Abdomen is flat. Bowel sounds are normal. There is no distension.      Palpations: Abdomen is soft. There is no hepatomegaly, splenomegaly, mass or pulsatile mass.      Tenderness: There is no abdominal tenderness. There is no rebound.   Musculoskeletal:         General: No swelling. Normal range of motion.      Cervical back: Normal range of motion. No rigidity or tenderness.      Right lower le+ Pitting Edema present.      Left lower le+ Pitting Edema present.   Lymphadenopathy:      Cervical: No cervical adenopathy.      Upper Body:      Right upper body: No supraclavicular or axillary adenopathy.      Left upper body: No supraclavicular or axillary adenopathy.   Skin:     General: Skin is warm and dry.   Neurological:      General: No focal deficit present.      Mental Status: He is alert and oriented to person, place, and time. Mental status is at baseline.      Cranial Nerves: No cranial nerve deficit.      Sensory: No sensory deficit.      Motor: No weakness.      Gait: Gait normal.   Psychiatric:         Mood and Affect: Mood normal.          Significant Labs:  BMP:   Recent Labs   Lab 23  0043 23  0118    143   K 4.1 4.1   CO2 21* 23   BUN 31.9* 34.2*   CREATININE 0.86 0.83   CALCIUM 7.4* 7.5*   MG 2.70*  --      CBC:   Recent Labs   Lab 23  0118   WBC 21.38*   RBC 5.55   HGB 13.3*   HCT 41.5*      MCV 74.8*   MCH 24.0*   MCHC 32.0*     All pertinent labs from the last 24 hours have been reviewed.    Significant Diagnostics:  I have reviewed and interpreted all pertinent imaging results/findings within the past 24 hours.

## 2023-06-11 NOTE — PROGRESS NOTES
[facility] - [facility]  Cardiothoracic Surgery  Progress Note    Patient Name: Tom Anna  MRN: [id number]  Admission Date: 6/1/2023  Hospital Length of Stay: 10 days  Code Status: Full Code   Attending Physician: REILLY Saunders MD   Referring Provider: Fito Chaudhry MD  Principal Problem:<principal problem not specified>            Subjective:     Post-Op Info:  Procedure(s) (LRB):  CORONARY ARTERY BYPASS GRAFT (CABG) (N/A)   10 Days Post-Op     Interval History:  Postoperative day 10 from coronary artery bypass grafting.  Echocardiogram performed on June 7, 2023 reveals severely depressed left ventricular function with an ejection fraction in the 20% range.  Overnight the patient has had a 20 beat run of ventricular tachycardia.    Review of Systems   Constitutional: Negative. Negative for chills, diaphoresis, fever and night sweats.   HENT:  Negative for hoarse voice, sore throat and stridor.    Eyes: Negative.  Negative for blurred vision and double vision.   Cardiovascular:  Negative for chest pain, claudication, cyanosis, dyspnea on exertion, irregular heartbeat, leg swelling, orthopnea, palpitations, paroxysmal nocturnal dyspnea and syncope.   Respiratory:  Negative for cough, hemoptysis, shortness of breath, sputum production and wheezing.    Endocrine: Negative for polydipsia and polyuria.   Hematologic/Lymphatic: Negative for adenopathy and bleeding problem.   Gastrointestinal:  Negative for abdominal pain, diarrhea, heartburn, hematemesis, hematochezia, nausea and vomiting.   Neurological:  Negative for brief paralysis, disturbances in coordination, dizziness, focal weakness, headaches, numbness and paresthesias.   Medications:  Continuous Infusions:   amino acid 4.25% - dextrose 5% solution 50 mL/hr at 06/10/23 1915    dexmedeTOMIDine (Precedex) infusion (titrating) 1.2 mcg/kg/hr (06/10/23 0508)    loperamide      milrinone Stopped (06/09/23 1700)    NORepinephrine  bitartrate-D5W Stopped (06/09/23 1700)    propofoL 35 mcg/kg/min (06/10/23 0416)     Scheduled Meds:   albuterol sulfate  2.5 mg Nebulization Q6H    aspirin  81 mg Per OG tube Daily    dexAMETHasone  4 mg Intravenous Q12H    docusate  100 mg Per G Tube Daily    enoxparin  40 mg Subcutaneous Q24H (prophylaxis, 1700)    etomidate  20 mg Intravenous Once    famotidine (PF)  20 mg Intravenous Q12H    fluticasone propionate  2 spray Each Nostril Daily    folic acid  1 mg Per OG tube Daily    ipratropium  0.5 mg Nebulization Q6H    metoprolol tartrate  12.5 mg Per OG tube BID    perflutren lipid microspheres  1.3 mL Intravenous Once    piperacillin-tazobactam (Zosyn) IV (PEDS and ADULTS) (extended infusion is not appropriate)  4.5 g Intravenous Q8H    potassium chloride in water  20 mEq Intravenous Once    potassium chloride  10 mEq Oral Once    sodium chloride 3%  4 mL Nebulization Q6H     PRN Meds:sodium chloride, sodium chloride, acetaminophen, baclofen, dextrose 10%, dextrose 10%, glucagon (human recombinant), glucose, glucose, HYDROcodone-acetaminophen, insulin aspart U-100, labetaloL, lactulose 10 gram/15 ml, loperamide, metoclopramide HCl, ondansetron, oxyCODONE     Objective:     Vital Signs (Most Recent):  Temp: 98.6 °F (37 °C) (06/11/23 0404)  Pulse: 87 (06/11/23 0530)  Resp: (!) 34 (06/11/23 0530)  BP: (!) 143/93 (06/11/23 0530)  SpO2: (!) 93 % (06/11/23 0530) Vital Signs (24h Range):  Temp:  [97.6 °F (36.4 °C)-98.8 °F (37.1 °C)] 98.6 °F (37 °C)  Pulse:  [] 87  Resp:  [17-43] 34  SpO2:  [87 %-100 %] 93 %  BP: (110-172)/(64-97) 143/93     Weight: 88.5 kg (195 lb 1.7 oz)  Body mass index is 27.99 kg/m².    SpO2: (!) 93 %       Intake/Output - Last 3 Shifts         06/09 0700  06/10 0659 06/10 0700 06/11 0659 06/11 0700  06/12 0659    I.V. (mL/kg) 1243.8 (14.1)      Blood 600      NG/GT 1577 825     IV Piggyback       TPN  924     Total Intake(mL/kg) 3420.8 (38.7) 1749 (19.8)     Urine  (mL/kg/hr) 1665 (0.8) 1850 (0.9)     Drains 648      Stool 0      Total Output 2313 1850     Net +1107.8 -101            Stool Occurrence 1 x              Lines/Drains/Airways       Peripherally Inserted Central Catheter Line  Duration             PICC Triple Lumen 06/07/23 2318 left brachial 3 days              Drain  Duration                  Urethral Catheter 06/07/23 1130 Latex 16 Fr. 3 days         Rectal Tube 06/09/23 0800 rectal tube w/ balloon (indicate number of mLs) 1 day              Peripheral Intravenous Line  Duration                  Peripheral IV - Single Lumen 06/07/23 0250 20 G Anterior;Left Upper Arm 4 days         Peripheral IV - Single Lumen 06/07/23 1500 20 G Left;Posterior Hand 3 days         Peripheral IV - Single Lumen 06/07/23 1544 20 G Anterior;Right Forearm 3 days                     Physical Exam  Vitals and nursing note reviewed.   Constitutional:       General: He is not in acute distress.     Appearance: Normal appearance.   HENT:      Head: Normocephalic and atraumatic.      Nose: Nose normal. No congestion.      Mouth/Throat:      Mouth: Mucous membranes are moist.   Eyes:      General: No scleral icterus.     Extraocular Movements: Extraocular movements intact.      Conjunctiva/sclera: Conjunctivae normal.      Pupils: Pupils are equal, round, and reactive to light.   Neck:      Thyroid: No thyroid mass or thyromegaly.      Vascular: No carotid bruit or JVD.   Cardiovascular:      Rate and Rhythm: Normal rate and regular rhythm.      Pulses:           Carotid pulses are 1+ on the right side and 1+ on the left side.       Radial pulses are 1+ on the right side and 1+ on the left side.        Femoral pulses are 1+ on the right side and 1+ on the left side.       Popliteal pulses are 1+ on the right side and 1+ on the left side.        Dorsalis pedis pulses are 1+ on the right side and 1+ on the left side.        Posterior tibial pulses are 1+ on the right side and 1+ on the left  side.      Heart sounds: Murmur heard.   Systolic murmur is present.     No friction rub. No gallop.   Pulmonary:      Effort: Pulmonary effort is normal. No respiratory distress.      Breath sounds: No stridor. Examination of the right-upper field reveals rhonchi. Examination of the left-upper field reveals rhonchi. Examination of the right-middle field reveals rhonchi. Examination of the left-middle field reveals rhonchi. Examination of the right-lower field reveals rhonchi and rales. Examination of the left-lower field reveals rhonchi and rales. Rhonchi and rales present. No wheezing.   Chest:      Chest wall: No tenderness.   Abdominal:      General: Abdomen is flat. Bowel sounds are normal. There is no distension.      Palpations: Abdomen is soft. There is no hepatomegaly, splenomegaly, mass or pulsatile mass.      Tenderness: There is no abdominal tenderness. There is no rebound.   Musculoskeletal:         General: No swelling. Normal range of motion.      Cervical back: Normal range of motion. No rigidity or tenderness.      Right lower le+ Pitting Edema present.      Left lower le+ Pitting Edema present.   Lymphadenopathy:      Cervical: No cervical adenopathy.      Upper Body:      Right upper body: No supraclavicular or axillary adenopathy.      Left upper body: No supraclavicular or axillary adenopathy.   Skin:     General: Skin is warm and dry.   Neurological:      General: No focal deficit present.      Mental Status: He is alert and oriented to person, place, and time. Mental status is at baseline.      Cranial Nerves: No cranial nerve deficit.      Sensory: No sensory deficit.      Motor: No weakness.      Gait: Gait normal.   Psychiatric:         Mood and Affect: Mood normal.          Significant Labs:  BMP:   Recent Labs   Lab 23  0043 23  0118    143   K 4.1 4.1   CO2 21* 23   BUN 31.9* 34.2*   CREATININE 0.86 0.83   CALCIUM 7.4* 7.5*   MG 2.70*  --      CBC:   Recent Labs    Lab 06/11/23  0118   WBC 21.38*   RBC 5.55   HGB 13.3*   HCT 41.5*      MCV 74.8*   MCH 24.0*   MCHC 32.0*     All pertinent labs from the last 24 hours have been reviewed.    Significant Diagnostics:  I have reviewed and interpreted all pertinent imaging results/findings within the past 24 hours.    Assessment/Plan:     Hx of CABG  Patient with difficult postoperative course following coronary artery bypass grafting.  Significant rales and rhonchi about both lung fields with leukocytosis suggestive of pneumonia.  Patient has not had a chest x-ray for 3 days so I have ordered 1 today.  New evidence of ventricular tachycardia (20 beat run) last night.  Will re-engage [facility] for evaluation and treatment.  Continue Zosyn.  Continue supportive care per Pulmonary/critical Care Service.      Trevor Arthur MD  Cardiothoracic Surgery  [facility] - [facility]

## 2023-06-11 NOTE — CONSULTS
Inpatient consult to Cardiology  Consult performed by: BRISEYDA Penaloza  Consult ordered by: REILLY aSunders MD  Reason for consult: VT      [facility] - [facility]  Cardiology  Consult Note    Patient Name: Tom Anna  MRN: [id number]  Admission Date: 6/1/2023  Hospital Length of Stay: 10 days  Code Status: Full Code   Attending Provider: REILLY Saunders MD   Consulting Provider: BRISEYDA Penaloza  Primary Care Physician: Primary Doctor No  Principal Problem:<principal problem not specified>    Patient information was obtained from patient, past medical records, ER records, and primary team.     Subjective:   Consultation Reason: Concern for NSVT    HPI:   Mr. Anna is a 67 year old male, known to Dr. Contreras in [place], who presented to the hospital and underwent elective CABG by Dr. Grossman on 6.1.23. CABG noted to be LIMA to LAD, SVG to OM1, and SVG to PDA. Also underwent TAMI Ligation which was clipped with 45 atrial clip. He was initially extubated post surgery and sent to floor for further management (once out of ICU). On 6.2.23 he had a suspected aspiration event, required significant oxygen support, required BIAP and eventual intubation and transfer back to ICU. He was extubated once again on 6.10.23. Patient has been tachycardic and has a baseline lbbb, which looks like VT on the monitor. CIS is consulted for evaluation.    PMH: CAD/CABG, Hypertension, Hyperlipidemia, Ischemic Cardiomyopathy EF 25%, GERD, Cerebral Infarction, Nicotine Dependence, LBBB  PSH: LHC, CABG  Family History: Mother- Hypertension/Depression  Social History: Tobacco- Active Smoker, Alcohol- Beer Consumption, Substance Abuse- Negative    Previous Cardiac Diagnostics:   Echocardiogram (6.7.23):  Severely decreased systolic function.  The estimated ejection fraction is 25%; Definity used.  No LV thrombus seen in this study.  There is abnormal septal wall motion.  The Right ventricle is not well visualized but  appears to be of mildly reduced RV systolic function.  No evidence of significant pericardial effusion or tamponade physiology.    CV US Arterial Doppler (6.6.23):  The right lower extremity demonstrated severe arterial flow reduction consistent with an occluded superficial femoral, popliteal, and tibial arteries.   The left lower extremity demonstrated severe arterial flow reduction consistent with an occluded superficial femoral, and tibial arteries with reconstitution of flow in the popliteal artery.     CV US Arterial Upper Extremities (6.6.23):  Patent right upper extremity arterial system with biphasic waveforms throughout, however no flow was identified in the radial artery. Patent left upper extremity arterial system with triphasic waveforms throughout, however the ulnar artery was not well visualized.     LHC (5.1.23):  LM- Large 30-50% Obstruction, LAD- Mid Section with Moderate to Severe Disease, Proximal/Distal Section with Mild Disease, LCX- Moderate to Severe Diffuse Disease with Moderate Diffuse Disease of High Takeoff OM1, Moderate Disease of OM2, RCA- Dominant Vessel with Proximal Moderate to Severe Disease, Mid Disease is Mild, Distal  Noted.     Telemetry Monitor (29 Days & 7 Hours) (3.24.23):  This is a good quality study.   Predominant rhythm is normal sinus rhythm.   The minimum heart rate recorded was 59 beats / minute. The maximum heart rate is 133 beats / minute. The mean heart rate is 80 beats / minute.   Rare premature ventricular contractions noted.    No pauses were noted.   Baseline LBBB noted. Abberant rhythm noted. No symptoms reported.     Echocardiogram (3.18.23):  LV Dilated. EF 25-30%.  Inferior Wall Appears Hypokinetic. Normal RV Function    Review of patient's allergies indicates:   Allergen Reactions    Ace inhibitors Swelling     Facial edema       No current facility-administered medications on file prior to encounter.     Current Outpatient Medications on File Prior to  Encounter   Medication Sig    albuterol (VENTOLIN HFA) 90 mcg/actuation inhaler Inhale 1 puff into the lungs every 12 (twelve) hours as needed for Wheezing or Shortness of Breath. Rescue    aspirin (ECOTRIN) 81 MG EC tablet Take 81 mg by mouth once daily.    atorvastatin (LIPITOR) 80 MG tablet Take 80 mg by mouth every evening.    baclofen (LIORESAL) 10 MG tablet Take 1 tablet (10 mg total) by mouth 3 (three) times daily as needed (muscle spasms, hiccups).    cetirizine 10 mg chewable tablet Take 10 mg by mouth once daily.    clopidogreL (PLAVIX) 75 mg tablet Take 75 mg by mouth.    ENTRESTO 49-51 mg per tablet Take 1 tablet by mouth 2 (two) times daily.    fluticasone propionate (FLONASE) 50 mcg/actuation nasal spray 1 spray (50 mcg total) by Each Nostril route once daily.    furosemide (LASIX) 20 MG tablet Take 20 mg by mouth once daily.    metoprolol succinate (TOPROL-XL) 100 MG 24 hr tablet Take 100 mg by mouth once daily.    omeprazole (PRILOSEC) 20 MG capsule Take 1 capsule (20 mg total) by mouth once daily.    spironolactone (ALDACTONE) 25 MG tablet Take 25 mg by mouth once daily.    amLODIPine (NORVASC) 10 MG tablet Take 1 tablet (10 mg total) by mouth once daily.    ketoconazole (NIZORAL) 2 % cream Apply topically 2 (two) times daily. (Patient not taking: Reported on 5/24/2023)    losartan (COZAAR) 50 MG tablet Take 1 tablet (50 mg total) by mouth once daily.    lovastatin (MEVACOR) 10 MG tablet Take 1 tablet (10 mg total) by mouth nightly. (Patient not taking: Reported on 5/24/2023)    multivitamin capsule Take 1 capsule by mouth once daily.    sildenafiL (VIAGRA) 100 MG tablet Take 1 tablet (100 mg total) by mouth as needed for Erectile Dysfunction.       Review of Systems   Respiratory:  Negative for chest tightness and shortness of breath.    Cardiovascular:  Negative for chest pain.   All other systems reviewed and are negative.    Objective:     Vital Signs (Most Recent):  Temp: 98.6 °F (37 °C)  (06/11/23 0404)  Pulse: 90 (06/11/23 0841)  Resp: 18 (06/11/23 0841)  BP: (!) 159/79 (06/11/23 0807)  SpO2: (!) 91 % (06/11/23 0841) Vital Signs (24h Range):  Temp:  [97.6 °F (36.4 °C)-98.8 °F (37.1 °C)] 98.6 °F (37 °C)  Pulse:  [] 90  Resp:  [17-43] 18  SpO2:  [87 %-100 %] 91 %  BP: (110-172)/(64-97) 159/79     Weight: 88.5 kg (195 lb 1.7 oz)  Body mass index is 27.99 kg/m².    SpO2: (!) 91 %         Intake/Output Summary (Last 24 hours) at 6/11/2023 1048  Last data filed at 6/11/2023 0515  Gross per 24 hour   Intake 1749 ml   Output 1600 ml   Net 149 ml       Lines/Drains/Airways       Peripherally Inserted Central Catheter Line  Duration             PICC Triple Lumen 06/07/23 2318 left brachial 3 days              Drain  Duration                  Urethral Catheter 06/07/23 1130 Latex 16 Fr. 3 days         Rectal Tube 06/09/23 0800 rectal tube w/ balloon (indicate number of mLs) 2 days              Peripheral Intravenous Line  Duration                  Peripheral IV - Single Lumen 06/07/23 0250 20 G Anterior;Left Upper Arm 4 days         Peripheral IV - Single Lumen 06/07/23 1500 20 G Left;Posterior Hand 3 days         Peripheral IV - Single Lumen 06/07/23 1544 20 G Anterior;Right Forearm 3 days                    Significant Labs:  Recent Results (from the past 72 hour(s))   POCT glucose    Collection Time: 06/08/23 11:30 AM   Result Value Ref Range    POCT Glucose 225 (H) 70 - 110 mg/dL   POCT glucose    Collection Time: 06/08/23  3:56 PM   Result Value Ref Range    POCT Glucose 211 (H) 70 - 110 mg/dL   Comprehensive Metabolic Panel    Collection Time: 06/09/23  5:09 AM   Result Value Ref Range    Sodium Level 136 136 - 145 mmol/L    Potassium Level 3.7 3.5 - 5.1 mmol/L    Chloride 105 98 - 107 mmol/L    Carbon Dioxide 21 (L) 23 - 31 mmol/L    Glucose Level 279 (H) 82 - 115 mg/dL    Blood Urea Nitrogen 35.7 (H) 8.4 - 25.7 mg/dL    Creatinine 1.12 0.73 - 1.18 mg/dL    Calcium Level Total 7.7 (L) 8.8 - 10.0  mg/dL    Protein Total 6.2 5.8 - 7.6 gm/dL    Albumin Level 2.0 (L) 3.4 - 4.8 g/dL    Globulin 4.2 (H) 2.4 - 3.5 gm/dL    Albumin/Globulin Ratio 0.5 (L) 1.1 - 2.0 ratio    Bilirubin Total 0.7 <=1.5 mg/dL    Alkaline Phosphatase 56 40 - 150 unit/L    Alanine Aminotransferase 244 (H) 0 - 55 unit/L    Aspartate Aminotransferase 213 (H) 5 - 34 unit/L    eGFR >60 mls/min/1.73/m2   CBC with Differential    Collection Time: 06/09/23  7:34 AM   Result Value Ref Range    WBC 17.68 (H) 4.50 - 11.50 x10(3)/mcL    RBC 4.85 4.70 - 6.10 x10(6)/mcL    Hgb 10.7 (L) 14.0 - 18.0 g/dL    Hct 32.2 (L) 42.0 - 52.0 %    MCV 66.4 (L) 80.0 - 94.0 fL    MCH 22.1 (L) 27.0 - 31.0 pg    MCHC 33.2 33.0 - 36.0 g/dL    RDW 28.7 (H) 11.5 - 17.0 %    Platelet 212 130 - 400 x10(3)/mcL    MPV      Neut % 89.9 %    Lymph % 3.7 %    Mono % 3.3 %    Eos % 0.7 %    Basophil % 0.2 %    Lymph # 0.66 0.6 - 4.6 x10(3)/mcL    Neut # 15.89 (H) 2.1 - 9.2 x10(3)/mcL    Mono # 0.58 0.1 - 1.3 x10(3)/mcL    Eos # 0.13 0 - 0.9 x10(3)/mcL    Baso # 0.03 <=0.2 x10(3)/mcL    IG# 0.39 (H) 0 - 0.04 x10(3)/mcL    IG% 2.2 %    NRBC% 5.4 %   RT Blood Gas    Collection Time: 06/09/23  1:18 PM   Result Value Ref Range    Sample Type Arterial Blood     Sample site Left Radial Artery     Drawn by sd rrt     pH, Blood gas 7.480 (H) 7.350 - 7.450    pCO2, Blood gas 33.0 (L) 35.0 - 45.0 mmHg    pO2, Blood gas 78.0 (L) 80.0 - 100.0 mmHg    Sodium, Blood Gas 131 (L) 137 - 145 mmol/L    Potassium, Blood Gas 4.1 3.5 - 5.0 mmol/L    Calcium Level Ionized 1.07 (L) 1.12 - 1.23 mmol/L    TOC2, Blood gas 25.6 mmol/L    Base Excess, Blood gas 1.50 -2.00 - 2.00 mmol/L    sO2, Blood gas 96.3 %    HCO3, Blood gas 24.6 22.0 - 26.0 mmol/L    THb, Blood gas 12.1 12 - 16 g/dL    O2 Hb, Blood Gas 94.2 94.0 - 97.0 %    CO Hgb 1.7 (H) 0.5 - 1.5 %    Met Hgb 1.4 0.4 - 1.5 %    Allens Test Yes     MODE AC     Oxygen Device, Blood gas Ventilator     FIO2, Blood gas 30 %    Mech Vt 450 ml    Mech RR 26  b/min    PEEP 7.0 cmH2O   POCT glucose    Collection Time: 06/09/23  4:18 PM   Result Value Ref Range    POCT Glucose 248 (H) 70 - 110 mg/dL   POCT glucose    Collection Time: 06/10/23 12:13 AM   Result Value Ref Range    POCT Glucose 256 (H) 70 - 110 mg/dL   Comprehensive Metabolic Panel    Collection Time: 06/10/23  3:14 AM   Result Value Ref Range    Sodium Level 139 136 - 145 mmol/L    Potassium Level 5.1 3.5 - 5.1 mmol/L    Chloride 111 (H) 98 - 107 mmol/L    Carbon Dioxide 22 (L) 23 - 31 mmol/L    Glucose Level 269 (H) 82 - 115 mg/dL    Blood Urea Nitrogen 36.4 (H) 8.4 - 25.7 mg/dL    Creatinine 0.96 0.73 - 1.18 mg/dL    Calcium Level Total 7.4 (L) 8.8 - 10.0 mg/dL    Protein Total 6.0 5.8 - 7.6 gm/dL    Albumin Level 2.0 (L) 3.4 - 4.8 g/dL    Globulin 4.0 (H) 2.4 - 3.5 gm/dL    Albumin/Globulin Ratio 0.5 (L) 1.1 - 2.0 ratio    Bilirubin Total 0.7 <=1.5 mg/dL    Alkaline Phosphatase 55 40 - 150 unit/L    Alanine Aminotransferase 310 (H) 0 - 55 unit/L    Aspartate Aminotransferase 210 (H) 5 - 34 unit/L    eGFR >60 mls/min/1.73/m2   CBC with Differential    Collection Time: 06/10/23  3:14 AM   Result Value Ref Range    WBC 18.49 (H) 4.50 - 11.50 x10(3)/mcL    RBC 5.81 4.70 - 6.10 x10(6)/mcL    Hgb 13.9 (L) 14.0 - 18.0 g/dL    Hct 42.2 42.0 - 52.0 %    MCV 72.6 (L) 80.0 - 94.0 fL    MCH 23.9 (L) 27.0 - 31.0 pg    MCHC 32.9 (L) 33.0 - 36.0 g/dL    RDW      Platelet 160 130 - 400 x10(3)/mcL    MPV      Neut % 87.3 %    Lymph % 4.7 %    Mono % 3.5 %    Eos % 2.1 %    Basophil % 0.3 %    Lymph # 0.86 0.6 - 4.6 x10(3)/mcL    Neut # 16.16 (H) 2.1 - 9.2 x10(3)/mcL    Mono # 0.65 0.1 - 1.3 x10(3)/mcL    Eos # 0.39 0 - 0.9 x10(3)/mcL    Baso # 0.05 <=0.2 x10(3)/mcL    IG# 0.38 (H) 0 - 0.04 x10(3)/mcL    IG% 2.1 %    NRBC% 4.1 %   RT Blood Gas    Collection Time: 06/10/23  4:58 AM   Result Value Ref Range    Sample Type Arterial Blood     Sample site Right Radial Artery     Drawn by aslrt     pH, Blood gas 7.460 (H) 7.350  - 7.450    pCO2, Blood gas 33.0 (L) 35.0 - 45.0 mmHg    pO2, Blood gas 85.0 80.0 - 100.0 mmHg    Sodium, Blood Gas 133 (L) 137 - 145 mmol/L    Potassium, Blood Gas 4.8 3.5 - 5.0 mmol/L    Calcium Level Ionized 1.09 (L) 1.12 - 1.23 mmol/L    TOC2, Blood gas 24.5 mmol/L    Base Excess, Blood gas 0.30 -2.00 - 2.00 mmol/L    sO2, Blood gas 96.9 %    HCO3, Blood gas 23.5 22.0 - 26.0 mmol/L    THb, Blood gas 14.0 12 - 16 g/dL    O2 Hb, Blood Gas 94.6 94.0 - 97.0 %    CO Hgb 1.9 (H) 0.5 - 1.5 %    Met Hgb 1.5 0.4 - 1.5 %    Allens Test Yes     MODE AC     Oxygen Device, Blood gas Ventilator     FIO2, Blood gas 30 %    Mech Vt 450 ml    Mech RR 26 b/min    PEEP 7.0 cmH2O   POCT glucose    Collection Time: 06/10/23  6:33 AM   Result Value Ref Range    POCT Glucose 274 (H) 70 - 110 mg/dL   RT Blood Gas    Collection Time: 06/10/23  8:45 AM   Result Value Ref Range    Sample Type Arterial Blood     Sample site Right Radial Artery     Drawn by KRYSTAL WHELAN     pH, Blood gas 7.470 (H) 7.350 - 7.450    pCO2, Blood gas 31.0 (L) 35.0 - 45.0 mmHg    pO2, Blood gas 95.0 80.0 - 100.0 mmHg    Sodium, Blood Gas 133 (L) 137 - 145 mmol/L    Potassium, Blood Gas 4.3 3.5 - 5.0 mmol/L    Calcium Level Ionized 1.06 (L) 1.12 - 1.23 mmol/L    TOC2, Blood gas 23.6 mmol/L    Base Excess, Blood gas -0.20 -2.00 - 2.00 mmol/L    sO2, Blood gas 97.8 %    HCO3, Blood gas 22.6 22.0 - 26.0 mmol/L    THb, Blood gas 14.4 12 - 16 g/dL    O2 Hb, Blood Gas 95.2 94.0 - 97.0 %    CO Hgb 1.8 (H) 0.5 - 1.5 %    Met Hgb 1.4 0.4 - 1.5 %    Allens Test Yes     MODE CPAP     Oxygen Device, Blood gas Ventilator     FIO2, Blood gas 30 %    Spont Vt 680 mL    Spont RR 22 b/min    PEEP 7.0 cmH2O    PS 10.0 cmH2O    Total Minute Vol 16.1 L   POCT glucose    Collection Time: 06/10/23  8:58 AM   Result Value Ref Range    POCT Glucose 299 (H) 70 - 110 mg/dL   Basic Metabolic Panel    Collection Time: 06/11/23 12:43 AM   Result Value Ref Range    Sodium Level 143 136 - 145 mmol/L     Potassium Level 4.1 3.5 - 5.1 mmol/L    Chloride 112 (H) 98 - 107 mmol/L    Carbon Dioxide 21 (L) 23 - 31 mmol/L    Glucose Level 190 (H) 82 - 115 mg/dL    Blood Urea Nitrogen 31.9 (H) 8.4 - 25.7 mg/dL    Creatinine 0.86 0.73 - 1.18 mg/dL    BUN/Creatinine Ratio 37     Calcium Level Total 7.4 (L) 8.8 - 10.0 mg/dL    Anion Gap 10.0 mEq/L    eGFR >60 mls/min/1.73/m2   Magnesium    Collection Time: 06/11/23 12:43 AM   Result Value Ref Range    Magnesium Level 2.70 (H) 1.60 - 2.60 mg/dL   Phosphorus    Collection Time: 06/11/23 12:43 AM   Result Value Ref Range    Phosphorus Level 2.2 (L) 2.3 - 4.7 mg/dL   POCT glucose    Collection Time: 06/11/23 12:46 AM   Result Value Ref Range    POCT Glucose 199 (H) 70 - 110 mg/dL   Comprehensive Metabolic Panel    Collection Time: 06/11/23  1:18 AM   Result Value Ref Range    Sodium Level 143 136 - 145 mmol/L    Potassium Level 4.1 3.5 - 5.1 mmol/L    Chloride 112 (H) 98 - 107 mmol/L    Carbon Dioxide 23 23 - 31 mmol/L    Glucose Level 169 (H) 82 - 115 mg/dL    Blood Urea Nitrogen 34.2 (H) 8.4 - 25.7 mg/dL    Creatinine 0.83 0.73 - 1.18 mg/dL    Calcium Level Total 7.5 (L) 8.8 - 10.0 mg/dL    Protein Total 5.9 5.8 - 7.6 gm/dL    Albumin Level 2.0 (L) 3.4 - 4.8 g/dL    Globulin 3.9 (H) 2.4 - 3.5 gm/dL    Albumin/Globulin Ratio 0.5 (L) 1.1 - 2.0 ratio    Bilirubin Total 1.0 <=1.5 mg/dL    Alkaline Phosphatase 65 40 - 150 unit/L    Alanine Aminotransferase 287 (H) 0 - 55 unit/L    Aspartate Aminotransferase 148 (H) 5 - 34 unit/L    eGFR >60 mls/min/1.73/m2   CBC with Differential    Collection Time: 06/11/23  1:18 AM   Result Value Ref Range    WBC 21.38 (H) 4.50 - 11.50 x10(3)/mcL    RBC 5.55 4.70 - 6.10 x10(6)/mcL    Hgb 13.3 (L) 14.0 - 18.0 g/dL    Hct 41.5 (L) 42.0 - 52.0 %    MCV 74.8 (L) 80.0 - 94.0 fL    MCH 24.0 (L) 27.0 - 31.0 pg    MCHC 32.0 (L) 33.0 - 36.0 g/dL    RDW      Platelet 157 130 - 400 x10(3)/mcL    MPV      Neut % 87.3 %    Lymph % 6.5 %    Mono % 4.1 %     Eos % 0.7 %    Basophil % 0.1 %    Lymph # 1.40 0.6 - 4.6 x10(3)/mcL    Neut # 18.64 (H) 2.1 - 9.2 x10(3)/mcL    Mono # 0.87 0.1 - 1.3 x10(3)/mcL    Eos # 0.16 0 - 0.9 x10(3)/mcL    Baso # 0.03 <=0.2 x10(3)/mcL    IG# 0.28 (H) 0 - 0.04 x10(3)/mcL    IG% 1.3 %    NRBC% 1.3 %   Blood Smear Microscopic Exam    Collection Time: 06/11/23  1:18 AM   Result Value Ref Range    RBC Morph Abnormal (A) Normal    Anisocyte 1+ (A) (none)    Martha Cells 1+ (A) (none)    Macrocyte 1+ (A) (none)    Microcyte 1+ (A) (none)    Poik 1+ (A) (none)    Polychrom 1+ (A) (none)    Target Cell 2+ (A) (none)    Platelet Est Normal Normal, Adequate         EKG:        Telemetry:  ST with LBBB    Physical Exam  Vitals and nursing note reviewed.   Constitutional:       Appearance: Normal appearance.   HENT:      Head: Normocephalic.      Mouth/Throat:      Mouth: Mucous membranes are moist.      Pharynx: Oropharynx is clear.   Cardiovascular:      Rate and Rhythm: Regular rhythm. Tachycardia present.      Heart sounds: Normal heart sounds.   Pulmonary:      Effort: Pulmonary effort is normal. No respiratory distress.   Abdominal:      General: There is no distension.      Palpations: Abdomen is soft.      Tenderness: There is no abdominal tenderness. There is no guarding.   Musculoskeletal:         General: Normal range of motion.      Cervical back: Neck supple.   Skin:     General: Skin is warm and dry.   Neurological:      General: No focal deficit present.      Mental Status: He is alert and oriented to person, place, and time. Mental status is at baseline.   Psychiatric:         Mood and Affect: Mood normal.         Behavior: Behavior normal.       Home Medications:   No current facility-administered medications on file prior to encounter.     Current Outpatient Medications on File Prior to Encounter   Medication Sig Dispense Refill    albuterol (VENTOLIN HFA) 90 mcg/actuation inhaler Inhale 1 puff into the lungs every 12 (twelve) hours as  needed for Wheezing or Shortness of Breath. Rescue 18 g 6    aspirin (ECOTRIN) 81 MG EC tablet Take 81 mg by mouth once daily.      atorvastatin (LIPITOR) 80 MG tablet Take 80 mg by mouth every evening.      baclofen (LIORESAL) 10 MG tablet Take 1 tablet (10 mg total) by mouth 3 (three) times daily as needed (muscle spasms, hiccups). 90 tablet 4    cetirizine 10 mg chewable tablet Take 10 mg by mouth once daily.      clopidogreL (PLAVIX) 75 mg tablet Take 75 mg by mouth.      ENTRESTO 49-51 mg per tablet Take 1 tablet by mouth 2 (two) times daily.      fluticasone propionate (FLONASE) 50 mcg/actuation nasal spray 1 spray (50 mcg total) by Each Nostril route once daily. 16 g 6    furosemide (LASIX) 20 MG tablet Take 20 mg by mouth once daily.      metoprolol succinate (TOPROL-XL) 100 MG 24 hr tablet Take 100 mg by mouth once daily.      omeprazole (PRILOSEC) 20 MG capsule Take 1 capsule (20 mg total) by mouth once daily. 90 capsule 1    spironolactone (ALDACTONE) 25 MG tablet Take 25 mg by mouth once daily.      amLODIPine (NORVASC) 10 MG tablet Take 1 tablet (10 mg total) by mouth once daily. 90 tablet 1    ketoconazole (NIZORAL) 2 % cream Apply topically 2 (two) times daily. (Patient not taking: Reported on 5/24/2023) 60 g 2    losartan (COZAAR) 50 MG tablet Take 1 tablet (50 mg total) by mouth once daily. 90 tablet 1    lovastatin (MEVACOR) 10 MG tablet Take 1 tablet (10 mg total) by mouth nightly. (Patient not taking: Reported on 5/24/2023) 90 tablet 1    multivitamin capsule Take 1 capsule by mouth once daily.      sildenafiL (VIAGRA) 100 MG tablet Take 1 tablet (100 mg total) by mouth as needed for Erectile Dysfunction. 15 tablet 6       Current Inpatient Medications:    Current Facility-Administered Medications:     0.9%  NaCl infusion (for blood administration), , Intravenous, Q24H PRN, Xochitl Grossman MD    0.9%  NaCl infusion (for blood administration), , Intravenous, Q24H PRN, Xochitl Grossman MD     acetaminophen oral solution 650 mg, 650 mg, Per OG tube, Q6H PRN, JONNY Batista    albuterol nebulizer solution 2.5 mg, 2.5 mg, Nebulization, Q6H, JONNY Riley, 2.5 mg at 06/11/23 0842    amino acid 4.25% - dextrose 5% solution, , Intravenous, Continuous, REILLY Saunders MD, Last Rate: 50 mL/hr at 06/10/23 1915, New Bag at 06/10/23 1915    aspirin chewable tablet 81 mg, 81 mg, Per OG tube, Daily, JONNY Batista, 81 mg at 06/11/23 0807    baclofen tablet 10 mg, 10 mg, Per OG tube, TID PRN, JONNY Batista    dexAMETHasone injection 4 mg, 4 mg, Intravenous, Q12H, REILLY Saunders MD, 4 mg at 06/11/23 0054    dexmedetomidine (PRECEDEX) 400mcg/100mL 0.9% NaCL infusion, 0-1.4 mcg/kg/hr, Intravenous, Continuous, REILLY Saunders MD, Last Rate: 26.6 mL/hr at 06/10/23 0508, 1.2 mcg/kg/hr at 06/10/23 0508    dextrose 10% bolus 125 mL 125 mL, 12.5 g, Intravenous, PRN, REILLY Saunders MD    dextrose 10% bolus 250 mL 250 mL, 25 g, Intravenous, PRN, REILLY Saunders MD    docusate 50 mg/5 mL liquid 100 mg, 100 mg, Per G Tube, Daily, JONNY Batista, 100 mg at 06/11/23 0807    enoxaparin injection 40 mg, 40 mg, Subcutaneous, Q24H (prophylaxis, 1700), REILLY Saunders MD, 40 mg at 06/10/23 1700    etomidate injection 20 mg, 20 mg, Intravenous, Once, REILLY Saundesr MD    famotidine (PF) injection 20 mg, 20 mg, Intravenous, Q12H, JONNY Batista, 20 mg at 06/11/23 0807    fluticasone propionate 50 mcg/actuation nasal spray 100 mcg, 2 spray, Each Nostril, Daily, REILLY Saunders MD, 100 mcg at 06/04/23 0900    folic acid tablet 1 mg, 1 mg, Per OG tube, Daily, JONNY Batista, 1 mg at 06/11/23 0807    glucagon (human recombinant) injection 1 mg, 1 mg, Intramuscular, PRN, REILLY Saunders MD    glucose chewable tablet 16 g, 16 g, Per OG tube, PRN, JONNY Batista    glucose chewable tablet 24 g, 24 g, Per OG tube, PRN, JONNY Batista    HYDROcodone-acetaminophen 5-325 mg  per tablet 1 tablet, 1 tablet, Per OG tube, Q4H PRN, JONNY Batista    insulin aspart U-100 injection 0-5 Units, 0-5 Units, Subcutaneous, QID (AC + HS) PRN, REILLY Saunders MD, 3 Units at 06/10/23 0636    ipratropium 0.02 % nebulizer solution 0.5 mg, 0.5 mg, Nebulization, Q6H, REILLY Saunders MD, 0.5 mg at 06/11/23 0842    labetaloL injection 10 mg, 10 mg, Intravenous, Q4H PRN, Cleve Stratton MD, 10 mg at 06/11/23 0533    lactulose 10 gram/15 ml solution 20 g, 20 g, Per OG tube, Q6H PRN, JONNY Batista    loperamide capsule 2 mg, 2 mg, Per OG tube, Continuous PRN, JONNY Batista    metoclopramide HCl injection 5 mg, 5 mg, Intravenous, Q6H PRN, JONNY Batista    metoprolol tartrate (LOPRESSOR) split tablet 12.5 mg, 12.5 mg, Per OG tube, BID, JONNY Batista, 12.5 mg at 06/11/23 0807    milrinone 20mg in D5W 100 mL infusion, 0.375 mcg/kg/min (Dosing Weight), Intravenous, Continuous, Xochitl Grossman MD, Stopped at 06/09/23 1700    NORepinephrine 8 mg in dextrose 5% 250 mL infusion, 0-3 mcg/kg/min (Dosing Weight), Intravenous, Continuous, Cathy Lemos MD, Stopped at 06/09/23 1700    ondansetron injection 4 mg, 4 mg, Intravenous, Q4H PRN, JONNY Batista    oxyCODONE immediate release tablet Tab 10 mg, 10 mg, Per OG tube, Q4H PRN, JONNY Batista, 10 mg at 06/09/23 0659    perflutren lipid microspheres injection 1.3 mL, 1.3 mL, Intravenous, Once, REILLY Saunders MD    piperacillin-tazobactam (ZOSYN) 4.5 g in dextrose 5 % in water (D5W) 5 % 100 mL IVPB (MB+), 4.5 g, Intravenous, Q8H, Skip Solorzano MD, Stopped at 06/11/23 0732    potassium chloride 20 mEq in 100 mL IVPB (FOR CENTRAL LINE ADMINISTRATION ONLY), 20 mEq, Intravenous, Once, JONNY Riley, Held at 06/03/23 0345    potassium chloride CR tablet 10 mEq, 10 mEq, Oral, Once, JONNY Riley    propofol (DIPRIVAN) 10 mg/mL infusion, 0-50 mcg/kg/min (Dosing Weight), Intravenous, Continuous, Pavana L  "MD Sheldon, Last Rate: 18.2 mL/hr at 06/10/23 0416, 35 mcg/kg/min at 06/10/23 0416    sodium chloride 3% nebulizer solution 4 mL, 4 mL, Nebulization, Q6H, BRISEYDA Purvis, 4 mL at 06/11/23 0841    VTE Risk Mitigation (From admission, onward)           Ordered     enoxaparin injection 40 mg  Every 24 hours         06/06/23 0957                  Assessment:   Sinus Tachycardia with Left Bundle Branch Block  Chronic Left Bundle Branch Block  CAD/CABG    - LIMA to the LAD, SVG to OM1, SVG to PDA  Ischemic Cardiomyopathy    - EF 25%    - Life Vest  Acute Hypoxemic Respiratory Failure Requiring Intubation/Mechanical Ventilation due to Suspected Aspiration Event    - Extubated on 6.10.23  Leukocytosis  Hypertension  Hyperlipidemia  PAD  Nicotine Dependence/Alcohol Use  History of CVA  Elevated Hepatic Enzymes   ED  Delirium Possible ICU Psychosis   Ace Inhibitor Allergy "Facial Edema" (Tolerates Entresto)    Plan:   Advance Metoprolol Tartrate to 50 Mg PO BID- Switch to Long Acting form prior to discharge  Continue Aspirin  Restart Statin when liver enzymes improve  Resume Entresto  K+ Goal- 4, Mag Goal- 2  Will follow    Thank you for your consult.     BRISEYDA Penaloza  Cardiology  [facility]  06/11/2023 10:48 AM     Physician addendum:  I have seen and examined this patient as a split-shared visit with the ALEJANDRINA d/t complicated medical management of above problems written in assessment and high acuity requiring physician expertise in medical decision-making. I performed the substantive portion of the history and exam. Above medical decision-making is also formulated by me.    Cardiovascular exam:  S1, S2  Lungs:  Fine crackles at bases.  Extremities:  1+ Edema bilaterally    Plan:  Status post CABG x3.  Increase beta-blocker to 50 mg b.i.d..  Continue GDMT.  We will follow up    Frankie Gerard MD  Cardiologist    "

## 2023-06-12 NOTE — PROGRESS NOTES
[facility]  Pulmonary Critical Care Note    Patient Name: Tom Anna  MRN: [id number]  Admission Date: 6/1/2023  Hospital Length of Stay: 11 days  Code Status: Full Code  Attending Provider: REILLY Saunders MD  Primary Care Provider: Primary Doctor No     Subjective:     HPI:   67-year-old with a history of severe coronary artery disease mitral regurgitation, and ejection fraction 20-25% underwent CABG by Dr. Grossman on 6/1/2023.  Per Dr. Felix's notes CABG x3 with a LIMA to the LAD saphenous vein graft to OM1 and PDA.  Left atrial appendage was clipped with size 45 atrial clip.    Hospital Course/Significant events:  6/2 - Increased O2 requirements suspected aspiration placed on BiPAP and reupgraded to ICU.  6/4 - Downgraded to the floor but became more hypoxic and transferred back to ICU.  6/7 - Intubated  6/10/2023 - Extubated    24 Hour Interval History:  Patient's respiratory status has remained stable post extubation.  He is oxygenating well on approximately 2 L nasal cannula.  Hemodynamics are stable.  He remains profoundly weak.  Now receiving tube feeds via NG tube.    Past Medical History:   Diagnosis Date    Allergies     CHF (congestive heart failure)     Coronary artery disease     Essential (primary) hypertension     Fatigue     GERD (gastroesophageal reflux disease)     HLD (hyperlipidemia)     Personal history of colonic polyps     SOB (shortness of breath) on exertion     Stroke     Tobacco abuse     Weakness of both legs        Past Surgical History:   Procedure Laterality Date    COLONOSCOPY W/ BIOPSIES AND POLYPECTOMY  01/20/2020    Dr. Alf Covington    CORONARY ARTERY BYPASS GRAFT (CABG) N/A 6/1/2023    Procedure: CORONARY ARTERY BYPASS GRAFT (CABG);  Surgeon: Xochitl Grossman MD;  Location: [facility];  Service: Cardiothoracic;  Laterality: N/A;    ENDOSCOPY  01/20/2020    ESOPHAGOGASTRODUODENOSCOPY      PLANTAR'S WART EXCISION  07/17/2017       Social History      Socioeconomic History    Marital status:     Number of children: 4   Occupational History    Occupation: Disabled   Tobacco Use    Smoking status: Every Day     Packs/day: 1.00     Types: Cigarettes    Smokeless tobacco: Never   Substance and Sexual Activity    Alcohol use: Yes     Comment: Beer 6pk daily    Drug use: Never     Social Determinants of Health     Food Insecurity: Unknown    Worried About Running Out of Food in the Last Year: Never true   Transportation Needs: Unknown    Lack of Transportation (Medical): No   Housing Stability: Unknown    Unable to Pay for Housing in the Last Year: No       Current Outpatient Medications   Medication Instructions    albuterol (VENTOLIN HFA) 90 mcg/actuation inhaler 1 puff, Inhalation, Every 12 hours PRN, Rescue    amLODIPine (NORVASC) 10 mg, Oral, Daily    aspirin (ECOTRIN) 81 mg, Oral, Daily    atorvastatin (LIPITOR) 80 mg, Oral, Nightly    baclofen (LIORESAL) 10 mg, Oral, 3 times daily PRN    cetirizine 10 mg, Oral, Daily    clopidogreL (PLAVIX) 75 mg, Oral    ENTRESTO 49-51 mg per tablet 1 tablet, Oral, 2 times daily    fluticasone propionate (FLONASE) 50 mcg, Each Nostril, Daily    furosemide (LASIX) 20 mg, Oral, Daily    ketoconazole (NIZORAL) 2 % cream Topical (Top), 2 times daily    losartan (COZAAR) 50 mg, Oral, Daily    lovastatin (MEVACOR) 10 mg, Oral, Nightly    metoprolol succinate (TOPROL-XL) 100 mg, Oral, Daily    multivitamin capsule 1 capsule, Oral, Daily    omeprazole (PRILOSEC) 20 mg, Oral, Daily    sildenafiL (VIAGRA) 100 mg, Oral, As needed (PRN)    spironolactone (ALDACTONE) 25 mg, Oral, Daily       Current Inpatient Medications   albuterol sulfate  2.5 mg Nebulization Q6H    aspirin  81 mg Per OG tube Daily    dexAMETHasone  4 mg Intravenous Q12H    docusate  100 mg Per G Tube Daily    enoxparin  40 mg Subcutaneous Q24H (prophylaxis, 1700)    etomidate  20 mg Intravenous Once    famotidine (PF)  20 mg Intravenous Q12H    fluticasone  propionate  2 spray Each Nostril Daily    folic acid  1 mg Per OG tube Daily    ipratropium  0.5 mg Nebulization Q6H    metoprolol tartrate  50 mg Per OG tube BID    perflutren lipid microspheres  1.3 mL Intravenous Once    piperacillin-tazobactam (Zosyn) IV (PEDS and ADULTS) (extended infusion is not appropriate)  4.5 g Intravenous Q8H    potassium chloride in water  20 mEq Intravenous Once    potassium chloride  10 mEq Oral Once    sacubitriL-valsartan  1 tablet Nasogastric BID    sodium chloride 3%  4 mL Nebulization Q6H       Current Intravenous Infusions   dexmedeTOMIDine (Precedex) infusion (titrating) 1.2 mcg/kg/hr (06/10/23 0508)    loperamide      milrinone Stopped (06/09/23 1700)    NORepinephrine bitartrate-D5W Stopped (06/09/23 1700)    propofoL 35 mcg/kg/min (06/10/23 0416)       Review of Systems   Reason unable to perform ROS: Sedated and intubated.      Objective:       Intake/Output Summary (Last 24 hours) at 6/12/2023 0740  Last data filed at 6/12/2023 0600  Gross per 24 hour   Intake 1940.5 ml   Output 1515 ml   Net 425.5 ml       Vital Signs (Most Recent):  Temp: 98.3 °F (36.8 °C) (06/12/23 0400)  Pulse: 102 (06/12/23 0400)  Resp: (!) 27 (06/12/23 0400)  BP: 132/83 (06/12/23 0400)  SpO2: 95 % (06/12/23 0400)  Body mass index is 27.99 kg/m².  Weight: 88.5 kg (195 lb 1.7 oz) Vital Signs (24h Range):  Temp:  [98.3 °F (36.8 °C)-98.9 °F (37.2 °C)] 98.3 °F (36.8 °C)  Pulse:  [] 102  Resp:  [18-35] 27  SpO2:  [76 %-99 %] 95 %  BP: (111-159)/(57-88) 132/83     Physical Exam  General:  Awake and alert, weak  HEENT: NC/AT; PERRL; nasal and oral mucosa moist and clear; nasal cannula in place   Pulm: Corase in upper lobes bilaterally  CV: S1, S2 w/o murmurs or gallops; no edema noted  GI: Bowel sound present in all quadrants  Neuro: Limited d/t sedation    Lines/Drains/Airways       Peripherally Inserted Central Catheter Line  Duration             PICC Triple Lumen 06/07/23 2318 left brachial 4 days               Drain  Duration                  Urethral Catheter 06/07/23 1130 Latex 16 Fr. 4 days         Rectal Tube 06/09/23 0800 rectal tube w/ balloon (indicate number of mLs) 2 days         NG/OG Tube 06/11/23 0830 nasogastric 16 Fr. Right nostril <1 day              Peripheral Intravenous Line  Duration                  Peripheral IV - Single Lumen 06/07/23 1500 20 G Left;Posterior Hand 4 days         Peripheral IV - Single Lumen 06/07/23 1544 20 G Anterior;Right Forearm 4 days                    Significant Labs:    Lab Results   Component Value Date    WBC 21.83 (H) 06/12/2023    HGB 13.7 (L) 06/12/2023    HCT 42.9 06/12/2023    MCV 74.7 (L) 06/12/2023     06/12/2023         BMP  Lab Results   Component Value Date     06/12/2023    K 4.5 06/12/2023    CHLORIDE 111 (H) 06/12/2023    CO2 18 (L) 06/12/2023    BUN 30.0 (H) 06/12/2023    CREATININE 0.88 06/12/2023    CALCIUM 7.8 (L) 06/12/2023    AGAP 10.0 06/11/2023    EGFRNONAA 86 02/09/2022       ABG  Recent Labs   Lab 06/10/23  0845   PH 7.470*   PO2 95.0   HCO3 22.6         Significant Imaging:  No chest x-ray today      Assessment/Plan:     Assessment  Status post CABG x3 on 06/01/2023.  LIMA to the LAD, saphenous vein graft to OM1 and PDA.  Heart failure with reduced ejection fraction-EF 20-25%  Suspected aspiration event with associated hypoxemic respiratory failure  Delirium postop possibly ICU related    Plan  -Patient extubated on 6/10/2023 and currently tolerating 2 L/min via NC  -hemodynamics are stable  -patient is tolerating tube feeds  -continuing Zosyn empirically for possible aspiration event  -stable for downgrade to the floor         Alejandro Allred MD  Pulmonary Critical Care Medicine  [facility]

## 2023-06-12 NOTE — PROGRESS NOTES
Tom Anna is a 67 y.o. male patient.   1. Coronary artery disease    2. CAD (coronary artery disease)    3. S/P CABG x 3    4. Respiratory distress    5. QT prolongation    6. Pulse irregularity    7. Hx of CABG    8. Gaze palsy    9. Hypertension, unspecified type    10. Gastroesophageal reflux disease, unspecified whether esophagitis present    11. Pericardial effusion      Past Medical History:   Diagnosis Date    Allergies     CHF (congestive heart failure)     Coronary artery disease     Essential (primary) hypertension     Fatigue     GERD (gastroesophageal reflux disease)     HLD (hyperlipidemia)     Personal history of colonic polyps     SOB (shortness of breath) on exertion     Stroke     Tobacco abuse     Weakness of both legs      No past surgical history pertinent negatives on file.  Scheduled Meds:   albuterol sulfate  2.5 mg Nebulization Q6H    aspirin  81 mg Per OG tube Daily    dexAMETHasone  4 mg Intravenous Q12H    docusate  100 mg Per G Tube Daily    enoxparin  40 mg Subcutaneous Q24H (prophylaxis, 1700)    etomidate  20 mg Intravenous Once    famotidine (PF)  20 mg Intravenous Q12H    fluticasone propionate  2 spray Each Nostril Daily    folic acid  1 mg Per OG tube Daily    ipratropium  0.5 mg Nebulization Q6H    metoprolol tartrate  75 mg Per OG tube BID    perflutren lipid microspheres  1.3 mL Intravenous Once    piperacillin-tazobactam (Zosyn) IV (PEDS and ADULTS) (extended infusion is not appropriate)  4.5 g Intravenous Q8H    potassium chloride in water  20 mEq Intravenous Once    potassium chloride  10 mEq Oral Once    sacubitriL-valsartan  1 tablet Nasogastric BID    sodium chloride 3%  4 mL Nebulization Q6H     Continuous Infusions:   dexmedeTOMIDine (Precedex) infusion (titrating) 1.2 mcg/kg/hr (06/10/23 0508)    loperamide      milrinone Stopped (06/09/23 1700)    NORepinephrine bitartrate-D5W Stopped (06/09/23 1700)    propofoL 35 mcg/kg/min (06/10/23 6436)     PRN Meds:sodium  "chloride, sodium chloride, acetaminophen, baclofen, dextrose 10%, dextrose 10%, glucagon (human recombinant), glucose, glucose, HYDROcodone-acetaminophen, insulin aspart U-100, labetaloL, lactulose 10 gram/15 ml, loperamide, metoclopramide HCl, ondansetron, oxyCODONE    Review of patient's allergies indicates:   Allergen Reactions    Ace inhibitors Swelling     Facial edema     Active Hospital Problems    Diagnosis  POA    Hx of CABG [Z95.1]  Not Applicable     Patient is now status post coronary artery bypass grafting.  Postoperative course was complicated by mental status changes and necessity for re-intubation.        Resolved Hospital Problems   No resolved problems to display.     Blood pressure 131/73, pulse 90, temperature 99.1 °F (37.3 °C), temperature source Oral, resp. rate (!) 25, height 5' 10" (1.778 m), weight 88.5 kg (195 lb 1.7 oz), SpO2 98 %.       Subjective:    POD #11  Awake. Alert.  Sitting up in bed  Weak  Tubes feeds via NG tube     Objective:   AFVSS.   Heart: RRR  Lungs: bilateral rhonchi  Incision: c/d/I  H/h: 13/42  Cr: 0.88  K: 4.5     Assesment/Plan:    S/p CAB  - continue care, PT/OT/JONNY Oliveros  6/12/2023    "

## 2023-06-12 NOTE — PT/OT/SLP RE-EVAL
Occupational Therapy  Evaluation    Name: Tom Anna  MRN: [id number]  Admitting Diagnosis: CABG  Recent Surgery: Procedure(s) (LRB):  CORONARY ARTERY BYPASS GRAFT (CABG) (N/A) 11 Days Post-Op    Recommendations:     Discharge Recommendations: nursing facility, skilled  Discharge Equipment Recommendations:  to be determined by next level of care  Barriers to discharge:  None    Assessment:     Tom Anna is a 67 y.o. male with a medical diagnosis of CABG x3 with post op hypoxia (suspected aspiration event)  requiring reintubation. New OT orders received to resume OT treatment today.  He presents with the following performance deficits affecting function: weakness, impaired endurance, impaired self care skills, impaired balance, impaired functional mobility, pain. Pt presents with worsening decondition since initial eval, but tolerated resuming therapy efforts with good physiologic stability. Recommend SNF placement on d/c to maximize functional recovery.    Rehab Prognosis: Good; patient would benefit from acute skilled OT services to address these deficits and reach maximum level of function.       Plan:     Patient to be seen 5 x/week to address the above listed problems via self-care/home management, therapeutic activities, therapeutic exercises  Plan of Care Expires: 06/23/23  Plan of Care Reviewed with: patient    Subjective     Chief Complaint: none    Pain/Comfort:  Pain Rating 1: 0/10    Patients cultural, spiritual, Gnosticism conflicts given the current situation: no    Objective:     Communicated with: RN prior to session.  Patient found left sidelying with blood pressure cuff, telemetry, pulse ox (continuous), shaw catheter, bowel management system, NG tube, oxygen upon OT entry to room.    General Precautions: Standard, sternal  Orthopedic Precautions: N/A  Braces: N/A    Vital Signs  Blood Pressure: 132/74  HR: 102  Sp02: 94%  Supplemental 02: 3L    Occupational Performance:    Bed  Mobility:    Patient completed Rolling/Turning to Left with  total assistance  Patient completed Rolling/Turning to Right with total assistance  Patient completed Scooting/Bridging with total assistance  Patient completed Supine to Sit with total assistance  Patient completed Sit to Supine with total assistance    Functional Mobility/Transfers:  Anterior/Posterior Scooting: total assistance   Sit<>Stand Transition: maximal assistance and of 2 persons     Activities of Daily Living:  Grooming: total/[place] A with RUE while EOB    Upper Body Dressing: total assistance   Lower Body Dressing: total assistance      AMPA 6 Click ADL Scale:  Total Score: 6    Functional Cognition  Orientation: WFL  Simple Command following: WFL    Physical Exam:  Sitting Balance: min A with L lean, mitra 15 min EOB    Standing Balance: max A x 2 persons, forward flexed posture--unable to achieve full upright stance x 2 trials    UE Function:  BUE: grossly 3+/5 distally, shoulders 3-/5    Therapeutic Positioning  Risk for acquired pressure injuries is increased due to impaired mobility.    OT interventions performed during the course of today's session in an effort to prevent and/or reduce acquired pressure injuries: skin assessment was performed and collaboration with nursing staff on therapeutic positioning recommendations     Skin assessment: full body skin assessment was performed    Findings:  sternal incision, former CT site with stitches in abd x 2, abrasion to L shin with mepilex over. No redness/breakdown at bony prominences     OT recommendations for therapeutic positioning throughout hospitalization: standard pressure injury prevention measures      Patient Education:  Patient provided with verbal education regarding OT role/goals/POC and post op precautions.  Understanding was verbalized, however additional teaching warranted.     Patient left with bed in chair position for improved cardiopulmomary function with all lines intact,  call button in reach, and RN notified    GOALS:   Multidisciplinary Problems       Occupational Therapy Goals          Problem: Occupational Therapy    Goal Priority Disciplines Outcome Interventions   Occupational Therapy Goal     OT, PT/OT Unable to Meet, Plan Revised    Description: LTG: Pt will perform basic ADLs and ADL transfers with min A and good compliance to sternal precautions using LRAD by discharge.--resume    STG: to be met in 2 weeks    Pt will complete grooming EOB with SBA.--revised  Pt will complete UB dressing with mod A.-resume  Pt will complete LB dressing with mod A using LRAD.-resume  Pt will complete toileting with mod A using RW and BSC.-resume  Pt will complete toilet transfer with mod A using RW and BSC. -resume  Pt will independently verbalize sternal precautions with >75% accuracy. -revised                       History:     Past Medical History:   Diagnosis Date    Allergies     CHF (congestive heart failure)     Coronary artery disease     Essential (primary) hypertension     Fatigue     GERD (gastroesophageal reflux disease)     HLD (hyperlipidemia)     Personal history of colonic polyps     SOB (shortness of breath) on exertion     Stroke     Tobacco abuse     Weakness of both legs          Past Surgical History:   Procedure Laterality Date    COLONOSCOPY W/ BIOPSIES AND POLYPECTOMY  01/20/2020    Dr. Alf Covington    CORONARY ARTERY BYPASS GRAFT (CABG) N/A 6/1/2023    Procedure: CORONARY ARTERY BYPASS GRAFT (CABG);  Surgeon: Xochitl Grossman MD;  Location: [facility];  Service: Cardiothoracic;  Laterality: N/A;    ENDOSCOPY  01/20/2020    ESOPHAGOGASTRODUODENOSCOPY      PLANTAR'S WART EXCISION  07/17/2017       Time Tracking:     OT Date of Treatment: 06/12/23  OT Start Time: 0904  OT Stop Time: 0934  OT Total Time (min): 30 min    Billable Minutes:Re-eval 1    6/12/2023

## 2023-06-12 NOTE — PT/OT/SLP PROGRESS
Physical Therapy Treatment    Patient Name:  Tom Anna   MRN:  [id number]    Recommendations:     Discharge Recommendations: nursing facility, skilled  Discharge Equipment Recommendations: to be determined by next level of care  Barriers to discharge: Impaired mobility and Ongoing medical needs    Assessment:     Tom Anna is a 67 y.o. male admitted with a medical diagnosis of CABG X 3 with post op hypoxia requiring reintubation.  He presents with the following impairments/functional limitations: weakness, impaired endurance, impaired functional mobility, impaired self care skills, impaired balance, pain Pt is alert and oriented, but had difficulty comprehending verbal responses. Reports having pain  in R foot.still requires total assistance for all bed mobility. Pt required some VC with sternal precautions throughout therapy session. For STS t/f, required max A x 2 using no AD initially, RW placed in front for pt to use for balance once standing. Due to fatigue, pt only able to tolerate standing ~ 1 minute before sitting back down. Pt showed fatigue with exercises in sitting and returned back into bed. Will continue to provide acute therapy services to progress as able.    Rehab Prognosis: Good; patient would benefit from acute skilled PT services to address these deficits and reach maximum level of function.    Recent Surgery: Procedure(s) (LRB):  CORONARY ARTERY BYPASS GRAFT (CABG) (N/A) 11 Days Post-Op    Plan:     During this hospitalization, patient to be seen 6 x/week to address the identified rehab impairments via therapeutic activities, therapeutic exercises, gait training and progress toward the following goals:    Plan of Care Expires:  07/11/23    Subjective     Chief Complaint: Pain in R foot  Patient/Family Comments/goals: return to PLOF  Pain/Comfort:  Location - Side 1: Right  Location 1: leg      Objective:     Communicated with NSG prior to session.  Patient found HOB elevated with  blood pressure cuff, telemetry, pulse ox (continuous), bowel management system, NG tube upon PT entry to room.     General Precautions: Standard, sternal  Orthopedic Precautions: N/A  Braces: N/A  Respiratory Status: Nasal cannula, flow 2 L/min  Blood Pressure: 126/73  Skin Integrity: Visible skin intact      Functional Mobility:  Bed Mobility:     Rolling Left:  total assistance  Rolling Right: total assistance  Scooting: total assistance  Supine to Sit: total assistance  Sit to Supine: total assistance  Transfers:     Sit to Stand:  maximal assistance with no AD, and RW in front to use for balance once standing. Had pt hold on to pillow due to sternal precautions.  Balance: Pt required Min A for static sitting balance initially due to noted L lean, pt able to sit EOB with SBA ~ 15 mins.    Therapeutic Activities/Exercises:  STS X 1: Pt required max x 2 persons with RW in front for t/f. Educated pt on sternal precautions prior to standing. Required VC and facilitation at glutes to stand upright. Pt presented with slightly flexed knees and forward bent posture and was unable to stand fully erect.  Ther-ex:   Seated LAQ in sitting: 1 x 10 BLE, required active assistance towards end range.  Ankle pumps: pt only able to perform 1 x 5 with LLE and 2 reps on RLE, discontinued exercise due to fatigue and pain in the R foot.    Education:  Patient and spouse provided with verbal education regarding PT POC and importance of mobilization during acute care stay.  Understanding was verbalized, however additional teaching warranted.     Patient left HOB elevated with all lines intact, call button in reach, NSG notified, and spouse present..    GOALS:   Multidisciplinary Problems       Physical Therapy Goals          Problem: Physical Therapy    Goal Priority Disciplines Outcome Goal Variances Interventions   Physical Therapy Goal     PT, PT/OT Ongoing, Progressing     Description: Goals to be met by: 07/04/2023     Patient will  increase functional independence with mobility by performin. Supine to sit with MInimal Assistance  2. Sit to stand transfer with Minimal Assistance  3. Bed to chair transfer with Minimal Assistance using Rolling Walker  4. Gait  x 100 feet with Minimal Assistance using Rolling Walker.   5. Ascend/descend 2 stair with bilateral Handrails Moderate Assistance using No Assistive Device.                          Time Tracking:     PT Received On: 23  PT Start Time: 1312     PT Stop Time: 1339  PT Total Time (min): 27 min     Billable Minutes: Therapeutic Activity 19 and Therapeutic Exercise 8    Treatment Type: Treatment  PT/PTA: PT     Number of PTA visits since last PT visit: 2023

## 2023-06-12 NOTE — PLAN OF CARE
Problem: Occupational Therapy  Goal: Occupational Therapy Goal  Description: LTG: Pt will perform basic ADLs and ADL transfers with min A and good compliance to sternal precautions using LRAD by discharge.--resume    STG: to be met in 2 weeks    Pt will complete grooming EOB with SBA.--revised  Pt will complete UB dressing with mod A.-resume  Pt will complete LB dressing with mod A using LRAD.-resume  Pt will complete toileting with mod A using RW and BSC.-resume  Pt will complete toilet transfer with mod A using RW and BSC. -resume  Pt will independently verbalize sternal precautions with >75% accuracy. -revised  Outcome: Unable to Meet, Plan Revised

## 2023-06-12 NOTE — PROGRESS NOTES
[facility] - [facility]  Cardiology  Progress Note    Patient Name: Tom Anna  MRN: [id number]  Admission Date: 6/1/2023  Hospital Length of Stay: 11 days  Code Status: Full Code   Attending Physician: REILLY Saunders MD   Primary Care Physician: Primary Doctor No  Expected Discharge Date:   Principal Problem:<principal problem not specified>    Subjective:   Consultation Reason: Concern for NSVT     HPI:   Mr. Anna is a 67 year old male, known to Dr. Contreras in [place], who presented to the hospital and underwent elective CABG by Dr. Grossman on 6.1.23. CABG noted to be LIMA to LAD, SVG to OM1, and SVG to PDA. Also underwent TAMI Ligation which was clipped with 45 atrial clip. He was initially extubated post surgery and sent to floor for further management (once out of ICU). On 6.2.23 he had a suspected aspiration event, required significant oxygen support, required BIAP and eventual intubation and transfer back to ICU. He was extubated once again on 6.10.23. Patient has been tachycardic and has a baseline lbbb, which looks like VT on the monitor. CIS is consulted for evaluation.    Hospital Course:   6.12.23: NAD Noted. Sinus Tach.      PMH: CAD/CABG, Hypertension, Hyperlipidemia, Ischemic Cardiomyopathy EF 25%, GERD, Cerebral Infarction, Nicotine Dependence, LBBB  PSH: LHC, CABG  Family History: Mother- Hypertension/Depression  Social History: Tobacco- Active Smoker, Alcohol- Beer Consumption, Substance Abuse- Negative     Previous Cardiac Diagnostics:   Echocardiogram (6.7.23):  Severely decreased systolic function.  The estimated ejection fraction is 25%; Definity used.  No LV thrombus seen in this study.  There is abnormal septal wall motion.  The Right ventricle is not well visualized but appears to be of mildly reduced RV systolic function.  No evidence of significant pericardial effusion or tamponade physiology.     CV US Arterial Doppler (6.6.23):  The right lower extremity  demonstrated severe arterial flow reduction consistent with an occluded superficial femoral, popliteal, and tibial arteries.   The left lower extremity demonstrated severe arterial flow reduction consistent with an occluded superficial femoral, and tibial arteries with reconstitution of flow in the popliteal artery.      CV US Arterial Upper Extremities (6.6.23):  Patent right upper extremity arterial system with biphasic waveforms throughout, however no flow was identified in the radial artery. Patent left upper extremity arterial system with triphasic waveforms throughout, however the ulnar artery was not well visualized.      LHC (5.1.23):  LM- Large 30-50% Obstruction, LAD- Mid Section with Moderate to Severe Disease, Proximal/Distal Section with Mild Disease, LCX- Moderate to Severe Diffuse Disease with Moderate Diffuse Disease of High Takeoff OM1, Moderate Disease of OM2, RCA- Dominant Vessel with Proximal Moderate to Severe Disease, Mid Disease is Mild, Distal  Noted.      Telemetry Monitor (29 Days & 7 Hours) (3.24.23):  This is a good quality study.   Predominant rhythm is normal sinus rhythm.   The minimum heart rate recorded was 59 beats / minute. The maximum heart rate is 133 beats / minute. The mean heart rate is 80 beats / minute.   Rare premature ventricular contractions noted.    No pauses were noted.   Baseline LBBB noted. Abberant rhythm noted. No symptoms reported.      Echocardiogram (3.18.23):  LV Dilated. EF 25-30%.  Inferior Wall Appears Hypokinetic. Normal RV Function    Review of Systems   Cardiovascular:  Negative for chest pain.   Respiratory:  Negative for shortness of breath.    All other systems reviewed and are negative.    Objective:     Vital Signs (Most Recent):  Temp: 98.3 °F (36.8 °C) (06/12/23 0400)  Pulse: 102 (06/12/23 0400)  Resp: (!) 27 (06/12/23 0400)  BP: 132/83 (06/12/23 0400)  SpO2: 95 % (06/12/23 0400) Vital Signs (24h Range):  Temp:  [98.3 °F (36.8 °C)-98.8 °F (37.1  °C)] 98.3 °F (36.8 °C)  Pulse:  [] 102  Resp:  [18-35] 27  SpO2:  [76 %-99 %] 95 %  BP: (111-154)/(57-88) 132/83     Weight: 88.5 kg (195 lb 1.7 oz)  Body mass index is 27.99 kg/m².    SpO2: 95 %         Intake/Output Summary (Last 24 hours) at 6/12/2023 0824  Last data filed at 6/12/2023 0600  Gross per 24 hour   Intake 1940.5 ml   Output 1515 ml   Net 425.5 ml       Lines/Drains/Airways       Peripherally Inserted Central Catheter Line  Duration             PICC Triple Lumen 06/07/23 2318 left brachial 4 days              Drain  Duration                  Urethral Catheter 06/07/23 1130 Latex 16 Fr. 4 days         Rectal Tube 06/09/23 0800 rectal tube w/ balloon (indicate number of mLs) 3 days         NG/OG Tube 06/11/23 0830 nasogastric 16 Fr. Right nostril <1 day              Peripheral Intravenous Line  Duration                  Peripheral IV - Single Lumen 06/07/23 1500 20 G Left;Posterior Hand 4 days         Peripheral IV - Single Lumen 06/07/23 1544 20 G Anterior;Right Forearm 4 days                    Significant Labs:   Recent Results (from the past 72 hour(s))   RT Blood Gas    Collection Time: 06/09/23  1:18 PM   Result Value Ref Range    Sample Type Arterial Blood     Sample site Left Radial Artery     Drawn by sd rrt     pH, Blood gas 7.480 (H) 7.350 - 7.450    pCO2, Blood gas 33.0 (L) 35.0 - 45.0 mmHg    pO2, Blood gas 78.0 (L) 80.0 - 100.0 mmHg    Sodium, Blood Gas 131 (L) 137 - 145 mmol/L    Potassium, Blood Gas 4.1 3.5 - 5.0 mmol/L    Calcium Level Ionized 1.07 (L) 1.12 - 1.23 mmol/L    TOC2, Blood gas 25.6 mmol/L    Base Excess, Blood gas 1.50 -2.00 - 2.00 mmol/L    sO2, Blood gas 96.3 %    HCO3, Blood gas 24.6 22.0 - 26.0 mmol/L    THb, Blood gas 12.1 12 - 16 g/dL    O2 Hb, Blood Gas 94.2 94.0 - 97.0 %    CO Hgb 1.7 (H) 0.5 - 1.5 %    Met Hgb 1.4 0.4 - 1.5 %    Allens Test Yes     MODE AC     Oxygen Device, Blood gas Ventilator     FIO2, Blood gas 30 %    Mech Vt 450 ml    Mech RR 26 b/min     PEEP 7.0 cmH2O   POCT glucose    Collection Time: 06/09/23  4:18 PM   Result Value Ref Range    POCT Glucose 248 (H) 70 - 110 mg/dL   POCT glucose    Collection Time: 06/10/23 12:13 AM   Result Value Ref Range    POCT Glucose 256 (H) 70 - 110 mg/dL   Comprehensive Metabolic Panel    Collection Time: 06/10/23  3:14 AM   Result Value Ref Range    Sodium Level 139 136 - 145 mmol/L    Potassium Level 5.1 3.5 - 5.1 mmol/L    Chloride 111 (H) 98 - 107 mmol/L    Carbon Dioxide 22 (L) 23 - 31 mmol/L    Glucose Level 269 (H) 82 - 115 mg/dL    Blood Urea Nitrogen 36.4 (H) 8.4 - 25.7 mg/dL    Creatinine 0.96 0.73 - 1.18 mg/dL    Calcium Level Total 7.4 (L) 8.8 - 10.0 mg/dL    Protein Total 6.0 5.8 - 7.6 gm/dL    Albumin Level 2.0 (L) 3.4 - 4.8 g/dL    Globulin 4.0 (H) 2.4 - 3.5 gm/dL    Albumin/Globulin Ratio 0.5 (L) 1.1 - 2.0 ratio    Bilirubin Total 0.7 <=1.5 mg/dL    Alkaline Phosphatase 55 40 - 150 unit/L    Alanine Aminotransferase 310 (H) 0 - 55 unit/L    Aspartate Aminotransferase 210 (H) 5 - 34 unit/L    eGFR >60 mls/min/1.73/m2   CBC with Differential    Collection Time: 06/10/23  3:14 AM   Result Value Ref Range    WBC 18.49 (H) 4.50 - 11.50 x10(3)/mcL    RBC 5.81 4.70 - 6.10 x10(6)/mcL    Hgb 13.9 (L) 14.0 - 18.0 g/dL    Hct 42.2 42.0 - 52.0 %    MCV 72.6 (L) 80.0 - 94.0 fL    MCH 23.9 (L) 27.0 - 31.0 pg    MCHC 32.9 (L) 33.0 - 36.0 g/dL    RDW      Platelet 160 130 - 400 x10(3)/mcL    MPV      Neut % 87.3 %    Lymph % 4.7 %    Mono % 3.5 %    Eos % 2.1 %    Basophil % 0.3 %    Lymph # 0.86 0.6 - 4.6 x10(3)/mcL    Neut # 16.16 (H) 2.1 - 9.2 x10(3)/mcL    Mono # 0.65 0.1 - 1.3 x10(3)/mcL    Eos # 0.39 0 - 0.9 x10(3)/mcL    Baso # 0.05 <=0.2 x10(3)/mcL    IG# 0.38 (H) 0 - 0.04 x10(3)/mcL    IG% 2.1 %    NRBC% 4.1 %   RT Blood Gas    Collection Time: 06/10/23  4:58 AM   Result Value Ref Range    Sample Type Arterial Blood     Sample site Right Radial Artery     Drawn by aslrt     pH, Blood gas 7.460 (H) 7.350 -  7.450    pCO2, Blood gas 33.0 (L) 35.0 - 45.0 mmHg    pO2, Blood gas 85.0 80.0 - 100.0 mmHg    Sodium, Blood Gas 133 (L) 137 - 145 mmol/L    Potassium, Blood Gas 4.8 3.5 - 5.0 mmol/L    Calcium Level Ionized 1.09 (L) 1.12 - 1.23 mmol/L    TOC2, Blood gas 24.5 mmol/L    Base Excess, Blood gas 0.30 -2.00 - 2.00 mmol/L    sO2, Blood gas 96.9 %    HCO3, Blood gas 23.5 22.0 - 26.0 mmol/L    THb, Blood gas 14.0 12 - 16 g/dL    O2 Hb, Blood Gas 94.6 94.0 - 97.0 %    CO Hgb 1.9 (H) 0.5 - 1.5 %    Met Hgb 1.5 0.4 - 1.5 %    Allens Test Yes     MODE AC     Oxygen Device, Blood gas Ventilator     FIO2, Blood gas 30 %    Mech Vt 450 ml    Mech RR 26 b/min    PEEP 7.0 cmH2O   POCT glucose    Collection Time: 06/10/23  6:33 AM   Result Value Ref Range    POCT Glucose 274 (H) 70 - 110 mg/dL   RT Blood Gas    Collection Time: 06/10/23  8:45 AM   Result Value Ref Range    Sample Type Arterial Blood     Sample site Right Radial Artery     Drawn by KRYSTAL WHELAN     pH, Blood gas 7.470 (H) 7.350 - 7.450    pCO2, Blood gas 31.0 (L) 35.0 - 45.0 mmHg    pO2, Blood gas 95.0 80.0 - 100.0 mmHg    Sodium, Blood Gas 133 (L) 137 - 145 mmol/L    Potassium, Blood Gas 4.3 3.5 - 5.0 mmol/L    Calcium Level Ionized 1.06 (L) 1.12 - 1.23 mmol/L    TOC2, Blood gas 23.6 mmol/L    Base Excess, Blood gas -0.20 -2.00 - 2.00 mmol/L    sO2, Blood gas 97.8 %    HCO3, Blood gas 22.6 22.0 - 26.0 mmol/L    THb, Blood gas 14.4 12 - 16 g/dL    O2 Hb, Blood Gas 95.2 94.0 - 97.0 %    CO Hgb 1.8 (H) 0.5 - 1.5 %    Met Hgb 1.4 0.4 - 1.5 %    Allens Test Yes     MODE CPAP     Oxygen Device, Blood gas Ventilator     FIO2, Blood gas 30 %    Spont Vt 680 mL    Spont RR 22 b/min    PEEP 7.0 cmH2O    PS 10.0 cmH2O    Total Minute Vol 16.1 L   POCT glucose    Collection Time: 06/10/23  8:58 AM   Result Value Ref Range    POCT Glucose 299 (H) 70 - 110 mg/dL   Basic Metabolic Panel    Collection Time: 06/11/23 12:43 AM   Result Value Ref Range    Sodium Level 143 136 - 145 mmol/L     Potassium Level 4.1 3.5 - 5.1 mmol/L    Chloride 112 (H) 98 - 107 mmol/L    Carbon Dioxide 21 (L) 23 - 31 mmol/L    Glucose Level 190 (H) 82 - 115 mg/dL    Blood Urea Nitrogen 31.9 (H) 8.4 - 25.7 mg/dL    Creatinine 0.86 0.73 - 1.18 mg/dL    BUN/Creatinine Ratio 37     Calcium Level Total 7.4 (L) 8.8 - 10.0 mg/dL    Anion Gap 10.0 mEq/L    eGFR >60 mls/min/1.73/m2   Magnesium    Collection Time: 06/11/23 12:43 AM   Result Value Ref Range    Magnesium Level 2.70 (H) 1.60 - 2.60 mg/dL   Phosphorus    Collection Time: 06/11/23 12:43 AM   Result Value Ref Range    Phosphorus Level 2.2 (L) 2.3 - 4.7 mg/dL   POCT glucose    Collection Time: 06/11/23 12:46 AM   Result Value Ref Range    POCT Glucose 199 (H) 70 - 110 mg/dL   Comprehensive Metabolic Panel    Collection Time: 06/11/23  1:18 AM   Result Value Ref Range    Sodium Level 143 136 - 145 mmol/L    Potassium Level 4.1 3.5 - 5.1 mmol/L    Chloride 112 (H) 98 - 107 mmol/L    Carbon Dioxide 23 23 - 31 mmol/L    Glucose Level 169 (H) 82 - 115 mg/dL    Blood Urea Nitrogen 34.2 (H) 8.4 - 25.7 mg/dL    Creatinine 0.83 0.73 - 1.18 mg/dL    Calcium Level Total 7.5 (L) 8.8 - 10.0 mg/dL    Protein Total 5.9 5.8 - 7.6 gm/dL    Albumin Level 2.0 (L) 3.4 - 4.8 g/dL    Globulin 3.9 (H) 2.4 - 3.5 gm/dL    Albumin/Globulin Ratio 0.5 (L) 1.1 - 2.0 ratio    Bilirubin Total 1.0 <=1.5 mg/dL    Alkaline Phosphatase 65 40 - 150 unit/L    Alanine Aminotransferase 287 (H) 0 - 55 unit/L    Aspartate Aminotransferase 148 (H) 5 - 34 unit/L    eGFR >60 mls/min/1.73/m2   CBC with Differential    Collection Time: 06/11/23  1:18 AM   Result Value Ref Range    WBC 21.38 (H) 4.50 - 11.50 x10(3)/mcL    RBC 5.55 4.70 - 6.10 x10(6)/mcL    Hgb 13.3 (L) 14.0 - 18.0 g/dL    Hct 41.5 (L) 42.0 - 52.0 %    MCV 74.8 (L) 80.0 - 94.0 fL    MCH 24.0 (L) 27.0 - 31.0 pg    MCHC 32.0 (L) 33.0 - 36.0 g/dL    RDW      Platelet 157 130 - 400 x10(3)/mcL    MPV      Neut % 87.3 %    Lymph % 6.5 %    Mono % 4.1 %    Eos %  0.7 %    Basophil % 0.1 %    Lymph # 1.40 0.6 - 4.6 x10(3)/mcL    Neut # 18.64 (H) 2.1 - 9.2 x10(3)/mcL    Mono # 0.87 0.1 - 1.3 x10(3)/mcL    Eos # 0.16 0 - 0.9 x10(3)/mcL    Baso # 0.03 <=0.2 x10(3)/mcL    IG# 0.28 (H) 0 - 0.04 x10(3)/mcL    IG% 1.3 %    NRBC% 1.3 %   Blood Smear Microscopic Exam    Collection Time: 06/11/23  1:18 AM   Result Value Ref Range    RBC Morph Abnormal (A) Normal    Anisocyte 1+ (A) (none)    [place] Cells 1+ (A) (none)    Macrocyte 1+ (A) (none)    Microcyte 1+ (A) (none)    Poik 1+ (A) (none)    Polychrom 1+ (A) (none)    Target Cell 2+ (A) (none)    Platelet Est Normal Normal, Adequate   Comprehensive Metabolic Panel    Collection Time: 06/12/23  1:31 AM   Result Value Ref Range    Sodium Level 140 136 - 145 mmol/L    Potassium Level 4.5 3.5 - 5.1 mmol/L    Chloride 111 (H) 98 - 107 mmol/L    Carbon Dioxide 18 (L) 23 - 31 mmol/L    Glucose Level 273 (H) 82 - 115 mg/dL    Blood Urea Nitrogen 30.0 (H) 8.4 - 25.7 mg/dL    Creatinine 0.88 0.73 - 1.18 mg/dL    Calcium Level Total 7.8 (L) 8.8 - 10.0 mg/dL    Protein Total 6.6 5.8 - 7.6 gm/dL    Albumin Level 1.9 (L) 3.4 - 4.8 g/dL    Globulin 4.7 (H) 2.4 - 3.5 gm/dL    Albumin/Globulin Ratio 0.4 (L) 1.1 - 2.0 ratio    Bilirubin Total 1.0 <=1.5 mg/dL    Alkaline Phosphatase 74 40 - 150 unit/L    Alanine Aminotransferase 170 (H) 0 - 55 unit/L    Aspartate Aminotransferase 78 (H) 5 - 34 unit/L    eGFR >60 mls/min/1.73/m2   CBC with Differential    Collection Time: 06/12/23  1:31 AM   Result Value Ref Range    WBC 21.83 (H) 4.50 - 11.50 x10(3)/mcL    RBC 5.74 4.70 - 6.10 x10(6)/mcL    Hgb 13.7 (L) 14.0 - 18.0 g/dL    Hct 42.9 42.0 - 52.0 %    MCV 74.7 (L) 80.0 - 94.0 fL    MCH 23.9 (L) 27.0 - 31.0 pg    MCHC 31.9 (L) 33.0 - 36.0 g/dL    RDW      Platelet 149 130 - 400 x10(3)/mcL    MPV      Neut % 87.6 %    Lymph % 5.7 %    Mono % 5.0 %    Eos % 0.5 %    Basophil % 0.1 %    Lymph # 1.24 0.6 - 4.6 x10(3)/mcL    Neut # 19.14 (H) 2.1 - 9.2  x10(3)/mcL    Mono # 1.10 0.1 - 1.3 x10(3)/mcL    Eos # 0.10 0 - 0.9 x10(3)/mcL    Baso # 0.02 <=0.2 x10(3)/mcL    IG# 0.23 (H) 0 - 0.04 x10(3)/mcL    IG% 1.1 %    NRBC% 0.9 %   Blood Smear Microscopic Exam    Collection Time: 06/12/23  1:31 AM   Result Value Ref Range    RBC Morph Abnormal (A) Normal    Anisocyte 1+ (A) (none)    [place] Cells 2+ (A) (none)    Hypochrom 1+ (A) (none)    Poik 2+ (A) (none)    Polychrom 1+ (A) (none)    Target Cell 2+ (A) (none)    Platelet Est Normal Normal, Adequate   POCT glucose    Collection Time: 06/12/23  5:56 AM   Result Value Ref Range    POCT Glucose 336 (H) 70 - 110 mg/dL     Telemetry:  Sinus Tach    Physical Exam  Vitals and nursing note reviewed.   Constitutional:       Appearance: Normal appearance.   HENT:      Head: Normocephalic.      Mouth/Throat:      Mouth: Mucous membranes are moist.      Pharynx: Oropharynx is clear.   Cardiovascular:      Rate and Rhythm: Regular rhythm. Tachycardia present.      Heart sounds: Normal heart sounds.      Comments: Sinus Tach  Pulmonary:      Effort: Pulmonary effort is normal. No respiratory distress.      Breath sounds: Normal breath sounds. No wheezing or rales.   Abdominal:      General: There is no distension.      Palpations: Abdomen is soft.      Tenderness: There is no abdominal tenderness. There is no guarding.   Musculoskeletal:      Cervical back: Neck supple.   Skin:     General: Skin is warm and dry.   Neurological:      General: No focal deficit present.      Mental Status: He is alert and oriented to person, place, and time. Mental status is at baseline.   Psychiatric:         Mood and Affect: Mood normal.         Behavior: Behavior normal.     Current Inpatient Medications:    Current Facility-Administered Medications:     0.9%  NaCl infusion (for blood administration), , Intravenous, Q24H PRN, Xochitl Grossman MD    0.9%  NaCl infusion (for blood administration), , Intravenous, Q24H PRN, Xochitl Grossman MD     acetaminophen oral solution 650 mg, 650 mg, Per OG tube, Q6H PRN, JONNY Batista    albuterol nebulizer solution 2.5 mg, 2.5 mg, Nebulization, Q6H, JONNY Riley, 2.5 mg at 06/12/23 0024    aspirin chewable tablet 81 mg, 81 mg, Per OG tube, Daily, JONNY Batista, 81 mg at 06/11/23 0807    baclofen tablet 10 mg, 10 mg, Per OG tube, TID PRN, JONNY Batista    dexAMETHasone injection 4 mg, 4 mg, Intravenous, Q12H, REILLY Saunders MD, 4 mg at 06/12/23 0156    dexmedetomidine (PRECEDEX) 400mcg/100mL 0.9% NaCL infusion, 0-1.4 mcg/kg/hr, Intravenous, Continuous, REILLY Saunders MD, Last Rate: 26.6 mL/hr at 06/10/23 0508, 1.2 mcg/kg/hr at 06/10/23 0508    dextrose 10% bolus 125 mL 125 mL, 12.5 g, Intravenous, PRN, REILLY Saunders MD    dextrose 10% bolus 250 mL 250 mL, 25 g, Intravenous, PRN, REILLY Saunders MD    docusate 50 mg/5 mL liquid 100 mg, 100 mg, Per G Tube, Daily, JONNY Batista, 100 mg at 06/11/23 0807    enoxaparin injection 40 mg, 40 mg, Subcutaneous, Q24H (prophylaxis, 1700), REILLY Saunders MD, 40 mg at 06/11/23 1730    etomidate injection 20 mg, 20 mg, Intravenous, Once, REILLY Saunders MD    famotidine (PF) injection 20 mg, 20 mg, Intravenous, Q12H, JONNY Batista, 20 mg at 06/11/23 2124    fluticasone propionate 50 mcg/actuation nasal spray 100 mcg, 2 spray, Each Nostril, Daily, REILLY Saunders MD, 100 mcg at 06/04/23 0900    folic acid tablet 1 mg, 1 mg, Per OG tube, Daily, JONNY Batista, 1 mg at 06/11/23 0807    glucagon (human recombinant) injection 1 mg, 1 mg, Intramuscular, PRN, REILLY Saunders MD    glucose chewable tablet 16 g, 16 g, Per OG tube, PRN, JONNY Batista    glucose chewable tablet 24 g, 24 g, Per OG tube, PRN, JONNY Batista    HYDROcodone-acetaminophen 5-325 mg per tablet 1 tablet, 1 tablet, Per OG tube, Q4H PRN, JONNY Batista    insulin aspart U-100 injection 0-5 Units, 0-5 Units, Subcutaneous, QID (AC +  HS) PRN, REILLY Saunders MD, 4 Units at 06/12/23 0601    ipratropium 0.02 % nebulizer solution 0.5 mg, 0.5 mg, Nebulization, Q6H, REILLY Saunders MD, 0.5 mg at 06/12/23 0115    labetaloL injection 10 mg, 10 mg, Intravenous, Q4H PRN, Cleve Stratton MD, 10 mg at 06/11/23 0533    lactulose 10 gram/15 ml solution 20 g, 20 g, Per OG tube, Q6H PRN, JONNY Batista    loperamide capsule 2 mg, 2 mg, Per OG tube, Continuous PRN, JONNY Batista    metoclopramide HCl injection 5 mg, 5 mg, Intravenous, Q6H PRN, JONNY Batista    metoprolol tartrate (LOPRESSOR) tablet 50 mg, 50 mg, Per OG tube, BID, Frankie Gerard MD, 50 mg at 06/11/23 2124    milrinone 20mg in D5W 100 mL infusion, 0.375 mcg/kg/min (Dosing Weight), Intravenous, Continuous, Xochitl Grossman MD, Stopped at 06/09/23 1700    NORepinephrine 8 mg in dextrose 5% 250 mL infusion, 0-3 mcg/kg/min (Dosing Weight), Intravenous, Continuous, Cathy Lemos MD, Stopped at 06/09/23 1700    ondansetron injection 4 mg, 4 mg, Intravenous, Q4H PRN, JONNY Batista    oxyCODONE immediate release tablet Tab 10 mg, 10 mg, Per OG tube, Q4H PRN, JONNY Batista, 10 mg at 06/09/23 0659    perflutren lipid microspheres injection 1.3 mL, 1.3 mL, Intravenous, Once, REILLY Saunders MD    piperacillin-tazobactam (ZOSYN) 4.5 g in dextrose 5 % in water (D5W) 5 % 100 mL IVPB (MB+), 4.5 g, Intravenous, Q8H, Skip Solorzano MD, Last Rate: 25 mL/hr at 06/12/23 0303, 4.5 g at 06/12/23 0303    potassium chloride 20 mEq in 100 mL IVPB (FOR CENTRAL LINE ADMINISTRATION ONLY), 20 mEq, Intravenous, Once, JONNY Riley, Held at 06/03/23 0345    potassium chloride CR tablet 10 mEq, 10 mEq, Oral, Once, JONNY Riley    propofol (DIPRIVAN) 10 mg/mL infusion, 0-50 mcg/kg/min (Dosing Weight), Intravenous, Continuous, Ester Chung MD, Last Rate: 18.2 mL/hr at 06/10/23 0416, 35 mcg/kg/min at 06/10/23 0416    sacubitriL-valsartan 24-26 mg per tablet 1 tablet,  "1 tablet, Nasogastric, BID, BRISEYDA Penaloza, 1 tablet at 06/11/23 2124    sodium chloride 3% nebulizer solution 4 mL, 4 mL, Nebulization, Q6H, BRISEYDA Purvis, 4 mL at 06/12/23 0115    VTE Risk Mitigation (From admission, onward)           Ordered     enoxaparin injection 40 mg  Every 24 hours         06/06/23 0957                  Assessment:   Sinus Tachycardia with Left Bundle Branch Block  Chronic Left Bundle Branch Block  CAD/CABG    - LIMA to the LAD, SVG to OM1, SVG to PDA  Ischemic Cardiomyopathy    - EF 25%    - Life Vest  Acute Hypoxemic Respiratory Failure Requiring Intubation/Mechanical Ventilation due to Suspected Aspiration Event    - Extubated on 6.10.23  Leukocytosis  Hypertension (BP Stable)  Hyperlipidemia  PAD  Nicotine Dependence/Alcohol Use  History of CVA  Elevated Hepatic Enzymes   ED  Delirium Possible ICU Psychosis   Ace Inhibitor Allergy "Facial Edema" (Tolerates Entresto)    Plan:   Increase Metoprolol Tartrate to 75 Mg PO BID- Switch to Long Acting form prior to discharge  Continue Aspirin  Restart Statin when liver enzymes improve  Continue Entresto  Check Limited Echo to rule out pericardial effusion and to evaluate LV Function  K+ Goal- 4, Mag Goal- 2    BRISEYDA Penaloza  Cardiology  [facility] - [address] [place] [address]  06/12/2023     Physician addendum:  I have seen and examined this patient as a split-shared visit with the ALEJANDRINA d/t complicated medical management of above problems written in assessment and high acuity requiring physician expertise in medical decision-making. I performed the substantive portion of the history and exam. Above medical decision-making is also formulated by me.    Cardiovascular exam:  S1, S2  Lungs:  Fine crackles at bases.  Extremities:  1+ Edema bilaterally    Plan:  Increase metoprolol to 75 b.i.d.  Continue other meds.  Follow up echocardiogram.    Frankie Gerard MD  Cardiologist    "

## 2023-06-12 NOTE — PT/OT/SLP PROGRESS
SLP attempting clinical swallowing evaluation, however pt continue to present with weak cough and hoarse vocal quality. SLP to reattempt tomorrow as appropriate.

## 2023-06-12 NOTE — NURSING
Nurses Note -- 4 Eyes      6/12/2023   12:28 AM      Skin assessed during: Q Shift Change      [] No Altered Skin Integrity Present    [x]Prevention Measures Documented      [x] Yes- Altered Skin Integrity Present or Discovered   [] LDA Added if Not in Epic (Describe Wound)   [] New Altered Skin Integrity was Present on Admit and Documented in LDA   [] Wound Image Taken    Wound Care Consulted? No    Attending Nurse:  Skip Pinedo RN     Second RN/Staff Member:  Mary Ann HURLEY

## 2023-06-12 NOTE — H&P
[facility] Medicine History & Physical Examination       Patient Name: Tom Anna  MRN: [id number]  Patient Class: IP- Inpatient   Admission Date: 6/1/2023   Admitting Physician:  Service   Attending Physician: Robert Landis MD  Primary Care Provider: Primary Doctor No  Face-to-Face encounter date: 06/12/2023  Code Status:Code Status Discussion Note   Chief Complaint: No chief complaint on file.          Patient information was obtained from patient, patient's family, past medical records and ER records.     HISTORY OF PRESENT ILLNESS:   Tom Anna is a 67 y.o. male who  has a past medical history of Allergies, CHF (congestive heart failure), Coronary artery disease, Essential (primary) hypertension, Fatigue, GERD (gastroesophageal reflux disease), HLD (hyperlipidemia), Personal history of colonic polyps, SOB (shortness of breath) on exertion, Stroke, Tobacco abuse, and Weakness of both legs.. The patient presented to [facility] on 6/1/2023     67-year-old male that was originally admitted with history of coronary artery disease had a CABG performed on 06/01.  Postoperative course has been complicated by hypoxemic respiratory failure and concern for aspiration.  He was intubated twice during the postop.  And has been in the ICU for the last week and a half.  He was extubated again on 06/10.  He is now on 2-3 L nasal cannula and doing all her respiratory standpoint.  Did have some deconditioning and oropharyngeal dysphagia postoperatively.  He now has a NG tube in his tolerating tube feeds.  He continues to work with PT and OT.  Chest tubes have been discontinued.  Cardiology is following along and managing his blood pressure and heart rate.  He was a chronic left bundle branch block which is currently controlled.  He is on day 10 of empiric Zosyn for possible aspiration event.  He remains afebrile but with persistent leukocytosis.  He is however on IV steroids.  This  can likely be discontinued.  He was deemed stable enough for downgrade to the floor and hospitalist services asked to assume care.  Cardiovascular surgery and Cardiology will continue to follow along. His wife is at the bedside and plan discussed with her and nursing.  PAST MEDICAL HISTORY:     Past Medical History:   Diagnosis Date    Allergies     CHF (congestive heart failure)     Coronary artery disease     Essential (primary) hypertension     Fatigue     GERD (gastroesophageal reflux disease)     HLD (hyperlipidemia)     Personal history of colonic polyps     SOB (shortness of breath) on exertion     Stroke     Tobacco abuse     Weakness of both legs        PAST SURGICAL HISTORY:     Past Surgical History:   Procedure Laterality Date    COLONOSCOPY W/ BIOPSIES AND POLYPECTOMY  01/20/2020    Dr. Alf Covington    CORONARY ARTERY BYPASS GRAFT (CABG) N/A 6/1/2023    Procedure: CORONARY ARTERY BYPASS GRAFT (CABG);  Surgeon: Xochitl Grossman MD;  Location: [facility];  Service: Cardiothoracic;  Laterality: N/A;    ENDOSCOPY  01/20/2020    ESOPHAGOGASTRODUODENOSCOPY      PLANTAR'S WART EXCISION  07/17/2017       ALLERGIES:   Ace inhibitors    FAMILY HISTORY:   Reviewed and negative    SOCIAL HISTORY:     Social History     Tobacco Use    Smoking status: Every Day     Packs/day: 1.00     Types: Cigarettes    Smokeless tobacco: Never   Substance Use Topics    Alcohol use: Yes     Comment: Beer 6pk daily        HOME MEDICATIONS:     Prior to Admission medications    Medication Sig Start Date End Date Taking? Authorizing Provider   albuterol (VENTOLIN HFA) 90 mcg/actuation inhaler Inhale 1 puff into the lungs every 12 (twelve) hours as needed for Wheezing or Shortness of Breath. Rescue 1/9/23 1/9/24 Yes BRISEYDA Sawant   aspirin (ECOTRIN) 81 MG EC tablet Take 81 mg by mouth once daily.   Yes Historical Provider   atorvastatin (LIPITOR) 80 MG tablet Take 80 mg by mouth every evening. 5/10/23  Yes Historical Provider    baclofen (LIORESAL) 10 MG tablet Take 1 tablet (10 mg total) by mouth 3 (three) times daily as needed (muscle spasms, hiccups). 1/9/23  Yes BRISEYDA Sawant   cetirizine 10 mg chewable tablet Take 10 mg by mouth once daily.   Yes Historical Provider   clopidogreL (PLAVIX) 75 mg tablet Take 75 mg by mouth. 4/18/23  Yes Historical Provider   ENTRESTO 49-51 mg per tablet Take 1 tablet by mouth 2 (two) times daily. 5/10/23  Yes Historical Provider   fluticasone propionate (FLONASE) 50 mcg/actuation nasal spray 1 spray (50 mcg total) by Each Nostril route once daily. 11/14/22  Yes BRISEYDA Levin   furosemide (LASIX) 20 MG tablet Take 20 mg by mouth once daily. 3/28/23  Yes Historical Provider   metoprolol succinate (TOPROL-XL) 100 MG 24 hr tablet Take 100 mg by mouth once daily. 5/10/23  Yes Historical Provider   omeprazole (PRILOSEC) 20 MG capsule Take 1 capsule (20 mg total) by mouth once daily. 1/9/23  Yes BRISEYDA Sawant   spironolactone (ALDACTONE) 25 MG tablet Take 25 mg by mouth once daily. 5/1/23  Yes Historical Provider   amLODIPine (NORVASC) 10 MG tablet Take 1 tablet (10 mg total) by mouth once daily. 1/9/23 7/8/23  BRISEYDA Sawant   ketoconazole (NIZORAL) 2 % cream Apply topically 2 (two) times daily.  Patient not taking: Reported on 5/24/2023 1/9/23   BRISEYDA Sawant   losartan (COZAAR) 50 MG tablet Take 1 tablet (50 mg total) by mouth once daily. 1/9/23 4/9/23  BRISEYDA Sawant   lovastatin (MEVACOR) 10 MG tablet Take 1 tablet (10 mg total) by mouth nightly.  Patient not taking: Reported on 5/24/2023 1/9/23   BRISEYDA Sawant   multivitamin capsule Take 1 capsule by mouth once daily.    Historical Provider   sildenafiL (VIAGRA) 100 MG tablet Take 1 tablet (100 mg total) by mouth as needed for Erectile Dysfunction. 11/14/22 5/13/23  BRISEYDA Levin       REVIEW OF SYSTEMS:   Except as documented, all other systems reviewed and negative     PHYSICAL EXAM:     VITAL  SIGNS: 24 HRS MIN & MAX LAST   Temp  Min: 97.9 °F (36.6 °C)  Max: 99.1 °F (37.3 °C) 98.4 °F (36.9 °C)   BP  Min: 110/70  Max: 141/82 (!) 141/82   Pulse  Min: 88  Max: 102  99   Resp  Min: 20  Max: 33 (!) 23   SpO2  Min: 93 %  Max: 99 % 95 %       General appearance: Well-developed, well-nourished male in no apparent distress.  HENT: Atraumatic head. Moist mucous membranes of oral cavity. NG tube  Eyes: Normal extraocular movements.   Neck: Supple.   Lungs: Clear to auscultation bilaterally. No wheezing present.   Heart: Regular rate and rhythm. S1 and S2 present with no murmurs/gallop/rub. No pedal edema. No JVD present. CABG incision c/d/i  Abdomen: Soft, non-distended, non-tender. No rebound tenderness/guarding. Bowel sounds are normal.   Extremities: No cyanosis, clubbing, or edema. L PICC line  Skin: No Rash.   Neuro: Motor and sensory exams grossly intact. Good tone. Muscle strength 5/5 in all 4 extremities  Psych/mental status: Appropriate mood and affect. Responds appropriately to questions.     LABS AND IMAGING:     Recent Labs   Lab 06/06/23  0608 06/07/23  0147 06/08/23  0131 06/09/23  0734 06/10/23  0314 06/11/23  0118 06/12/23  0131   WBC 10.73 12.01* 10.46 17.68* 18.49* 21.38* 21.83*   RBC 3.82* 4.02* 3.68* 4.85 5.81 5.55 5.74   HGB 7.3* 7.7* 7.2* 10.7* 13.9* 13.3* 13.7*   HCT 23.5* 25.2* 23.1* 32.2* 42.2 41.5* 42.9   MCV 61.5* 62.7* 62.8* 66.4* 72.6* 74.8* 74.7*   MCH 19.1* 19.2* 19.6* 22.1* 23.9* 24.0* 23.9*   MCHC 31.1* 30.6* 31.2* 33.2 32.9* 32.0* 31.9*   RDW 25.2* 25.8* 25.7* 28.7*  --   --   --     204 210 212 160 157 149   MPV 10.1  --  9.6  --   --   --   --        Recent Labs   Lab 06/07/23  0147 06/07/23  1225 06/08/23  0131 06/08/23  0622 06/09/23  1318 06/10/23  0314 06/10/23  0458 06/10/23  0845 06/11/23  0043 06/11/23  0118 06/12/23  0131     --  134*   < >  --  139  --   --  143 143 140   K 4.5  --  4.4   < >  --  5.1  --   --  4.1 4.1 4.5   CO2 24  --  25   < >  --  22*  --    --  21* 23 18*   BUN 18.4  --  34.3*   < >  --  36.4*  --   --  31.9* 34.2* 30.0*   CREATININE 0.98  --  1.52*   < >  --  0.96  --   --  0.86 0.83 0.88   CALCIUM 7.9*  --  7.5*   < >  --  7.4*  --   --  7.4* 7.5* 7.8*   PH  --    < >  --    < > 7.480*  --  7.460* 7.470*  --   --   --    MG 2.60  --  2.70*  --   --   --   --   --  2.70*  --   --    ALBUMIN 2.3*  --  2.2*   < >  --  2.0*  --   --   --  2.0* 1.9*   ALKPHOS 55  --  52   < >  --  55  --   --   --  65 74   ALT 40  --  178*   < >  --  310*  --   --   --  287* 170*   AST 64*  --  347*   < >  --  210*  --   --   --  148* 78*   BILITOT 1.0  --  1.1   < >  --  0.7  --   --   --  1.0 1.0    < > = values in this interval not displayed.       Microbiology Results (last 7 days)       Procedure Component Value Units Date/Time    Respiratory Culture [[id number]]  (Abnormal) Collected: 06/07/23 1428    Order Status: Completed Specimen: Sputum, Induced Updated: 06/09/23 0833     Respiratory Culture Moderate Yeast     Comment: with no normal respiratory candido        GRAM STAIN Quality 3+      No bacteria seen    Respiratory Culture [[id number]]  (Abnormal) Collected: 06/07/23 1138    Order Status: Completed Specimen: Bronchial Brush L from BAL, LLL Updated: 06/09/23 0802     Respiratory Culture Moderate Yeast     Comment: with no normal respiratory candido       Blood Culture [[id number]]  (Normal) Collected: 06/03/23 0610    Order Status: Completed Specimen: Blood from Arm, Left Updated: 06/08/23 1000     CULTURE, BLOOD ([facility]) No Growth at 5 days    Blood Culture [[id number]]  (Normal) Collected: 06/03/23 0610    Order Status: Completed Specimen: Blood from Arm, Right Updated: 06/08/23 1000     CULTURE, BLOOD ([facility]) No Growth at 5 days    Gram Stain [[id number]] Collected: 06/07/23 1138    Order Status: Completed Specimen: Bronchial Alveolar Lavage from BAL, LLL Updated: 06/07/23 1430     GRAM STAIN Few WBC observed      Few Gram Negative Rods      Few Gram positive cocci              Echo Saline Bubble? No  · The estimated ejection fraction is 25%; Definity used.  · There is abnormal septal wall motion consistent with post-operative   status.  · No pericardial effusion.  · Normal central venous pressure (3 mmHg).     Technically difficult study due to post-operative status.      _____________________________________  INPATIENT LIST OF MEDICATIONS     Current Facility-Administered Medications:     0.9%  NaCl infusion (for blood administration), , Intravenous, Q24H PRN, Xochitl Grossman MD    0.9%  NaCl infusion (for blood administration), , Intravenous, Q24H PRN, Xochitl Grossman MD    acetaminophen oral solution 650 mg, 650 mg, Per OG tube, Q6H PRN, JONNY Batista    albuterol nebulizer solution 2.5 mg, 2.5 mg, Nebulization, Q6H, JONNY Riley, 2.5 mg at 06/12/23 1301    aspirin chewable tablet 81 mg, 81 mg, Per OG tube, Daily, JONNY Batista, 81 mg at 06/12/23 0920    baclofen tablet 10 mg, 10 mg, Per OG tube, TID PRN, JONNY Batista    dexAMETHasone injection 4 mg, 4 mg, Intravenous, Q12H, REILLY Saunders MD, 4 mg at 06/12/23 1233    dextrose 10% bolus 125 mL 125 mL, 12.5 g, Intravenous, PRN, REILLY Saunders MD    dextrose 10% bolus 250 mL 250 mL, 25 g, Intravenous, PRN, REILLY Saunders MD    docusate 50 mg/5 mL liquid 100 mg, 100 mg, Per G Tube, Daily, JONNY Batista, 100 mg at 06/12/23 0920    enoxaparin injection 40 mg, 40 mg, Subcutaneous, Q24H (prophylaxis, 1700), REILLY Saunders MD, 40 mg at 06/11/23 1730    etomidate injection 20 mg, 20 mg, Intravenous, Once, REILLY Saunders MD    famotidine (PF) injection 20 mg, 20 mg, Intravenous, Q12H, JONNY Batista, 20 mg at 06/12/23 0918    fluticasone propionate 50 mcg/actuation nasal spray 100 mcg, 2 spray, Each Nostril, Daily, REILLY Saunders MD, 100 mcg at 06/12/23 0940    folic acid tablet 1 mg, 1 mg, Per OG tube, Daily, JONNY Batista, 1 mg at 06/12/23 0919    glucagon (human  recombinant) injection 1 mg, 1 mg, Intramuscular, PRN, REILLY Saunders MD    glucose chewable tablet 16 g, 16 g, Per OG tube, PRN, JONNY Batista    glucose chewable tablet 24 g, 24 g, Per OG tube, PRN, JONNY Batista    HYDROcodone-acetaminophen 5-325 mg per tablet 1 tablet, 1 tablet, Per OG tube, Q4H PRN, JONNY Batista, 1 tablet at 06/12/23 1315    insulin aspart U-100 injection 0-5 Units, 0-5 Units, Subcutaneous, QID (AC + HS) PRN, REILLY Saunders MD, 2 Units at 06/12/23 1233    ipratropium 0.02 % nebulizer solution 0.5 mg, 0.5 mg, Nebulization, Q6H, REILLY Saunders MD, 0.5 mg at 06/12/23 1301    labetaloL injection 10 mg, 10 mg, Intravenous, Q4H PRN, Cleve Stratton MD, 10 mg at 06/11/23 0533    lactulose 10 gram/15 ml solution 20 g, 20 g, Per OG tube, Q6H PRN, JONNY Batista    loperamide capsule 2 mg, 2 mg, Per OG tube, Continuous PRN, JONNY Batista    metoclopramide HCl injection 5 mg, 5 mg, Intravenous, Q6H PRN, JONNY Batista    metoprolol tartrate tablet 75 mg, 75 mg, Per OG tube, BID, Brooklyn Hallman, BRISEYDA    milrinone 20mg in D5W 100 mL infusion, 0.375 mcg/kg/min (Dosing Weight), Intravenous, Continuous, Xochitl Grossman MD, Stopped at 06/09/23 1700    ondansetron injection 4 mg, 4 mg, Intravenous, Q4H PRN, JONNY Batista    oxyCODONE immediate release tablet Tab 10 mg, 10 mg, Per OG tube, Q4H PRN, JONNY Batista, 10 mg at 06/09/23 0659    perflutren lipid microspheres injection 1.3 mL, 1.3 mL, Intravenous, Once, REILLY Saunders MD    piperacillin-tazobactam (ZOSYN) 4.5 g in dextrose 5 % in water (D5W) 5 % 100 mL IVPB (MB+), 4.5 g, Intravenous, Q8H, Skip Solorzano MD, Stopped at 06/12/23 1345    potassium chloride 20 mEq in 100 mL IVPB (FOR CENTRAL LINE ADMINISTRATION ONLY), 20 mEq, Intravenous, Once, JONNY Riley, Held at 06/03/23 0345    potassium chloride CR tablet 10 mEq, 10 mEq, Oral, Once, JONNY Riley    sacubitriL-valsartan  24-26 mg per tablet 1 tablet, 1 tablet, Nasogastric, BID, Brooklyn LOMAX Lexx, FNP, 1 tablet at 06/12/23 0918    sodium chloride 3% nebulizer solution 4 mL, 4 mL, Nebulization, Q6H, Amanda ISADORA Vy, FNP, 4 mL at 06/12/23 0115      Scheduled Meds:   albuterol sulfate  2.5 mg Nebulization Q6H    aspirin  81 mg Per OG tube Daily    dexAMETHasone  4 mg Intravenous Q12H    docusate  100 mg Per G Tube Daily    enoxparin  40 mg Subcutaneous Q24H (prophylaxis, 1700)    etomidate  20 mg Intravenous Once    famotidine (PF)  20 mg Intravenous Q12H    fluticasone propionate  2 spray Each Nostril Daily    folic acid  1 mg Per OG tube Daily    ipratropium  0.5 mg Nebulization Q6H    metoprolol tartrate  75 mg Per OG tube BID    perflutren lipid microspheres  1.3 mL Intravenous Once    piperacillin-tazobactam (Zosyn) IV (PEDS and ADULTS) (extended infusion is not appropriate)  4.5 g Intravenous Q8H    potassium chloride in water  20 mEq Intravenous Once    potassium chloride  10 mEq Oral Once    sacubitriL-valsartan  1 tablet Nasogastric BID    sodium chloride 3%  4 mL Nebulization Q6H     Continuous Infusions:   loperamide      milrinone Stopped (06/09/23 1700)     PRN Meds:.sodium chloride, sodium chloride, acetaminophen, baclofen, dextrose 10%, dextrose 10%, glucagon (human recombinant), glucose, glucose, HYDROcodone-acetaminophen, insulin aspart U-100, labetaloL, lactulose 10 gram/15 ml, loperamide, metoclopramide HCl, ondansetron, oxyCODONE      VTE Prophylaxis: will be placed on appropriate DVT prophylaxis and will be advised to be as mobile as possible and sit in a chair as tolerated  _____________________________________    ASSESSMENT & PLAN:   Acute hypoxemic respiratory failure after aspiration event   Severe sepsis  CAD s/p CABG x3 6/1  Anemia of critical illness  Acute systolic heart failure, EF 20-25%  Critical illness delirium   Leukocytosis, infectious versus reactive versus medication induced  Essential  hypertension  Hyperlipidemia  Transaminitis     Patient is being downgraded to the floor to the hospitalist service.    Overall he has been stable on nasal cannula flow last couple days.  Will continue to wean as tolerated.  He does remain on IV steroids.  Will see if we can start tapering these today.    He is on day 10 of empiric Zosyn.  Cultures have remained negative.  Does saw the leukocytosis but on steroids.  Afebrile.  Could potentially come off of this.  His respiratory cultures did grow yeast.  Will check with ICU staff to see if he was treated for this or if this is of concern.  He did get transfused on 6/8.  Hgb has now stabilized though remains a little low. Suspect some degree of bone marrow suppression  He continues to work with PT OT and speech therapy.    Tolerating tube feeds.  He will need aggressive therapy services upon discharge.  Case management is looking into SNF placement.  Cardiology continues to follow along.  The recommending Entresto, aspirin, and beta-blocker.  Plan to resume statin once his liver enzymes normalized.  They were normal at admission and elevated during his critical illness.  Possibly some shock liver.  There starting to trend down now.  He did have an acute kidney injury but this is resolved.  Renal function at baseline and electrolytes are stable.    Critical care diagnosis: sepsis, iv antibiotics  Critical care interventions: hands on evaluation, review of labs/radiographs/records and discussions with family  Critical care time spent: >32 minutes

## 2023-06-12 NOTE — PROGRESS NOTES
Inpatient Nutrition Assessment    Admit Date: 6/1/2023   Total duration of encounter: 11 days     Nutrition Recommendation/Prescription     Tube feeding recommendation:   Impact Peptide 1.5 goal rate 70 ml/hr to provide  2100 kcal/d (96% est needs)  131 g protein/d (98% est needs)  1078 ml free water/d (49% est needs)  (calculations based on estimated 20 hr/d run time)     Communication of Recommendations: reviewed with nurse    Nutrition Assessment     Malnutrition Assessment/Nutrition-Focused Physical Exam    Malnutrition Context: other (see comments) (does not meet criteria)     Energy Intake (Malnutrition): other (see comments) (does not meet criteria)  Weight Loss (Malnutrition): other (see comments) (unable to obtain)  Subcutaneous Fat (Malnutrition): other (see comments) (does not meet criteria)           Muscle Mass (Malnutrition): mild depletion  [place] (Muscle Loss): mild depletion                       Fluid Accumulation (Malnutrition): other (see comments) (does not meet criteria)        A minimum of two characteristics is recommended for diagnosis of either severe or non-severe malnutrition.    Chart Review    Reason Seen: continuous nutrition monitoring and length of stay    Malnutrition Screening Tool Results   Have you recently lost weight without trying?: No  Have you been eating poorly because of a decreased appetite?: No   MST Score: 0     Diagnosis:  Status post CABG x3 on 06/01/2023.   Heart failure with reduced ejection fraction-EF 20-25%  Suspected aspiration event with associated hypoxemic respiratory failure  Delirium postop possibly ICU related    Relevant Medical History: CHF, CAD, HTN, GERD, HLD    Nutrition-Related Medications: decadron, docusate, folic acid  Calorie Containing IV Medications: no significant kcals from medications at this time    Nutrition-Related Labs:  6/7 Glu 144  6/8 Na 134, Glu 185, Mg 2.7  6/12 BUN 30, Glu 273, Mg 2.7    Diet/PN Order: Diet NPO  Oral  "Supplement Order: none  Tube Feeding Order:  Impact Peptide 1.5 (see below for calculation)  Appetite/Oral Intake: not applicable/not applicable  Factors Affecting Nutritional Intake: on mechanical ventilation  Food/Temple/Cultural Preferences: unable to obtain  Food Allergies: none reported    Skin Integrity: wound  Wound(s):   noted    Comments    23: Noted good po intake of meals documented previously. At time of RD visit pt actively being intubated. Will provide tube feeding recommendatios in case needed.     23: Tube feeding to start today. Discussed with RN. Receiving kcal from meds. Updated TF recs.    23: TF continues @ previous goal rate. Updated est needs now that pt extubated. No longer receiving kcal from meds.    Anthropometrics    Height: 5' 10" (177.8 cm) Height Method: Stated  Last Weight: 88.5 kg (195 lb 1.7 oz) (23 0400) Weight Method: Standard Scale  BMI (Calculated): 28  BMI Classification: overweight (BMI 25-29.9)        Ideal Body Weight (IBW), Male: 166 lb     % Ideal Body Weight, Male (lb): 114.88 %                          Usual Weight Provided By: EMR weight history    Wt Readings from Last 5 Encounters:   23 88.5 kg (195 lb 1.7 oz)   23 85.7 kg (189 lb)   23 92.6 kg (204 lb 3.2 oz)   22 90.4 kg (199 lb 6.4 oz)   10/14/21 91.1 kg (200 lb 13.4 oz)     Weight Change(s) Since Admission:  Admit Weight: 88.5 kg (195 lb) (23 1433)  23: no new    Estimated Needs    Weight Used For Calorie Calculations: 88.5 kg (195 lb 1.7 oz)  Energy Calorie Requirements (kcal): 2179kcal (1.3 stress factor)  [facility]: [facility]  Weight Used For Protein Calculations: 88.5 kg (195 lb 1.7 oz)  Protein Requirements: 133gm (1.5g/kg)  Fluid Requirements (mL): 2180ml (1ml/kcal)  Temp (24hrs), Av.6 °F (37 °C), Min:98.3 °F (36.8 °C), Max:99.1 °F (37.3 °C)    Vtot (L/Min) for [place] Equation Calculation: 9.1    Enteral Nutrition    Formula: " Impact Peptide 1.5 Edward  Rate/Volume: 50ml/hr  Water Flushes: 50ml q4hr  Additives/Modulars: none at this time  Route: nasogastric tube  Method: continuous  Total Nutrition Provided by Tube Feeding, Additives, and Flushes:  Calories Provided  1500 kcal/d, 77% needs   Protein Provided  94 g/d, 71% needs   Fluid Provided  770 ml/d, 40% needs   Continuous feeding calculations based on estimated 20 hr/d run time unless otherwise stated.    Parenteral Nutrition    Patient not receiving parenteral nutrition support at this time.    Evaluation of Received Nutrient Intake    Calories: not meeting estimated needs  Protein: not meeting estimated needs    Patient Education    Not applicable.    Nutrition Diagnosis     PES: Inadequate oral intake related to acute illness as evidenced by NPO post extubation. (continues)    Interventions/Goals     Intervention(s): modified rate of enteral nutrition and collaboration with other providers  Goal: Meet greater than 75% of nutritional needs by follow-up. (goal progressing)    Monitoring & Evaluation     Dietitian will monitor energy intake.  Nutrition Risk/Follow-Up: moderate (follow-up in 3-5 days)   Please consult if re-assessment needed sooner.

## 2023-06-13 NOTE — PT/OT/SLP PROGRESS
SLP attempting clinical swallowing evaluation, however pt continues to present with hoarse vocal quality and weak cough. PO intake remains unsafe at this time. SLP to continue to follow and treat as appropriate.

## 2023-06-13 NOTE — PROGRESS NOTES
[facility] Medicine Progress Note        Chief Complaint: Inpatient Follow-up for CAD s/pCABG    HPI:   67-year-old male that was originally admitted with history of coronary artery disease had a CABG performed on 06/01.  Postoperative course has been complicated by hypoxemic respiratory failure and concern for aspiration.  He was intubated twice during the postop.  And has been in the ICU for the last week and a half.  He was extubated again on 06/10.  He is now on 2-3 L nasal cannula and doing all her respiratory standpoint.  Did have some deconditioning and oropharyngeal dysphagia postoperatively.  He now has a NG tube in his tolerating tube feeds.  He continues to work with PT and OT.  Chest tubes have been discontinued.  Cardiology is following along and managing his blood pressure and heart rate.  He was a chronic left bundle branch block which is currently controlled.  He is on day 10 of empiric Zosyn for possible aspiration event.  He remains afebrile but with persistent leukocytosis.  He is however on IV steroids.  This can likely be discontinued.  He was deemed stable enough for downgrade to the floor and hospitalist services asked to assume care.  Cardiovascular surgery and Cardiology will continue to follow along. His wife is at the bedside and plan discussed with her and nursing.    Interval Hx:  Patient remained afebrile overnight.  He was transferred to the floor.  No new issues.  No family currently with him.  He was little sleepy but awakens.  Denies any current pain    Objective/physical exam:  General appearance: Well-developed, well-nourished male in no apparent distress.  HENT: Atraumatic head. Moist mucous membranes of oral cavity. NG tube  Eyes: Normal extraocular movements.   Neck: Supple.   Lungs: Clear to auscultation bilaterally. No wheezing present.   Heart: Regular rate and rhythm. S1 and S2 present with no murmurs/gallop/rub. No pedal edema. No  JVD present. CABG incision c/d/i  Abdomen: Soft, non-distended, non-tender. No rebound tenderness/guarding. Bowel sounds are normal.   Extremities: No cyanosis, clubbing, or edema. L PICC line  Skin: No Rash.   Neuro: Motor and sensory exams grossly intact. Good tone. Muscle strength 5/5 in all 4 extremities  Psych/mental status: Appropriate mood and affect. Responds appropriately to questions.     VITAL SIGNS: 24 HRS MIN & MAX LAST   Temp  Min: 97.9 °F (36.6 °C)  Max: 99.9 °F (37.7 °C) 99.6 °F (37.6 °C)   BP  Min: 110/70  Max: 171/103 121/82   Pulse  Min: 90  Max: 109  97   Resp  Min: 2  Max: 53 (!) 2   SpO2  Min: 92 %  Max: 98 % (!) 93 %       Recent Labs   Lab 06/07/23  0147 06/08/23  0131 06/09/23  0734 06/10/23  0314 06/11/23  0118 06/12/23  0131 06/13/23  0157   WBC 12.01* 10.46 17.68*   < > 21.38* 21.83* 21.42*   RBC 4.02* 3.68* 4.85   < > 5.55 5.74 5.65   HGB 7.7* 7.2* 10.7*   < > 13.3* 13.7* 13.4*   HCT 25.2* 23.1* 32.2*   < > 41.5* 42.9 42.9   MCV 62.7* 62.8* 66.4*   < > 74.8* 74.7* 75.9*   MCH 19.2* 19.6* 22.1*   < > 24.0* 23.9* 23.7*   MCHC 30.6* 31.2* 33.2   < > 32.0* 31.9* 31.2*   RDW 25.8* 25.7* 28.7*  --   --   --   --     210 212   < > 157 149 213   MPV  --  9.6  --   --   --   --   --     < > = values in this interval not displayed.       Recent Labs   Lab 06/07/23  0147 06/07/23  1225 06/08/23  0131 06/08/23  0622 06/09/23  1318 06/10/23  0314 06/10/23  0458 06/10/23  0845 06/11/23  0043 06/11/23  0118 06/12/23  0131 06/13/23  0157     --  134*   < >  --    < >  --   --  143 143 140 147*   K 4.5  --  4.4   < >  --    < >  --   --  4.1 4.1 4.5 4.4   CO2 24  --  25   < >  --    < >  --   --  21* 23 18* 21*   BUN 18.4  --  34.3*   < >  --    < >  --   --  31.9* 34.2* 30.0* 30.6*   CREATININE 0.98  --  1.52*   < >  --    < >  --   --  0.86 0.83 0.88 0.83   CALCIUM 7.9*  --  7.5*   < >  --    < >  --   --  7.4* 7.5* 7.8* 8.5*   PH  --    < >  --    < > 7.480*  --  7.460* 7.470*  --   --    --   --    MG 2.60  --  2.70*  --   --   --   --   --  2.70*  --   --   --    ALBUMIN 2.3*  --  2.2*   < >  --    < >  --   --   --  2.0* 1.9* 2.0*   ALKPHOS 55  --  52   < >  --    < >  --   --   --  65 74 80   ALT 40  --  178*   < >  --    < >  --   --   --  287* 170* 122*   AST 64*  --  347*   < >  --    < >  --   --   --  148* 78* 56*   BILITOT 1.0  --  1.1   < >  --    < >  --   --   --  1.0 1.0 0.8    < > = values in this interval not displayed.          Microbiology Results (last 7 days)       Procedure Component Value Units Date/Time    Respiratory Culture [[id number]]  (Abnormal) Collected: 06/07/23 1428    Order Status: Completed Specimen: Sputum, Induced Updated: 06/09/23 0833     Respiratory Culture Moderate Yeast     Comment: with no normal respiratory candido        GRAM STAIN Quality 3+      No bacteria seen    Respiratory Culture [[id number]]  (Abnormal) Collected: 06/07/23 1138    Order Status: Completed Specimen: Bronchial Brush L from BAL, LLL Updated: 06/09/23 0802     Respiratory Culture Moderate Yeast     Comment: with no normal respiratory candido       Blood Culture [[id number]]  (Normal) Collected: 06/03/23 0610    Order Status: Completed Specimen: Blood from Arm, Left Updated: 06/08/23 1000     CULTURE, BLOOD ([facility]) No Growth at 5 days    Blood Culture [[id number]]  (Normal) Collected: 06/03/23 0610    Order Status: Completed Specimen: Blood from Arm, Right Updated: 06/08/23 1000     CULTURE, BLOOD ([facility]) No Growth at 5 days    Gram Stain [[id number]] Collected: 06/07/23 1138    Order Status: Completed Specimen: Bronchial Alveolar Lavage from BAL, LLL Updated: 06/07/23 1430     GRAM STAIN Few WBC observed      Few Gram Negative Rods      Few Gram positive cocci             See below for Radiology    Scheduled Med:   albuterol sulfate  2.5 mg Nebulization Q6H    aspirin  81 mg Per OG tube Daily    [START ON 6/14/2023] dexAMETHasone  4 mg Intravenous Daily    docusate  100 mg Per G Tube Daily     enoxparin  40 mg Subcutaneous Q24H (prophylaxis, 1700)    etomidate  20 mg Intravenous Once    famotidine (PF)  20 mg Intravenous Q12H    fluticasone propionate  2 spray Each Nostril Daily    folic acid  1 mg Per OG tube Daily    ipratropium  0.5 mg Nebulization Q6H    metoprolol tartrate  75 mg Per OG tube BID    perflutren lipid microspheres  1.3 mL Intravenous Once    potassium chloride in water  20 mEq Intravenous Once    potassium chloride  10 mEq Oral Once    sacubitriL-valsartan  1 tablet Nasogastric BID    sodium chloride 3%  4 mL Nebulization Q6H        Continuous Infusions:   loperamide      milrinone Stopped (06/09/23 1700)        PRN Meds:  sodium chloride, sodium chloride, acetaminophen, baclofen, dextrose 10%, dextrose 10%, glucagon (human recombinant), glucose, glucose, HYDROcodone-acetaminophen, insulin aspart U-100, labetaloL, lactulose 10 gram/15 ml, loperamide, metoclopramide HCl, ondansetron, oxyCODONE       Assessment/Plan:   Acute hypoxemic respiratory failure after aspiration event   Severe sepsis  CAD s/p CABG x3 6/1  Anemia of critical illness  Acute systolic heart failure, EF 20-25%  Critical illness delirium   Leukocytosis, infectious versus reactive versus medication induced  Essential hypertension  Hyperlipidemia  Transaminitis     I discussed the case with ICU physician yesterday after rounds.  He has completed his IV antibiotics for possible aspiration pneumonia.  Will discontinue this today.  I suspect that his persistent leukocytosis is from high-dose steroids.  Will start tapering these off.  I decreased his dexamethasone to just daily dosing.  Will plan to cut that in half and another day or 2.    From respiratory standpoint he remained stable.  Will need to continue working with PT OT and speech therapy.    He remains NPO with NG tube in place.  Follow speech therapy recommendations but if does not make any improvement in his swallowing may end up needing a long-term PEG tube.     Case management was consulted for SNF placement but will need nutrition source prior to discharge.    Cardiothoracic surgery is following from [facility].  No new issues.  They are monitoring his wounds.    Cardiology following as well.  Recommending continuing anticoagulation and resumption of his statin once his liver enzymes have come back down.  They have been trending down last couple days.  Likely some shock liver from initial infection.    Patient condition:  Stable    Anticipated discharge and Disposition: TBD      All diagnosis and differential diagnosis have been reviewed; assessment and plan has been documented; I have personally reviewed the labs and test results that are presently available; I have reviewed the patients medication list; I have reviewed the consulting providers response and recommendations. I have reviewed or attempted to review medical records based upon their availability    All of the patient's questions have been  addressed and answered. Patient's is agreeable to the above stated plan. I will continue to monitor closely and make adjustments to medical management as needed.  _____________________________________________________________________      Radiology:  Echo Saline Bubble? No  · The estimated ejection fraction is 25%; Definity used.  · There is abnormal septal wall motion consistent with post-operative   status.  · No pericardial effusion.  · Normal central venous pressure (3 mmHg).     Technically difficult study due to post-operative status.      Robert Landis MD   06/13/2023

## 2023-06-13 NOTE — PROGRESS NOTES
[facility] - [facility]  Cardiology  Progress Note    Patient Name: Tom Anna  MRN: [id number]  Admission Date: 6/1/2023  Hospital Length of Stay: 12 days  Code Status: Full Code   Attending Physician: Robert Landis MD   Primary Care Physician: Primary Doctor No  Expected Discharge Date:   Principal Problem:<principal problem not specified>    Subjective:   Consultation Reason: Concern for NSVT     HPI:   Mr. Anna is a 67 year old male, known to Dr. Contreras in [place], who presented to the hospital and underwent elective CABG by Dr. Grossman on 6.1.23. CABG noted to be LIMA to LAD, SVG to OM1, and SVG to PDA. Also underwent TAMI Ligation which was clipped with 45 atrial clip. He was initially extubated post surgery and sent to floor for further management (once out of ICU). On 6.2.23 he had a suspected aspiration event, required significant oxygen support, required BIAP and eventual intubation and transfer back to ICU. He was extubated once again on 6.10.23. Patient has been tachycardic and has a baseline lbbb, which looks like VT on the monitor. CIS is consulted for evaluation.    Hospital Course:   6.12.23: NAD Noted. Sinus Tach.   6.13.23: NAD Noted. Vitals Stable. . Remains deconditioned. BP Stable. NG Tube in place.     PMH: CAD/CABG, Hypertension, Hyperlipidemia, Ischemic Cardiomyopathy EF 25%, GERD, Cerebral Infarction, Nicotine Dependence, LBBB  PSH: LHC, CABG  Family History: Mother- Hypertension/Depression  Social History: Tobacco- Active Smoker, Alcohol- Beer Consumption, Substance Abuse- Negative     Previous Cardiac Diagnostics:   Echocardiogram (6.12.23):  The estimated ejection fraction is 25%; Definity used.  There is abnormal septal wall motion consistent with post-operative status.  No pericardial effusion.  Normal central venous pressure (3 mmHg).  Technically difficult study due to post-operative status.    Echocardiogram (6.7.23):  Severely decreased systolic  function.  The estimated ejection fraction is 25%; Definity used.  No LV thrombus seen in this study.  There is abnormal septal wall motion.  The Right ventricle is not well visualized but appears to be of mildly reduced RV systolic function.  No evidence of significant pericardial effusion or tamponade physiology.     CV US Arterial Doppler (6.6.23):  The right lower extremity demonstrated severe arterial flow reduction consistent with an occluded superficial femoral, popliteal, and tibial arteries.   The left lower extremity demonstrated severe arterial flow reduction consistent with an occluded superficial femoral, and tibial arteries with reconstitution of flow in the popliteal artery.      CV US Arterial Upper Extremities (6.6.23):  Patent right upper extremity arterial system with biphasic waveforms throughout, however no flow was identified in the radial artery. Patent left upper extremity arterial system with triphasic waveforms throughout, however the ulnar artery was not well visualized.      LHC (5.1.23):  LM- Large 30-50% Obstruction, LAD- Mid Section with Moderate to Severe Disease, Proximal/Distal Section with Mild Disease, LCX- Moderate to Severe Diffuse Disease with Moderate Diffuse Disease of High Takeoff OM1, Moderate Disease of OM2, RCA- Dominant Vessel with Proximal Moderate to Severe Disease, Mid Disease is Mild, Distal  Noted.      Telemetry Monitor (29 Days & 7 Hours) (3.24.23):  This is a good quality study.   Predominant rhythm is normal sinus rhythm.   The minimum heart rate recorded was 59 beats / minute. The maximum heart rate is 133 beats / minute. The mean heart rate is 80 beats / minute.   Rare premature ventricular contractions noted.    No pauses were noted.   Baseline LBBB noted. Abberant rhythm noted. No symptoms reported.      Echocardiogram (3.18.23):  LV Dilated. EF 25-30%.  Inferior Wall Appears Hypokinetic. Normal RV Function    Review of Systems   Cardiovascular:  Negative  for chest pain.   Respiratory:  Negative for shortness of breath.    Gastrointestinal:         Thirsty   All other systems reviewed and are negative.    Objective:     Vital Signs (Most Recent):  Temp: 97.9 °F (36.6 °C) (06/13/23 0747)  Pulse: (!) 112 (06/13/23 0747)  Resp: (!) 2 (06/13/23 0317)  BP: 136/69 (06/13/23 0747)  SpO2: (!) 94 % (06/13/23 0931) Vital Signs (24h Range):  Temp:  [97.9 °F (36.6 °C)-99.9 °F (37.7 °C)] 97.9 °F (36.6 °C)  Pulse:  [] 112  Resp:  [2-53] 2  SpO2:  [92 %-98 %] 94 %  BP: (110-171)/() 136/69     Weight: 88.5 kg (195 lb 1.7 oz)  Body mass index is 27.99 kg/m².    SpO2: (!) 94 %         Intake/Output Summary (Last 24 hours) at 6/13/2023 1029  Last data filed at 6/13/2023 0317  Gross per 24 hour   Intake 1033 ml   Output 1135 ml   Net -102 ml         Lines/Drains/Airways       Drain  Duration                  Rectal Tube 06/09/23 0800 rectal tube w/ balloon (indicate number of mLs) 4 days         NG/OG Tube 06/11/23 0830 nasogastric 16 Fr. Right nostril 2 days    Male External Urinary Catheter 06/12/23 1814 <1 day              Peripheral Intravenous Line  Duration                  Peripheral IV - Single Lumen 06/07/23 1500 20 G Left;Posterior Hand 5 days         Peripheral IV - Single Lumen 06/07/23 1544 20 G Anterior;Right Forearm 5 days                    Significant Labs:   Recent Results (from the past 72 hour(s))   Basic Metabolic Panel    Collection Time: 06/11/23 12:43 AM   Result Value Ref Range    Sodium Level 143 136 - 145 mmol/L    Potassium Level 4.1 3.5 - 5.1 mmol/L    Chloride 112 (H) 98 - 107 mmol/L    Carbon Dioxide 21 (L) 23 - 31 mmol/L    Glucose Level 190 (H) 82 - 115 mg/dL    Blood Urea Nitrogen 31.9 (H) 8.4 - 25.7 mg/dL    Creatinine 0.86 0.73 - 1.18 mg/dL    BUN/Creatinine Ratio 37     Calcium Level Total 7.4 (L) 8.8 - 10.0 mg/dL    Anion Gap 10.0 mEq/L    eGFR >60 mls/min/1.73/m2   Magnesium    Collection Time: 06/11/23 12:43 AM   Result Value Ref Range     Magnesium Level 2.70 (H) 1.60 - 2.60 mg/dL   Phosphorus    Collection Time: 06/11/23 12:43 AM   Result Value Ref Range    Phosphorus Level 2.2 (L) 2.3 - 4.7 mg/dL   POCT glucose    Collection Time: 06/11/23 12:46 AM   Result Value Ref Range    POCT Glucose 199 (H) 70 - 110 mg/dL   Comprehensive Metabolic Panel    Collection Time: 06/11/23  1:18 AM   Result Value Ref Range    Sodium Level 143 136 - 145 mmol/L    Potassium Level 4.1 3.5 - 5.1 mmol/L    Chloride 112 (H) 98 - 107 mmol/L    Carbon Dioxide 23 23 - 31 mmol/L    Glucose Level 169 (H) 82 - 115 mg/dL    Blood Urea Nitrogen 34.2 (H) 8.4 - 25.7 mg/dL    Creatinine 0.83 0.73 - 1.18 mg/dL    Calcium Level Total 7.5 (L) 8.8 - 10.0 mg/dL    Protein Total 5.9 5.8 - 7.6 gm/dL    Albumin Level 2.0 (L) 3.4 - 4.8 g/dL    Globulin 3.9 (H) 2.4 - 3.5 gm/dL    Albumin/Globulin Ratio 0.5 (L) 1.1 - 2.0 ratio    Bilirubin Total 1.0 <=1.5 mg/dL    Alkaline Phosphatase 65 40 - 150 unit/L    Alanine Aminotransferase 287 (H) 0 - 55 unit/L    Aspartate Aminotransferase 148 (H) 5 - 34 unit/L    eGFR >60 mls/min/1.73/m2   CBC with Differential    Collection Time: 06/11/23  1:18 AM   Result Value Ref Range    WBC 21.38 (H) 4.50 - 11.50 x10(3)/mcL    RBC 5.55 4.70 - 6.10 x10(6)/mcL    Hgb 13.3 (L) 14.0 - 18.0 g/dL    Hct 41.5 (L) 42.0 - 52.0 %    MCV 74.8 (L) 80.0 - 94.0 fL    MCH 24.0 (L) 27.0 - 31.0 pg    MCHC 32.0 (L) 33.0 - 36.0 g/dL    RDW      Platelet 157 130 - 400 x10(3)/mcL    MPV      Neut % 87.3 %    Lymph % 6.5 %    Mono % 4.1 %    Eos % 0.7 %    Basophil % 0.1 %    Lymph # 1.40 0.6 - 4.6 x10(3)/mcL    Neut # 18.64 (H) 2.1 - 9.2 x10(3)/mcL    Mono # 0.87 0.1 - 1.3 x10(3)/mcL    Eos # 0.16 0 - 0.9 x10(3)/mcL    Baso # 0.03 <=0.2 x10(3)/mcL    IG# 0.28 (H) 0 - 0.04 x10(3)/mcL    IG% 1.3 %    NRBC% 1.3 %   Blood Smear Microscopic Exam    Collection Time: 06/11/23  1:18 AM   Result Value Ref Range    RBC Morph Abnormal (A) Normal    Anisocyte 1+ (A) (none)    [place] Cells 1+  (A) (none)    Macrocyte 1+ (A) (none)    Microcyte 1+ (A) (none)    Poik 1+ (A) (none)    Polychrom 1+ (A) (none)    Target Cell 2+ (A) (none)    Platelet Est Normal Normal, Adequate   Comprehensive Metabolic Panel    Collection Time: 06/12/23  1:31 AM   Result Value Ref Range    Sodium Level 140 136 - 145 mmol/L    Potassium Level 4.5 3.5 - 5.1 mmol/L    Chloride 111 (H) 98 - 107 mmol/L    Carbon Dioxide 18 (L) 23 - 31 mmol/L    Glucose Level 273 (H) 82 - 115 mg/dL    Blood Urea Nitrogen 30.0 (H) 8.4 - 25.7 mg/dL    Creatinine 0.88 0.73 - 1.18 mg/dL    Calcium Level Total 7.8 (L) 8.8 - 10.0 mg/dL    Protein Total 6.6 5.8 - 7.6 gm/dL    Albumin Level 1.9 (L) 3.4 - 4.8 g/dL    Globulin 4.7 (H) 2.4 - 3.5 gm/dL    Albumin/Globulin Ratio 0.4 (L) 1.1 - 2.0 ratio    Bilirubin Total 1.0 <=1.5 mg/dL    Alkaline Phosphatase 74 40 - 150 unit/L    Alanine Aminotransferase 170 (H) 0 - 55 unit/L    Aspartate Aminotransferase 78 (H) 5 - 34 unit/L    eGFR >60 mls/min/1.73/m2   CBC with Differential    Collection Time: 06/12/23  1:31 AM   Result Value Ref Range    WBC 21.83 (H) 4.50 - 11.50 x10(3)/mcL    RBC 5.74 4.70 - 6.10 x10(6)/mcL    Hgb 13.7 (L) 14.0 - 18.0 g/dL    Hct 42.9 42.0 - 52.0 %    MCV 74.7 (L) 80.0 - 94.0 fL    MCH 23.9 (L) 27.0 - 31.0 pg    MCHC 31.9 (L) 33.0 - 36.0 g/dL    RDW      Platelet 149 130 - 400 x10(3)/mcL    MPV      Neut % 87.6 %    Lymph % 5.7 %    Mono % 5.0 %    Eos % 0.5 %    Basophil % 0.1 %    Lymph # 1.24 0.6 - 4.6 x10(3)/mcL    Neut # 19.14 (H) 2.1 - 9.2 x10(3)/mcL    Mono # 1.10 0.1 - 1.3 x10(3)/mcL    Eos # 0.10 0 - 0.9 x10(3)/mcL    Baso # 0.02 <=0.2 x10(3)/mcL    IG# 0.23 (H) 0 - 0.04 x10(3)/mcL    IG% 1.1 %    NRBC% 0.9 %   Blood Smear Microscopic Exam    Collection Time: 06/12/23  1:31 AM   Result Value Ref Range    RBC Morph Abnormal (A) Normal    Anisocyte 1+ (A) (none)    [place] Cells 2+ (A) (none)    Hypochrom 1+ (A) (none)    Poik 2+ (A) (none)    Polychrom 1+ (A) (none)    Target Cell  2+ (A) (none)    Platelet Est Normal Normal, Adequate   POCT glucose    Collection Time: 06/12/23  5:56 AM   Result Value Ref Range    POCT Glucose 336 (H) 70 - 110 mg/dL   Echo Saline Bubble? No    Collection Time: 06/12/23 11:51 AM   Result Value Ref Range    BSA 2.07 m2    Right Atrial Pressure (from IVC) 3 mmHg    EF 25 %   POCT glucose    Collection Time: 06/12/23 12:26 PM   Result Value Ref Range    POCT Glucose 277 (H) 70 - 110 mg/dL   POCT glucose    Collection Time: 06/12/23  4:19 PM   Result Value Ref Range    POCT Glucose 295 (H) 70 - 110 mg/dL   POCT glucose    Collection Time: 06/12/23  8:51 PM   Result Value Ref Range    POCT Glucose 286 (H) 70 - 110 mg/dL   Comprehensive Metabolic Panel    Collection Time: 06/13/23  1:57 AM   Result Value Ref Range    Sodium Level 147 (H) 136 - 145 mmol/L    Potassium Level 4.4 3.5 - 5.1 mmol/L    Chloride 118 (H) 98 - 107 mmol/L    Carbon Dioxide 21 (L) 23 - 31 mmol/L    Glucose Level 305 (H) 82 - 115 mg/dL    Blood Urea Nitrogen 30.6 (H) 8.4 - 25.7 mg/dL    Creatinine 0.83 0.73 - 1.18 mg/dL    Calcium Level Total 8.5 (L) 8.8 - 10.0 mg/dL    Protein Total 6.1 5.8 - 7.6 gm/dL    Albumin Level 2.0 (L) 3.4 - 4.8 g/dL    Globulin 4.1 (H) 2.4 - 3.5 gm/dL    Albumin/Globulin Ratio 0.5 (L) 1.1 - 2.0 ratio    Bilirubin Total 0.8 <=1.5 mg/dL    Alkaline Phosphatase 80 40 - 150 unit/L    Alanine Aminotransferase 122 (H) 0 - 55 unit/L    Aspartate Aminotransferase 56 (H) 5 - 34 unit/L    eGFR >60 mls/min/1.73/m2   CBC with Differential    Collection Time: 06/13/23  1:57 AM   Result Value Ref Range    WBC 21.42 (H) 4.50 - 11.50 x10(3)/mcL    RBC 5.65 4.70 - 6.10 x10(6)/mcL    Hgb 13.4 (L) 14.0 - 18.0 g/dL    Hct 42.9 42.0 - 52.0 %    MCV 75.9 (L) 80.0 - 94.0 fL    MCH 23.7 (L) 27.0 - 31.0 pg    MCHC 31.2 (L) 33.0 - 36.0 g/dL    RDW      Platelet 213 130 - 400 x10(3)/mcL    MPV      Neut % 87.6 %    Lymph % 4.4 %    Mono % 6.9 %    Eos % 0.0 %    Basophil % 0.1 %    Lymph # 0.94  0.6 - 4.6 x10(3)/mcL    Neut # 18.76 (H) 2.1 - 9.2 x10(3)/mcL    Mono # 1.47 (H) 0.1 - 1.3 x10(3)/mcL    Eos # 0.01 0 - 0.9 x10(3)/mcL    Baso # 0.03 <=0.2 x10(3)/mcL    IG# 0.21 (H) 0 - 0.04 x10(3)/mcL    IG% 1.0 %    NRBC% 0.2 %   POCT glucose    Collection Time: 06/13/23  5:51 AM   Result Value Ref Range    POCT Glucose 296 (H) 70 - 110 mg/dL     Telemetry:  Sinus Rhythm 90's- 100 BPM    Physical Exam  Vitals and nursing note reviewed.   Constitutional:       General: He is not in acute distress.     Appearance: He is ill-appearing.   HENT:      Head: Normocephalic.      Mouth/Throat:      Mouth: Mucous membranes are moist.   Cardiovascular:      Rate and Rhythm: Normal rate and regular rhythm.      Heart sounds: Normal heart sounds.      Comments: Sinus Rhythm 90's  Pulmonary:      Effort: Pulmonary effort is normal. No respiratory distress.      Breath sounds: Normal breath sounds. No wheezing or rales.      Comments: NC  Abdominal:      General: There is no distension.      Palpations: Abdomen is soft.      Tenderness: There is no abdominal tenderness. There is no guarding.      Comments: NG Tube in Place (Tube Feedings/Nutrition).   Musculoskeletal:      Cervical back: Neck supple.      Right lower leg: No edema.      Left lower leg: No edema.      Comments: Legs are Warm- Heel Protectors in Place.    Skin:     General: Skin is warm and dry.   Neurological:      General: No focal deficit present.      Mental Status: He is alert and oriented to person, place, and time. Mental status is at baseline.   Psychiatric:         Behavior: Behavior normal.     Current Inpatient Medications:    Current Facility-Administered Medications:     0.9%  NaCl infusion (for blood administration), , Intravenous, Q24H PRN, Xochitl Grossman MD    0.9%  NaCl infusion (for blood administration), , Intravenous, Q24H PRN, Xochitl Grossman MD    acetaminophen oral solution 650 mg, 650 mg, Per OG tube, Q6H PRN, JONNY Batista     albuterol nebulizer solution 2.5 mg, 2.5 mg, Nebulization, Q6H, JONNY Riley, 2.5 mg at 06/13/23 0024    aspirin chewable tablet 81 mg, 81 mg, Per OG tube, Daily, JONNY Batista, 81 mg at 06/13/23 0937    baclofen tablet 10 mg, 10 mg, Per OG tube, TID PRN, JONNY Batista    [START ON 6/14/2023] dexAMETHasone injection 4 mg, 4 mg, Intravenous, Daily, Robert Landis MD    dextrose 10% bolus 125 mL 125 mL, 12.5 g, Intravenous, PRN, REILLY Saunders MD    dextrose 10% bolus 250 mL 250 mL, 25 g, Intravenous, PRN, REILLY Saunders MD    docusate 50 mg/5 mL liquid 100 mg, 100 mg, Per G Tube, Daily, JONNY Batista, 100 mg at 06/12/23 0920    enoxaparin injection 40 mg, 40 mg, Subcutaneous, Q24H (prophylaxis, 1700), REILLY Saunders MD, 40 mg at 06/12/23 1634    etomidate injection 20 mg, 20 mg, Intravenous, Once, REILLY Saunders MD    famotidine (PF) injection 20 mg, 20 mg, Intravenous, Q12H, JONNY Batista, 20 mg at 06/13/23 0949    fluticasone propionate 50 mcg/actuation nasal spray 100 mcg, 2 spray, Each Nostril, Daily, REILLY Saunders MD, 100 mcg at 06/12/23 0940    folic acid tablet 1 mg, 1 mg, Per OG tube, Daily, JONNY Batista, 1 mg at 06/13/23 0937    glucagon (human recombinant) injection 1 mg, 1 mg, Intramuscular, PRN, REILLY Saunders MD    glucose chewable tablet 16 g, 16 g, Per OG tube, PRN, JONNY Batista    glucose chewable tablet 24 g, 24 g, Per OG tube, PRN, JONNY Batista    HYDROcodone-acetaminophen 5-325 mg per tablet 1 tablet, 1 tablet, Per OG tube, Q4H PRN, JONNY Batista, 1 tablet at 06/12/23 1315    insulin aspart U-100 injection 0-5 Units, 0-5 Units, Subcutaneous, QID (AC + HS) PRN, REILLY Saunders MD, 3 Units at 06/12/23 2054    ipratropium 0.02 % nebulizer solution 0.5 mg, 0.5 mg, Nebulization, Q6H, REILLY Saunders MD, 0.5 mg at 06/13/23 0024    labetaloL injection 10 mg, 10 mg, Intravenous, Q4H PRN, Cleve Stratton MD, 10 mg at  06/12/23 1819    lactulose 10 gram/15 ml solution 20 g, 20 g, Per OG tube, Q6H PRN, JONNY Batista    loperamide capsule 2 mg, 2 mg, Per OG tube, Continuous PRN, JONNY Batista    metoclopramide HCl injection 5 mg, 5 mg, Intravenous, Q6H PRN, JONNY Batista    metoprolol tartrate tablet 75 mg, 75 mg, Per OG tube, BID, BRISEYDA Penaloza, 75 mg at 06/13/23 0938    milrinone 20mg in D5W 100 mL infusion, 0.375 mcg/kg/min (Dosing Weight), Intravenous, Continuous, Xochitl Grossman MD, Stopped at 06/09/23 1700    ondansetron injection 4 mg, 4 mg, Intravenous, Q4H PRN, JONNY Batista    oxyCODONE immediate release tablet 10 mg, 10 mg, Per OG tube, Q4H PRN, JONNY Batista, 10 mg at 06/09/23 0659    perflutren lipid microspheres injection 1.3 mL, 1.3 mL, Intravenous, Once, REILLY Saunders MD    potassium chloride 20 mEq in 100 mL IVPB (FOR CENTRAL LINE ADMINISTRATION ONLY), 20 mEq, Intravenous, Once, JONNY Riley, Held at 06/03/23 0345    potassium chloride CR tablet 10 mEq, 10 mEq, Oral, Once, JONNY Riley    sacubitriL-valsartan 24-26 mg per tablet 1 tablet, 1 tablet, Nasogastric, BID, BRISEYDA Penaloza, 1 tablet at 06/13/23 0938    sodium chloride 3% nebulizer solution 4 mL, 4 mL, Nebulization, Q6H, CHINEDU PurvisP, 4 mL at 06/12/23 0115    VTE Risk Mitigation (From admission, onward)           Ordered     enoxaparin injection 40 mg  Every 24 hours         06/06/23 0957                  Assessment:   Intermittent Sinus Tachycardia with Left Bundle Branch Block- Now Sinus Rhythm in the 90's    - No pericardial effusion (Echo 6.12.23)  Chronic Left Bundle Branch Block  CAD/CABG    - LIMA to the LAD, SVG to OM1, SVG to PDA  Ischemic Cardiomyopathy    - EF 25%    - Life Vest  Acute Hypoxemic Respiratory Failure Requiring Intubation/Mechanical Ventilation due to Suspected Aspiration Event (On Nasal Cannula)    - Extubated on 6.10.23  Leukocytosis  "(Persistent)  Hypertension (BP Stable)  Hyperlipidemia  PAD- No Evidence of Acute Limb Ischemia    - The right lower extremity demonstrated severe arterial flow reduction consistent with an occluded superficial femoral, popliteal, and tibial arteries.     - The left lower extremity demonstrated severe arterial flow reduction consistent with an occluded superficial femoral, and tibial arteries with reconstitution of flow in the popliteal artery.   Nicotine Dependence/Alcohol Use  History of CVA  Elevated Hepatic Enzymes (Improving)  ED  Delirium Possible ICU Psychosis   Ace Inhibitor Allergy "Facial Edema" (Tolerates Entresto)  Dysphagia Requiring NG Tube for Medications/Nutrition  Physical Deconditioning    Plan:   Increase Metoprolol Tartrate to 100 Mg PO BID- Switch to Long Acting form prior to discharge (Not now as requiring crushed meds for NG Tube)  Continue Aspirin & Entresto  Restart Statin when liver enzymes improve- Now on Hold, Enzymes Improving.   K+ Goal- 4, Mag Goal- 2  Supportive Care Per Primary Team- Mobilize as Able, will likely require placement.    BRISEYDA Penaloza  Cardiology  [facility] - [facility]  06/13/2023     Physician addendum:  I have seen and examined this patient as a split-shared visit with the ALEJANDRINA d/t complicated medical management of above problems written in assessment and high acuity requiring physician expertise in medical decision-making. I performed the substantive portion of the history and exam. Above medical decision-making is also formulated by me.    Cardiovascular exam:  S1, S2  Lungs:  Clear to auscultation  Extremities:  No edema  General:  Ill-appearing    Plan:  Patient will likely need skilled nursing facility upon discharge   Agree with nurse practitioner assessment and plan  Sid Morin MD  Cardiologist    "

## 2023-06-13 NOTE — PT/OT/SLP PROGRESS
Occupational Therapy   Treatment    Name: Tom Anna  MRN: [id number]  Admitting Diagnosis:  CABG x 3  12 Days Post-Op    Recommendations:     Discharge Recommendations: nursing facility, skilled  Discharge Equipment Recommendations:  to be determined by next level of care  Barriers to discharge:       Assessment:     Tom Anna is a 67 y.o. male with a medical diagnosis of CABG x3.  He presents with decreased activity tolerance and endurance . Performance deficits affecting function are weakness, impaired endurance, impaired functional mobility, impaired balance, impaired self care skills.      Patient would benefit from acute skilled OT services to address these deficits and reach maximum level of function.       Plan:     Patient to be seen 5 x/week to address the above listed problems via self-care/home management, therapeutic activities, therapeutic exercises  Plan of Care Expires: 06/23/23  Plan of Care Reviewed with: patient, spouse    Subjective     Pain/Comfort:       Objective:     Communicated with: RN prior to session.  Patient found up in chair with   upon OT entry to room.    General Precautions: Standard, sternal    Orthopedic Precautions:N/A  Braces: N/A  Respiratory Status: Nasal cannula, flow 2 L/min  Vital Signs: Blood Pressure: 120/81  HR: 99  Sp02: 94     Occupational Performance:   Pt. UIC upon entry on geomatt.   Pt. Requiring Total A during squat pivot t/f from BS chair to EOB with B LE blocked in prevention of knee buckling. Adherence given to sternal pxns.   Pt. Repositioned in bed appropriately, wedged with pillows to L side.   (Per CNA RN to order pup kit.)    Therapeutic Positioning    OT interventions performed during the course of today's session in an effort to prevent and/or reduce acquired pressure injuries:   Therapeutic positioning completed     [facility] 6 Click ADL:      Patient Education:  Patient and spouse provided with verbal education regarding pressure ulcer  prevention.  Additional teaching is warranted.      Patient left HOB elevated with all lines intact and call button in reach    GOALS:   Multidisciplinary Problems       Occupational Therapy Goals          Problem: Occupational Therapy    Goal Priority Disciplines Outcome Interventions   Occupational Therapy Goal     OT, PT/OT Unable to Meet, Plan Revised    Description: LTG: Pt will perform basic ADLs and ADL transfers with min A and good compliance to sternal precautions using LRAD by discharge.--resume    STG: to be met in 2 weeks    Pt will complete grooming EOB with SBA.--revised  Pt will complete UB dressing with mod A.-resume  Pt will complete LB dressing with mod A using LRAD.-resume  Pt will complete toileting with mod A using RW and BSC.-resume  Pt will complete toilet transfer with mod A using RW and BSC. -resume  Pt will independently verbalize sternal precautions with >75% accuracy. -revised                       Time Tracking:     OT Date of Treatment: 06/13/23  OT Start Time: 1409  OT Stop Time: 1433  OT Total Time (min): 24 min    Billable Minutes:Therapeutic Activity 2          Number of CATALINA visits since last OT visit: 0    6/13/2023

## 2023-06-14 NOTE — PROGRESS NOTES
Tom Anna is a 67 y.o. male patient.   1. Coronary artery disease    2. CAD (coronary artery disease)    3. S/P CABG x 3    4. Respiratory distress    5. QT prolongation    6. Pulse irregularity    7. Hx of CABG    8. Gaze palsy    9. Hypertension, unspecified type    10. Gastroesophageal reflux disease, unspecified whether esophagitis present    11. Pericardial effusion      Past Medical History:   Diagnosis Date    Allergies     CHF (congestive heart failure)     Coronary artery disease     Essential (primary) hypertension     Fatigue     GERD (gastroesophageal reflux disease)     HLD (hyperlipidemia)     Personal history of colonic polyps     SOB (shortness of breath) on exertion     Stroke     Tobacco abuse     Weakness of both legs      No past surgical history pertinent negatives on file.  Scheduled Meds:   acetylcysteine 100 mg/ml (10%)  4 mL Nebulization TID WAKE    albuterol sulfate  2.5 mg Nebulization Q6H    aspirin  81 mg Per OG tube Daily    dexAMETHasone  4 mg Intravenous Daily    docusate  100 mg Per G Tube Daily    enoxparin  40 mg Subcutaneous Q24H (prophylaxis, 1700)    etomidate  20 mg Intravenous Once    famotidine (PF)  20 mg Intravenous Q12H    fluticasone propionate  2 spray Each Nostril Daily    folic acid  1 mg Per OG tube Daily    ipratropium  0.5 mg Nebulization Q6H    metoprolol tartrate  100 mg Per OG tube BID    perflutren lipid microspheres  1.3 mL Intravenous Once    potassium chloride in water  20 mEq Intravenous Once    potassium chloride  10 mEq Oral Once    sacubitriL-valsartan  1 tablet Nasogastric BID    sodium chloride 3%  4 mL Nebulization Q6H     Continuous Infusions:   loperamide      milrinone Stopped (06/09/23 1700)     PRN Meds:sodium chloride, sodium chloride, acetaminophen, baclofen, dextrose 10%, dextrose 10%, glucagon (human recombinant), glucose, glucose, guaiFENesin 100 mg/5 ml, HYDROcodone-acetaminophen, insulin aspart U-100, labetaloL, lactulose 10 gram/15  "ml, loperamide, metoclopramide HCl, ondansetron, oxyCODONE    Review of patient's allergies indicates:   Allergen Reactions    Ace inhibitors Swelling     Facial edema     Active Hospital Problems    Diagnosis  POA    Hx of CABG [Z95.1]  Not Applicable     Patient is now status post coronary artery bypass grafting.  Postoperative course was complicated by mental status changes and necessity for re-intubation.        Resolved Hospital Problems   No resolved problems to display.     Blood pressure (!) 140/83, pulse 109, temperature (!) 102 °F (38.9 °C), temperature source Axillary, resp. rate (!) 30, height 5' 10" (1.778 m), weight 88.5 kg (195 lb 1.6 oz), SpO2 (!) 93 %.    Subjective:    POD #12  Awake. Alert.  Increasing O2 requirments this morning with work of breathing  Currently 93% on 7L oxymask  Antibiotics restarted after fever of 102F    Objective:   Tmax: 102. VSS. 93% on 7L oxymask  Heart: sinus tach  Lungs: coarse, diminished  Incision: c/d/i  H/h: 13/44  Cr: 0.9  Na: 151  K: 5      Assesment/Plan:    S/p CAB  - diuresis  - ABG pending  - Pulmonology reconsulted            JONNY Seo  6/14/2023    "

## 2023-06-14 NOTE — PT/OT/SLP PROGRESS
Occupational Therapy      Patient Name:  Tom Anna   MRN:  [id number]    RN requesting to differ OT session 2/2 patient requiring extensive oxygen requirements now being placed on continuous BiPAP. Will follow-up as appropriate.    6/14/2023

## 2023-06-14 NOTE — PT/OT/SLP PROGRESS
Physical Therapy Treatment    Patient Name:  Tom Anna   MRN:  [id number]    PT session held secondary to patient with increased O2 requirements and now placed on continuous BIPAP. PT will follow up as schedule allows.

## 2023-06-14 NOTE — NURSING
Artificial Intelligence Notification  [facility]  [address]  [place] [address]  Phone: [phone]     This documentation was triggered by an Artificial Intelligence Notification.     Admit Date: 2023   LOS: 13  Code Status: Full Code  : 1955  Age: 67 y.o.  Weight:   Wt Readings from Last 1 Encounters:   23 88.5 kg (195 lb 1.6 oz)        Sex: male  Bed: [address] [place] [address]  MRN: [id number]  Attending Physician: Robert Landis MD     Date of Alert: 2023  Time AI Alert Received: 0802             Vitals:    23 0910   BP: (!) 140/83   Pulse:    Resp:    Temp:        Patient Condition: Patient with increased SOB and was started on BIPAP by hospitalist. Pulmonology reconsulted. Antibiotics restarted. CT chest ordered.Lasix ordered. ABG with pH 7.52, PO2 84, CO2 30. Chest xray unchanged. Fever of 102F this morning. On tube feeds. Suggest obtaining RC if possible.

## 2023-06-14 NOTE — PROGRESS NOTES
[facility] Medicine Progress Note        Chief Complaint: Inpatient Follow-up for  CAD s/pCABG     HPI:   67-year-old male that was originally admitted with history of coronary artery disease had a CABG performed on 06/01.  Postoperative course has been complicated by hypoxemic respiratory failure and concern for aspiration.  He was intubated twice during the postop.  And has been in the ICU for the last week and a half.  He was extubated again on 06/10.  He is now on 2-3 L nasal cannula and doing all her respiratory standpoint.  Did have some deconditioning and oropharyngeal dysphagia postoperatively.  He now has a NG tube in his tolerating tube feeds.  He continues to work with PT and OT.  Chest tubes have been discontinued.  Cardiology is following along and managing his blood pressure and heart rate.  He was a chronic left bundle branch block which is currently controlled.  He is on day 10 of empiric Zosyn for possible aspiration event.  He remains afebrile but with persistent leukocytosis.  He is however on IV steroids.  This can likely be discontinued.  He was deemed stable enough for downgrade to the floor and hospitalist services asked to assume care.  Cardiovascular surgery and Cardiology will continue to follow along. His wife is at the bedside and plan discussed with her and nursing.     Interval Hx:   Remains with NG tube.  His voice does seem a little bit stronger today.  Wife is at the bedside.  I discussed with the patient and his wife that if he does not make any progress with speech therapy over the next couple days will likely need to get GI involved for possible PEG.  She was asking when he will be placed in rehab but discussed with her until he has long-term nutritional access that arrangements have been placed on hold    His respiratory status is more tenuous this morning.  Spiked a fever of 102 last night.  He has some thick secretions which he is trying  to cough up but cannot clear.  Nursing has bumped him to 7L NC     Objective/physical exam:  General appearance: Well-developed, well-nourished male in moderate respiratory distress.  HENT: Atraumatic head. Moist mucous membranes of oral cavity. NG tube  Eyes: Normal extraocular movements.   Neck: Supple.   Lungs: Coarse to auscultation bilaterally.    Heart: Regular rate and rhythm. S1 and S2 present with no murmurs/gallop/rub. No pedal edema. No JVD present. CABG incision c/d/i  Abdomen: Soft, non-distended, non-tender. No rebound tenderness/guarding. Bowel sounds are normal.   Extremities: No cyanosis, clubbing, or edema. L PICC line  Skin: No Rash.   Neuro: Motor and sensory exams grossly intact. Good tone. Muscle strength 5/5 in all 4 extremities  Psych/mental status: Appropriate mood and affect. Responds appropriately to questions    VITAL SIGNS: 24 HRS MIN & MAX LAST   Temp  Min: 97.9 °F (36.6 °C)  Max: 100.1 °F (37.8 °C) 100.1 °F (37.8 °C)   BP  Min: 131/79  Max: 136/69 132/68   Pulse  Min: 92  Max: 112  102   Resp  Min: 19  Max: 22 (!) 22   SpO2  Min: 93 %  Max: 97 % 95 %       Recent Labs   Lab 06/08/23  0131 06/09/23  0734 06/10/23  0314 06/12/23  0131 06/13/23  0157 06/14/23  0404   WBC 10.46 17.68*   < > 21.83* 21.42* 21.91*   RBC 3.68* 4.85   < > 5.74 5.65 5.67   HGB 7.2* 10.7*   < > 13.7* 13.4* 13.7*   HCT 23.1* 32.2*   < > 42.9 42.9 44.5   MCV 62.8* 66.4*   < > 74.7* 75.9* 78.5*   MCH 19.6* 22.1*   < > 23.9* 23.7* 24.2*   MCHC 31.2* 33.2   < > 31.9* 31.2* 30.8*   RDW 25.7* 28.7*  --   --   --   --     212   < > 149 213 262   MPV 9.6  --   --   --   --   --     < > = values in this interval not displayed.       Recent Labs   Lab 06/08/23  0131 06/08/23  0622 06/09/23  1318 06/10/23  0314 06/10/23  0458 06/10/23  0845 06/11/23  0043 06/11/23  0118 06/12/23  0131 06/13/23  0157 06/14/23  0404   *   < >  --    < >  --   --  143   < > 140 147* 151*   K 4.4   < >  --    < >  --   --  4.1   < >  4.5 4.4 5.0   CO2 25   < >  --    < >  --   --  21*   < > 18* 21* 22*   BUN 34.3*   < >  --    < >  --   --  31.9*   < > 30.0* 30.6* 33.1*   CREATININE 1.52*   < >  --    < >  --   --  0.86   < > 0.88 0.83 0.90   CALCIUM 7.5*   < >  --    < >  --   --  7.4*   < > 7.8* 8.5* 8.9   PH  --    < > 7.480*  --  7.460* 7.470*  --   --   --   --   --    MG 2.70*  --   --   --   --   --  2.70*  --   --   --   --    ALBUMIN 2.2*   < >  --    < >  --   --   --    < > 1.9* 2.0* 2.0*   ALKPHOS 52   < >  --    < >  --   --   --    < > 74 80 90   *   < >  --    < >  --   --   --    < > 170* 122* 109*   *   < >  --    < >  --   --   --    < > 78* 56* 67*   BILITOT 1.1   < >  --    < >  --   --   --    < > 1.0 0.8 0.8    < > = values in this interval not displayed.          Microbiology Results (last 7 days)       Procedure Component Value Units Date/Time    Respiratory Culture [[id number]]  (Abnormal) Collected: 06/07/23 1428    Order Status: Completed Specimen: Sputum, Induced Updated: 06/09/23 0833     Respiratory Culture Moderate Yeast     Comment: with no normal respiratory candido        GRAM STAIN Quality 3+      No bacteria seen    Respiratory Culture [[id number]]  (Abnormal) Collected: 06/07/23 1138    Order Status: Completed Specimen: Bronchial Brush L from BAL, LLL Updated: 06/09/23 0802     Respiratory Culture Moderate Yeast     Comment: with no normal respiratory candido       Blood Culture [[id number]]  (Normal) Collected: 06/03/23 0610    Order Status: Completed Specimen: Blood from Arm, Left Updated: 06/08/23 1000     CULTURE, BLOOD ([facility]) No Growth at 5 days    Blood Culture [[id number]]  (Normal) Collected: 06/03/23 0610    Order Status: Completed Specimen: Blood from Arm, Right Updated: 06/08/23 1000     CULTURE, BLOOD ([facility]) No Growth at 5 days    Gram Stain [[id number]] Collected: 06/07/23 1138    Order Status: Completed Specimen: Bronchial Alveolar Lavage from BAL, LLL Updated: 06/07/23 1430     GRAM STAIN  Few WBC observed      Few Gram Negative Rods      Few Gram positive cocci             See below for Radiology    Scheduled Med:   albuterol sulfate  2.5 mg Nebulization Q6H    aspirin  81 mg Per OG tube Daily    dexAMETHasone  4 mg Intravenous Daily    docusate  100 mg Per G Tube Daily    enoxparin  40 mg Subcutaneous Q24H (prophylaxis, 1700)    etomidate  20 mg Intravenous Once    famotidine (PF)  20 mg Intravenous Q12H    fluticasone propionate  2 spray Each Nostril Daily    folic acid  1 mg Per OG tube Daily    ipratropium  0.5 mg Nebulization Q6H    metoprolol tartrate  100 mg Per OG tube BID    perflutren lipid microspheres  1.3 mL Intravenous Once    potassium chloride in water  20 mEq Intravenous Once    potassium chloride  10 mEq Oral Once    sacubitriL-valsartan  1 tablet Nasogastric BID    sodium chloride 3%  4 mL Nebulization Q6H        Continuous Infusions:   loperamide      milrinone Stopped (06/09/23 1700)        PRN Meds:  sodium chloride, sodium chloride, acetaminophen, baclofen, dextrose 10%, dextrose 10%, glucagon (human recombinant), glucose, glucose, HYDROcodone-acetaminophen, insulin aspart U-100, labetaloL, lactulose 10 gram/15 ml, loperamide, metoclopramide HCl, ondansetron, oxyCODONE       Assessment/Plan:   Acute hypoxemic respiratory failure after aspiration event   Severe sepsis  CAD s/p CABG x3 6/1  Anemia of critical illness  Acute systolic heart failure, EF 20-25%  Critical illness delirium   Leukocytosis, infectious versus reactive versus medication induced  Essential hypertension  Hyperlipidemia  Transaminitis      Patient has completed antibiotics for aspiration pneumonia.  Received 9 days of Zosyn.  Stopped yesterday but spike fever again today.  Will restart zosyn for now and consider adding Vanc.  Will get a stat CXR and ABG.  Will place on bipap to ease the work of breathing.  Can try some mucomyst treatments to help with secretions.    Will ask pulmonary to re-evaluate this  morning.  Patient has already had to be re-intubated once after surgery.  High risk of compromise again.    We discussed that if he is unable to swallow by the end of the weekend will likely have to get GI involved for PEG tube placement.  They like to avoid this if possible but understand that he will be necessary for him to go to a rehab facility.    Will continue work with PT OT and speech therapy in the meantime.    Heart rates a little bit on the higher side.  He is already on metoprolol 100 mg b.i.d..  Maybe will see some improvement with decreasing his steroids. Discussed with cardiology  Another concern is his PVD.  Cardiology reporting decreased blood flow to the R foot.  Source of fevers? Can further investigate once we get his respiratory status more stable  Cardiothoracic surgery following.  No new issues.    Cardiology has started him on aspirin.  He is also on due to prophylaxis with Lovenox.  Transaminases have almost normalized.  Plan to start statin once a back to normal for a few days.    Critical care diagnosis: acute hypoxemic respiratory failure requiring high-flow oxygen  Critical care interventions: hands on evaluation, review of labs/radiographs/records and discussions with family  Critical care time spent: >32 minutes      Patient condition:  guarded    Anticipated discharge and Disposition: TBD      All diagnosis and differential diagnosis have been reviewed; assessment and plan has been documented; I have personally reviewed the labs and test results that are presently available; I have reviewed the patients medication list; I have reviewed the consulting providers response and recommendations. I have reviewed or attempted to review medical records based upon their availability    All of the patient's questions have been  addressed and answered. Patient's is agreeable to the above stated plan. I will continue to monitor closely and make adjustments to medical management as  needed.  _____________________________________________________________________      Radiology:  Echo Saline Bubble? No  · The estimated ejection fraction is 25%; Definity used.  · There is abnormal septal wall motion consistent with post-operative   status.  · No pericardial effusion.  · Normal central venous pressure (3 mmHg).     Technically difficult study due to post-operative status.      Robert Landis MD   06/14/2023

## 2023-06-14 NOTE — PHYSICIAN QUERY
PT Name: Tom Anna  MR #: [id number]     DOCUMENTATION CLARIFICATION      CDS/: Gisella Hsu RN CDIS          Contact information: [email or web address]  This form is a permanent document in the medical record.     Query Date: June 14, 2023    Dear Provider,  By submitting this query, we are merely seeking further clarification of documentation.  Please utilize your independent clinical judgment when addressing the question(s) below.     The Medical Record contains the following:    Supporting Clinical Findings Location in Medical Record   6/1 CABG x 3  6/2 - Increased O2 requirements suspected aspiration placed on BiPAP and reupgraded to ICU.  6/4 - Downgraded to the floor but became more hypoxic and transferred back to ICU.  6/7 - Intubated  6/10/2023 - Extubated  6/12 downgraded to floor  6/14 increasing o2 requirements with fever and increasing o2 requirements Pulmonary consult 6/17    Suspected Pneumonia    - Febrile Cards note 6/14    Patient has completed antibiotics for aspiration pneumonia.  Received 9 days of Zosyn.  Stopped yesterday but spike fever again today.  Will restart zosyn for now and consider adding Vanc.  Will get a stat CXR and ABG.  Will place on bipap to ease the work of breathing.  Can try some mucomyst treatments to help with secretions.    note 6/14     He has completed his IV antibiotics for possible aspiration pneumonia.  Will discontinue this today.  I suspect that his persistent leukocytosis is from high-dose steroids.  Will start tapering these off.  I decreased his dexamethasone to just daily dosing.  Will plan to cut that in half and another day or 2.    note 6/13      Please clarify if the __ Aspiration Pneumonia __ diagnosis has been:    [ x ] Ruled In   [  ] Ruled Out   [  ] Other/Clarification of findings (please specify): _______________           Please document in your progress notes daily for the duration of treatment, until resolved, and include in  your discharge summary.    Form No. [id number]

## 2023-06-14 NOTE — PROGRESS NOTES
[facility] - [facility]  Cardiology  Progress Note    Patient Name: Tom Anna  MRN: [id number]  Admission Date: 6/1/2023  Hospital Length of Stay: 13 days  Code Status: Full Code   Attending Physician: Robert Landis MD   Primary Care Physician: Primary Doctor No  Expected Discharge Date:   Principal Problem:<principal problem not specified>    Subjective:   Consultation Reason: Concern for NSVT     HPI:   Mr. Anna is a 67 year old male, known to Dr. Contreras in [place], who presented to the hospital and underwent elective CABG by Dr. Grossman on 6.1.23. CABG noted to be LIMA to LAD, SVG to OM1, and SVG to PDA. Also underwent TAMI Ligation which was clipped with 45 atrial clip. He was initially extubated post surgery and sent to floor for further management (once out of ICU). On 6.2.23 he had a suspected aspiration event, required significant oxygen support, required BIAP and eventual intubation and transfer back to ICU. He was extubated once again on 6.10.23. Patient has been tachycardic and has a baseline lbbb, which looks like VT on the monitor. CIS is consulted for evaluation.    Hospital Course:   6.12.23: NAD Noted. Sinus Tach.   6.13.23: NAD Noted. Vitals Stable. . Remains deconditioned. BP Stable. NG Tube in place.  6.14.23: Patient labored from respiratory standpoint. He is Sinus Tach in the one teen's. BP Stable. Nebulizer treatment in progress, oxygen saturation in low 90's. He is abdominal breathing. EKG and Chest XR Ordered. Family at bedside.      PMH: CAD/CABG, Hypertension, Hyperlipidemia, Ischemic Cardiomyopathy EF 25%, GERD, Cerebral Infarction, Nicotine Dependence, LBBB  PSH: LHC, CABG  Family History: Mother- Hypertension/Depression  Social History: Tobacco- Active Smoker, Alcohol- Beer Consumption, Substance Abuse- Negative     Previous Cardiac Diagnostics:   Echocardiogram (6.12.23):  The estimated ejection fraction is 25%; Definity used.  There is abnormal septal  wall motion consistent with post-operative status.  No pericardial effusion.  Normal central venous pressure (3 mmHg).  Technically difficult study due to post-operative status.    Echocardiogram (6.7.23):  Severely decreased systolic function.  The estimated ejection fraction is 25%; Definity used.  No LV thrombus seen in this study.  There is abnormal septal wall motion.  The Right ventricle is not well visualized but appears to be of mildly reduced RV systolic function.  No evidence of significant pericardial effusion or tamponade physiology.     CV US Arterial Doppler (6.6.23):  The right lower extremity demonstrated severe arterial flow reduction consistent with an occluded superficial femoral, popliteal, and tibial arteries.   The left lower extremity demonstrated severe arterial flow reduction consistent with an occluded superficial femoral, and tibial arteries with reconstitution of flow in the popliteal artery.      CV US Arterial Upper Extremities (6.6.23):  Patent right upper extremity arterial system with biphasic waveforms throughout, however no flow was identified in the radial artery. Patent left upper extremity arterial system with triphasic waveforms throughout, however the ulnar artery was not well visualized.      LHC (5.1.23):  LM- Large 30-50% Obstruction, LAD- Mid Section with Moderate to Severe Disease, Proximal/Distal Section with Mild Disease, LCX- Moderate to Severe Diffuse Disease with Moderate Diffuse Disease of High Takeoff OM1, Moderate Disease of OM2, RCA- Dominant Vessel with Proximal Moderate to Severe Disease, Mid Disease is Mild, Distal  Noted.      Telemetry Monitor (29 Days & 7 Hours) (3.24.23):  This is a good quality study.   Predominant rhythm is normal sinus rhythm.   The minimum heart rate recorded was 59 beats / minute. The maximum heart rate is 133 beats / minute. The mean heart rate is 80 beats / minute.   Rare premature ventricular contractions noted.    No pauses  were noted.   Baseline LBBB noted. Abberant rhythm noted. No symptoms reported.      Echocardiogram (3.18.23):  LV Dilated. EF 25-30%.  Inferior Wall Appears Hypokinetic. Normal RV Function    Review of Systems   Unable to perform ROS: Acuity of condition     Objective:     Vital Signs (Most Recent):  Temp: (!) 102 °F (38.9 °C) (nurse notified) (06/14/23 0747)  Pulse: 109 (06/14/23 0820)  Resp: (!) 30 (06/14/23 0820)  BP: (!) 140/83 (06/14/23 0747)  SpO2: (!) 93 % (06/14/23 0820) Vital Signs (24h Range):  Temp:  [98.5 °F (36.9 °C)-102 °F (38.9 °C)] 102 °F (38.9 °C)  Pulse:  [] 109  Resp:  [19-30] 30  SpO2:  [82 %-97 %] 93 %  BP: (131-140)/(68-84) 140/83     Weight: 88.5 kg (195 lb 1.6 oz)  Body mass index is 27.99 kg/m².    SpO2: (!) 93 %         Intake/Output Summary (Last 24 hours) at 6/14/2023 0841  Last data filed at 6/13/2023 1433  Gross per 24 hour   Intake --   Output 400 ml   Net -400 ml         Lines/Drains/Airways       Drain  Duration                  Rectal Tube 06/09/23 0800 rectal tube w/ balloon (indicate number of mLs) 5 days         NG/OG Tube 06/11/23 0830 nasogastric 16 Fr. Right nostril 3 days    Male External Urinary Catheter 06/12/23 1814 1 day              Peripheral Intravenous Line  Duration                  Peripheral IV - Single Lumen 06/07/23 1500 20 G Left;Posterior Hand 6 days         Peripheral IV - Single Lumen 06/07/23 1544 20 G Anterior;Right Forearm 6 days                    Significant Labs:   Recent Results (from the past 72 hour(s))   Comprehensive Metabolic Panel    Collection Time: 06/12/23  1:31 AM   Result Value Ref Range    Sodium Level 140 136 - 145 mmol/L    Potassium Level 4.5 3.5 - 5.1 mmol/L    Chloride 111 (H) 98 - 107 mmol/L    Carbon Dioxide 18 (L) 23 - 31 mmol/L    Glucose Level 273 (H) 82 - 115 mg/dL    Blood Urea Nitrogen 30.0 (H) 8.4 - 25.7 mg/dL    Creatinine 0.88 0.73 - 1.18 mg/dL    Calcium Level Total 7.8 (L) 8.8 - 10.0 mg/dL    Protein Total 6.6 5.8 -  7.6 gm/dL    Albumin Level 1.9 (L) 3.4 - 4.8 g/dL    Globulin 4.7 (H) 2.4 - 3.5 gm/dL    Albumin/Globulin Ratio 0.4 (L) 1.1 - 2.0 ratio    Bilirubin Total 1.0 <=1.5 mg/dL    Alkaline Phosphatase 74 40 - 150 unit/L    Alanine Aminotransferase 170 (H) 0 - 55 unit/L    Aspartate Aminotransferase 78 (H) 5 - 34 unit/L    eGFR >60 mls/min/1.73/m2   CBC with Differential    Collection Time: 06/12/23  1:31 AM   Result Value Ref Range    WBC 21.83 (H) 4.50 - 11.50 x10(3)/mcL    RBC 5.74 4.70 - 6.10 x10(6)/mcL    Hgb 13.7 (L) 14.0 - 18.0 g/dL    Hct 42.9 42.0 - 52.0 %    MCV 74.7 (L) 80.0 - 94.0 fL    MCH 23.9 (L) 27.0 - 31.0 pg    MCHC 31.9 (L) 33.0 - 36.0 g/dL    RDW      Platelet 149 130 - 400 x10(3)/mcL    MPV      Neut % 87.6 %    Lymph % 5.7 %    Mono % 5.0 %    Eos % 0.5 %    Basophil % 0.1 %    Lymph # 1.24 0.6 - 4.6 x10(3)/mcL    Neut # 19.14 (H) 2.1 - 9.2 x10(3)/mcL    Mono # 1.10 0.1 - 1.3 x10(3)/mcL    Eos # 0.10 0 - 0.9 x10(3)/mcL    Baso # 0.02 <=0.2 x10(3)/mcL    IG# 0.23 (H) 0 - 0.04 x10(3)/mcL    IG% 1.1 %    NRBC% 0.9 %   Blood Smear Microscopic Exam    Collection Time: 06/12/23  1:31 AM   Result Value Ref Range    RBC Morph Abnormal (A) Normal    Anisocyte 1+ (A) (none)    [place] Cells 2+ (A) (none)    Hypochrom 1+ (A) (none)    Poik 2+ (A) (none)    Polychrom 1+ (A) (none)    Target Cell 2+ (A) (none)    Platelet Est Normal Normal, Adequate   POCT glucose    Collection Time: 06/12/23  5:56 AM   Result Value Ref Range    POCT Glucose 336 (H) 70 - 110 mg/dL   Echo Saline Bubble? No    Collection Time: 06/12/23 11:51 AM   Result Value Ref Range    BSA 2.07 m2    Right Atrial Pressure (from IVC) 3 mmHg    EF 25 %   POCT glucose    Collection Time: 06/12/23 12:26 PM   Result Value Ref Range    POCT Glucose 277 (H) 70 - 110 mg/dL   POCT glucose    Collection Time: 06/12/23  4:19 PM   Result Value Ref Range    POCT Glucose 295 (H) 70 - 110 mg/dL   POCT glucose    Collection Time: 06/12/23  8:51 PM   Result Value  Ref Range    POCT Glucose 286 (H) 70 - 110 mg/dL   Comprehensive Metabolic Panel    Collection Time: 06/13/23  1:57 AM   Result Value Ref Range    Sodium Level 147 (H) 136 - 145 mmol/L    Potassium Level 4.4 3.5 - 5.1 mmol/L    Chloride 118 (H) 98 - 107 mmol/L    Carbon Dioxide 21 (L) 23 - 31 mmol/L    Glucose Level 305 (H) 82 - 115 mg/dL    Blood Urea Nitrogen 30.6 (H) 8.4 - 25.7 mg/dL    Creatinine 0.83 0.73 - 1.18 mg/dL    Calcium Level Total 8.5 (L) 8.8 - 10.0 mg/dL    Protein Total 6.1 5.8 - 7.6 gm/dL    Albumin Level 2.0 (L) 3.4 - 4.8 g/dL    Globulin 4.1 (H) 2.4 - 3.5 gm/dL    Albumin/Globulin Ratio 0.5 (L) 1.1 - 2.0 ratio    Bilirubin Total 0.8 <=1.5 mg/dL    Alkaline Phosphatase 80 40 - 150 unit/L    Alanine Aminotransferase 122 (H) 0 - 55 unit/L    Aspartate Aminotransferase 56 (H) 5 - 34 unit/L    eGFR >60 mls/min/1.73/m2   CBC with Differential    Collection Time: 06/13/23  1:57 AM   Result Value Ref Range    WBC 21.42 (H) 4.50 - 11.50 x10(3)/mcL    RBC 5.65 4.70 - 6.10 x10(6)/mcL    Hgb 13.4 (L) 14.0 - 18.0 g/dL    Hct 42.9 42.0 - 52.0 %    MCV 75.9 (L) 80.0 - 94.0 fL    MCH 23.7 (L) 27.0 - 31.0 pg    MCHC 31.2 (L) 33.0 - 36.0 g/dL    RDW      Platelet 213 130 - 400 x10(3)/mcL    MPV      Neut % 87.6 %    Lymph % 4.4 %    Mono % 6.9 %    Eos % 0.0 %    Basophil % 0.1 %    Lymph # 0.94 0.6 - 4.6 x10(3)/mcL    Neut # 18.76 (H) 2.1 - 9.2 x10(3)/mcL    Mono # 1.47 (H) 0.1 - 1.3 x10(3)/mcL    Eos # 0.01 0 - 0.9 x10(3)/mcL    Baso # 0.03 <=0.2 x10(3)/mcL    IG# 0.21 (H) 0 - 0.04 x10(3)/mcL    IG% 1.0 %    NRBC% 0.2 %   POCT glucose    Collection Time: 06/13/23  5:51 AM   Result Value Ref Range    POCT Glucose 296 (H) 70 - 110 mg/dL   POCT glucose    Collection Time: 06/13/23 12:18 PM   Result Value Ref Range    POCT Glucose 329 (H) 70 - 110 mg/dL   POCT glucose    Collection Time: 06/13/23  4:36 PM   Result Value Ref Range    POCT Glucose 307 (H) 70 - 110 mg/dL   Comprehensive Metabolic Panel    Collection  Time: 06/14/23  4:04 AM   Result Value Ref Range    Sodium Level 151 (H) 136 - 145 mmol/L    Potassium Level 5.0 3.5 - 5.1 mmol/L    Chloride 120 (H) 98 - 107 mmol/L    Carbon Dioxide 22 (L) 23 - 31 mmol/L    Glucose Level 287 (H) 82 - 115 mg/dL    Blood Urea Nitrogen 33.1 (H) 8.4 - 25.7 mg/dL    Creatinine 0.90 0.73 - 1.18 mg/dL    Calcium Level Total 8.9 8.8 - 10.0 mg/dL    Protein Total 6.4 5.8 - 7.6 gm/dL    Albumin Level 2.0 (L) 3.4 - 4.8 g/dL    Globulin 4.4 (H) 2.4 - 3.5 gm/dL    Albumin/Globulin Ratio 0.5 (L) 1.1 - 2.0 ratio    Bilirubin Total 0.8 <=1.5 mg/dL    Alkaline Phosphatase 90 40 - 150 unit/L    Alanine Aminotransferase 109 (H) 0 - 55 unit/L    Aspartate Aminotransferase 67 (H) 5 - 34 unit/L    eGFR >60 mls/min/1.73/m2   CBC with Differential    Collection Time: 06/14/23  4:04 AM   Result Value Ref Range    WBC 21.91 (H) 4.50 - 11.50 x10(3)/mcL    RBC 5.67 4.70 - 6.10 x10(6)/mcL    Hgb 13.7 (L) 14.0 - 18.0 g/dL    Hct 44.5 42.0 - 52.0 %    MCV 78.5 (L) 80.0 - 94.0 fL    MCH 24.2 (L) 27.0 - 31.0 pg    MCHC 30.8 (L) 33.0 - 36.0 g/dL    RDW      Platelet 262 130 - 400 x10(3)/mcL    MPV      Neut % 85.4 %    Lymph % 6.4 %    Mono % 6.7 %    Eos % 0.3 %    Basophil % 0.2 %    Lymph # 1.40 0.6 - 4.6 x10(3)/mcL    Neut # 18.70 (H) 2.1 - 9.2 x10(3)/mcL    Mono # 1.47 (H) 0.1 - 1.3 x10(3)/mcL    Eos # 0.07 0 - 0.9 x10(3)/mcL    Baso # 0.05 <=0.2 x10(3)/mcL    IG# 0.22 (H) 0 - 0.04 x10(3)/mcL    IG% 1.0 %    NRBC% 0.1 %   POCT glucose    Collection Time: 06/14/23  5:58 AM   Result Value Ref Range    POCT Glucose 289 (H) 70 - 110 mg/dL     Telemetry:  Sinus Tachycardia    Physical Exam  Vitals and nursing note reviewed.   Constitutional:       General: He is in acute distress.      Appearance: He is ill-appearing.   HENT:      Head: Normocephalic.      Mouth/Throat:      Mouth: Mucous membranes are moist.   Cardiovascular:      Rate and Rhythm: Regular rhythm. Tachycardia present.      Heart sounds: Normal  heart sounds.      Comments: Sinus Tachycardia  Pulmonary:      Effort: Accessory muscle usage and respiratory distress present.      Breath sounds: Rhonchi present. No wheezing or rales.      Comments: Abdominal Breathing/Tachypnea   Abdominal:      General: There is no distension.      Palpations: Abdomen is soft.      Tenderness: There is no abdominal tenderness. There is no guarding.      Comments: NG Tube in Place (Tube Feedings/Nutrition).   Musculoskeletal:      Cervical back: Neck supple.      Right lower leg: No edema.      Left lower leg: No edema.      Comments: Legs are Warm (Right Foot Cool)- Heel Protectors in Place.    Skin:     General: Skin is dry.   Neurological:      Mental Status: He is alert. Mental status is at baseline.   Psychiatric:         Behavior: Behavior normal.     Current Inpatient Medications:    Current Facility-Administered Medications:     0.9%  NaCl infusion (for blood administration), , Intravenous, Q24H PRN, Xochitl Grossman MD    0.9%  NaCl infusion (for blood administration), , Intravenous, Q24H PRN, Xochitl Grossman MD    acetaminophen oral solution 650 mg, 650 mg, Per OG tube, Q6H PRN, JONNY Batista    acetylcysteine 100 mg/ml (10%) solution 4 mL, 4 mL, Nebulization, TID WAKE, Robert Landis MD    albuterol nebulizer solution 2.5 mg, 2.5 mg, Nebulization, Q6H, JONNY Riley, 2.5 mg at 06/14/23 0820    aspirin chewable tablet 81 mg, 81 mg, Per OG tube, Daily, JONNY Batista, 81 mg at 06/13/23 0937    baclofen tablet 10 mg, 10 mg, Per OG tube, TID PRN, JONNY Batista    dexAMETHasone injection 4 mg, 4 mg, Intravenous, Daily, Robert Landis MD    dextrose 10% bolus 125 mL 125 mL, 12.5 g, Intravenous, PRN, REILLY Saunders MD    dextrose 10% bolus 250 mL 250 mL, 25 g, Intravenous, PRN, REILLY Saunders MD    docusate 50 mg/5 mL liquid 100 mg, 100 mg, Per G Tube, Daily, JONNY Batista, 100 mg at 06/13/23 1134    enoxaparin injection 40 mg, 40  mg, Subcutaneous, Q24H (prophylaxis, 1700), REILLY Saunders MD, 40 mg at 06/13/23 1727    etomidate injection 20 mg, 20 mg, Intravenous, Once, REILLY Saunders MD    famotidine (PF) injection 20 mg, 20 mg, Intravenous, Q12H, JONNY Batista, 20 mg at 06/13/23 2053    fluticasone propionate 50 mcg/actuation nasal spray 100 mcg, 2 spray, Each Nostril, Daily, REILLY Saunders MD, 100 mcg at 06/13/23 1134    folic acid tablet 1 mg, 1 mg, Per OG tube, Daily, JONNY Batista, 1 mg at 06/13/23 0937    glucagon (human recombinant) injection 1 mg, 1 mg, Intramuscular, PRN, REILLY Saunders MD    glucose chewable tablet 16 g, 16 g, Per OG tube, PRN, JONNY Batista    glucose chewable tablet 24 g, 24 g, Per OG tube, PRN, JONNY Batista    guaiFENesin 100 mg/5 ml syrup 200 mg, 200 mg, Oral, Q4H PRN, Robert Landis MD    HYDROcodone-acetaminophen 5-325 mg per tablet 1 tablet, 1 tablet, Per OG tube, Q4H PRN, JONNY Batista, 1 tablet at 06/12/23 1315    insulin aspart U-100 injection 0-5 Units, 0-5 Units, Subcutaneous, QID (AC + HS) PRN, REILLY Saunders MD, 3 Units at 06/14/23 0628    ipratropium 0.02 % nebulizer solution 0.5 mg, 0.5 mg, Nebulization, Q6H, REILLY Saunders MD, 0.5 mg at 06/14/23 0820    labetaloL injection 10 mg, 10 mg, Intravenous, Q4H PRN, Cleve Stratton MD, 10 mg at 06/12/23 1819    lactulose 10 gram/15 ml solution 20 g, 20 g, Per OG tube, Q6H PRN, JONNY Batista    loperamide capsule 2 mg, 2 mg, Per OG tube, Continuous PRN, JONNY Batista    metoclopramide HCl injection 5 mg, 5 mg, Intravenous, Q6H PRN, JONNY Batista    metoprolol tartrate (LOPRESSOR) tablet 100 mg, 100 mg, Per OG tube, BID, Rein MONIE Hallman, FNDAYANARA, 100 mg at 06/13/23 2052    milrinone 20mg in D5W 100 mL infusion, 0.375 mcg/kg/min (Dosing Weight), Intravenous, Continuous, Xochitl Grossman MD, Stopped at 06/09/23 1700    ondansetron injection 4 mg, 4 mg, Intravenous, Q4H PRN, Walter Rowland,  "PA    oxyCODONE immediate release tablet 10 mg, 10 mg, Per OG tube, Q4H PRN, JONNY Batista, 10 mg at 06/09/23 0659    perflutren lipid microspheres injection 1.3 mL, 1.3 mL, Intravenous, Once, REILLY Saunders MD    potassium chloride 20 mEq in 100 mL IVPB (FOR CENTRAL LINE ADMINISTRATION ONLY), 20 mEq, Intravenous, Once, JONNY Riley, Held at 06/03/23 0345    potassium chloride CR tablet 10 mEq, 10 mEq, Oral, Once, JONNY Riley    sacubitriL-valsartan 24-26 mg per tablet 1 tablet, 1 tablet, Nasogastric, BID, Brooklyn Hallman, FNP, 1 tablet at 06/13/23 2053    sodium chloride 3% nebulizer solution 4 mL, 4 mL, Nebulization, Q6H, Amanda Mcclellan, FNP, 4 mL at 06/14/23 0820    VTE Risk Mitigation (From admission, onward)           Ordered     enoxaparin injection 40 mg  Every 24 hours         06/06/23 0957                  Assessment:   Acute Hypoxemic Respiratory Failure     - Requiring Intubation/Mechanical Ventilation due to Suspected Aspiration Event     - Extubated on 6.10.23  Sinus Tachycardia with Left Bundle Branch Block- Suspect Driven by Acute Respiratory Decompensation    - No pericardial effusion (Echo 6.12.23)  Chronic Left Bundle Branch Block  CAD/CABG    - LIMA to the LAD, SVG to OM1, SVG to PDA  Ischemic Cardiomyopathy    - EF 25%    - Life Vest  Suspected Pneumonia    - Febrile  Leukocytosis (Persistent)  Hypertension (BP Stable)  Hyperlipidemia  PAD    - The right lower extremity demonstrated severe arterial flow reduction consistent with an occluded superficial femoral, popliteal, and tibial arteries.     - The left lower extremity demonstrated severe arterial flow reduction consistent with an occluded superficial femoral, and tibial arteries with reconstitution of flow in the popliteal artery.   Nicotine Dependence/Alcohol Use  History of CVA  Elevated Hepatic Enzymes (Improving)  ED  Delirium Possible ICU Psychosis (Stable)  Ace Inhibitor Allergy "Facial Edema" (Tolerates " Entresto)  Dysphagia Requiring NG Tube for Medications/Nutrition  Physical Deconditioning    Plan:   Give Lasix 40 Mg IVP x 1 Dose Now  Obtain STAT Chest XR and EKG  Continue Current Cardiac Medications  Case/patient condition discussed with attending who is aware and adjusting plan of care accordingly  Restart Statin when liver enzymes improve- Now on Hold  Close Monitoring by Nursing Staff (Discussed)    Brooklyn Hallman, BRISEYDA  Cardiology  [facility] - [facility]  06/14/2023     Physician addendum:  I have seen and examined this patient as a split-shared visit with the ALEJANDRINA d/t complicated medical management of above problems written in assessment and high acuity requiring physician expertise in medical decision-making. I performed the substantive portion of the history and exam. Above medical decision-making is also formulated by me.    Cardiovascular exam:  S1, S2, tachycardic  Lungs:  Coarse breath sounds throughout with rhonchi and decreased breath sounds left base  Extremities:  Trace edema  General:  Ill-appearing, mild respiratory distress    Plan:  Chest x-ray reviewed appears to have infiltrate versus effusion left lower lobe we will give IV Lasix x1   Agree with Ventimask  We will have primary team and Pulmonary evaluate for need for antibiotics and possible BiPAP    Sid Morin MD  Cardiologist

## 2023-06-14 NOTE — NURSING
Nurses Note -- 4 Eyes      6/14/2023   12:13 PM      Skin assessed during: Daily Assessment      [] No Altered Skin Integrity Present    []Prevention Measures Documented      [x] Yes- Altered Skin Integrity Present or Discovered   [x] LDA Added if Not in Epic (Describe Wound)   [x] New Altered Skin Integrity was Present on Admit and Documented in LDA   [x] Wound Image Taken    Wound Care Consulted? Yes    Attending Nurse:  Fabiana Zelaya RN     Second RN/Staff Member:  Raymond De Luna RN

## 2023-06-14 NOTE — PHYSICIAN QUERY
PT Name: Tom Anna  MR #: [id number]     DOCUMENTATION CLARIFICATION     CDS/: Gisella Hsu RN CDIS             Contact information: [email or web address]    This form is a permanent document in the medical record.     Query Date: June 14, 2023    By submitting this query, we are merely seeking further clarification of documentation.  Please utilize your independent clinical judgment when addressing the question(s) below.    The Medical Record contains the following   Indicators Supporting Clinical Findings Location in Medical Record   x Heart Failure documented Heart failure with reduced ejection fraction-EF 20-25%      Acute systolic heart failure, EF 20-25%    CHF (congestive heart failure) CCM note 6/12       H&P 6/12     H&P 6/12 - Past medical history     BNP     x EF/Echo Severely decreased systolic function.  The estimated ejection fraction is 25%; Definity used.  No LV thrombus seen in this study.  There is abnormal septal wall motion.  The Right ventricle is not well visualized but appears to be of mildly reduced RV systolic function.  No evidence of significant pericardial effusion or tamponade physiology. TTE 6/7     Radiology findings      Subjective/Objective Respiratory Conditions      Recent/Current MI      Heart Transplant, LVAD      Edema, JVD      Ascites     x Diuretics/Meds furosemide injection 20 mg  Dose: 20 mg  Freq: Once Route: IV    furosemide injection 40 mg  Dose: 40 mg  Freq: Once Route: IV   MAR x 2         MAR x 5    Other Treatment      Other       Heart failure is a clinical diagnosis which includes symptomatic fluid retention, elevated intracardiac pressures, and/or the inability of the heart to deliver adequate blood flow.    Heart Failure with reduced Ejection Fraction (HFrEF) or Systolic Heart Failure (loses ability to contract normally, EF is <40%)    Heart Failure with preserved Ejection Fraction (HFpEF) or Diastolic Heart Failure (stiff ventricles, does not  relax properly, EF is >50%)     Heart Failure with Combined Systolic and Diastolic Failure (stiff ventricles, does not relax properly and EF is <50%)    Mid-range or mildly reduced ejection fraction (HFmrEF) is classified as systolic heart failure.  Congestive heart failure with a recovered EF is classified as Diastolic Heart Failure.  Common clues to acute exacerbation:  Rapidly progressive symptoms (w/in 2 weeks of presentation), using IV diuretics, using supplemental O2, pulmonary edema on Xray, new or worsening pleural effusion, +JVD or other signs of volume overload, MI w/in 4 weeks, and/or BNP >500  The clinical guidelines noted are only system guidelines, and do not replace the providers clinical judgment.    Provider, please clarify the ACUITY of the heart failure:     [   ]  Acute Systolic Heart Failure (HFrEF or HFmrEF) - new diagnosis   [  x ]  Acute on Chronic Systolic Heart Failure (HFrEF or HFmrEF) - worsening of CHF signs/symptoms in preexisting CHF   [   ]  Chronic Systolic Heart Failure (HFrEF or HFmrEF) - preexisting and stable   [   ]  Other (please specify): ___________________________________         Please document in your progress notes daily for the duration of treatment until resolved and include in your discharge summary.    References:  American Heart Association editorial staff. (2017, May). Ejection Fraction Heart Failure Measurement. American Heart Association. https://www.heart.org/en/health-topics/heart-failure/diagnosing-heart-failure/ejection-fraction-heart-failure-measurement#:~:text=Ejection%20fraction%20(EF)%20is%20a,pushed%20out%20with%20each%20heartbeat  JEANETTE Arredondo (2020, December 15). Heart failure with preserved ejection fraction: Clinical manifestations and diagnosis. [employer]. https://[email or web address].GridBridge/contents/heart-failure-with-preserved-ejection-fraction-clinical-manifestations-and-diagnosis.  ICD-10-CM/PCS Coding Clinic Third Quarter ICD-10, Effective with  discharges: September 8, 2020 [facility] [place] Heart [place] with mid-range or mildly reduced ejection fraction (2020).  ICD-10-CM/PCS Coding Clinic Third Quarter ICD-10, Effective with discharges: September 8, 2020 [facility] [place] § Heart failure with recovered ejection fraction (2020).  Form No. [id number]

## 2023-06-14 NOTE — CONSULTS
[facility] - [facility]  Pulmonary Critical Care Note    Patient Name: Tom Anna  MRN: [id number]  Admission Date: 6/1/2023  Hospital Length of Stay: 13 days  Code Status: Full Code  Attending Provider: Robert Landis MD  Primary Care Provider: Primary Doctor No     Subjective:     HPI:   67-year-old with a history of severe coronary artery disease mitral regurgitation, and ejection fraction 20-25% underwent CABG by Dr. Grossman on 6/1/2023.  Per Dr. Felix's notes CABG x3 with a LIMA to the LAD saphenous vein graft to OM1 and PDA.  Left atrial appendage was clipped with size 45 atrial clip.    Hospital Course/Significant events:  6/1 CABG x 3  6/2 - Increased O2 requirements suspected aspiration placed on BiPAP and reupgraded to ICU.  6/4 - Downgraded to the floor but became more hypoxic and transferred back to ICU.  6/7 - Intubated  6/10/2023 - Extubated  6/12 downgraded to floor  6/14 increasing o2 requirements with fever and increasing o2 requirements    24 Hour Interval History:  He is having increasing o2 requirements this AM prompting pulmonary reconsult. Currently on 7L oxymask with sats in the mid 90s. He is having increased work of breathing. CXR with small left effusion and otherwise unremarkable. He is only on tube feeds and not taking anything by mouth. Spiked a temp of 102F this AM.     Past Medical History:   Diagnosis Date    Allergies     CHF (congestive heart failure)     Coronary artery disease     Essential (primary) hypertension     Fatigue     GERD (gastroesophageal reflux disease)     HLD (hyperlipidemia)     Personal history of colonic polyps     SOB (shortness of breath) on exertion     Stroke     Tobacco abuse     Weakness of both legs        Past Surgical History:   Procedure Laterality Date    COLONOSCOPY W/ BIOPSIES AND POLYPECTOMY  01/20/2020    Dr. Alf Covington    CORONARY ARTERY BYPASS GRAFT (CABG) N/A 6/1/2023    Procedure: CORONARY ARTERY BYPASS GRAFT (CABG);   Surgeon: Xochitl Grossman MD;  [facility]: [facility];  Service: Cardiothoracic;  Laterality: N/A;    ENDOSCOPY  01/20/2020    ESOPHAGOGASTRODUODENOSCOPY      PLANTAR'S WART EXCISION  07/17/2017       Social History     Socioeconomic History    Marital status:     Number of children: 4   Occupational History    Occupation: Disabled   Tobacco Use    Smoking status: Every Day     Packs/day: 1.00     Types: Cigarettes    Smokeless tobacco: Never   Substance and Sexual Activity    Alcohol use: Yes     Comment: Beer 6pk daily    Drug use: Never     Social Determinants of Health     Food Insecurity: Unknown    Worried About Running Out of Food in the Last Year: Never true   Transportation Needs: Unknown    Lack of Transportation (Medical): No   Housing Stability: Unknown    Unable to Pay for Housing in the Last Year: No       Current Outpatient Medications   Medication Instructions    albuterol (VENTOLIN HFA) 90 mcg/actuation inhaler 1 puff, Inhalation, Every 12 hours PRN, Rescue    amLODIPine (NORVASC) 10 mg, Oral, Daily    aspirin (ECOTRIN) 81 mg, Oral, Daily    atorvastatin (LIPITOR) 80 mg, Oral, Nightly    baclofen (LIORESAL) 10 mg, Oral, 3 times daily PRN    cetirizine 10 mg, Oral, Daily    clopidogreL (PLAVIX) 75 mg, Oral    ENTRESTO 49-51 mg per tablet 1 tablet, Oral, 2 times daily    fluticasone propionate (FLONASE) 50 mcg, Each Nostril, Daily    furosemide (LASIX) 20 mg, Oral, Daily    ketoconazole (NIZORAL) 2 % cream Topical (Top), 2 times daily    losartan (COZAAR) 50 mg, Oral, Daily    lovastatin (MEVACOR) 10 mg, Oral, Nightly    metoprolol succinate (TOPROL-XL) 100 mg, Oral, Daily    multivitamin capsule 1 capsule, Oral, Daily    omeprazole (PRILOSEC) 20 mg, Oral, Daily    sildenafiL (VIAGRA) 100 mg, Oral, As needed (PRN)    spironolactone (ALDACTONE) 25 mg, Oral, Daily       Current Inpatient Medications   acetylcysteine 100 mg/ml (10%)  4 mL Nebulization TID WAKE    albuterol sulfate  2.5 mg Nebulization  Q6H    aspirin  81 mg Per OG tube Daily    dexAMETHasone  4 mg Intravenous Daily    docusate  100 mg Per G Tube Daily    enoxparin  40 mg Subcutaneous Q24H (prophylaxis, 1700)    etomidate  20 mg Intravenous Once    famotidine (PF)  20 mg Intravenous Q12H    fluticasone propionate  2 spray Each Nostril Daily    folic acid  1 mg Per OG tube Daily    ipratropium  0.5 mg Nebulization Q6H    metoprolol tartrate  100 mg Per OG tube BID    perflutren lipid microspheres  1.3 mL Intravenous Once    potassium chloride in water  20 mEq Intravenous Once    potassium chloride  10 mEq Oral Once    sacubitriL-valsartan  1 tablet Nasogastric BID    sodium chloride 3%  4 mL Nebulization Q6H       Current Intravenous Infusions   loperamide      milrinone Stopped (06/09/23 1700)       Review of Systems   All other systems reviewed and are negative.     Objective:       Intake/Output Summary (Last 24 hours) at 6/14/2023 0918  Last data filed at 6/13/2023 1433  Gross per 24 hour   Intake --   Output 400 ml   Net -400 ml       Vital Signs (Most Recent):  Temp: (!) 102 °F (38.9 °C) (nurse notified) (06/14/23 0747)  Pulse: 109 (06/14/23 0820)  Resp: (!) 30 (06/14/23 0820)  BP: (!) 140/83 (06/14/23 0910)  SpO2: (!) 93 % (06/14/23 0820)  Body mass index is 27.99 kg/m².  Weight: 88.5 kg (195 lb 1.6 oz) Vital Signs (24h Range):  Temp:  [98.5 °F (36.9 °C)-102 °F (38.9 °C)] 102 °F (38.9 °C)  Pulse:  [] 109  Resp:  [19-30] 30  SpO2:  [82 %-97 %] 93 %  BP: (131-140)/(68-84) 140/83     Physical Exam  General:  Awake and alert, weak  HEENT: NC/AT; PERRL; nasal and oral mucosa moist and clear; oxymask and NG in place   Pulm: Corase in upper lobes bilaterally  CV: S1, S2 w/o murmurs or gallops; no edema noted  GI: Bowel sound present in all quadrants  Neuro: follows commands     Lines/Drains/Airways       Drain  Duration                  Rectal Tube 06/09/23 0800 rectal tube w/ balloon (indicate number of mLs) 5 days         NG/OG Tube 06/11/23  0830 nasogastric 16 Fr. Right nostril 3 days    Male External Urinary Catheter 06/12/23 1814 1 day              Peripheral Intravenous Line  Duration                  Peripheral IV - Single Lumen 06/07/23 1500 20 G Left;Posterior Hand 6 days         Peripheral IV - Single Lumen 06/07/23 1544 20 G Anterior;Right Forearm 6 days                    Significant Labs:    Lab Results   Component Value Date    WBC 21.91 (H) 06/14/2023    HGB 13.7 (L) 06/14/2023    HCT 44.5 06/14/2023    MCV 78.5 (L) 06/14/2023     06/14/2023         BMP  Lab Results   Component Value Date     (H) 06/14/2023    K 5.0 06/14/2023    CHLORIDE 120 (H) 06/14/2023    CO2 22 (L) 06/14/2023    BUN 33.1 (H) 06/14/2023    CREATININE 0.90 06/14/2023    CALCIUM 8.9 06/14/2023    AGAP 10.0 06/11/2023    EGFRNONAA 86 02/09/2022       ABG  Recent Labs   Lab 06/10/23  0845   PH 7.470*   PO2 95.0   HCO3 22.6         Significant Imaging:  X-Ray Chest 1 View  Narrative: EXAMINATION:  XR CHEST 1 VIEW    CPT 27742    CLINICAL HISTORY:  Hypoxia;    COMPARISON:  June 11, 2023    FINDINGS:  Examination reveals cardiomediastinal silhouette and pleuroparenchymal changes to be essentially unchanged as compared with the previous exam.    Left-sided PICC line has been removed  Impression: No significant change.    Interval removal of left-sided PICC line    Electronically signed by: Jesse Mancia  Date:    06/14/2023  Time:    09:06          Assessment/Plan:     Assessment  Status post CABG x3 on 06/01/2023.  LIMA to the LAD, saphenous vein graft to OM1 and PDA.  Heart failure with reduced ejection fraction-EF 20-25%  Suspected aspiration event with associated hypoxemic respiratory failure      Plan  -Patient extubated on 6/10/2023 from his reintubation and now with increasing o2 requirements  -patient is tolerating tube feeds and taking nothing by mouth  -will repeat CT chest to evaluate   -awaiting ABGs  -continue BiPAP as needed   -restarting Zosyn  and will obtain respiratory culture if possible       Adriana Heath, FNP  Pulmonary Critical Care Medicine  [facility]

## 2023-06-14 NOTE — PT/OT/SLP PROGRESS
Followed up regarding clinical swallow evaluation, however pt with decline in respiratory status and is requiring BiPAP for support.  PO intake unsafe at this time.  SLP to follow up in AM.

## 2023-06-14 NOTE — PT/OT/SLP PROGRESS
Physical Therapy Treatment    Patient Name:  Tom Anna   MRN:  [id number]    Recommendations:     Discharge Recommendations: nursing facility, skilled  Discharge Equipment Recommendations: to be determined by next level of care  Barriers to discharge: None    Assessment:     Tom Anna is a 67 y.o. male admitted with a medical diagnosis of <principal problem not specified>.  He presents with the following impairments/functional limitations: weakness, impaired endurance, impaired functional mobility, impaired self care skills, impaired balance, pain.    Rehab Prognosis: Good; patient would benefit from acute skilled PT services to address these deficits and reach maximum level of function.    Recent Surgery: Procedure(s) (LRB):  CORONARY ARTERY BYPASS GRAFT (CABG) (N/A) 12 Days Post-Op    Plan:     During this hospitalization, patient to be seen 6 x/week to address the identified rehab impairments via therapeutic activities, therapeutic exercises, gait training and progress toward the following goals:    Plan of Care Expires:  07/11/23    Subjective     Chief Complaint:   Patient/Family Comments/goals:   Pain/Comfort:         Objective:     Communicated with NSG prior to session.  Patient found up in chair with   upon PT entry to room.     General Precautions: Standard, sternal  Orthopedic Precautions: N/A  Braces: N/A  Respiratory Status:   Blood Pressure:   Skin Integrity: Visible skin intact      Functional Mobility:  Bed Mobility:     Supine to Sit: max assistance  Transfers:     Sit to Stand:  mod a x persons with hand-held assist, maintaining sternal pxns  Stand pivot to chair mod a x2     T     Patient left up in chair with all lines intact and call button in reach..    GOALS:   Multidisciplinary Problems       Physical Therapy Goals          Problem: Physical Therapy    Goal Priority Disciplines Outcome Goal Variances Interventions   Physical Therapy Goal     PT, PT/OT Ongoing, Progressing      Description: Goals to be met by: 2023     Patient will increase functional independence with mobility by performin. Supine to sit with MInimal Assistance  2. Sit to stand transfer with Minimal Assistance  3. Bed to chair transfer with Minimal Assistance using Rolling Walker  4. Gait  x 100 feet with Minimal Assistance using Rolling Walker.   5. Ascend/descend 2 stair with bilateral Handrails Moderate Assistance using No Assistive Device.                            Treatment Type: Treatment  PT/PTA: PTA     Number of PTA visits since last PT visit: 2     2023

## 2023-06-15 NOTE — NURSING
Abel Arthur NP for orders. Updated on the following:    Latest Reference Range & Units 06/15/23 03:13   Sodium 136 - 145 mmol/L 149 (H)   Potassium 3.5 - 5.1 mmol/L 5.8 (H)   Chloride 98 - 107 mmol/L 120 (H)   CO2 23 - 31 mmol/L 21 (L)   BUN 8.4 - 25.7 mg/dL 51.8 (H)   Creatinine 0.73 - 1.18 mg/dL 1.35 (H)   eGFR mls/min/1.73/m2 58   Glucose 82 - 115 mg/dL 474 (HH)

## 2023-06-15 NOTE — NURSING
Abel Arthur NP for orders. Patient's glucose has been 300s-400s today, orders for moderate insulin sliding scale placed.

## 2023-06-15 NOTE — PROGRESS NOTES
[facility] - [facility]  Cardiology  Progress Note    Patient Name: Tom Anna  MRN: [id number]  Admission Date: 6/1/2023  Hospital Length of Stay: 14 days  Code Status: Full Code   Attending Physician: Alejandro Allred MD   Primary Care Physician: Primary Doctor No  Expected Discharge Date:   Principal Problem:<principal problem not specified>    Subjective:   Consultation Reason: Concern for NSVT     HPI:   Mr. Anna is a 67 year old male, known to Dr. Contreras in [place], who presented to the hospital and underwent elective CABG by Dr. Grossman on 6.1.23. CABG noted to be LIMA to LAD, SVG to OM1, and SVG to PDA. Also underwent TAMI Ligation which was clipped with 45 atrial clip. He was initially extubated post surgery and sent to floor for further management (once out of ICU). On 6.2.23 he had a suspected aspiration event, required significant oxygen support, required BIAP and eventual intubation and transfer back to ICU. He was extubated once again on 6.10.23. Patient has been tachycardic and has a baseline lbbb, which looks like VT on the monitor. [facility] is consulted for evaluation.    Hospital Course:   6.12.23: NAD Noted. Sinus Tach.   6.13.23: NAD Noted. Vitals Stable. . Remains deconditioned. BP Stable. NG Tube in place.  6.14.23: Patient labored from respiratory standpoint. He is Sinus Tach in the one teen's. BP Stable. Nebulizer treatment in progress, oxygen saturation in low 90's. He is abdominal breathing. EKG and Chest XR Ordered. Family at bedside.   6.15.23: Patient upgraded to ICU due to mental status change and respiratory distress. Patient now intubated. SR versus ST on Tele.      PMH: CAD/CABG, Hypertension, Hyperlipidemia, Ischemic Cardiomyopathy EF 25%, GERD, Cerebral Infarction, Nicotine Dependence, LBBB  PSH: LHC, CABG  Family History: Mother- Hypertension/Depression  Social History: Tobacco- Active Smoker, Alcohol- Beer Consumption, Substance Abuse- Negative      Previous Cardiac Diagnostics:   Echocardiogram (6.12.23):  The estimated ejection fraction is 25%; Definity used.  There is abnormal septal wall motion consistent with post-operative status.  No pericardial effusion.  Normal central venous pressure (3 mmHg).  Technically difficult study due to post-operative status.    Echocardiogram (6.7.23):  Severely decreased systolic function.  The estimated ejection fraction is 25%; Definity used.  No LV thrombus seen in this study.  There is abnormal septal wall motion.  The Right ventricle is not well visualized but appears to be of mildly reduced RV systolic function.  No evidence of significant pericardial effusion or tamponade physiology.     CV US Arterial Doppler (6.6.23):  The right lower extremity demonstrated severe arterial flow reduction consistent with an occluded superficial femoral, popliteal, and tibial arteries.   The left lower extremity demonstrated severe arterial flow reduction consistent with an occluded superficial femoral, and tibial arteries with reconstitution of flow in the popliteal artery.      CV US Arterial Upper Extremities (6.6.23):  Patent right upper extremity arterial system with biphasic waveforms throughout, however no flow was identified in the radial artery. Patent left upper extremity arterial system with triphasic waveforms throughout, however the ulnar artery was not well visualized.      LHC (5.1.23):  LM- Large 30-50% Obstruction, LAD- Mid Section with Moderate to Severe Disease, Proximal/Distal Section with Mild Disease, LCX- Moderate to Severe Diffuse Disease with Moderate Diffuse Disease of High Takeoff OM1, Moderate Disease of OM2, RCA- Dominant Vessel with Proximal Moderate to Severe Disease, Mid Disease is Mild, Distal  Noted.      Telemetry Monitor (29 Days & 7 Hours) (3.24.23):  This is a good quality study.   Predominant rhythm is normal sinus rhythm.   The minimum heart rate recorded was 59 beats / minute. The maximum  heart rate is 133 beats / minute. The mean heart rate is 80 beats / minute.   Rare premature ventricular contractions noted.    No pauses were noted.   Baseline LBBB noted. Abberant rhythm noted. No symptoms reported.      Echocardiogram (3.18.23):  LV Dilated. EF 25-30%.  Inferior Wall Appears Hypokinetic. Normal RV Function    Review of Systems   Unable to perform ROS: Intubated     Objective:     Vital Signs (Most Recent):  Temp: (!) 101.7 °F (38.7 °C) (06/15/23 0846)  Pulse: (!) 113 (06/15/23 0716)  Resp: (!) 50 (06/15/23 0716)  BP: 113/69 (06/15/23 0530)  SpO2: (!) 92 % (06/15/23 0716) Vital Signs (24h Range):  Temp:  [98 °F (36.7 °C)-101.7 °F (38.7 °C)] 101.7 °F (38.7 °C)  Pulse:  [] 113  Resp:  [19-50] 50  SpO2:  [92 %-100 %] 92 %  BP: ()/(61-97) 113/69     Weight: 88.5 kg (195 lb 1.6 oz)  Body mass index is 27.99 kg/m².    SpO2: (!) 92 %         Intake/Output Summary (Last 24 hours) at 6/15/2023 1019  Last data filed at 6/15/2023 1000  Gross per 24 hour   Intake 3358 ml   Output 2350 ml   Net 1008 ml         Lines/Drains/Airways       Drain  Duration                  Rectal Tube 06/09/23 0800 rectal tube w/ balloon (indicate number of mLs) 6 days         NG/OG Tube 06/11/23 0830 nasogastric 16 Fr. Right nostril 4 days         Urethral Catheter 06/15/23 0600 16 Fr. <1 day              Airway  Duration                  Airway - Non-Surgical 06/15/23 0925 Endotracheal Tube <1 day              Peripheral Intravenous Line  Duration                  Peripheral IV - Single Lumen 06/15/23 0445 Anterior;Left Forearm <1 day         Peripheral IV - Single Lumen 06/15/23 0630 20 G Anterior;Right Forearm <1 day                    Significant Labs:   Recent Results (from the past 72 hour(s))   Echo Saline Bubble? No    Collection Time: 06/12/23 11:51 AM   Result Value Ref Range    BSA 2.07 m2    Right Atrial Pressure (from IVC) 3 mmHg    EF 25 %   POCT glucose    Collection Time: 06/12/23 12:26 PM   Result  Value Ref Range    POCT Glucose 277 (H) 70 - 110 mg/dL   POCT glucose    Collection Time: 06/12/23  4:19 PM   Result Value Ref Range    POCT Glucose 295 (H) 70 - 110 mg/dL   POCT glucose    Collection Time: 06/12/23  8:51 PM   Result Value Ref Range    POCT Glucose 286 (H) 70 - 110 mg/dL   Comprehensive Metabolic Panel    Collection Time: 06/13/23  1:57 AM   Result Value Ref Range    Sodium Level 147 (H) 136 - 145 mmol/L    Potassium Level 4.4 3.5 - 5.1 mmol/L    Chloride 118 (H) 98 - 107 mmol/L    Carbon Dioxide 21 (L) 23 - 31 mmol/L    Glucose Level 305 (H) 82 - 115 mg/dL    Blood Urea Nitrogen 30.6 (H) 8.4 - 25.7 mg/dL    Creatinine 0.83 0.73 - 1.18 mg/dL    Calcium Level Total 8.5 (L) 8.8 - 10.0 mg/dL    Protein Total 6.1 5.8 - 7.6 gm/dL    Albumin Level 2.0 (L) 3.4 - 4.8 g/dL    Globulin 4.1 (H) 2.4 - 3.5 gm/dL    Albumin/Globulin Ratio 0.5 (L) 1.1 - 2.0 ratio    Bilirubin Total 0.8 <=1.5 mg/dL    Alkaline Phosphatase 80 40 - 150 unit/L    Alanine Aminotransferase 122 (H) 0 - 55 unit/L    Aspartate Aminotransferase 56 (H) 5 - 34 unit/L    eGFR >60 mls/min/1.73/m2   CBC with Differential    Collection Time: 06/13/23  1:57 AM   Result Value Ref Range    WBC 21.42 (H) 4.50 - 11.50 x10(3)/mcL    RBC 5.65 4.70 - 6.10 x10(6)/mcL    Hgb 13.4 (L) 14.0 - 18.0 g/dL    Hct 42.9 42.0 - 52.0 %    MCV 75.9 (L) 80.0 - 94.0 fL    MCH 23.7 (L) 27.0 - 31.0 pg    MCHC 31.2 (L) 33.0 - 36.0 g/dL    RDW      Platelet 213 130 - 400 x10(3)/mcL    MPV      Neut % 87.6 %    Lymph % 4.4 %    Mono % 6.9 %    Eos % 0.0 %    Basophil % 0.1 %    Lymph # 0.94 0.6 - 4.6 x10(3)/mcL    Neut # 18.76 (H) 2.1 - 9.2 x10(3)/mcL    Mono # 1.47 (H) 0.1 - 1.3 x10(3)/mcL    Eos # 0.01 0 - 0.9 x10(3)/mcL    Baso # 0.03 <=0.2 x10(3)/mcL    IG# 0.21 (H) 0 - 0.04 x10(3)/mcL    IG% 1.0 %    NRBC% 0.2 %   POCT glucose    Collection Time: 06/13/23  5:51 AM   Result Value Ref Range    POCT Glucose 296 (H) 70 - 110 mg/dL   POCT glucose    Collection Time:  06/13/23 12:18 PM   Result Value Ref Range    POCT Glucose 329 (H) 70 - 110 mg/dL   POCT glucose    Collection Time: 06/13/23  4:36 PM   Result Value Ref Range    POCT Glucose 307 (H) 70 - 110 mg/dL   POCT glucose    Collection Time: 06/13/23  8:04 PM   Result Value Ref Range    POCT Glucose 213 (H) 70 - 110 mg/dL   Comprehensive Metabolic Panel    Collection Time: 06/14/23  4:04 AM   Result Value Ref Range    Sodium Level 151 (H) 136 - 145 mmol/L    Potassium Level 5.0 3.5 - 5.1 mmol/L    Chloride 120 (H) 98 - 107 mmol/L    Carbon Dioxide 22 (L) 23 - 31 mmol/L    Glucose Level 287 (H) 82 - 115 mg/dL    Blood Urea Nitrogen 33.1 (H) 8.4 - 25.7 mg/dL    Creatinine 0.90 0.73 - 1.18 mg/dL    Calcium Level Total 8.9 8.8 - 10.0 mg/dL    Protein Total 6.4 5.8 - 7.6 gm/dL    Albumin Level 2.0 (L) 3.4 - 4.8 g/dL    Globulin 4.4 (H) 2.4 - 3.5 gm/dL    Albumin/Globulin Ratio 0.5 (L) 1.1 - 2.0 ratio    Bilirubin Total 0.8 <=1.5 mg/dL    Alkaline Phosphatase 90 40 - 150 unit/L    Alanine Aminotransferase 109 (H) 0 - 55 unit/L    Aspartate Aminotransferase 67 (H) 5 - 34 unit/L    eGFR >60 mls/min/1.73/m2   CBC with Differential    Collection Time: 06/14/23  4:04 AM   Result Value Ref Range    WBC 21.91 (H) 4.50 - 11.50 x10(3)/mcL    RBC 5.67 4.70 - 6.10 x10(6)/mcL    Hgb 13.7 (L) 14.0 - 18.0 g/dL    Hct 44.5 42.0 - 52.0 %    MCV 78.5 (L) 80.0 - 94.0 fL    MCH 24.2 (L) 27.0 - 31.0 pg    MCHC 30.8 (L) 33.0 - 36.0 g/dL    RDW      Platelet 262 130 - 400 x10(3)/mcL    MPV      Neut % 85.4 %    Lymph % 6.4 %    Mono % 6.7 %    Eos % 0.3 %    Basophil % 0.2 %    Lymph # 1.40 0.6 - 4.6 x10(3)/mcL    Neut # 18.70 (H) 2.1 - 9.2 x10(3)/mcL    Mono # 1.47 (H) 0.1 - 1.3 x10(3)/mcL    Eos # 0.07 0 - 0.9 x10(3)/mcL    Baso # 0.05 <=0.2 x10(3)/mcL    IG# 0.22 (H) 0 - 0.04 x10(3)/mcL    IG% 1.0 %    NRBC% 0.1 %   POCT glucose    Collection Time: 06/14/23  5:58 AM   Result Value Ref Range    POCT Glucose 289 (H) 70 - 110 mg/dL   RT Blood Gas     Collection Time: 06/14/23  9:25 AM   Result Value Ref Range    Sample Type Arterial Blood     Sample site Left Radial Artery     Drawn by sd rrt     pH, Blood gas 7.520 (H) 7.350 - 7.450    pCO2, Blood gas 30.0 (L) 35.0 - 45.0 mmHg    pO2, Blood gas 84.0 80.0 - 100.0 mmHg    Sodium, Blood Gas 148 (H) 137 - 145 mmol/L    Potassium, Blood Gas 4.7 3.5 - 5.0 mmol/L    Calcium Level Ionized 1.32 (H) 1.12 - 1.23 mmol/L    TOC2, Blood gas 25.4 mmol/L    Base Excess, Blood gas 2.50 (H) -2.00 - 2.00 mmol/L    sO2, Blood gas 97.3 %    HCO3, Blood gas 24.5 22.0 - 26.0 mmol/L    THb, Blood gas 14.2 12 - 16 g/dL    O2 Hb, Blood Gas 94.9 94.0 - 97.0 %    CO Hgb 2.4 (H) 0.5 - 1.5 %    Met Hgb 1.0 0.4 - 1.5 %    Allens Test Yes     Oxygen Device, Blood gas Oxy Mask     LPM 7    RT Blood Gas    Collection Time: 06/14/23 11:55 AM   Result Value Ref Range    Sample Type Arterial Blood     Sample site Left Brachial Artery     Drawn by sd rrt     pH, Blood gas 7.470 (H) 7.350 - 7.450    pCO2, Blood gas 37.0 35.0 - 45.0 mmHg    pO2, Blood gas 178.0 (H) 80.0 - 100.0 mmHg    Sodium, Blood Gas 148 (H) 137 - 145 mmol/L    Potassium, Blood Gas 5.0 3.5 - 5.0 mmol/L    Calcium Level Ionized 1.33 (H) 1.12 - 1.23 mmol/L    TOC2, Blood gas 28.0 mmol/L    Base Excess, Blood gas 3.30 (H) -2.00 - 2.00 mmol/L    sO2, Blood gas 99.6 %    HCO3, Blood gas 26.9 (H) 22.0 - 26.0 mmol/L    THb, Blood gas 14.2 12 - 16 g/dL    O2 Hb, Blood Gas 96.3 94.0 - 97.0 %    CO Hgb 1.6 (H) 0.5 - 1.5 %    Met Hgb 1.5 0.4 - 1.5 %    Allens Test N/A     MODE BiPAP     Oxygen Device, Blood gas Ventilator     FIO2, Blood gas 100 %    Spont RR 28 b/min    BiPAP (I) 14 cm H2O    BiPAP (E) 8 cm H2O   POCT glucose    Collection Time: 06/14/23  1:56 PM   Result Value Ref Range    POCT Glucose 406 (H) 70 - 110 mg/dL   POCT glucose    Collection Time: 06/14/23  2:04 PM   Result Value Ref Range    POCT Glucose 416 (H) 70 - 110 mg/dL   POCT Glucose, Hand-Held Device    Collection  Time: 06/14/23  3:50 PM   Result Value Ref Range    POC Glucose 399 (A) 70 - 110 MG/DL   POCT glucose    Collection Time: 06/14/23  3:50 PM   Result Value Ref Range    POCT Glucose 399 (H) 70 - 110 mg/dL   POCT glucose    Collection Time: 06/14/23  8:09 PM   Result Value Ref Range    POCT Glucose 418 (H) 70 - 110 mg/dL   Comprehensive Metabolic Panel    Collection Time: 06/15/23 12:56 AM   Result Value Ref Range    Sodium Level 153 (H) 136 - 145 mmol/L    Potassium Level 5.3 (H) 3.5 - 5.1 mmol/L    Chloride 120 (H) 98 - 107 mmol/L    Carbon Dioxide 24 23 - 31 mmol/L    Glucose Level 399 (H) 82 - 115 mg/dL    Blood Urea Nitrogen 54.1 (H) 8.4 - 25.7 mg/dL    Creatinine 1.36 (H) 0.73 - 1.18 mg/dL    Calcium Level Total 9.1 8.8 - 10.0 mg/dL    Protein Total 6.8 5.8 - 7.6 gm/dL    Albumin Level 1.9 (L) 3.4 - 4.8 g/dL    Globulin 4.9 (H) 2.4 - 3.5 gm/dL    Albumin/Globulin Ratio 0.4 (L) 1.1 - 2.0 ratio    Bilirubin Total 1.0 <=1.5 mg/dL    Alkaline Phosphatase 109 40 - 150 unit/L    Alanine Aminotransferase 112 (H) 0 - 55 unit/L    Aspartate Aminotransferase 66 (H) 5 - 34 unit/L    eGFR 58 mls/min/1.73/m2   Lactic Acid, Plasma    Collection Time: 06/15/23 12:56 AM   Result Value Ref Range    Lactic Acid Level 2.2 0.5 - 2.2 mmol/L   Magnesium    Collection Time: 06/15/23 12:56 AM   Result Value Ref Range    Magnesium Level 2.50 1.60 - 2.60 mg/dL   Phosphorus    Collection Time: 06/15/23 12:56 AM   Result Value Ref Range    Phosphorus Level 3.4 2.3 - 4.7 mg/dL   CBC with Differential    Collection Time: 06/15/23 12:56 AM   Result Value Ref Range    WBC 23.42 (H) 4.50 - 11.50 x10(3)/mcL    RBC 6.15 (H) 4.70 - 6.10 x10(6)/mcL    Hgb 14.7 14.0 - 18.0 g/dL    Hct 47.9 42.0 - 52.0 %    MCV 77.9 (L) 80.0 - 94.0 fL    MCH 23.9 (L) 27.0 - 31.0 pg    MCHC 30.7 (L) 33.0 - 36.0 g/dL    RDW      Platelet 335 130 - 400 x10(3)/mcL    MPV      NRBC% 0.2 %   RT Blood Gas    Collection Time: 06/15/23 12:56 AM   Result Value Ref Range     Sample Type Arterial Blood     Sample site Right Brachial Artery     Drawn by AK RRT     pH, Blood gas 7.490 (H) 7.350 - 7.450    pCO2, Blood gas 33.0 (L) 35.0 - 45.0 mmHg    pO2, Blood gas 99.0 80.0 - 100.0 mmHg    Sodium, Blood Gas 150 (H) 137 - 145 mmol/L    Potassium, Blood Gas 4.9 3.5 - 5.0 mmol/L    Calcium Level Ionized 1.31 (H) 1.12 - 1.23 mmol/L    TOC2, Blood gas 26.1 mmol/L    Base Excess, Blood gas 2.30 (H) -2.00 - 2.00 mmol/L    sO2, Blood gas 98.2 %    HCO3, Blood gas 25.1 22.0 - 26.0 mmol/L    THb, Blood gas 14.8 12 - 16 g/dL    O2 Hb, Blood Gas 96.2 94.0 - 97.0 %    CO Hgb 1.6 (H) 0.5 - 1.5 %    Met Hgb 1.0 0.4 - 1.5 %    Allens Test Yes     MODE BiPAP     FIO2, Blood gas 100 %    IPAP 14 cmH2O    EPAP 8 cmH2O   Manual Differential    Collection Time: 06/15/23 12:56 AM   Result Value Ref Range    Neut Man 95 %    Lymph Man 4 %    Monocyte Man 2 %    Instr WBC 23.42 x10(3)/mcL    Abs Mono 0.4684 0.1 - 1.3 x10(3)/mcL    Abs Lymp 0.9368 0.6 - 4.6 x10(3)/mcL    Abs Neut 22.249 (H) 2.1 - 9.2 x10(3)/mcL    NRBC Man 1 %    Polychrom 1+ (A) (none)    RBC Morph Abnormal (A) Normal    Anisocyte 1+ (A) (none)    Poik 1+ (A) (none)    Macrocyte 1+ (A) (none)    Target Cell 2+ (A) (none)    [place] Cells 1+ (A) (none)    Platelet Est Normal Normal, Adequate    WBC Vacuoles 1+ (A) (none)   MRSA PCR    Collection Time: 06/15/23  1:22 AM   Result Value Ref Range    MRSA PCR SCRN ([facility]) Not Detected Not Detected   Comprehensive Metabolic Panel    Collection Time: 06/15/23  3:13 AM   Result Value Ref Range    Sodium Level 149 (H) 136 - 145 mmol/L    Potassium Level 5.8 (H) 3.5 - 5.1 mmol/L    Chloride 120 (H) 98 - 107 mmol/L    Carbon Dioxide 21 (L) 23 - 31 mmol/L    Glucose Level 474 (HH) 82 - 115 mg/dL    Blood Urea Nitrogen 51.8 (H) 8.4 - 25.7 mg/dL    Creatinine 1.35 (H) 0.73 - 1.18 mg/dL    Calcium Level Total 9.1 8.8 - 10.0 mg/dL    Protein Total 7.1 5.8 - 7.6 gm/dL    Albumin Level 1.8 (L) 3.4 - 4.8 g/dL     Globulin 5.3 (H) 2.4 - 3.5 gm/dL    Albumin/Globulin Ratio 0.3 (L) 1.1 - 2.0 ratio    Bilirubin Total 1.1 <=1.5 mg/dL    Alkaline Phosphatase 105 40 - 150 unit/L    Alanine Aminotransferase 103 (H) 0 - 55 unit/L    Aspartate Aminotransferase 56 (H) 5 - 34 unit/L    eGFR 58 mls/min/1.73/m2   Protime-INR    Collection Time: 06/15/23  3:13 AM   Result Value Ref Range    PT 15.0 (H) 12.5 - 14.5 seconds    INR 1.19 0.00 - 1.30   CBC with Differential    Collection Time: 06/15/23  3:13 AM   Result Value Ref Range    WBC 22.23 (H) 4.50 - 11.50 x10(3)/mcL    RBC 6.48 (H) 4.70 - 6.10 x10(6)/mcL    Hgb 15.6 14.0 - 18.0 g/dL    Hct 51.5 42.0 - 52.0 %    MCV 79.5 (L) 80.0 - 94.0 fL    MCH 24.1 (L) 27.0 - 31.0 pg    MCHC 30.3 (L) 33.0 - 36.0 g/dL    RDW      Platelet 296 130 - 400 x10(3)/mcL    MPV      Neut % 90.1 %    Lymph % 4.6 %    Mono % 4.3 %    Eos % 0.0 %    Basophil % 0.4 %    Lymph # 1.03 0.6 - 4.6 x10(3)/mcL    Neut # 20.02 (H) 2.1 - 9.2 x10(3)/mcL    Mono # 0.95 0.1 - 1.3 x10(3)/mcL    Eos # 0.01 0 - 0.9 x10(3)/mcL    Baso # 0.08 <=0.2 x10(3)/mcL    IG# 0.14 (H) 0 - 0.04 x10(3)/mcL    IG% 0.6 %    NRBC% 0.1 %   Lactic Acid, Plasma    Collection Time: 06/15/23  3:13 AM   Result Value Ref Range    Lactic Acid Level 2.2 0.5 - 2.2 mmol/L   POCT glucose    Collection Time: 06/15/23  5:58 AM   Result Value Ref Range    POCT Glucose 417 (H) 70 - 110 mg/dL   Urinalysis, Reflex to Urine Culture    Collection Time: 06/15/23  6:02 AM    Specimen: Urine   Result Value Ref Range    Color, UA Yellow Yellow, Light-Yellow, Dark Yellow, Erika, Straw    Appearance, UA Clear Clear    Specific Gravity, UA 1.025 1.005 - 1.030    pH, UA 5.5 5.0 - 8.5    Protein, UA Negative Negative mg/dL    Glucose, UA 3+ (A) Negative, Normal mg/dL    Ketones, UA Negative Negative mg/dL    Blood, UA Negative Negative unit/L    Bilirubin, UA Negative Negative mg/dL    Urobilinogen, UA 0.2 0.2, 1.0, Normal mg/dL    Nitrites, UA Negative Negative     Leukocyte Esterase, UA Negative Negative unit/L   Urinalysis, Microscopic    Collection Time: 06/15/23  6:02 AM   Result Value Ref Range    RBC, UA <5 <=5 /HPF    WBC, UA <5 <=5 /HPF    Squamous Epithelial Cells, UA <5 <=5 /HPF    Bacteria, UA None Seen None Seen, Rare, Occasional /HPF   POCT glucose    Collection Time: 06/15/23  7:12 AM   Result Value Ref Range    POCT Glucose 468 (HH) 70 - 110 mg/dL   POCT glucose    Collection Time: 06/15/23  8:23 AM   Result Value Ref Range    POCT Glucose 363 (H) 70 - 110 mg/dL   POCT glucose    Collection Time: 06/15/23  9:05 AM   Result Value Ref Range    POCT Glucose 305 (H) 70 - 110 mg/dL   POCT glucose    Collection Time: 06/15/23  9:56 AM   Result Value Ref Range    POCT Glucose 221 (H) 70 - 110 mg/dL     Telemetry:  Sinus Rhythm    Physical Exam  Vitals and nursing note reviewed.   Constitutional:       Appearance: He is ill-appearing.   HENT:      Head: Normocephalic.   Cardiovascular:      Rate and Rhythm: Normal rate and regular rhythm.      Comments: SR HR 90's  Pulmonary:      Effort: Respiratory distress present.      Breath sounds: Rhonchi present.      Comments: Intubated/Mechanical Ventilation  Musculoskeletal:      Right lower leg: No edema.      Left lower leg: No edema.   Skin:     General: Skin is dry.     Current Inpatient Medications:    Current Facility-Administered Medications:     0.9%  NaCl infusion (for blood administration), , Intravenous, Q24H PRN, Xochitl Grossman MD    0.9%  NaCl infusion (for blood administration), , Intravenous, Q24H PRN, Xochitl Grossman MD    acetaminophen oral solution 650 mg, 650 mg, Per OG tube, Q6H PRN, JONNY Batista, 650 mg at 06/15/23 0846    aspirin chewable tablet 81 mg, 81 mg, Per OG tube, Daily, JONNY Batista, 81 mg at 06/15/23 0839    baclofen tablet 10 mg, 10 mg, Per OG tube, TID PRN, JONNY Batista    chlorhexidine 0.12 % solution 15 mL, 15 mL, Mouth/Throat, BID, Eliot Murphy, DO     dexAMETHasone injection 4 mg, 4 mg, Intravenous, Daily, Robert Landis MD, 4 mg at 06/15/23 0838    dextrose 10 % infusion, , Intravenous, PRN, Eliot Murphy,     dextrose 10 % infusion, , Intravenous, PRN, Eliot Murphy,     dextrose 10% bolus 125 mL 125 mL, 12.5 g, Intravenous, PRN, REILLY Saunders MD    dextrose 10% bolus 125 mL 125 mL, 12.5 g, Intravenous, PRN, Jesica Arthur, FNP    dextrose 10% bolus 250 mL 250 mL, 25 g, Intravenous, PRN, REILLY Saunders MD    dextrose 10% bolus 250 mL 250 mL, 25 g, Intravenous, PRN, Jesica Arthur, FNP    dextrose 5 % and 0.45 % NaCl infusion, , Intravenous, Continuous PRN, Eliot Murphy, DO    dextrose 5 % and 0.45 % NaCl infusion, , Intravenous, Continuous PRN, Eliot Murphy DO    docusate 50 mg/5 mL liquid 100 mg, 100 mg, Per G Tube, Daily, JONNY Batista, 100 mg at 06/14/23 0910    enoxaparin injection 40 mg, 40 mg, Subcutaneous, Q24H (prophylaxis, 1700), REILLY Saunders MD, 40 mg at 06/14/23 1732    etomidate injection 20 mg, 20 mg, Intravenous, Once, REILLY Saunders MD    famotidine (PF) injection 20 mg, 20 mg, Intravenous, Q12H, JONNY Batista, 20 mg at 06/15/23 0838    fentaNYL 50 mcg/mL injection 50 mcg, 50 mcg, Intravenous, Q15 Min PRN **FOLLOWED BY** fentaNYL 50 mcg/mL injection 50 mcg, 50 mcg, Intravenous, Q1H PRN, Eliot Murphy DO    fluticasone propionate 50 mcg/actuation nasal spray 100 mcg, 2 spray, Each Nostril, Daily, REILLY Saunders MD, 100 mcg at 06/14/23 0911    folic acid tablet 1 mg, 1 mg, Per OG tube, Daily, JONNY Batista, 1 mg at 06/15/23 0839    furosemide injection 40 mg, 40 mg, Intravenous, Q12H, Brooklyn Hallman, CHINEDUP, 40 mg at 06/15/23 0838    glucose chewable tablet 16 g, 16 g, Per OG tube, PRN, JONNY Batista    glucose chewable tablet 16 g, 16 g, Oral, PRN, BRISEYDA Morataya    glucose chewable tablet 24 g, 24 g, Per OG tube, PRN, JONNY Batista    glucose chewable tablet 28  g, 28 g, Oral, PRN, Jesica Arthur, FNP    guaiFENesin 100 mg/5 ml syrup 200 mg, 200 mg, Oral, Q4H PRN, Robert Landis MD, 200 mg at 06/14/23 0909    HYDROcodone-acetaminophen 5-325 mg per tablet 1 tablet, 1 tablet, Per OG tube, Q4H PRN, JONNY Batista, 1 tablet at 06/12/23 1315    insulin regular in 0.9 % NaCl 100 unit/100 mL (1 unit/mL) infusion, 0-0.2 Units/kg/hr (Dosing Weight), Intravenous, Continuous, Eliot Murphy DO, Last Rate: 8.7 mL/hr at 06/15/23 0956, 0.1 Units/kg/hr at 06/15/23 0956    lactulose 10 gram/15 ml solution 20 g, 20 g, Per OG tube, Q6H PRN, JONNY Batista    loperamide capsule 2 mg, 2 mg, Per OG tube, Continuous PRN, JONNY Batista    metoclopramide HCl injection 5 mg, 5 mg, Intravenous, Q6H PRN, JONNY Batista    metoprolol tartrate (LOPRESSOR) tablet 100 mg, 100 mg, Per OG tube, BID, Brooklyn Hallman, BRISEYDA, 100 mg at 06/15/23 0839    milrinone 20mg in D5W 100 mL infusion, 0.375 mcg/kg/min (Dosing Weight), Intravenous, Continuous, Xochitl Grossman MD, Stopped at 06/09/23 1700    NORepinephrine 8 mg (LEVOPHED) 8 mg/250 mL (32 mcg/mL) in dextrose 5% 250 mL infusion, , , ,     NORepinephrine 8 mg in dextrose 5% 250 mL infusion, 0-3 mcg/kg/min (Dosing Weight), Intravenous, Continuous, Eliot Murphy DO    ondansetron injection 4 mg, 4 mg, Intravenous, Q4H PRN, JONNY Batista    oxyCODONE immediate release tablet Tab 10 mg, 10 mg, Per OG tube, Q4H PRN, JONNY Batista, 10 mg at 06/09/23 0659    perflutren lipid microspheres injection 1.3 mL, 1.3 mL, Intravenous, Once, W. Alok Saunders MD    piperacillin-tazobactam (ZOSYN) 4.5 g in dextrose 5 % in water (D5W) 5 % 100 mL IVPB (MB+), 4.5 g, Intravenous, Q8H, Robert Landis MD, Stopped at 06/15/23 0753    potassium chloride 10 mEq in 100 mL IVPB, 40 mEq, Intravenous, PRN **AND** potassium chloride 10 mEq in 100 mL IVPB, 60 mEq, Intravenous, PRN **AND** potassium chloride 10 mEq in 100 mL IVPB, 80 mEq,  Intravenous, PRN, Eliot Murphy DO    propofol (DIPRIVAN) 10 mg/mL infusion, 0-50 mcg/kg/min (Dosing Weight), Intravenous, Continuous, Eliot Murphy DO, Last Rate: 13 mL/hr at 06/15/23 0947, 25 mcg/kg/min at 06/15/23 0947    sodium chloride 0.9% flush 10 mL, 10 mL, Intravenous, PRN, Eliot Deanf, DO    sodium chloride 0.9% flush 10 mL, 10 mL, Intravenous, PRN, Eliot Murphy, DO    Pharmacy to dose Vancomycin consult, , , Once **AND** vancomycin - pharmacy to dose, , Intravenous, pharmacy to manage frequency, Jesica Arthur, BRISEYDA    vancomycin 1.5 g in dextrose 5 % 250 mL IVPB (ready to mix), 1,500 mg, Intravenous, Q12H, Robert Landis MD, Stopped at 06/15/23 0351    VTE Risk Mitigation (From admission, onward)           Ordered     enoxaparin injection 40 mg  Every 24 hours         06/06/23 0957                  Assessment:   Acute Hypoxemic Respiratory Failure Requiring Intubation/Mechanical Ventilation    - Multiple Re Intubations   Sinus Tachycardia with Left Bundle Branch Block- Suspect Driven by Acute Respiratory Decompensation (Now SR)    - No pericardial effusion (Echo 6.12.23)  Sepsis secondary to Suspected aspiration event with associated hypoxemic respiratory failure  Chronic Left Bundle Branch Block  CAD/CABG    - LIMA to the LAD, SVG to OM1, SVG to PDA  Ischemic Cardiomyopathy    - EF 25%    - Life Vest  Suspected Pneumonia    - Febrile  Leukocytosis (Persistent)  Hypertension (BP Stable)  Hyperlipidemia  PAD    - The right lower extremity demonstrated severe arterial flow reduction consistent with an occluded superficial femoral, popliteal, and tibial arteries.     - The left lower extremity demonstrated severe arterial flow reduction consistent with an occluded superficial femoral, and tibial arteries with reconstitution of flow in the popliteal artery.   Nicotine Dependence/Alcohol Use  History of CVA  Elevated Hepatic Enzymes (Improving)  ED  Delirium Possible ICU Psychosis  "(Stable)  Ace Inhibitor Allergy "Facial Edema" (Tolerates Entresto)  Dysphagia Requiring NG Tube for Medications/Nutrition  Acute Kidney Injury/Hyperkalemia  Physical Deconditioning    Plan:   Discontinue Entresto given FLETCHER/Hyperkalemia  Vent Management as per ICU Team  Antibiotic Management as per Primary Team  Goals of Care Discussion as per ICU Team, Remains Full Code for now  Statin on Hold given Elevated Liver Enzymes  Continue Supportive Care  Prognosis appears guarded    BRISEYDA Penaloza  Cardiology  [facility] - [facility] ICU  06/15/2023       I have seen the patient, reviewed the Nurse Practitioner's note, assessment and plan. I have personally interviewed and examined the patient at bedside and agree with the findings. Medical decision making listed above were done under my guidance.    Physical exam:  Cardiovascular system: regular rhythm, no murmur.  Lungs: Coarse  Extremities: No leg edema.    Plan:  MV CAD s/p 3VX CABG  ICMO 25%, stop entresto due to Hyperkalemia and FLETCHER  Patient re-intubated- vent mgt by ICU team  Continue IV Lasix  Statin on hold due to elevated LFT  Prognosis is guarded    "

## 2023-06-15 NOTE — PROGRESS NOTES
I spoke with the patient's wife daughter and son at bedside and indicated overall decline in the patient's status including poor neurologic status since intubation in addition to worsening acute kidney injury with associated septic versus cardiogenic shock, worsening hypoxemic respiratory failure requiring peep of 14 and FiO2 100% with elevated peak pressures with marginal improvement in oxygenation with saturations anywhere in the mid to upper 80s now requiring norepinephrine and vasopressin.  Patient had CT head that was negative and he is unstable for MRI to workup possibility of stroke.  Renal function is not stable or appropriate for CTA to workup for pulmonary embolus.  The patient is anuric essentially and continues to be febrile.  At this point in time the patient's etiology of decline is not clear but he has multiorgan dysfunction at this time with associated renal dysfunction without profound metabolic acidosis with hyperkalemia hypernatremia.  At this time the patient is a DNR will make plans to shift potassium as appropriate with dextrose insulin and albuterol in order to allow for patient's family to visit.  I indicated he is not stable for any other diagnostics but also including hemodialysis.  They understand.  All questions were answered.

## 2023-06-15 NOTE — PLAN OF CARE
Problem: Adult Inpatient Plan of Care  Goal: Absence of Hospital-Acquired Illness or Injury  6/15/2023 0341 by Anne Rosario RN  Outcome: Ongoing, Not Progressing    Problem: Adult Inpatient Plan of Care  Goal: Optimal Comfort and Wellbeing  6/15/2023 0341 by Anne Rosario RN  Outcome: Ongoing, Not Progressing    Problem: Infection  Goal: Absence of Infection Signs and Symptoms  6/15/2023 0341 by Anne Rosario RN  Outcome: Ongoing, Not Progressing  6/15/2023 0340 by Anne Rosario RN     Problem: Adult Inpatient Plan of Care  Goal: Readiness for Transition of Care  6/15/2023 0341 by Anne Rosario RN  Outcome: Ongoing, Not Progressing    Problem: Fall Injury Risk  Goal: Absence of Fall and Fall-Related Injury  6/15/2023 0340 by Anne Rosario RN  Outcome: Ongoing, Progressing

## 2023-06-15 NOTE — PROCEDURES
"Tom Anna is a 67 y.o. male patient.    Temp: (!) 101.7 °F (38.7 °C) (06/15/23 0846)  Pulse: (!) 113 (06/15/23 0716)  Resp: (!) 50 (06/15/23 0716)  BP: 113/69 (06/15/23 0530)  SpO2: (!) 92 % (06/15/23 0716)  Weight: 88.5 kg (195 lb 1.6 oz) (06/14/23 0547)  Height: 5' 10" (177.8 cm) (06/07/23 1325)    PICC  Time out: Immediately prior to procedure a time out was called to verify the correct patient, procedure, equipment, support staff and site/side marked as required  Indications: med administration  Preparation: skin prepped with ChloraPrep  Skin prep agent dried: skin prep agent completely dried prior to procedure  Sterile barriers: all five maximum sterile barriers used - cap, mask, sterile gown, sterile gloves, and large sterile sheet  Hand hygiene: hand hygiene performed prior to central venous catheter insertion  Location details: right brachial  Catheter type: triple lumen  Catheter size: 5 Fr  Catheter Length: 37cm    Ultrasound guidance: yes  Needle advanced into vessel with real time Ultrasound guidance.  Guidewire confirmed in vessel.  Sterile sheath used.          Name Jose C nay  6/15/2023    "

## 2023-06-15 NOTE — PT/OT/SLP PROGRESS
Pt transferred to ICU with worsening respiratory status and continues to require BiPAP for support.  Attempts at PO intake and skilled SLP services not appropriate at this time.  Please reconsult as needed.

## 2023-06-15 NOTE — PROGRESS NOTES
[facility] - 7th Floor ICU  Wound Care    Patient Name:  Tom Anna   MRN:  [id number]  Date: 6/15/2023  Diagnosis: <principal problem not specified>    History:     Past Medical History:   Diagnosis Date    Allergies     CHF (congestive heart failure)     Coronary artery disease     Essential (primary) hypertension     Fatigue     GERD (gastroesophageal reflux disease)     HLD (hyperlipidemia)     Personal history of colonic polyps     SOB (shortness of breath) on exertion     Stroke     Tobacco abuse     Weakness of both legs        Social History     Socioeconomic History    Marital status:     Number of children: 4   Occupational History    Occupation: Disabled   Tobacco Use    Smoking status: Every Day     Packs/day: 1.00     Types: Cigarettes    Smokeless tobacco: Never   Substance and Sexual Activity    Alcohol use: Yes     Comment: Beer 6pk daily    Drug use: Never     Social Determinants of Health     Food Insecurity: Unknown    Worried About Running Out of Food in the Last Year: Never true   Transportation Needs: Unknown    Lack of Transportation (Medical): No   Housing Stability: Unknown    Unable to Pay for Housing in the Last Year: No       Precautions:     Allergies as of 05/24/2023 - Reviewed 05/24/2023   Allergen Reaction Noted    Ace inhibitors Swelling 08/20/2019       WOC Assessment Details/Treatment     WOCN consulted for left lower leg. Wife at bedside. Treatment recommendations discussed with Nurse Porter and put into place. Cleanse with NS. Apply calcium Alginate with AG, cover with foam dressing. Change daily and PRN. Keep areas clean and dry, no adult briefs while in bed. Nursing to continue with turning every two hours, wedge and floating heels.  Head of bed elevated, bed in lowest position.  On a ICU MARIA L mattress. Will follow up.      06/15/2023

## 2023-06-15 NOTE — NURSING
Patient's respiratory status further declined in last hour. ICU staff, ICU resident, and house supervisor at bedside. Patient's respiratory rate now >40 and guppy breathing with continuous bipap. Notified TERESE Arthur NP. Ok to transfer patient to ICU.

## 2023-06-15 NOTE — PROGRESS NOTES
Pharmacokinetic Initial Assessment: IV Vancomycin    Assessment/Plan:    Initiate intravenous vancomycin with loading dose of 1500 mg once followed by a maintenance dose of vancomycin 1500 mg IV every 12 hours  Desired empiric serum trough concentration is 15 to 20 mcg/mL  Draw vancomycin trough level 60 min prior to fourth dose on 06/16 at approximately 1300  Pharmacy will continue to follow and monitor vancomycin.      Please contact pharmacy at extension [phone] with any questions regarding this assessment.     Thank you for the consult,   Efraín Sun       Patient brief summary:  Tom Anna is a 67 y.o. male initiated on antimicrobial therapy with IV Vancomycin for treatment of suspected sepsis    Drug Allergies:   Review of patient's allergies indicates:   Allergen Reactions    Ace inhibitors Swelling     Facial edema       Actual Body Weight:   7886    Renal Function:   Estimated Creatinine Clearance: 60.7 mL/min (A) (based on SCr of 1.36 mg/dL (H)).,     Dialysis Method (if applicable):  N/A    CBC (last 72 hours):  Recent Labs   Lab Result Units 06/13/23 0157 06/14/23 0404 06/15/23  0056   WBC x10(3)/mcL 21.42* 21.91* 23.42*   Hgb g/dL 13.4* 13.7* 14.7   Hct % 42.9 44.5 47.9   Platelet x10(3)/mcL 213 262 335   Mono % % 6.9 6.7  --    Monocyte Man %  --   --  2   Eos % % 0.0 0.3  --    Basophil % % 0.1 0.2  --        Metabolic Panel (last 72 hours):  Recent Labs   Lab Result Units 06/13/23 0157 06/14/23 0404 06/15/23  0056   Sodium Level mmol/L 147* 151* 153*   Potassium Level mmol/L 4.4 5.0 5.3*   Chloride mmol/L 118* 120* 120*   Carbon Dioxide mmol/L 21* 22* 24   Glucose Level mg/dL 305* 287* 399*   Blood Urea Nitrogen mg/dL 30.6* 33.1* 54.1*   Creatinine mg/dL 0.83 0.90 1.36*   Albumin Level g/dL 2.0* 2.0* 1.9*   Bilirubin Total mg/dL 0.8 0.8 1.0   Alkaline Phosphatase unit/L 80 90 109   Aspartate Aminotransferase unit/L 56* 67* 66*   Alanine Aminotransferase unit/L 122* 109* 112*   Magnesium  Level mg/dL  --   --  2.50   Phosphorus Level mg/dL  --   --  3.4       Drug levels (last 3 results):  No results for input(s): VANCOMYCINRA, VANCORANDOM, VANCOMYCINPE, VANCOPEAK, VANCOMYCINTR, VANCOTROUGH in the last 72 hours.    Microbiologic Results:  Microbiology Results (last 7 days)       Procedure Component Value Units Date/Time    Respiratory Culture [[id number]]     Order Status: Sent Specimen: Sputum     Respiratory Culture [[id number]]  (Abnormal) Collected: 06/07/23 1428    Order Status: Completed Specimen: Sputum, Induced Updated: 06/09/23 0833     Respiratory Culture Moderate Yeast     Comment: with no normal respiratory candido        GRAM STAIN Quality 3+      No bacteria seen    Respiratory Culture [[id number]]  (Abnormal) Collected: 06/07/23 1138    Order Status: Completed Specimen: Bronchial Brush L from BAL, LLL Updated: 06/09/23 0802     Respiratory Culture Moderate Yeast     Comment: with no normal respiratory candido       Blood Culture [[id number]]  (Normal) Collected: 06/03/23 0610    Order Status: Completed Specimen: Blood from Arm, Left Updated: 06/08/23 1000     CULTURE, BLOOD ([facility]) No Growth at 5 days    Blood Culture [[id number]]  (Normal) Collected: 06/03/23 0610    Order Status: Completed Specimen: Blood from Arm, Right Updated: 06/08/23 1000     CULTURE, BLOOD ([facility]) No Growth at 5 days

## 2023-06-15 NOTE — PROCEDURES
"Tom Anna is a 67 y.o. male patient.    Temp: (!) 101.7 °F (38.7 °C) (06/15/23 0846)  Pulse: (!) 113 (06/15/23 0716)  Resp: (!) 50 (06/15/23 0716)  BP: 113/69 (06/15/23 0530)  SpO2: (!) 92 % (06/15/23 0716)  Weight: 88.5 kg (195 lb 1.6 oz) (23 0547)  Height: 5' 10" (177.8 cm) (23 1325)       Intubation    Date/Time: 6/15/2023 12:00 PM  Location procedure was performed: [facility]  Performed by: Elgin Milner MD  Authorized by: Elgin Milner MD   Consent Done: Yes  Consent: Verbal consent obtained. Written consent not obtained.  Risks and benefits: risks, benefits and alternatives were discussed  Consent given by: spouse  Patient understanding: patient does not state understanding of the procedure being performed  Patient consent: the patient's understanding of the procedure does not match consent given  Procedure consent: procedure consent matches procedure scheduled  Relevant documents: relevant documents present and verified  Imaging studies: imaging studies available  Required items: required blood products, implants, devices, and special equipment available  Patient identity confirmed: , MRN, name, provided demographic data and verbally with patient  Time out: Immediately prior to procedure a "time out" was called to verify the correct patient, procedure, equipment, support staff and site/side marked as required.  Indications: respiratory distress, respiratory failure, airway protection, hypoxemia and hypercapnia  Intubation method: direct  Patient status: paralyzed (RSI)  Preoxygenation: BVM  Sedatives: fentanyl, midazolam and see MAR for details (100 mcg fentanyl, 4 mg Versed)  Paralytic: rocuronium (100 mg)  Laryngoscope size: Evans 2  Tube size: 8.0 mm  Tube type: cuffed  Number of attempts: 1  Ventilation between attempts: BVM  Cricoid pressure: no  Cords visualized: yes (Grade 2 view of the cords)  Post-procedure assessment: chest rise and CO2 detector (Tube fog, " direct visualization)  Breath sounds: diminished bilaterally (Rhonchi auscultated in all lung fields right greater than left)  Cuff inflated: yes  ETT to lip: 25 cm  ETT to teeth: 24 cm  Tube secured with: ETT mcdowell  Chest x-ray interpreted by me.  Chest x-ray findings: endotracheal tube in appropriate position  Patient tolerance: Patient tolerated the procedure well with no immediate complications  Complications: No  Specimens: No  Implants: No  Comments: Significant amount of dried oral secretions in the posterior oropharynx and larynx partially obstructing the view        6/15/2023

## 2023-06-15 NOTE — NURSING
Artificial Intelligence Notification  [facility]  [address]  [place] [address]  Phone: [phone]     This documentation was triggered by an Artificial Intelligence Notification.     Admit Date: 2023   LOS: 14  Code Status: Full Code  : 1955  Age: 67 y.o.  Weight:   Wt Readings from Last 1 Encounters:   23 88.5 kg (195 lb 1.6 oz)        Sex: male  Bed: [address] [place] [address] A  MRN: [id number]  Attending Physician: Robert Landis MD     Date of Alert: 06/15/2023  Time AI Alert Received: 0217             Vitals:    06/15/23 0206   BP:    Pulse: 100   Resp: (!) 38   Temp:        Artificial Intelligence alert discussed with Provider:     Name: TERESE Arthur NP    Date/Time of Provider Notification: 6/15/0120       Patient Condition: TERESE Arthur NP notified by OSCAR Manning RN of current VS, orders received for IVF bolus, labs, ABG, and vancomycin. He is being treated for probable sepsis. Resp status is tenuous, he is tachypnic on 90% bipap. ABG's similar to his last-resp alkalosis. Asked his nurse to call ICU if he has any further deterioration.

## 2023-06-15 NOTE — NURSING
Abel Arthur NP for orders. Patient's blood pressure is 93/61, temperature is 101, and patient's respirations have increased to 37--40/min. Labs, stat EKG, and new medication orders given. 500 cc bolus of NS ordered for hypotension.

## 2023-06-15 NOTE — PROGRESS NOTES
Inpatient Nutrition Assessment    Admit Date: 6/1/2023   Total duration of encounter: 14 days     Nutrition Recommendation/Prescription     When feasible, tube feeding as tolerated:  Impact Peptide 1.5 goal rate 60 ml/hr to provide  1800 kcal/d, 102% needs with kcals from Diprivan considered  113 g protein/d, 100% needs  924 ml free water/d, 44% needs  (calculations based on estimated 20 hr/d run time)    Communication of Recommendations: reviewed with nurse    Nutrition Assessment     Malnutrition Assessment/Nutrition-Focused Physical Exam    Malnutrition Context: other (see comments) (does not meet criteria)     Energy Intake (Malnutrition): other (see comments) (does not meet criteria)  Weight Loss (Malnutrition): other (see comments) (unable to obtain)  Subcutaneous Fat (Malnutrition): other (see comments) (does not meet criteria)           Muscle Mass (Malnutrition): mild depletion  [place] (Muscle Loss): mild depletion                       Fluid Accumulation (Malnutrition): other (see comments) (does not meet criteria)        A minimum of two characteristics is recommended for diagnosis of either severe or non-severe malnutrition.    Chart Review    Reason Seen: continuous nutrition monitoring, length of stay, and follow-up    Malnutrition Screening Tool Results   Have you recently lost weight without trying?: No  Have you been eating poorly because of a decreased appetite?: No   MST Score: 0     Diagnosis:  Status post CABG x3 on 06/01/2023.  LIMA to the LAD, saphenous vein graft to OM1 and PDA  Heart failure with reduced ejection fraction-EF 20-25%  Sepsis secondary to Suspected aspiration event with associated hypoxemic respiratory failure  Delirium postop possibly ICU related  Non gap metabolic acidosis, respiratory alkalosis  Hyperglycemia  Hyperkalemia  Hypernatremia    Relevant Medical History: CHF, CAD, HTN, GERD, HLD    Nutrition-Related Medications: insulin in NS, norepinephrine, Diprivan,  "vasopressin  Calorie Containing IV Medications: Diprivan @ 13 ml/hr (provides 343 kcal/d)    Nutrition-Related Labs:  6/15/23 Na 149, K 5.8, Cl 120, GFR 58, Glu 474, Alb 1.8    Diet/PN Order: Diet NPO  Oral Supplement Order: none  Tube Feeding Order:  Impact Peptide 1.5 Edward 70 ml/hr (see below for calculation)  Appetite/Oral Intake: not applicable/not applicable  Factors Affecting Nutritional Intake: hemodynamic instability, NPO, and on mechanical ventilation  Food/Jain/Cultural Preferences: unable to obtain  Food Allergies: none reported    Wound(s):         Incision/Site 06/01/23 1018 Left Leg        Incision/Site 06/01/23 1300 Sternal midline     Last Bowel Movement: 06/14/23    Comments    6/7/23: Noted good po intake of meals documented previously. At time of RD visit pt actively being intubated. Will provide tube feeding recommendatios in case needed.     6/8/23: Tube feeding to start today. Discussed with RN. Receiving kcal from meds. Updated TF recs.    6/12/23: TF continues @ previous goal rate. Updated est needs now that pt extubated. No longer receiving kcal from meds.    6/15/23: Patient was transferred out of ICU, now back in ICU and intubated on ventilator as of this morning, receiving kcal from Diprivan. He is currently not receiving tube feeding, will update needs and tube feeding recommendations for when appropriate to feed; recommend Impact Peptide formula due to low volume, high protein, and for wound healing (sternal incision). Hyperkalemia noted but may resolve with improvement in hyperglycemia, monitor need for specialized formula lower in potassium.    Anthropometrics    Height: 5' 10" (177.8 cm) Height Method: Stated  Last Weight: 88.5 kg (195 lb 1.6 oz) (06/14/23 0547) Weight Method: Standard Scale  BMI (Calculated): 28  BMI Classification: overweight (BMI 25-29.9)        Ideal Body Weight (IBW), Male: 166 lb     % Ideal Body Weight, Male (lb): 114.88 %                          Usual " Weight Provided By: EMR weight history    Wt Readings from Last 5 Encounters:   23 88.5 kg (195 lb 1.6 oz)   23 85.7 kg (189 lb)   23 92.6 kg (204 lb 3.2 oz)   22 90.4 kg (199 lb 6.4 oz)   10/14/21 91.1 kg (200 lb 13.4 oz)     Weight Change(s) Since Admission: stable  Wt Readings from Last 1 Encounters:   23 0547 88.5 kg (195 lb 1.6 oz)   23 0400 88.5 kg (195 lb 1.7 oz)   23 0630 88.5 kg (195 lb 1.7 oz)   23 0600 90.3 kg (199 lb)   23 0824 86.5 kg (190 lb 11.2 oz)   23 1433 88.5 kg (195 lb)   Admit Weight: 88.5 kg (195 lb) (23 1433)    Estimated Needs    Weight Used For Calorie Calculations: 88.5 kg (195 lb 1.7 oz)  Energy Calorie Requirements (kcal): 2102  Energy Need Method: Thor State  Weight Used For Protein Calculations: 88.5 kg (195 lb 1.7 oz)  Protein Requirements: 106-133 g, 1.2-1.5 g/kg  Fluid Requirements (mL): 2102, 1 ml/kcal  Temp (24hrs), Av.4 °F (38 °C), Min:98 °F (36.7 °C), Max:101.7 °F (38.7 °C)  Vtot (L/Min) for Thor State Equation Calculation: 7.9; Max Temp for Calculation: 101.7 F  Last Updated: 6/15/23    Enteral Nutrition Patient not receiving enteral nutrition support at this time.    Parenteral Nutrition Patient not receiving parenteral nutrition support at this time.    Evaluation of Received Nutrient Intake    Calories: not meeting estimated needs  Protein: not meeting estimated needs    Patient Education Not applicable.    Nutrition Diagnosis     PES: Inadequate oral intake related to mechanical cause as evidenced by intubation since 6/15/23. (continues)    Interventions/Goals     Intervention(s): modified rate of enteral nutrition and collaboration with other providers  Goal: Meet greater than 75% of nutritional needs by follow-up. (goal progressing)    Monitoring & Evaluation     Dietitian will monitor energy intake, enteral nutrition intake, weight, electrolyte/renal panel, and glucose/endocrine profile.  Nutrition  Risk/Follow-Up: moderate (follow-up in 3-5 days)   Please consult if re-assessment needed sooner.

## 2023-06-15 NOTE — PROGRESS NOTES
[facility] - [facility]  Pulmonary Critical Care Note    Patient Name: Tom Anna  MRN: [id number]  Admission Date: 6/1/2023  Hospital Length of Stay: 14 days  Code Status: Full Code  Attending Provider: Robert Landis MD  Primary Care Provider: Primary Doctor No     Subjective:     HPI:   67-year-old with a history of severe coronary artery disease mitral regurgitation, and ejection fraction 20-25% underwent CABG by Dr. Grossman on 6/1/2023.  Per Dr. Felix's notes CABG x3 with a LIMA to the LAD saphenous vein graft to OM1 and PDA.  Left atrial appendage was clipped with size 45 atrial clip.    Hospital Course/Significant events:  6/2 - Increased O2 requirements suspected aspiration placed on BiPAP and reupgraded to ICU.  6/4 - Downgraded to the floor but became more hypoxic and transferred back to ICU.  6/7 - Intubated  6/10/2023 - Extubated  6/12/23 - downgraded  6/14 increasing o2 requirements with fever and increasing o2 requirements    24 Hour Interval History:  Patient acutely with tachypnia and hypoxia overnight. Placed on BiPAP at 100% with statting low 90's, ABG depicted 7.49/33/99/25, patient upgraded to ICU for close monitoring of respiratory status.  Patient complaining of some tachypnea, shortness of breath.  Spiking fevers with T-max 102° F in last 24 hours.  Denies chest pains, chills, nausea, vomiting.    Past Medical History:   Diagnosis Date    Allergies     CHF (congestive heart failure)     Coronary artery disease     Essential (primary) hypertension     Fatigue     GERD (gastroesophageal reflux disease)     HLD (hyperlipidemia)     Personal history of colonic polyps     SOB (shortness of breath) on exertion     Stroke     Tobacco abuse     Weakness of both legs        Past Surgical History:   Procedure Laterality Date    COLONOSCOPY W/ BIOPSIES AND POLYPECTOMY  01/20/2020    Dr. Alf Covington    CORONARY ARTERY BYPASS GRAFT (CABG) N/A 6/1/2023    Procedure: CORONARY ARTERY BYPASS  GRAFT (CABG);  Surgeon: Xochitl Grossman MD;  Location: [facility];  Service: Cardiothoracic;  Laterality: N/A;    ENDOSCOPY  01/20/2020    ESOPHAGOGASTRODUODENOSCOPY      PLANTAR'S WART EXCISION  07/17/2017       Social History     Socioeconomic History    Marital status:     Number of children: 4   Occupational History    Occupation: Disabled   Tobacco Use    Smoking status: Every Day     Packs/day: 1.00     Types: Cigarettes    Smokeless tobacco: Never   Substance and Sexual Activity    Alcohol use: Yes     Comment: Beer 6pk daily    Drug use: Never     Social Determinants of Health     Food Insecurity: Unknown    Worried About Running Out of Food in the Last Year: Never true   Transportation Needs: Unknown    Lack of Transportation (Medical): No   Housing Stability: Unknown    Unable to Pay for Housing in the Last Year: No       Current Outpatient Medications   Medication Instructions    albuterol (VENTOLIN HFA) 90 mcg/actuation inhaler 1 puff, Inhalation, Every 12 hours PRN, Rescue    amLODIPine (NORVASC) 10 mg, Oral, Daily    aspirin (ECOTRIN) 81 mg, Oral, Daily    atorvastatin (LIPITOR) 80 mg, Oral, Nightly    baclofen (LIORESAL) 10 mg, Oral, 3 times daily PRN    cetirizine 10 mg, Oral, Daily    clopidogreL (PLAVIX) 75 mg, Oral    ENTRESTO 49-51 mg per tablet 1 tablet, Oral, 2 times daily    fluticasone propionate (FLONASE) 50 mcg, Each Nostril, Daily    furosemide (LASIX) 20 mg, Oral, Daily    ketoconazole (NIZORAL) 2 % cream Topical (Top), 2 times daily    losartan (COZAAR) 50 mg, Oral, Daily    lovastatin (MEVACOR) 10 mg, Oral, Nightly    metoprolol succinate (TOPROL-XL) 100 mg, Oral, Daily    multivitamin capsule 1 capsule, Oral, Daily    omeprazole (PRILOSEC) 20 mg, Oral, Daily    sildenafiL (VIAGRA) 100 mg, Oral, As needed (PRN)    spironolactone (ALDACTONE) 25 mg, Oral, Daily       Current Inpatient Medications   sodium chloride 0.9%   Intravenous Once    acetylcysteine 100 mg/ml (10%)  4 mL  Nebulization TID WAKE    albuterol sulfate  2.5 mg Nebulization Q6H    aspirin  81 mg Per OG tube Daily    dexAMETHasone  4 mg Intravenous Daily    docusate  100 mg Per G Tube Daily    enoxparin  40 mg Subcutaneous Q24H (prophylaxis, 1700)    etomidate  20 mg Intravenous Once    famotidine (PF)  20 mg Intravenous Q12H    fluticasone propionate  2 spray Each Nostril Daily    folic acid  1 mg Per OG tube Daily    furosemide (LASIX) injection  40 mg Intravenous Q12H    ipratropium  0.5 mg Nebulization Q6H    metoprolol tartrate  100 mg Per OG tube BID    perflutren lipid microspheres  1.3 mL Intravenous Once    piperacillin-tazobactam (Zosyn) IV (PEDS and ADULTS) (extended infusion is not appropriate)  4.5 g Intravenous Q8H    potassium chloride in water  20 mEq Intravenous Once    potassium chloride  10 mEq Oral Once    sacubitriL-valsartan  1 tablet Nasogastric BID    sodium chloride 3%  4 mL Nebulization Q6H    vancomycin (VANCOCIN) IVPB  1,500 mg Intravenous Q12H       Current Intravenous Infusions   loperamide      milrinone Stopped (06/09/23 1700)       ROS   Inaccurate ROS was unable to be obtained secondary to patient's encephalopathic status      Objective:       Intake/Output Summary (Last 24 hours) at 6/15/2023 0512  Last data filed at 6/15/2023 0144  Gross per 24 hour   Intake 3358 ml   Output 1700 ml   Net 1658 ml       Vital Signs (Most Recent):  Temp: 98 °F (36.7 °C) (06/15/23 0418)  Pulse: 107 (06/15/23 0418)  Resp: 19 (06/15/23 0418)  BP: 112/70 (06/15/23 0418)  SpO2: 95 % (06/15/23 0418)  Body mass index is 27.99 kg/m².  Weight: 88.5 kg (195 lb 1.6 oz) Vital Signs (24h Range):  Temp:  [98 °F (36.7 °C)-102 °F (38.9 °C)] 98 °F (36.7 °C)  Pulse:  [] 107  Resp:  [19-38] 19  SpO2:  [82 %-99 %] 95 %  BP: ()/(61-97) 112/70     Physical Exam  General: Patient resting in bed, in no acute distress   Eye: EOMI, clear conjunctiva, eyelids normal, anicteric  HENT: Head-normocephalic and atraumatic,  BiPAP in place, NG tube in place  Respiratory:  Coarse breath sounds auscultated bilateral upper lobes without wheezes  Cardiovascular:  Tachycardic rate, regular rhythm, without murmurs, without gallops, without rubs  Gastrointestinal: soft, non-tender, non-distended with normal bowel sounds, without masses to palpation, rectal tube in place  Musculoskeletal:  No gross deformities  Integumentary: no rashes or skin lesions present  Neurologic: no signs of peripheral neurological deficit, motor/sensory function intact  Psychiatric:  Alert, oriented, lethargic, responds to commands    Lines/Drains/Airways       Drain  Duration                  Rectal Tube 06/09/23 0800 rectal tube w/ balloon (indicate number of mLs) 5 days         NG/OG Tube 06/11/23 0830 nasogastric 16 Fr. Right nostril 3 days    Male External Urinary Catheter 06/12/23 1814 2 days              Peripheral Intravenous Line  Duration                  Peripheral IV - Single Lumen 06/07/23 1500 20 G Left;Posterior Hand 7 days         Peripheral IV - Single Lumen 06/07/23 1544 20 G Anterior;Right Forearm 7 days                    Significant Labs:    Lab Results   Component Value Date    WBC 22.23 (H) 06/15/2023    HGB 15.6 06/15/2023    HCT 51.5 06/15/2023    MCV 79.5 (L) 06/15/2023     06/15/2023         BMP  Lab Results   Component Value Date     (H) 06/15/2023    K 5.8 (H) 06/15/2023    CHLORIDE 120 (H) 06/15/2023    CO2 21 (L) 06/15/2023    BUN 51.8 (H) 06/15/2023    CREATININE 1.35 (H) 06/15/2023    CALCIUM 9.1 06/15/2023    AGAP 10.0 06/11/2023    EGFRNONAA 86 02/09/2022       ABG  Recent Labs   Lab 06/15/23  0056   PH 7.490*   PO2 99.0   HCO3 25.1         Significant Imaging:  No chest x-ray today      Assessment/Plan:     Assessment  Status post CABG x3 on 06/01/2023.  LIMA to the LAD, saphenous vein graft to OM1 and PDA.  Heart failure with reduced ejection fraction-EF 20-25%  Sepsis secondary to Suspected aspiration event with  associated hypoxemic respiratory failure  Delirium postop possibly ICU related  Non gap metabolic acidosis, respiratory alkalosis  Hyperglycemia  Hyperkalemia  Hypernatremia    Plan  -continue with BiPAP, can wean as tolerates   -increasing likelihood for re-intubation  -pending CXR for evaluation   -pending repeat blood cultures, sputum cultures, respiratory panel  -hemodynamics are stable  -patient is tolerating tube feeds  -vanco (D 2)/Zosyn (D 11) empirically for possible aspiration event  -had extensive discussion with patient and patient's family at bedside about intubation/resuscitation status, would wish to remain full code for now  -Will initiate insulin GTT for improved glycemic control, plan to transition to subcu insulin  -insulin should assist with hyperkalemia, will monitor with BMPs             Eliot Murphy, DO  Pulmonary Critical Care Medicine  [facility] - [address] [place] [address]

## 2023-06-15 NOTE — NURSING
Nurses Note -- 4 Eyes      6/15/2023   5:23 PM      Skin assessed during: Daily Assessment      [x] No Altered Skin Integrity Present    []Prevention Measures Documented      [] Yes- Altered Skin Integrity Present or Discovered   [] LDA Added if Not in Epic (Describe Wound)   [] New Altered Skin Integrity was Present on Admit and Documented in LDA   [] Wound Image Taken    Wound Care Consulted? No    Attending Nurse:  Arash Contreras RN     Second RN/Staff Member:  SAWYER

## 2023-06-15 NOTE — PT/OT/SLP PROGRESS
Occupational Therapy      Patient Name:  Tom Anna   MRN:  [id number]    Patient not seen today secondary to worsening resp status resulting in upgrade to ICU with intubation this AM . OT services placed on hold again d/t decline in medical status. Please reconsult as needed if/when therapy becomes appropriate.     6/15/2023

## 2023-06-15 NOTE — PROCEDURES
"Tom Anna is a 67 y.o. male patient.    Temp: (!) 101.7 °F (38.7 °C) (06/15/23 0846)  Pulse: 79 (06/15/23 1400)  Resp: (!) 28 (06/15/23 1400)  BP: (!) 108/56 (06/15/23 1300)  SpO2: 100 % (06/15/23 1400)  Weight: 88.5 kg (195 lb 1.6 oz) (06/14/23 0547)  Height: 5' 10" (177.8 cm) (06/07/23 1325)       Arterial Line    Date/Time: 6/15/2023 3:57 PM  Location procedure was performed: [facility]  Performed by: Elgin Milner MD  Authorized by: Elgin Milner MD   Pre-op Diagnosis: septic shock  Post-operative diagnosis: same  Consent Done: Not Needed  Preparation: Patient was prepped and draped in the usual sterile fashion.  Indications: multiple ABGs, respiratory failure and hemodynamic monitoring  Location: left brachial    Patient sedated: no  Needle gauge: 20  Seldinger technique: Seldinger technique used  Number of attempts: 1  Technical procedures used: seldinger  Complications: No  Estimated blood loss (mL): 0  Specimens: No  Implants: No  Post-procedure: line sutured and dressing applied  Post-procedure CMS: unchanged  Patient tolerance: Patient tolerated the procedure well with no immediate complications        6/15/2023    "

## 2023-06-15 NOTE — PT/OT/SLP PROGRESS
Pt required intubation this AM due to resp status decline. Will discharge current PT orders. Please re-order PT once pt becomes appropriate again.

## 2023-06-16 NOTE — PROGRESS NOTES
[facility] - [facility]  Cardiology  Progress Note    Patient Name: Tom Anna  MRN: [id number]  Admission Date: 6/1/2023  Hospital Length of Stay: 15 days  Code Status: DNR   Attending Physician: Alejandro Allred MD   Primary Care Physician: Primary Doctor No  Expected Discharge Date:   Principal Problem:<principal problem not specified>    Subjective:   Consultation Reason: Concern for NSVT     HPI: Mr. Anna is a 67 year old male, known to Dr. Contreras in [place], who presented to the hospital and underwent elective CABG by Dr. Grossman on 6.1.23. CABG noted to be LIMA to LAD, SVG to OM1, and SVG to PDA. Also underwent TAMI Ligation which was clipped with 45 atrial clip. He was initially extubated post surgery and sent to floor for further management (once out of ICU). On 6.2.23 he had a suspected aspiration event, required significant oxygen support, required BIAP and eventual intubation and transfer back to ICU. He was extubated once again on 6.10.23. Patient has been tachycardic and has a baseline lbbb, which looks like VT on the monitor. CIS is consulted for evaluation.    Hospital Course:   6.12.23: NAD Noted. Sinus Tach.   6.13.23: NAD Noted. Vitals Stable. . Remains deconditioned. BP Stable. NG Tube in place.  6.14.23: Patient labored from respiratory standpoint. He is Sinus Tach in the one teen's. BP Stable. Nebulizer treatment in progress, oxygen saturation in low 90's. He is abdominal breathing. EKG and Chest XR Ordered. Family at bedside.   6.15.23: Patient upgraded to ICU due to mental status change and respiratory distress. Patient now intubated. SR versus ST on Tele.   6.16.23: NAD. Vented/Not Sedated.  Levophed 0.28mcg/kg/min, Vasopressin 0.04units/min. Following Simple Commands. ST 110s on Tele. BUN/Crea 96.2/3.52     PMH: CAD/CABG, Hypertension, Hyperlipidemia, Ischemic Cardiomyopathy EF 25%, GERD, Cerebral Infarction, Nicotine Dependence, LBBB  PSH: [facility],  CABG  Family History: Mother- Hypertension/Depression  Social History: Tobacco- Active Smoker, Alcohol- Beer Consumption, Substance Abuse- Negative     Previous Cardiac Diagnostics:   Echocardiogram (6.12.23):  The estimated ejection fraction is 25%; Definity used.  There is abnormal septal wall motion consistent with post-operative status.  No pericardial effusion.  Normal central venous pressure (3 mmHg).  Technically difficult study due to post-operative status.    Echocardiogram (6.7.23):  Severely decreased systolic function.  The estimated ejection fraction is 25%; Definity used.  No LV thrombus seen in this study.  There is abnormal septal wall motion.  The Right ventricle is not well visualized but appears to be of mildly reduced RV systolic function.  No evidence of significant pericardial effusion or tamponade physiology.     CV US Arterial Doppler (6.6.23):  The right lower extremity demonstrated severe arterial flow reduction consistent with an occluded superficial femoral, popliteal, and tibial arteries.   The left lower extremity demonstrated severe arterial flow reduction consistent with an occluded superficial femoral, and tibial arteries with reconstitution of flow in the popliteal artery.      CV US Arterial Upper Extremities (6.6.23):  Patent right upper extremity arterial system with biphasic waveforms throughout, however no flow was identified in the radial artery. Patent left upper extremity arterial system with triphasic waveforms throughout, however the ulnar artery was not well visualized.      LHC (5.1.23):  LM- Large 30-50% Obstruction, LAD- Mid Section with Moderate to Severe Disease, Proximal/Distal Section with Mild Disease, LCX- Moderate to Severe Diffuse Disease with Moderate Diffuse Disease of High Takeoff OM1, Moderate Disease of OM2, RCA- Dominant Vessel with Proximal Moderate to Severe Disease, Mid Disease is Mild, Distal  Noted.      Telemetry Monitor (29 Days & 7 Hours)  (3.24.23):  This is a good quality study.   Predominant rhythm is normal sinus rhythm.   The minimum heart rate recorded was 59 beats / minute. The maximum heart rate is 133 beats / minute. The mean heart rate is 80 beats / minute.   Rare premature ventricular contractions noted.    No pauses were noted.   Baseline LBBB noted. Abberant rhythm noted. No symptoms reported.      Echocardiogram (3.18.23):  LV Dilated. EF 25-30%.  Inferior Wall Appears Hypokinetic. Normal RV Function    Review of Systems   Unable to perform ROS: Intubated     Objective:     Vital Signs (Most Recent):  Temp: 98.9 °F (37.2 °C) (06/16/23 0400)  Pulse: 91 (06/16/23 0646)  Resp: (!) 37 (06/16/23 0646)  BP: (!) 83/59 (06/16/23 0100)  SpO2: 98 % (06/16/23 0646) Vital Signs (24h Range):  Temp:  [98.4 °F (36.9 °C)-101.7 °F (38.7 °C)] 98.9 °F (37.2 °C)  Pulse:  [] 91  Resp:  [20-51] 37  SpO2:  [80 %-100 %] 98 %  BP: ()/(37-81) 83/59  Arterial Line BP: ()/(50-73) 98/50     Weight: 88.5 kg (195 lb 1.6 oz)  Body mass index is 27.99 kg/m².    SpO2: 98 %         Intake/Output Summary (Last 24 hours) at 6/16/2023 0821  Last data filed at 6/16/2023 0639  Gross per 24 hour   Intake 2201.8 ml   Output 770 ml   Net 1431.8 ml       Lines/Drains/Airways       Peripherally Inserted Central Catheter Line  Duration             PICC Triple Lumen 06/15/23 1319 right brachial <1 day              Drain  Duration                  Rectal Tube 06/09/23 0800 rectal tube w/ balloon (indicate number of mLs) 7 days         NG/OG Tube 06/11/23 0830 nasogastric 16 Fr. Right nostril 4 days         Urethral Catheter 06/15/23 0600 16 Fr. 1 day              Airway  Duration                  Airway - Non-Surgical 06/15/23 0925 Endotracheal Tube <1 day              Arterial Line  Duration             Arterial Line 06/15/23 1500 Left Brachial <1 day              Peripheral Intravenous Line  Duration                  Peripheral IV - Single Lumen 06/15/23 9591  Anterior;Left Forearm 1 day         Peripheral IV - Single Lumen 06/15/23 1328 18 G Anterior;Left Upper Arm <1 day                  Significant Labs:   Recent Results (from the past 72 hour(s))   POCT glucose    Collection Time: 06/13/23 12:18 PM   Result Value Ref Range    POCT Glucose 329 (H) 70 - 110 mg/dL   POCT glucose    Collection Time: 06/13/23  4:36 PM   Result Value Ref Range    POCT Glucose 307 (H) 70 - 110 mg/dL   POCT glucose    Collection Time: 06/13/23  8:04 PM   Result Value Ref Range    POCT Glucose 213 (H) 70 - 110 mg/dL   Comprehensive Metabolic Panel    Collection Time: 06/14/23  4:04 AM   Result Value Ref Range    Sodium Level 151 (H) 136 - 145 mmol/L    Potassium Level 5.0 3.5 - 5.1 mmol/L    Chloride 120 (H) 98 - 107 mmol/L    Carbon Dioxide 22 (L) 23 - 31 mmol/L    Glucose Level 287 (H) 82 - 115 mg/dL    Blood Urea Nitrogen 33.1 (H) 8.4 - 25.7 mg/dL    Creatinine 0.90 0.73 - 1.18 mg/dL    Calcium Level Total 8.9 8.8 - 10.0 mg/dL    Protein Total 6.4 5.8 - 7.6 gm/dL    Albumin Level 2.0 (L) 3.4 - 4.8 g/dL    Globulin 4.4 (H) 2.4 - 3.5 gm/dL    Albumin/Globulin Ratio 0.5 (L) 1.1 - 2.0 ratio    Bilirubin Total 0.8 <=1.5 mg/dL    Alkaline Phosphatase 90 40 - 150 unit/L    Alanine Aminotransferase 109 (H) 0 - 55 unit/L    Aspartate Aminotransferase 67 (H) 5 - 34 unit/L    eGFR >60 mls/min/1.73/m2   CBC with Differential    Collection Time: 06/14/23  4:04 AM   Result Value Ref Range    WBC 21.91 (H) 4.50 - 11.50 x10(3)/mcL    RBC 5.67 4.70 - 6.10 x10(6)/mcL    Hgb 13.7 (L) 14.0 - 18.0 g/dL    Hct 44.5 42.0 - 52.0 %    MCV 78.5 (L) 80.0 - 94.0 fL    MCH 24.2 (L) 27.0 - 31.0 pg    MCHC 30.8 (L) 33.0 - 36.0 g/dL    RDW      Platelet 262 130 - 400 x10(3)/mcL    MPV      Neut % 85.4 %    Lymph % 6.4 %    Mono % 6.7 %    Eos % 0.3 %    Basophil % 0.2 %    Lymph # 1.40 0.6 - 4.6 x10(3)/mcL    Neut # 18.70 (H) 2.1 - 9.2 x10(3)/mcL    Mono # 1.47 (H) 0.1 - 1.3 x10(3)/mcL    Eos # 0.07 0 - 0.9 x10(3)/mcL     Baso # 0.05 <=0.2 x10(3)/mcL    IG# 0.22 (H) 0 - 0.04 x10(3)/mcL    IG% 1.0 %    NRBC% 0.1 %   POCT glucose    Collection Time: 06/14/23  5:58 AM   Result Value Ref Range    POCT Glucose 289 (H) 70 - 110 mg/dL   RT Blood Gas    Collection Time: 06/14/23  9:25 AM   Result Value Ref Range    Sample Type Arterial Blood     Sample site Left Radial Artery     Drawn by sd rrt     pH, Blood gas 7.520 (H) 7.350 - 7.450    pCO2, Blood gas 30.0 (L) 35.0 - 45.0 mmHg    pO2, Blood gas 84.0 80.0 - 100.0 mmHg    Sodium, Blood Gas 148 (H) 137 - 145 mmol/L    Potassium, Blood Gas 4.7 3.5 - 5.0 mmol/L    Calcium Level Ionized 1.32 (H) 1.12 - 1.23 mmol/L    TOC2, Blood gas 25.4 mmol/L    Base Excess, Blood gas 2.50 (H) -2.00 - 2.00 mmol/L    sO2, Blood gas 97.3 %    HCO3, Blood gas 24.5 22.0 - 26.0 mmol/L    THb, Blood gas 14.2 12 - 16 g/dL    O2 Hb, Blood Gas 94.9 94.0 - 97.0 %    CO Hgb 2.4 (H) 0.5 - 1.5 %    Met Hgb 1.0 0.4 - 1.5 %    Allens Test Yes     Oxygen Device, Blood gas Oxy Mask     LPM 7    RT Blood Gas    Collection Time: 06/14/23 11:55 AM   Result Value Ref Range    Sample Type Arterial Blood     Sample site Left Brachial Artery     Drawn by sd rrt     pH, Blood gas 7.470 (H) 7.350 - 7.450    pCO2, Blood gas 37.0 35.0 - 45.0 mmHg    pO2, Blood gas 178.0 (H) 80.0 - 100.0 mmHg    Sodium, Blood Gas 148 (H) 137 - 145 mmol/L    Potassium, Blood Gas 5.0 3.5 - 5.0 mmol/L    Calcium Level Ionized 1.33 (H) 1.12 - 1.23 mmol/L    TOC2, Blood gas 28.0 mmol/L    Base Excess, Blood gas 3.30 (H) -2.00 - 2.00 mmol/L    sO2, Blood gas 99.6 %    HCO3, Blood gas 26.9 (H) 22.0 - 26.0 mmol/L    THb, Blood gas 14.2 12 - 16 g/dL    O2 Hb, Blood Gas 96.3 94.0 - 97.0 %    CO Hgb 1.6 (H) 0.5 - 1.5 %    Met Hgb 1.5 0.4 - 1.5 %    Allens Test N/A     MODE BiPAP     Oxygen Device, Blood gas Ventilator     FIO2, Blood gas 100 %    Spont RR 28 b/min    BiPAP (I) 14 cm H2O    BiPAP (E) 8 cm H2O   POCT glucose    Collection Time: 06/14/23  1:56 PM    Result Value Ref Range    POCT Glucose 406 (H) 70 - 110 mg/dL   POCT glucose    Collection Time: 06/14/23  2:04 PM   Result Value Ref Range    POCT Glucose 416 (H) 70 - 110 mg/dL   POCT Glucose, Hand-Held Device    Collection Time: 06/14/23  3:50 PM   Result Value Ref Range    POC Glucose 399 (A) 70 - 110 MG/DL   POCT glucose    Collection Time: 06/14/23  3:50 PM   Result Value Ref Range    POCT Glucose 399 (H) 70 - 110 mg/dL   POCT glucose    Collection Time: 06/14/23  8:09 PM   Result Value Ref Range    POCT Glucose 418 (H) 70 - 110 mg/dL   Comprehensive Metabolic Panel    Collection Time: 06/15/23 12:56 AM   Result Value Ref Range    Sodium Level 153 (H) 136 - 145 mmol/L    Potassium Level 5.3 (H) 3.5 - 5.1 mmol/L    Chloride 120 (H) 98 - 107 mmol/L    Carbon Dioxide 24 23 - 31 mmol/L    Glucose Level 399 (H) 82 - 115 mg/dL    Blood Urea Nitrogen 54.1 (H) 8.4 - 25.7 mg/dL    Creatinine 1.36 (H) 0.73 - 1.18 mg/dL    Calcium Level Total 9.1 8.8 - 10.0 mg/dL    Protein Total 6.8 5.8 - 7.6 gm/dL    Albumin Level 1.9 (L) 3.4 - 4.8 g/dL    Globulin 4.9 (H) 2.4 - 3.5 gm/dL    Albumin/Globulin Ratio 0.4 (L) 1.1 - 2.0 ratio    Bilirubin Total 1.0 <=1.5 mg/dL    Alkaline Phosphatase 109 40 - 150 unit/L    Alanine Aminotransferase 112 (H) 0 - 55 unit/L    Aspartate Aminotransferase 66 (H) 5 - 34 unit/L    eGFR 58 mls/min/1.73/m2   Lactic Acid, Plasma    Collection Time: 06/15/23 12:56 AM   Result Value Ref Range    Lactic Acid Level 2.2 0.5 - 2.2 mmol/L   Magnesium    Collection Time: 06/15/23 12:56 AM   Result Value Ref Range    Magnesium Level 2.50 1.60 - 2.60 mg/dL   Phosphorus    Collection Time: 06/15/23 12:56 AM   Result Value Ref Range    Phosphorus Level 3.4 2.3 - 4.7 mg/dL   CBC with Differential    Collection Time: 06/15/23 12:56 AM   Result Value Ref Range    WBC 23.42 (H) 4.50 - 11.50 x10(3)/mcL    RBC 6.15 (H) 4.70 - 6.10 x10(6)/mcL    Hgb 14.7 14.0 - 18.0 g/dL    Hct 47.9 42.0 - 52.0 %    MCV 77.9 (L) 80.0 -  94.0 fL    MCH 23.9 (L) 27.0 - 31.0 pg    MCHC 30.7 (L) 33.0 - 36.0 g/dL    RDW      Platelet 335 130 - 400 x10(3)/mcL    MPV      NRBC% 0.2 %   RT Blood Gas    Collection Time: 06/15/23 12:56 AM   Result Value Ref Range    Sample Type Arterial Blood     Sample site Right Brachial Artery     Drawn by AK RRT     pH, Blood gas 7.490 (H) 7.350 - 7.450    pCO2, Blood gas 33.0 (L) 35.0 - 45.0 mmHg    pO2, Blood gas 99.0 80.0 - 100.0 mmHg    Sodium, Blood Gas 150 (H) 137 - 145 mmol/L    Potassium, Blood Gas 4.9 3.5 - 5.0 mmol/L    Calcium Level Ionized 1.31 (H) 1.12 - 1.23 mmol/L    TOC2, Blood gas 26.1 mmol/L    Base Excess, Blood gas 2.30 (H) -2.00 - 2.00 mmol/L    sO2, Blood gas 98.2 %    HCO3, Blood gas 25.1 22.0 - 26.0 mmol/L    THb, Blood gas 14.8 12 - 16 g/dL    O2 Hb, Blood Gas 96.2 94.0 - 97.0 %    CO Hgb 1.6 (H) 0.5 - 1.5 %    Met Hgb 1.0 0.4 - 1.5 %    Allens Test Yes     MODE BiPAP     FIO2, Blood gas 100 %    IPAP 14 cmH2O    EPAP 8 cmH2O   Manual Differential    Collection Time: 06/15/23 12:56 AM   Result Value Ref Range    Neut Man 95 %    Lymph Man 4 %    Monocyte Man 2 %    Instr WBC 23.42 x10(3)/mcL    Abs Mono 0.4684 0.1 - 1.3 x10(3)/mcL    Abs Lymp 0.9368 0.6 - 4.6 x10(3)/mcL    Abs Neut 22.249 (H) 2.1 - 9.2 x10(3)/mcL    NRBC Man 1 %    Polychrom 1+ (A) (none)    RBC Morph Abnormal (A) Normal    Anisocyte 1+ (A) (none)    Poik 1+ (A) (none)    Macrocyte 1+ (A) (none)    Target Cell 2+ (A) (none)    Martha Cells 1+ (A) (none)    Platelet Est Normal Normal, Adequate    WBC Vacuoles 1+ (A) (none)   MRSA PCR    Collection Time: 06/15/23  1:22 AM   Result Value Ref Range    MRSA PCR SCRN ([facility]) Not Detected Not Detected   Comprehensive Metabolic Panel    Collection Time: 06/15/23  3:13 AM   Result Value Ref Range    Sodium Level 149 (H) 136 - 145 mmol/L    Potassium Level 5.8 (H) 3.5 - 5.1 mmol/L    Chloride 120 (H) 98 - 107 mmol/L    Carbon Dioxide 21 (L) 23 - 31 mmol/L    Glucose Level 474 (HH) 82 - 115  mg/dL    Blood Urea Nitrogen 51.8 (H) 8.4 - 25.7 mg/dL    Creatinine 1.35 (H) 0.73 - 1.18 mg/dL    Calcium Level Total 9.1 8.8 - 10.0 mg/dL    Protein Total 7.1 5.8 - 7.6 gm/dL    Albumin Level 1.8 (L) 3.4 - 4.8 g/dL    Globulin 5.3 (H) 2.4 - 3.5 gm/dL    Albumin/Globulin Ratio 0.3 (L) 1.1 - 2.0 ratio    Bilirubin Total 1.1 <=1.5 mg/dL    Alkaline Phosphatase 105 40 - 150 unit/L    Alanine Aminotransferase 103 (H) 0 - 55 unit/L    Aspartate Aminotransferase 56 (H) 5 - 34 unit/L    eGFR 58 mls/min/1.73/m2   Protime-INR    Collection Time: 06/15/23  3:13 AM   Result Value Ref Range    PT 15.0 (H) 12.5 - 14.5 seconds    INR 1.19 0.00 - 1.30   CBC with Differential    Collection Time: 06/15/23  3:13 AM   Result Value Ref Range    WBC 22.23 (H) 4.50 - 11.50 x10(3)/mcL    RBC 6.48 (H) 4.70 - 6.10 x10(6)/mcL    Hgb 15.6 14.0 - 18.0 g/dL    Hct 51.5 42.0 - 52.0 %    MCV 79.5 (L) 80.0 - 94.0 fL    MCH 24.1 (L) 27.0 - 31.0 pg    MCHC 30.3 (L) 33.0 - 36.0 g/dL    RDW      Platelet 296 130 - 400 x10(3)/mcL    MPV      Neut % 90.1 %    Lymph % 4.6 %    Mono % 4.3 %    Eos % 0.0 %    Basophil % 0.4 %    Lymph # 1.03 0.6 - 4.6 x10(3)/mcL    Neut # 20.02 (H) 2.1 - 9.2 x10(3)/mcL    Mono # 0.95 0.1 - 1.3 x10(3)/mcL    Eos # 0.01 0 - 0.9 x10(3)/mcL    Baso # 0.08 <=0.2 x10(3)/mcL    IG# 0.14 (H) 0 - 0.04 x10(3)/mcL    IG% 0.6 %    NRBC% 0.1 %   Lactic Acid, Plasma    Collection Time: 06/15/23  3:13 AM   Result Value Ref Range    Lactic Acid Level 2.2 0.5 - 2.2 mmol/L   POCT glucose    Collection Time: 06/15/23  4:45 AM   Result Value Ref Range    POCT Glucose 446 (H) 70 - 110 mg/dL   POCT glucose    Collection Time: 06/15/23  5:58 AM   Result Value Ref Range    POCT Glucose 417 (H) 70 - 110 mg/dL   Urinalysis, Reflex to Urine Culture    Collection Time: 06/15/23  6:02 AM    Specimen: Urine   Result Value Ref Range    Color, UA Yellow Yellow, Light-Yellow, Dark Yellow, Erika, Straw    Appearance, UA Clear Clear    Specific [place], UA  1.025 1.005 - 1.030    pH, UA 5.5 5.0 - 8.5    Protein, UA Negative Negative mg/dL    Glucose, UA 3+ (A) Negative, Normal mg/dL    Ketones, UA Negative Negative mg/dL    Blood, UA Negative Negative unit/L    Bilirubin, UA Negative Negative mg/dL    Urobilinogen, UA 0.2 0.2, 1.0, Normal mg/dL    Nitrites, UA Negative Negative    Leukocyte Esterase, UA Negative Negative unit/L   Urinalysis, Microscopic    Collection Time: 06/15/23  6:02 AM   Result Value Ref Range    RBC, UA <5 <=5 /HPF    WBC, UA <5 <=5 /HPF    Squamous Epithelial Cells, UA <5 <=5 /HPF    Bacteria, UA None Seen None Seen, Rare, Occasional /HPF   POCT glucose    Collection Time: 06/15/23  7:12 AM   Result Value Ref Range    POCT Glucose 468 (HH) 70 - 110 mg/dL   POCT glucose    Collection Time: 06/15/23  8:23 AM   Result Value Ref Range    POCT Glucose 363 (H) 70 - 110 mg/dL   POCT glucose    Collection Time: 06/15/23  9:05 AM   Result Value Ref Range    POCT Glucose 305 (H) 70 - 110 mg/dL   POCT glucose    Collection Time: 06/15/23  9:56 AM   Result Value Ref Range    POCT Glucose 221 (H) 70 - 110 mg/dL   RT Blood Gas    Collection Time: 06/15/23 10:08 AM   Result Value Ref Range    Sample Type Arterial Blood     Sample site Left Radial Artery     Drawn by sd rrt     pH, Blood gas 7.180 (LL) 7.350 - 7.450    pCO2, Blood gas 73.0 (HH) 35.0 - 45.0 mmHg    pO2, Blood gas 84.0 80.0 - 100.0 mmHg    Sodium, Blood Gas 157 (H) 137 - 145 mmol/L    Potassium, Blood Gas 3.9 3.5 - 5.0 mmol/L    Calcium Level Ionized 1.24 (H) 1.12 - 1.23 mmol/L    TOC2, Blood gas 29.4 mmol/L    Base Excess, Blood gas -2.60 mmol/L    sO2, Blood gas 93.0 %    HCO3, Blood gas 27.2 >=15.0 mmol/L    Allens Test Yes     MODE AC     Oxygen Device, Blood gas Ventilator     FIO2, Blood gas 100 %    Mech Vt 450 ml    Mech RR 20 b/min    PEEP 14.0 cmH2O   Respiratory Culture    Collection Time: 06/15/23 10:26 AM    Specimen: Endotracheal Aspirate; Sputum   Result Value Ref Range     Respiratory Culture Many Gram-negative Rods (A)     GRAM STAIN Quality 3+     GRAM STAIN Many Gram Negative Rods     GRAM STAIN Few Gram Positive Rods     GRAM STAIN Rare Gram positive cocci    POCT glucose    Collection Time: 06/15/23 11:12 AM   Result Value Ref Range    POCT Glucose 162 (H) 70 - 110 mg/dL   POCT glucose    Collection Time: 06/15/23 12:53 PM   Result Value Ref Range    POCT Glucose 103 70 - 110 mg/dL   POCT glucose    Collection Time: 06/15/23  1:39 PM   Result Value Ref Range    POCT Glucose 98 70 - 110 mg/dL   Basic Metabolic Panel    Collection Time: 06/15/23  2:02 PM   Result Value Ref Range    Sodium Level 160 (H) 136 - 145 mmol/L    Potassium Level 5.6 (H) 3.5 - 5.1 mmol/L    Chloride 122 (H) 98 - 107 mmol/L    Carbon Dioxide 25 23 - 31 mmol/L    Glucose Level 145 (H) 82 - 115 mg/dL    Blood Urea Nitrogen 56.6 (H) 8.4 - 25.7 mg/dL    Creatinine 1.79 (H) 0.73 - 1.18 mg/dL    BUN/Creatinine Ratio 32     Calcium Level Total 8.9 8.8 - 10.0 mg/dL    Anion Gap 13.0 mEq/L    eGFR 42 mls/min/1.73/m2   POCT glucose    Collection Time: 06/15/23  3:01 PM   Result Value Ref Range    POCT Glucose 142 (H) 70 - 110 mg/dL   Basic Metabolic Panel    Collection Time: 06/15/23  3:41 PM   Result Value Ref Range    Sodium Level 155 (H) 136 - 145 mmol/L    Potassium Level 6.6 (HH) 3.5 - 5.1 mmol/L    Chloride 123 (H) 98 - 107 mmol/L    Carbon Dioxide 24 23 - 31 mmol/L    Glucose Level 152 (H) 82 - 115 mg/dL    Blood Urea Nitrogen 67.3 (H) 8.4 - 25.7 mg/dL    Creatinine 2.24 (H) 0.73 - 1.18 mg/dL    BUN/Creatinine Ratio 30     Calcium Level Total 8.3 (L) 8.8 - 10.0 mg/dL    Anion Gap 8.0 mEq/L    eGFR 32 mls/min/1.73/m2   POCT glucose    Collection Time: 06/15/23  4:49 PM   Result Value Ref Range    POCT Glucose 170 (H) 70 - 110 mg/dL   Basic Metabolic Panel    Collection Time: 06/15/23  6:50 PM   Result Value Ref Range    Sodium Level 151 (H) 136 - 145 mmol/L    Potassium Level 6.3 (HH) 3.5 - 5.1 mmol/L     Chloride 120 (H) 98 - 107 mmol/L    Carbon Dioxide 21 (L) 23 - 31 mmol/L    Glucose Level 310 (H) 82 - 115 mg/dL    Blood Urea Nitrogen 72.7 (H) 8.4 - 25.7 mg/dL    Creatinine 2.48 (H) 0.73 - 1.18 mg/dL    BUN/Creatinine Ratio 29     Calcium Level Total 8.1 (L) 8.8 - 10.0 mg/dL    Anion Gap 10.0 mEq/L    eGFR 28 mls/min/1.73/m2   Basic Metabolic Panel    Collection Time: 06/15/23  8:06 PM   Result Value Ref Range    Sodium Level 150 (H) 136 - 145 mmol/L    Potassium Level 6.4 (HH) 3.5 - 5.1 mmol/L    Chloride 119 (H) 98 - 107 mmol/L    Carbon Dioxide 21 (L) 23 - 31 mmol/L    Glucose Level 306 (H) 82 - 115 mg/dL    Blood Urea Nitrogen 70.5 (H) 8.4 - 25.7 mg/dL    Creatinine 2.45 (H) 0.73 - 1.18 mg/dL    BUN/Creatinine Ratio 29     Calcium Level Total 8.0 (L) 8.8 - 10.0 mg/dL    Anion Gap 10.0 mEq/L    eGFR 28 mls/min/1.73/m2   POCT glucose    Collection Time: 06/15/23  8:33 PM   Result Value Ref Range    POCT Glucose 264 (H) 70 - 110 mg/dL   POCT glucose    Collection Time: 06/15/23 10:18 PM   Result Value Ref Range    POCT Glucose 274 (H) 70 - 110 mg/dL   Basic Metabolic Panel    Collection Time: 06/15/23 10:23 PM   Result Value Ref Range    Sodium Level 150 (H) 136 - 145 mmol/L    Potassium Level 6.7 (HH) 3.5 - 5.1 mmol/L    Chloride 118 (H) 98 - 107 mmol/L    Carbon Dioxide 20 (L) 23 - 31 mmol/L    Glucose Level 351 (H) 82 - 115 mg/dL    Blood Urea Nitrogen 74.3 (H) 8.4 - 25.7 mg/dL    Creatinine 2.68 (H) 0.73 - 1.18 mg/dL    BUN/Creatinine Ratio 28     Calcium Level Total 7.7 (L) 8.8 - 10.0 mg/dL    Anion Gap 12.0 mEq/L    eGFR 26 mls/min/1.73/m2   POCT glucose    Collection Time: 06/16/23 12:07 AM   Result Value Ref Range    POCT Glucose 293 (H) 70 - 110 mg/dL   POCT glucose    Collection Time: 06/16/23  1:08 AM   Result Value Ref Range    POCT Glucose 317 (H) 70 - 110 mg/dL   Basic Metabolic Panel    Collection Time: 06/16/23  2:14 AM   Result Value Ref Range    Sodium Level 149 (H) 136 - 145 mmol/L     Potassium Level 6.4 (HH) 3.5 - 5.1 mmol/L    Chloride 118 (H) 98 - 107 mmol/L    Carbon Dioxide 18 (L) 23 - 31 mmol/L    Glucose Level 341 (H) 82 - 115 mg/dL    Blood Urea Nitrogen 84.4 (H) 8.4 - 25.7 mg/dL    Creatinine 3.04 (H) 0.73 - 1.18 mg/dL    BUN/Creatinine Ratio 28     Calcium Level Total 7.4 (L) 8.8 - 10.0 mg/dL    Anion Gap 13.0 mEq/L    eGFR 22 mls/min/1.73/m2   RT Blood Gas    Collection Time: 06/16/23  3:11 AM   Result Value Ref Range    Sample Type Arterial Blood     Sample site Arterial Line     Drawn by tdl rrt     pH, Blood gas 7.250 (LL) 7.350 - 7.450    pCO2, Blood gas 43.0 35.0 - 45.0 mmHg    pO2, Blood gas 196.0 (H) 80.0 - 100.0 mmHg    Sodium, Blood Gas 145 137 - 145 mmol/L    Potassium, Blood Gas 6.2 (H) 3.5 - 5.0 mmol/L    Calcium Level Ionized 1.06 (L) 1.12 - 1.23 mmol/L    TOC2, Blood gas 20.2 mmol/L    Base Excess, Blood gas -8.00 (L) -2.00 - 2.00 mmol/L    sO2, Blood gas 99.6 %    HCO3, Blood gas 18.9 (L) 22.0 - 26.0 mmol/L    THb, Blood gas 12.9 12 - 16 g/dL    O2 Hb, Blood Gas 96.4 94.0 - 97.0 %    CO Hgb 1.1 0.5 - 1.5 %    Met Hgb 1.7 (H) 0.4 - 1.5 %    MODE AC     FIO2, Blood gas 100 %    Mech Vt 450 ml    Mech RR 26 b/min    PEEP 14.0 cmH2O   Basic Metabolic Panel    Collection Time: 06/16/23  4:00 AM   Result Value Ref Range    Sodium Level 149 (H) 136 - 145 mmol/L    Potassium Level 6.4 (HH) 3.5 - 5.1 mmol/L    Chloride 116 (H) 98 - 107 mmol/L    Carbon Dioxide 17 (L) 23 - 31 mmol/L    Glucose Level 378 (H) 82 - 115 mg/dL    Blood Urea Nitrogen 81.1 (H) 8.4 - 25.7 mg/dL    Creatinine 3.21 (H) 0.73 - 1.18 mg/dL    BUN/Creatinine Ratio 25     Calcium Level Total 7.6 (L) 8.8 - 10.0 mg/dL    Anion Gap 16.0 mEq/L    eGFR 21 mls/min/1.73/m2   Magnesium    Collection Time: 06/16/23  4:00 AM   Result Value Ref Range    Magnesium Level 2.50 1.60 - 2.60 mg/dL   Phosphorus    Collection Time: 06/16/23  4:00 AM   Result Value Ref Range    Phosphorus Level 8.1 (H) 2.3 - 4.7 mg/dL   CBC with  Differential    Collection Time: 06/16/23  4:04 AM   Result Value Ref Range    WBC 18.99 (H) 4.50 - 11.50 x10(3)/mcL    RBC 5.24 4.70 - 6.10 x10(6)/mcL    Hgb 12.5 (L) 14.0 - 18.0 g/dL    Hct 42.0 42.0 - 52.0 %    MCV 80.2 80.0 - 94.0 fL    MCH 23.9 (L) 27.0 - 31.0 pg    MCHC 29.8 (L) 33.0 - 36.0 g/dL    RDW      Platelet 307 130 - 400 x10(3)/mcL    MPV      NRBC% 0.2 %   Manual Differential    Collection Time: 06/16/23  4:04 AM   Result Value Ref Range    Neut Man 84 %    Lymph Man 8 %    Monocyte Man 7 %    Basophil Man 2 %    Instr WBC 18.99 x10(3)/mcL    Abs Mono 1.3293 (H) 0.1 - 1.3 x10(3)/mcL    Abs Baso 0.3798 (H) 0 - 0.2 x10(3)/mcL    Abs Lymp 1.5192 0.6 - 4.6 x10(3)/mcL    Abs Neut 15.9516 (H) 2.1 - 9.2 x10(3)/mcL    Polychrom 1+ (A) (none)    RBC Morph Abnormal (A) Normal    Anisocyte 1+ (A) (none)    Poik 1+ (A) (none)    Macrocyte 1+ (A) (none)    Target Cell 1+ (A) (none)    Acanthocytes 1+ (A) (none)    Platelet Est Normal Normal, Adequate   POCT glucose    Collection Time: 06/16/23  4:28 AM   Result Value Ref Range    POCT Glucose 341 (H) 70 - 110 mg/dL     Telemetry: ST    Physical Exam  Vitals reviewed.   Constitutional:       General: He is not in acute distress.     Appearance: He is ill-appearing.      Comments: Vented/Not Sedated   HENT:      Head: Normocephalic.      Mouth/Throat:      Mouth: Mucous membranes are dry.   Eyes:      Conjunctiva/sclera: Conjunctivae normal.   Cardiovascular:      Rate and Rhythm: Tachycardia present. Rhythm irregular.      Comments: ST PVCs  Pulmonary:      Effort: No respiratory distress.      Breath sounds: Rhonchi and rales present.      Comments: Ventilator Associate Breath Sounds  Vent Mode: A/C  Oxygen Concentration (%):  [] 60  Resp Rate Total:  [28 br/min-41 br/min] 41 br/min  Vt Set:  [450 mL] 450 mL  PEEP/CPAP:  [14 cmH20] 14 cmH20  Mean Airway Pressure:  [15 avK93-94 cmH20] 18 cmH20  Musculoskeletal:      Right lower leg: No edema.      Left lower  leg: No edema.   Skin:     General: Skin is dry.   Neurological:      Comments: Following Simple Commands     Current Inpatient Medications:    Current Facility-Administered Medications:     0.9%  NaCl infusion (for blood administration), , Intravenous, Q24H PRN, Xochitl Grossman MD    0.9%  NaCl infusion (for blood administration), , Intravenous, Q24H PRN, Xochitl Grossman MD    acetaminophen oral solution 650 mg, 650 mg, Per OG tube, Q6H PRN, JONNY Batista, 650 mg at 06/15/23 0846    aspirin chewable tablet 81 mg, 81 mg, Per OG tube, Daily, JONNY Batista, 81 mg at 06/15/23 0839    baclofen tablet 10 mg, 10 mg, Per OG tube, TID PRN, JONNY Batista    chlorhexidine 0.12 % solution 15 mL, 15 mL, Mouth/Throat, BID, Eliot Murphy, , 15 mL at 06/15/23 2014    dexAMETHasone injection 4 mg, 4 mg, Intravenous, Daily, Robert Landis MD, 4 mg at 06/15/23 0838    dextrose 10 % infusion, , Intravenous, PRN, Eliot Deanf, DO    dextrose 10 % infusion, , Intravenous, PRN, Eliot Murphy, DO    dextrose 10% bolus 125 mL 125 mL, 12.5 g, Intravenous, PRN, REILLY Saunders MD    dextrose 10% bolus 125 mL 125 mL, 12.5 g, Intravenous, PRN, CHINEDU MoratayaP    dextrose 10% bolus 250 mL 250 mL, 25 g, Intravenous, PRN, REILLY Saunders MD    dextrose 10% bolus 250 mL 250 mL, 25 g, Intravenous, PRN, Jesica Arthur, CHINEDUP    dextrose 5 % and 0.45 % NaCl infusion, , Intravenous, Continuous PRN, Eliot Murphy,     dextrose 5 % and 0.45 % NaCl infusion, , Intravenous, Continuous PRN, Eliot Murphy, DO    docusate 50 mg/5 mL liquid 100 mg, 100 mg, Per G Tube, Daily, JONNY Batista, 100 mg at 06/14/23 0910    enoxaparin injection 40 mg, 40 mg, Subcutaneous, Q24H (prophylaxis, 1700), REILLY Saunders MD, 40 mg at 06/15/23 1654    etomidate injection 20 mg, 20 mg, Intravenous, Once, REILLY Saunders MD    famotidine (PF) injection 20 mg, 20 mg, Intravenous, Q12H, JONNY Batista,  20 mg at 06/15/23 2021    [] fentaNYL 50 mcg/mL injection 50 mcg, 50 mcg, Intravenous, Q15 Min PRN **FOLLOWED BY** fentaNYL 50 mcg/mL injection 50 mcg, 50 mcg, Intravenous, Q1H PRN, lEiot Murphy DO    fluticasone propionate 50 mcg/actuation nasal spray 100 mcg, 2 spray, Each Nostril, Daily, REILLY Saunders MD, 100 mcg at 23 0911    folic acid tablet 1 mg, 1 mg, Per OG tube, Daily, JONNY Batista, 1 mg at 06/15/23 0839    furosemide injection 40 mg, 40 mg, Intravenous, Q12H, BRISEYDA Penaloza, 40 mg at 06/15/23 0838    glucose chewable tablet 16 g, 16 g, Per OG tube, PRN, JONNY Batista    glucose chewable tablet 16 g, 16 g, Oral, PRN, Jesica Arthur, BRISEYDA    glucose chewable tablet 24 g, 24 g, Per OG tube, PRN, JONNY Batista    glucose chewable tablet 28 g, 28 g, Oral, PRN, Jesica Arthur, CHINEDUP    guaiFENesin 100 mg/5 ml syrup 200 mg, 200 mg, Oral, Q4H PRN, Robert Landis MD, 200 mg at 23 0909    HYDROcodone-acetaminophen 5-325 mg per tablet 1 tablet, 1 tablet, Per OG tube, Q4H PRN, JONNY Batista, 1 tablet at 23 1315    insulin regular in 0.9 % NaCl 100 unit/100 mL (1 unit/mL) infusion, 0-0.2 Units/kg/hr (Dosing Weight), Intravenous, Continuous, Eliot Murphy DO, Last Rate: 0.9 mL/hr at 06/15/23 1340, 0.01 Units/kg/hr at 06/15/23 1340    lactulose 10 gram/15 ml solution 20 g, 20 g, Per OG tube, Q6H PRN, JONNY Batista    loperamide capsule 2 mg, 2 mg, Per OG tube, Continuous PRN, JONNY Batista    metoclopramide HCl injection 5 mg, 5 mg, Intravenous, Q6H PRN, JONNY Batista    metoprolol tartrate (LOPRESSOR) tablet 100 mg, 100 mg, Per OG tube, BID, BRISEYDA Penaloza, 100 mg at 06/15/23 0839    milrinone 20mg in D5W 100 mL infusion, 0.375 mcg/kg/min (Dosing Weight), Intravenous, Continuous, Xochitl Grossman MD, Stopped at 23 1700    NORepinephrine 8 mg in dextrose 5% 250 mL infusion, 0-3 mcg/kg/min (Dosing Weight),  Intravenous, Continuous, Eliot Murphy DO, Last Rate: 21.1 mL/hr at 06/16/23 0216, 0.13 mcg/kg/min at 06/16/23 0216    ondansetron injection 4 mg, 4 mg, Intravenous, Q4H PRN, JONNY Batista    oxyCODONE immediate release tablet Tab 10 mg, 10 mg, Per OG tube, Q4H PRN, JONNY Batista, 10 mg at 06/09/23 0659    perflutren lipid microspheres injection 1.3 mL, 1.3 mL, Intravenous, Once, REILLY Saunders MD    piperacillin-tazobactam (ZOSYN) 4.5 g in dextrose 5 % in water (D5W) 5 % 100 mL IVPB (MB+), 4.5 g, Intravenous, Q8H, Robert Landis MD, Last Rate: 25 mL/hr at 06/16/23 0421, 4.5 g at 06/16/23 0421    potassium chloride 10 mEq in 100 mL IVPB, 40 mEq, Intravenous, PRN **AND** potassium chloride 10 mEq in 100 mL IVPB, 60 mEq, Intravenous, PRN **AND** potassium chloride 10 mEq in 100 mL IVPB, 80 mEq, Intravenous, PRN, Eliot Murphy,     propofol (DIPRIVAN) 10 mg/mL infusion, 0-50 mcg/kg/min (Dosing Weight), Intravenous, Continuous, Eliot Murphy, , Last Rate: 13 mL/hr at 06/15/23 0947, 25 mcg/kg/min at 06/15/23 0947    sodium chloride 0.9% flush 10 mL, 10 mL, Intravenous, PRN, Eliot Deanf, DO    sodium chloride 0.9% flush 10 mL, 10 mL, Intravenous, PRN, Eliot Deanf, DO    Flushing PICC Protocol, , , Until Discontinued **AND** sodium chloride 0.9% flush 10 mL, 10 mL, Intravenous, Q6H, 10 mL at 06/16/23 0610 **AND** sodium chloride 0.9% flush 10 mL, 10 mL, Intravenous, PRN, Alejandro Allred MD    Pharmacy to dose Vancomycin consult, , , Once **AND** vancomycin - pharmacy to dose, , Intravenous, pharmacy to manage frequency, BRISEYAD Morataya    vancomycin 1.5 g in dextrose 5 % 250 mL IVPB (ready to mix), 1,500 mg, Intravenous, Q12H, Robert Landis MD, Stopped at 06/16/23 0346    vasopressin (PITRESSIN) 0.2 Units/mL in dextrose 5 % (D5W) 100 mL infusion, 0.04 Units/min, Intravenous, Continuous, BRISEYDA Purvis, Last Rate: 12 mL/hr at 06/16/23 0216, 0.04 Units/min  "at 06/16/23 0216    VTE Risk Mitigation (From admission, onward)           Ordered     enoxaparin injection 40 mg  Every 24 hours         06/06/23 0957                  Assessment:   Acute Hypoxemic Respiratory Failure Requiring Intubation/Mechanical Ventilation    - Multiple Re Intubations   Sinus Tachycardia with Left Bundle Branch Block - Suspect Driven by Acute Respiratory Decompensation (Now SR)    - No Pericardial Effusion (ECHO 6.12.23)  Sepsis secondary to Suspected Aspiration Event with Associated Hypoxemic Respiratory Failure  Chronic Left Bundle Branch Block  CAD/CABG    - LIMA to the LAD, rSVG to OM1, rSVG to PDA  Ischemic Cardiomyopathy    - EF 25%    - LifeVest  Suspected Pneumonia    - Febrile  Leukocytosis (Persistent)  Hypotension requiring Pressors     - Hx of Hypertension  Hyperlipidemia  PAD    - The right lower extremity demonstrated severe arterial flow reduction consistent with an occluded superficial femoral, popliteal, and tibial arteries.     - The left lower extremity demonstrated severe arterial flow reduction consistent with an occluded superficial femoral, and tibial arteries with reconstitution of flow in the popliteal artery.   Nicotine Dependence/Alcohol Use  History of CVA  Elevated Hepatic Enzymes (Improving)  ED  Delirium Possible ICU Psychosis (Stable)  Ace Inhibitor Allergy "Facial Edema" (Tolerates Entresto)  Dysphagia Requiring NG Tube for Medications/Nutrition  Acute Kidney Injury/Hyperkalemia  Physical Deconditioning    Plan:   Vent Management as per ICU Team  Wean Pressors for MAP > 65mmHg  Antibiotic Management as per Primary Team  Goals of Care Discussion as per ICU Team  Statin on Hold given Elevated Liver Enzymes  Continue Supportive Care  Prognosis appears Guarded  Will F/U on Monday (6.19.23)    Tone Decker, ARANZA  Cardiology  [facility] - [facility]  06/16/2023     I have seen the patient, reviewed the Nurse Practitioner's note, assessment and plan. I " have personally interviewed and examined the patient at bedside and agree with the findings. Medical decision making listed above were done under my guidance.    Physical exam:  Remains intubated on mechanical ventilation  Not sedated  Cardiovascular system: irregular rhythm and tachycardic, no murmur.  Lungs: Coarse anteriorly, bilateral crackles  Extremities: No leg edema.    Plan:  On Levophed and insulin drip  Renal function worsening may need HD  Tele showed sinus tachycardia with intermittent PVCs  Prognosis remains guarded  Will p.r.n. over the weekend we will see back on Monday

## 2023-06-16 NOTE — CONSULTS
Nephrology Initial Consult Note    Patient Name: Tom Anna  Age: 67 y.o.  : 1955  MRN: [id number]  Admission Date: 2023    Reason for Consult:      Acute kidney injury   Fito Chaudhry MD    HPI:     Tom Anna is a 67 y.o. male with coronary artery disease.  Past medical history significant for CHF, coronary artery disease, hypertension, GERD, hyperlipidemia, CVA, and tobacco abuse.  He presented to [facility] on 2023 for coronary artery disease and underwent CABG x3. His hospital course has been complicated by respiratory failure. He hes been intubated twice during this admission. He has had fairly normal renal function up until 6/15/23. He developed worsening respiratory status and hypotension requiring pressors. His labs are remarkable for hyperkalemia, metabolic acidosis and FLETCHER. Per nursing he is also anuric, only making 5-10ml UOP per hour. Currently intubated and on levophed. Family are at bedside and say they would like dialysis if needed.         Current Facility-Administered Medications   Medication Dose Route Frequency Provider Last Rate Last Admin    0.9%  NaCl infusion (for blood administration)   Intravenous Q24H PRN Xochitl Grossman MD        0.9%  NaCl infusion (for blood administration)   Intravenous Q24H PRN Xochitl Grossman MD        acetaminophen oral solution 650 mg  650 mg Per OG tube Q6H PRN JONNY Batista   650 mg at 06/15/23 0846    aspirin chewable tablet 81 mg  81 mg Per OG tube Daily JONNY Batista   81 mg at 23 0828    baclofen tablet 10 mg  10 mg Per OG tube TID PRN JONNY Batista        chlorhexidine 0.12 % solution 15 mL  15 mL Mouth/Throat BID Eliot Murphy, DO   15 mL at 23 0825    dexAMETHasone injection 4 mg  4 mg Intravenous Daily Robert Landis MD   4 mg at 23 0825    dexmedetomidine (PRECEDEX) 400mcg/100mL 0.9% NaCL infusion  0-1.4 mcg/kg/hr Intravenous Continuous Herve Sue MD         dextrose 10 % infusion   Intravenous PRN Eliot Le Jeffrey, DO        dextrose 10 % infusion   Intravenous PRN Eliot Lambsuf, DO        dextrose 10% bolus 125 mL 125 mL  12.5 g Intravenous PRN WJoseph Saunders MD        dextrose 10% bolus 125 mL 125 mL  12.5 g Intravenous PRN Jesica Arthur, FNP        dextrose 10% bolus 250 mL 250 mL  25 g Intravenous PRN WJoseph Saunders MD        dextrose 10% bolus 250 mL 250 mL  25 g Intravenous PRN Jesica Arthur, FNP        dextrose 5 % and 0.45 % NaCl infusion   Intravenous Continuous PRN Eliot Lambsuf, DO        dextrose 5 % and 0.45 % NaCl infusion   Intravenous Continuous PRN Eliot Deanf, DO        docusate 50 mg/5 mL liquid 100 mg  100 mg Per G Tube Daily JONNY Batista   100 mg at 06/16/23 0825    enoxaparin injection 40 mg  40 mg Subcutaneous Q24H (prophylaxis, 1700) WJoseph Saunders MD   40 mg at 06/15/23 1654    etomidate injection 20 mg  20 mg Intravenous Once W. Alok Saunders MD        famotidine (PF) injection 20 mg  20 mg Intravenous Q12H JONNY Batista   20 mg at 06/16/23 0828    fentaNYL 50 mcg/mL injection 50 mcg  50 mcg Intravenous Q1H PRN Eliot Murphy, DO        fluticasone propionate 50 mcg/actuation nasal spray 100 mcg  2 spray Each Nostril Daily WJoseph Saunders MD   100 mcg at 06/14/23 0911    folic acid tablet 1 mg  1 mg Per OG tube Daily JONNY Batista   1 mg at 06/16/23 0828    furosemide injection 40 mg  40 mg Intravenous Q12H Rein MONIE Hallman, FNP   40 mg at 06/15/23 0838    glucose chewable tablet 16 g  16 g Per OG tube PRN JONNY Batista        glucose chewable tablet 16 g  16 g Oral PRN Jesica Arthur, FNP        glucose chewable tablet 24 g  24 g Per OG tube PRN JONNY Batista        glucose chewable tablet 28 g  28 g Oral PRN Jesica Arthur, FNP        guaiFENesin 100 mg/5 ml syrup 200 mg  200 mg Oral Q4H PRN Robert Landis MD   200 mg at 06/14/23 0909    HYDROcodone-acetaminophen  5-325 mg per tablet 1 tablet  1 tablet Per OG tube Q4H PRN JONNY Batista   1 tablet at 06/12/23 1315    insulin regular in 0.9 % NaCl 100 unit/100 mL (1 unit/mL) infusion  0-0.2 Units/kg/hr (Dosing Weight) Intravenous Continuous Eliot Murphy, DO 8.7 mL/hr at 06/16/23 0841 0.1 Units/kg/hr at 06/16/23 0841    lactulose 10 gram/15 ml solution 20 g  20 g Per OG tube Q6H PRN JONNY Batista        loperamide capsule 2 mg  2 mg Per OG tube Continuous PRN JONNY Batista        metoclopramide HCl injection 5 mg  5 mg Intravenous Q6H PRN JONNY Batista        metoprolol tartrate (LOPRESSOR) tablet 100 mg  100 mg Per OG tube BID Rein T Lexx, FNP   100 mg at 06/15/23 0839    milrinone 20mg in D5W 100 mL infusion  0.375 mcg/kg/min (Dosing Weight) Intravenous Continuous Xochitl Grossman MD   Stopped at 06/09/23 1700    NORepinephrine 8 mg in dextrose 5% 250 mL infusion  0-3 mcg/kg/min (Dosing Weight) Intravenous Continuous Eliot Murphy, DO 21.1 mL/hr at 06/16/23 0216 0.13 mcg/kg/min at 06/16/23 0216    ondansetron injection 4 mg  4 mg Intravenous Q4H PRN JONNY Batista        oxyCODONE immediate release tablet Tab 10 mg  10 mg Per OG tube Q4H PRN JONNY Batista   10 mg at 06/09/23 0659    perflutren lipid microspheres injection 1.3 mL  1.3 mL Intravenous Once W. Alok Saunders MD        piperacillin-tazobactam (ZOSYN) 4.5 g in dextrose 5 % in water (D5W) 5 % 100 mL IVPB (MB+)  4.5 g Intravenous Q8H Robert Landis MD   Stopped at 06/16/23 0821    potassium chloride 10 mEq in 100 mL IVPB  40 mEq Intravenous PRN Eliot Murphy DO        And    potassium chloride 10 mEq in 100 mL IVPB  60 mEq Intravenous PRN Eliot Murphy DO        And    potassium chloride 10 mEq in 100 mL IVPB  80 mEq Intravenous PRN Eliot Murphy, DO        propofol (DIPRIVAN) 10 mg/mL infusion  0-50 mcg/kg/min (Dosing Weight) Intravenous Continuous Eliot Murphy, DO 13 mL/hr at  06/15/23 0947 25 mcg/kg/min at 06/15/23 0947    sodium chloride 0.9% flush 10 mL  10 mL Intravenous PRN Eliot Murphy, DO        sodium chloride 0.9% flush 10 mL  10 mL Intravenous PRN Eliot Murphy, DO        sodium chloride 0.9% flush 10 mL  10 mL Intravenous Q6H Alejandro Allred MD   10 mL at 06/16/23 0610    And    sodium chloride 0.9% flush 10 mL  10 mL Intravenous PRN Alejandro Allred MD        sodium zirconium cyclosilicate packet 10 g  10 g Oral TID Te Clark, DO        vancomycin - pharmacy to dose   Intravenous pharmacy to manage frequency Jesica Arthur, BRISEYDA        vasopressin (PITRESSIN) 0.2 Units/mL in dextrose 5 % (D5W) 100 mL infusion  0.04 Units/min Intravenous Continuous Amanda Mcclellan, FNP 12 mL/hr at 06/16/23 0216 0.04 Units/min at 06/16/23 0216       Primary Doctor No    Past Medical History:   Diagnosis Date    Allergies     CHF (congestive heart failure)     Coronary artery disease     Essential (primary) hypertension     Fatigue     GERD (gastroesophageal reflux disease)     HLD (hyperlipidemia)     Personal history of colonic polyps     SOB (shortness of breath) on exertion     Stroke     Tobacco abuse     Weakness of both legs       Past Surgical History:   Procedure Laterality Date    COLONOSCOPY W/ BIOPSIES AND POLYPECTOMY  01/20/2020    Dr. Alf Covington    CORONARY ARTERY BYPASS GRAFT (CABG) N/A 6/1/2023    Procedure: CORONARY ARTERY BYPASS GRAFT (CABG);  Surgeon: Xochitl Grossman MD;  Location: [facility];  Service: Cardiothoracic;  Laterality: N/A;    ENDOSCOPY  01/20/2020    ESOPHAGOGASTRODUODENOSCOPY      PLANTAR'S WART EXCISION  07/17/2017      Family History   Problem Relation Age of Onset    Hypertension Mother     Depression Mother      Social History     Tobacco Use    Smoking status: Every Day     Packs/day: 1.00     Types: Cigarettes    Smokeless tobacco: Never   Substance Use Topics    Alcohol use: Yes     Comment: Beer 6pk daily     Medications Prior to  Admission   Medication Sig Dispense Refill Last Dose    albuterol (VENTOLIN HFA) 90 mcg/actuation inhaler Inhale 1 puff into the lungs every 12 (twelve) hours as needed for Wheezing or Shortness of Breath. Rescue 18 g 6 5/31/2023    aspirin (ECOTRIN) 81 MG EC tablet Take 81 mg by mouth once daily.   5/31/2023 at 0800    atorvastatin (LIPITOR) 80 MG tablet Take 80 mg by mouth every evening.   5/31/2023 at 2000    baclofen (LIORESAL) 10 MG tablet Take 1 tablet (10 mg total) by mouth 3 (three) times daily as needed (muscle spasms, hiccups). 90 tablet 4 5/31/2023 at 2000    cetirizine 10 mg chewable tablet Take 10 mg by mouth once daily.   5/31/2023 at 0800    clopidogreL (PLAVIX) 75 mg tablet Take 75 mg by mouth.   5/25/2023    ENTRESTO 49-51 mg per tablet Take 1 tablet by mouth 2 (two) times daily.   5/31/2023 at 2000    fluticasone propionate (FLONASE) 50 mcg/actuation nasal spray 1 spray (50 mcg total) by Each Nostril route once daily. 16 g 6 5/31/2023 at 0800    furosemide (LASIX) 20 MG tablet Take 20 mg by mouth once daily.   5/31/2023 at 0800    metoprolol succinate (TOPROL-XL) 100 MG 24 hr tablet Take 100 mg by mouth once daily.   6/1/2023 at 0600    omeprazole (PRILOSEC) 20 MG capsule Take 1 capsule (20 mg total) by mouth once daily. 90 capsule 1 5/31/2023 at 0800    spironolactone (ALDACTONE) 25 MG tablet Take 25 mg by mouth once daily.   5/31/2023 at 0800    amLODIPine (NORVASC) 10 MG tablet Take 1 tablet (10 mg total) by mouth once daily. 90 tablet 1     ketoconazole (NIZORAL) 2 % cream Apply topically 2 (two) times daily. (Patient not taking: Reported on 5/24/2023) 60 g 2     losartan (COZAAR) 50 MG tablet Take 1 tablet (50 mg total) by mouth once daily. 90 tablet 1     lovastatin (MEVACOR) 10 MG tablet Take 1 tablet (10 mg total) by mouth nightly. (Patient not taking: Reported on 5/24/2023) 90 tablet 1     multivitamin capsule Take 1 capsule by mouth once daily.       sildenafiL (VIAGRA) 100 MG tablet Take  1 tablet (100 mg total) by mouth as needed for Erectile Dysfunction. 15 tablet 6 5/29/2023     Review of patient's allergies indicates:   Allergen Reactions    Ace inhibitors Swelling     Facial edema            Review of Systems:     Unable to obtain ROS due to mental status/intubation.       Objective:       VITAL SIGNS: 24 HR MIN & MAX LAST    Temp  Min: 98 °F (36.7 °C)  Max: 101.5 °F (38.6 °C)  98 °F (36.7 °C)        BP  Min: 51/27  Max: 151/54  (!) 51/27     Pulse  Min: 63  Max: 221  95     Resp  Min: 20  Max: 46  (!) 41    SpO2  Min: 80 %  Max: 100 %  95 %      GEN: Ill appearing AAM  HEENT: ET tube in place  CV: RRR +S1,S2 without murmur  PULM: bilateral ronchi, mechanical breath sounds  ABD: Soft, NT/ND abdomen with NABS  EXT: No cyanosis or edema  SKIN: Warm and dry  PSYCH: Unresponsive  Vascular access: NO HD access            Component Value Date/Time     (H) 06/16/2023 0844     (H) 06/16/2023 0400    K 6.2 (H) 06/16/2023 0844    K 6.4 (HH) 06/16/2023 0400    CHLORIDE 116 (H) 06/16/2023 0844    CHLORIDE 116 (H) 06/16/2023 0400    CO2 17 (L) 06/16/2023 0844    CO2 17 (L) 06/16/2023 0400    BUN 94.8 (H) 06/16/2023 0844    BUN 81.1 (H) 06/16/2023 0400    CREATININE 3.41 (H) 06/16/2023 0844    CREATININE 3.21 (H) 06/16/2023 0400    CALCIUM 7.2 (L) 06/16/2023 0844    CALCIUM 7.6 (L) 06/16/2023 0400    PHOS 8.1 (H) 06/16/2023 0400            Component Value Date/Time    WBC 18.99 (H) 06/16/2023 0404    WBC 22.23 (H) 06/15/2023 0313    HGB 12.5 (L) 06/16/2023 0404    HGB 15.6 06/15/2023 0313    HCT 42.0 06/16/2023 0404    HCT 51.5 06/15/2023 0313    HCT 21 (L) 06/01/2023 1113    HCT 26 (L) 06/01/2023 1051     06/16/2023 0404     06/15/2023 0313             CT Head Without Contrast   Final Result      1.  No definite acute intracranial findings identified.      2.  Old infarcts, severe chronic microvascular ischemia and atrophy.         Electronically signed by: Nav Geronimo    Date:    06/15/2023   Time:    16:28      X-Ray Chest 1 View   Final Result      NO ACUTE CARDIOPULMONARY PROCESS IDENTIFIED.         Electronically signed by: Nav Geronimo   Date:    06/15/2023   Time:    14:29      X-Ray Chest AP Portable   Final Result      Interval intubation.  No other significant change.         Electronically signed by: Skip Sam   Date:    06/15/2023   Time:    14:24      X-Ray Chest 1 View   Final Result      CT Chest Without Contrast   Final Result      1. Improving aeration of the lungs compared to previous CT.  There are residual interstitial opacities in the same distribution as previously seen ground-glass and consolidative densities.   2. Dependently layering pleural effusions, small on the left, trace on the right.  Adjacent left lower lobe atelectasis   3. Opacification of the right lower lobe bronchus may be related to secretions or aspiration   4. Postsurgical changes of sternotomy.  Interval removal of anterior mediastinal and pleural drains.  There is trace retrosternal fluid without organized, drainable collection.         Electronically signed by: Debbie Sheth   Date:    06/14/2023   Time:    11:32      X-Ray Chest 1 View   Final Result      No significant change.      Interval removal of left-sided PICC line         Electronically signed by: Jesse Mancia   Date:    06/14/2023   Time:    09:06      XR Gastric tube check, non-radiologist performed   Final Result      Optimal placement of the enteric tube.         Electronically signed by: Nav Geronimo   Date:    06/11/2023   Time:    09:31      X-Ray Chest 1 View   Final Result      1.  Improved left upper lung lobe opacities.      2.  Progressed left pleural effusion.         Electronically signed by: Nav Geronimo   Date:    06/11/2023   Time:    09:43      X-Ray Chest 1 View for Line/Tube Placement   Final Result      X-Ray Chest 1 View   Final Result      Interval intubation.         Electronically signed  by: Jed Grover   Date:    06/07/2023   Time:    11:42      X-Ray Chest 1 View   Final Result      Interval removal of the left chest tube.  Otherwise, no significant interval change.         Electronically signed by: Nav Geronimo   Date:    06/07/2023   Time:    07:15      X-Ray Chest 1 View   Final Result      No significant change.         Electronically signed by: Skip Sam   Date:    06/06/2023   Time:    07:27      X-Ray Chest 1 View   Final Result      Persistent interstitial and confluent alveolar opacities within the lungs, similar to previous.         Electronically signed by: Debbie Sheth   Date:    06/05/2023   Time:    09:35      Fl Modified Barium Swallow Speech   Final Result      X-Ray Chest AP Portable   Final Result      As above.         Electronically signed by: Jed Grover   Date:    06/04/2023   Time:    08:16      X-Ray Chest AP Portable   Final Result      As above.         Electronically signed by: Jed Grover   Date:    06/03/2023   Time:    08:39      CTA Chest Non-Coronary (PE Studies)   Final Result   Impression:      1. No filling defects are seen in the pulmonary arteries to suggest pulmonary embolus.      2. Mild emphysematous changes are seen in the lungs. Large ill-defined confluent airspace opacities are seen throughout both lungs, right greater than left. There is associated interlobular septal thickening. This likely reflects bilateral multifocal pneumonia with the possibility of intrapulmonary hemorrhagic component not excluded. There is also atelectasis/consolidation in the right lower lobe. Correlate clinically as regards further evaluation and followup.      3. The liver is slightly nodular in contour. An elelment of cirrhosis cannot be excluded.      4. Details and other findings as discussed above.         Electronically signed by: Jed Grover   Date:    06/03/2023   Time:    09:53      X-Ray Chest 1 View   Final Result      Lines and tubes as above with  decreased aeration lungs with enlarging right-sided effusion.         Electronically signed by: Cleve Villarreal MD   Date:    06/02/2023   Time:    21:38      X-Ray Chest AP Portable   Final Result      Interval removal of endotracheal tube and pulmonary artery catheter.  Otherwise stable exam.         Electronically signed by: Lupe Weinberg   Date:    06/02/2023   Time:    06:03      X-Ray Chest AP Portable   Final Result      Expected postop CABG chest.         Electronically signed by: Skip Sam   Date:    06/01/2023   Time:    13:59          Assessment / Plan:       Active Hospital Problems    Diagnosis  POA    Hx of CABG [Z95.1]  Not Applicable     Patient is now status post coronary artery bypass grafting.  Postoperative course was complicated by mental status changes and necessity for re-intubation.        Resolved Hospital Problems   No resolved problems to display.       Acute kidney injury - Baseline Cr 0.8-1  Hyperkalemia  Metabolic acidosis  Shock - Sepsis vs cardiogenic shock. On levophed.  HFrEF    Plan:  Renal function has significantly declined over the last 2 days. Labs with hyperkalemia and metabolic acidosis. He is making very little UOP per nursing. I anticipate he will require dialysis soon. Will give lokelma to see if it can control hyperkalemia, however, if not improving, will plan to start HD this afternoon. I spoke with his family and they would like dialysis if needed.     Thank you for the consult. Will follow.     Te Clark DO  Nephrology  [facility]  Clinic number: [phone]

## 2023-06-16 NOTE — PROGRESS NOTES
[facility] - [facility] ICU  Pulmonary Critical Care Note    Patient Name: Tom Anna  MRN: [id number]  Admission Date: 6/1/2023  Hospital Length of Stay: 15 days  Code Status: DNR  Attending Provider: Alejandro Allred MD  Primary Care Provider: Primary Doctor No     Subjective:     HPI:   67-year-old with a history of severe coronary artery disease mitral regurgitation, and ejection fraction 20-25% underwent CABG by Dr. Grossman on 6/1/2023.  Per Dr. Felix's notes CABG x3 with a LIMA to the LAD saphenous vein graft to OM1 and PDA.  Left atrial appendage was clipped with size 45 atrial clip.    Hospital Course/Significant events:  6/2 - Increased O2 requirements suspected aspiration placed on BiPAP and reupgraded to ICU.  6/4 - Downgraded to the floor but became more hypoxic and transferred back to ICU.  6/7 - Intubated  6/10/2023 - Extubated  6/12/23 - downgraded  6/14 increasing o2 requirements with fever and increasing o2 requirements      24 Hour Interval History:  Fever curve improved overnight, with T-max 100.5°.  Oxygen saturations and ventilator settings improved overnight, saturations now mid 90s on 60% FiO2.  Leukocytosis improved from yesterday.  Respiratory culture 06/15/2023 with growth of Gram-negative rods.  Vasopressor requirements improving. Persistent hyperkalemia remains.  Mental status has begun to improve and patient now following commands.        ROS unobtainable 2/2 intubation, altered mental status.         Past Medical History:   Diagnosis Date    Allergies     CHF (congestive heart failure)     Coronary artery disease     Essential (primary) hypertension     Fatigue     GERD (gastroesophageal reflux disease)     HLD (hyperlipidemia)     Personal history of colonic polyps     SOB (shortness of breath) on exertion     Stroke     Tobacco abuse     Weakness of both legs        Past Surgical History:   Procedure Laterality Date    COLONOSCOPY W/ BIOPSIES AND POLYPECTOMY  01/20/2020     Dr. Alf Covington    CORONARY ARTERY BYPASS GRAFT (CABG) N/A 6/1/2023    Procedure: CORONARY ARTERY BYPASS GRAFT (CABG);  Surgeon: Xochitl Grossman MD;  Location: [facility] OR;  Service: Cardiothoracic;  Laterality: N/A;    ENDOSCOPY  01/20/2020    ESOPHAGOGASTRODUODENOSCOPY      PLANTAR'S WART EXCISION  07/17/2017       Social History     Socioeconomic History    Marital status:     Number of children: 4   Occupational History    Occupation: Disabled   Tobacco Use    Smoking status: Every Day     Packs/day: 1.00     Types: Cigarettes    Smokeless tobacco: Never   Substance and Sexual Activity    Alcohol use: Yes     Comment: Beer 6pk daily    Drug use: Never     Social Determinants of Health     Food Insecurity: Unknown    Worried About Running Out of Food in the Last Year: Never true   Transportation Needs: Unknown    Lack of Transportation (Medical): No   Housing Stability: Unknown    Unable to Pay for Housing in the Last Year: No       Current Outpatient Medications   Medication Instructions    albuterol (VENTOLIN HFA) 90 mcg/actuation inhaler 1 puff, Inhalation, Every 12 hours PRN, Rescue    amLODIPine (NORVASC) 10 mg, Oral, Daily    aspirin (ECOTRIN) 81 mg, Oral, Daily    atorvastatin (LIPITOR) 80 mg, Oral, Nightly    baclofen (LIORESAL) 10 mg, Oral, 3 times daily PRN    cetirizine 10 mg, Oral, Daily    clopidogreL (PLAVIX) 75 mg, Oral    ENTRESTO 49-51 mg per tablet 1 tablet, Oral, 2 times daily    fluticasone propionate (FLONASE) 50 mcg, Each Nostril, Daily    furosemide (LASIX) 20 mg, Oral, Daily    ketoconazole (NIZORAL) 2 % cream Topical (Top), 2 times daily    losartan (COZAAR) 50 mg, Oral, Daily    lovastatin (MEVACOR) 10 mg, Oral, Nightly    metoprolol succinate (TOPROL-XL) 100 mg, Oral, Daily    multivitamin capsule 1 capsule, Oral, Daily    omeprazole (PRILOSEC) 20 mg, Oral, Daily    sildenafiL (VIAGRA) 100 mg, Oral, As needed (PRN)    spironolactone (ALDACTONE) 25 mg, Oral, Daily       Current  Inpatient Medications   aspirin  81 mg Per OG tube Daily    chlorhexidine  15 mL Mouth/Throat BID    dexAMETHasone  4 mg Intravenous Daily    docusate  100 mg Per G Tube Daily    enoxparin  40 mg Subcutaneous Q24H (prophylaxis, 1700)    etomidate  20 mg Intravenous Once    famotidine (PF)  20 mg Intravenous Q12H    fluticasone propionate  2 spray Each Nostril Daily    folic acid  1 mg Per OG tube Daily    furosemide (LASIX) injection  40 mg Intravenous Q12H    metoprolol tartrate  100 mg Per OG tube BID    perflutren lipid microspheres  1.3 mL Intravenous Once    piperacillin-tazobactam (Zosyn) IV (PEDS and ADULTS) (extended infusion is not appropriate)  4.5 g Intravenous Q8H    sodium chloride 0.9%  10 mL Intravenous Q6H    vancomycin (VANCOCIN) IVPB  1,500 mg Intravenous Q12H       Current Intravenous Infusions   dextrose 5 % and 0.45 % NaCl      dextrose 5 % and 0.45 % NaCl      insulin regular 1 units/mL infusion orderable (DKA) 0.1 Units/kg/hr (06/16/23 0841)    loperamide      milrinone Stopped (06/09/23 1700)    NORepinephrine bitartrate-D5W 0.13 mcg/kg/min (06/16/23 0216)    propofoL 25 mcg/kg/min (06/15/23 0947)    vasopressin 0.04 Units/min (06/16/23 0216)             Objective:       Intake/Output Summary (Last 24 hours) at 6/16/2023 0851  Last data filed at 6/16/2023 0639  Gross per 24 hour   Intake 2201.8 ml   Output 770 ml   Net 1431.8 ml       Vital Signs (Most Recent):  Temp: 98.9 °F (37.2 °C) (06/16/23 0400)  Pulse: 91 (06/16/23 0646)  Resp: (!) 37 (06/16/23 0646)  BP: (!) 83/59 (06/16/23 0100)  SpO2: 98 % (06/16/23 0646)  Body mass index is 27.99 kg/m².  Weight: 88.5 kg (195 lb 1.6 oz) Vital Signs (24h Range):  Temp:  [98.4 °F (36.9 °C)-101.5 °F (38.6 °C)] 98.9 °F (37.2 °C)  Pulse:  [] 91  Resp:  [20-49] 37  SpO2:  [80 %-100 %] 98 %  BP: ()/(37-81) 83/59  Arterial Line BP: ()/(50-73) 98/50         Physical Exam  Gen: intubated, following commands  HENT- ATNC, MMM  CV- RRR  Resp-  CTAB, tachypneic, oxygen saturations 94% on 60%/14 PEEP  MSK- WWP, no LE edema  Neuro- awake, follows commands  Psych- unable to assess 2/2 intubation, AMS         Lines/Drains/Airways       Peripherally Inserted Central Catheter Line  Duration             PICC Triple Lumen 06/15/23 1319 right brachial <1 day              Drain  Duration                  Rectal Tube 06/09/23 0800 rectal tube w/ balloon (indicate number of mLs) 7 days         NG/OG Tube 06/11/23 0830 nasogastric 16 Fr. Right nostril 5 days         Urethral Catheter 06/15/23 0600 16 Fr. 1 day              Airway  Duration                  Airway - Non-Surgical 06/15/23 0925 Endotracheal Tube <1 day              Arterial Line  Duration             Arterial Line 06/15/23 1500 Left Brachial <1 day              Peripheral Intravenous Line  Duration                  Peripheral IV - Single Lumen 06/15/23 0445 Anterior;Left Forearm 1 day         Peripheral IV - Single Lumen 06/15/23 1328 18 G Anterior;Left Upper Arm <1 day                    Significant Labs:    Lab Results   Component Value Date    WBC 18.99 (H) 06/16/2023    HGB 12.5 (L) 06/16/2023    HCT 42.0 06/16/2023    MCV 80.2 06/16/2023     06/16/2023         BMP  Lab Results   Component Value Date     (H) 06/16/2023    K 6.4 (HH) 06/16/2023    CHLORIDE 116 (H) 06/16/2023    CO2 17 (L) 06/16/2023    BUN 81.1 (H) 06/16/2023    CREATININE 3.21 (H) 06/16/2023    CALCIUM 7.6 (L) 06/16/2023    AGAP 16.0 06/16/2023    EGFRNONAA 86 02/09/2022       ABG  Recent Labs   Lab 06/16/23  0311   PH 7.250*   PO2 196.0*   HCO3 18.9*         Significant Imaging:  No chest x-ray today      Assessment/Plan:     Assessment  Status post CABG x3 on 06/01/2023.  LIMA to the LAD, saphenous vein graft to OM1 and PDA.  Heart failure with reduced ejection fraction-EF 20-25%  Sepsis secondary to Suspected aspiration event with associated hypoxemic respiratory failure  Delirium postop possibly ICU related  Acute  kidney injury with oliguria   Hyperglycemia        Plan:  -BUN persistently >80, urine output worsened yesterday with only 770mL UOP documented  -persistent hyperkalemia despite multiple attempts at shifting over last 24-48hrs--> nephrology consult today for evaluation, I suspect he may require RRT  -oxygen requirements improved, now saturating mid 90s on 60% FiO2/PEEP 14  -sputum cultures 06/15/2023 with heavy Gram-negative bakari growth, continue zosyn for now; MRSA nares negative, will DC vancomycin   -shock overall improved and vasopressin discontinued, norepinephrine currently at 0.1, recheck serum lactate this morning  -I suspect he will need tracheostomy if ongoing care is desired given recurrent intubations, will discuss with CV surgery        DVT ppx: LMWH  GI ppx: famotidine         I spent 35 minutes providing critical care services to this patient.  This does not include time spent for separately billed procedures.         Herve Sue MD  Pulmonary Critical Care Medicine  [facility] - [address] [place] [address]

## 2023-06-17 NOTE — PROGRESS NOTES
Nephrology consult follow up note    HPI:      Tom Anna is a 67 y.o. male with coronary artery disease.  Past medical history significant for CHF, coronary artery disease, hypertension, GERD, hyperlipidemia, CVA, and tobacco abuse.  He presented to [facility] on 06/01/2023 for coronary artery disease and underwent CABG x3. His hospital course has been complicated by respiratory failure. He hes been intubated twice during this admission. He has had fairly normal renal function up until 6/15/23. He developed worsening respiratory status and hypotension requiring pressors. His labs were remarkable for hyperkalemia, metabolic acidosis and FLETCHER.     Interval history:     No acute events overnight.  Patient remains on Levophed, however, dose has been decreased.  Urine output 480 mL yesterday.     Review of Systems:     Unable to obtain ROS due to mental status/intubation.     Past medical, family, surgical, and social history reviewed and unchanged from initial consult note.     Objective:       VITAL SIGNS: 24 HR MIN & MAX LAST    Temp  Min: 98 °F (36.7 °C)  Max: 99.8 °F (37.7 °C)  99.6 °F (37.6 °C)        BP  Min: 51/27  Max: 160/63  125/76     Pulse  Min: 77  Max: 105  90     Resp  Min: 25  Max: 43  (!) 34    SpO2  Min: 88 %  Max: 100 %  99 %      GEN:  Ill-appearing AAM  HEENT:  ET tube in place  CV: RRR +S1,S2 without murmur  PULM:  Bilateral rhonchi, on ventilator  ABD: Soft, NT/ND abdomen with hypoactive bowel sounds  EXT:  1+ lower extremity edema  SKIN: Warm and dry  PSYCH:  Unresponsive  Vascular access:  No hemodialysis access yet            Component Value Date/Time     (H) 06/17/2023 0402     (H) 06/17/2023 0012    K 5.2 (H) 06/17/2023 0402    K 5.7 (H) 06/17/2023 0012    CHLORIDE 114 (H) 06/17/2023 0402    CHLORIDE 116 (H) 06/17/2023 0012    CO2 16 (L) 06/17/2023 0402    CO2 15 (L) 06/17/2023 0012    .7 (H) 06/17/2023 0402    .2 (H) 06/17/2023 0012     CREATININE 4.39 (H) 06/17/2023 0402    CREATININE 4.33 (H) 06/17/2023 0012    CALCIUM 7.4 (L) 06/17/2023 0402    CALCIUM 6.8 (LL) 06/17/2023 0012    PHOS 8.1 (H) 06/16/2023 0400            Component Value Date/Time    WBC 18.99 (H) 06/16/2023 0404    WBC 22.23 (H) 06/15/2023 0313    HGB 12.5 (L) 06/16/2023 0404    HGB 15.6 06/15/2023 0313    HCT 42.0 06/16/2023 0404    HCT 51.5 06/15/2023 0313    HCT 21 (L) 06/01/2023 1113    HCT 26 (L) 06/01/2023 1051     06/16/2023 0404     06/15/2023 0313         Imaging reviewed      Assessment / Plan:       Active Hospital Problems    Diagnosis  POA    Hx of CABG [Z95.1]  Not Applicable     Patient is now status post coronary artery bypass grafting.  Postoperative course was complicated by mental status changes and necessity for re-intubation.        Resolved Hospital Problems   No resolved problems to display.       Acute kidney injury - Baseline Cr 0.8-1  Hyperkalemia  Metabolic acidosis  Shock - Sepsis vs cardiogenic shock. On levophed.  HFrEF     Plan:  Hyperkalemia improved with Lokelma, however, patient continues to have metabolic acidosis.  Additionally he appears to be getting more hypervolemic and has oliguric urine output.  Renal function continues to decline.  This level of azotemia may be contributing to mental status changes which would cause difficulty with extubation.  At this time I think patient needs to start on hemodialysis.  I spoke with ICU team and they will place Vas-Cath today.  We will plan to do hemodialysis today after catheter is placed.  Plan for hemodialysis #2 tomorrow.      Te Clark DO  Nephrology  [facility]  Clinic number: [phone]

## 2023-06-17 NOTE — PROGRESS NOTES
[facility] - 7th Floor ICU  Pulmonary Critical Care Note    Patient Name: Tom Anna  MRN: [id number]  Admission Date: 6/1/2023  Hospital Length of Stay: 16 days  Code Status: DNR  Attending Provider: Alejandro Allred MD  Primary Care Provider: Primary Doctor No     Subjective:     HPI:   67-year-old with a history of severe coronary artery disease mitral regurgitation, and ejection fraction 20-25% underwent CABG by Dr. Grossman on 6/1/2023.  Per Dr. Felix's notes CABG x3 with a LIMA to the LAD saphenous vein graft to OM1 and PDA.  Left atrial appendage was clipped with size 45 atrial clip.    Hospital Course/Significant events:  6/2 - Increased O2 requirements suspected aspiration placed on BiPAP and reupgraded to ICU.  6/4 - Downgraded to the floor but became more hypoxic and transferred back to ICU.  6/7 - Intubated  6/10/2023 - Extubated  6/12/23 - downgraded  6/14 increasing o2 requirements with fever and increasing o2 requirements    24 Hour Interval History:  Afebrile last 24 hours. No acute events overnight. Hyperkalemia improved with lokelma. Vasopressor requirements improving.     Past Medical History:   Diagnosis Date    Allergies     CHF (congestive heart failure)     Coronary artery disease     Essential (primary) hypertension     Fatigue     GERD (gastroesophageal reflux disease)     HLD (hyperlipidemia)     Personal history of colonic polyps     SOB (shortness of breath) on exertion     Stroke     Tobacco abuse     Weakness of both legs        Past Surgical History:   Procedure Laterality Date    COLONOSCOPY W/ BIOPSIES AND POLYPECTOMY  01/20/2020    Dr. Alf Covington    CORONARY ARTERY BYPASS GRAFT (CABG) N/A 6/1/2023    Procedure: CORONARY ARTERY BYPASS GRAFT (CABG);  Surgeon: Xochitl Grossman MD;  Location: [facility];  Service: Cardiothoracic;  Laterality: N/A;    ENDOSCOPY  01/20/2020    ESOPHAGOGASTRODUODENOSCOPY      PLANTAR'S WART EXCISION  07/17/2017       Social History      Socioeconomic History    Marital status:     Number of children: 4   Occupational History    Occupation: Disabled   Tobacco Use    Smoking status: Every Day     Packs/day: 1.00     Types: Cigarettes    Smokeless tobacco: Never   Substance and Sexual Activity    Alcohol use: Yes     Comment: Beer 6pk daily    Drug use: Never     Social Determinants of Health     Food Insecurity: Unknown    Worried About Running Out of Food in the Last Year: Never true   Transportation Needs: Unknown    Lack of Transportation (Medical): No   Housing Stability: Unknown    Unable to Pay for Housing in the Last Year: No           Current Outpatient Medications   Medication Instructions    albuterol (VENTOLIN HFA) 90 mcg/actuation inhaler 1 puff, Inhalation, Every 12 hours PRN, Rescue    amLODIPine (NORVASC) 10 mg, Oral, Daily    aspirin (ECOTRIN) 81 mg, Oral, Daily    atorvastatin (LIPITOR) 80 mg, Oral, Nightly    baclofen (LIORESAL) 10 mg, Oral, 3 times daily PRN    cetirizine 10 mg, Oral, Daily    clopidogreL (PLAVIX) 75 mg, Oral    ENTRESTO 49-51 mg per tablet 1 tablet, Oral, 2 times daily    fluticasone propionate (FLONASE) 50 mcg, Each Nostril, Daily    furosemide (LASIX) 20 mg, Oral, Daily    ketoconazole (NIZORAL) 2 % cream Topical (Top), 2 times daily    losartan (COZAAR) 50 mg, Oral, Daily    lovastatin (MEVACOR) 10 mg, Oral, Nightly    metoprolol succinate (TOPROL-XL) 100 mg, Oral, Daily    multivitamin capsule 1 capsule, Oral, Daily    omeprazole (PRILOSEC) 20 mg, Oral, Daily    sildenafiL (VIAGRA) 100 mg, Oral, As needed (PRN)    spironolactone (ALDACTONE) 25 mg, Oral, Daily       Current Inpatient Medications   aspirin  81 mg Per OG tube Daily    calcium carbonate  500 mg Per OG tube TID    chlorhexidine  15 mL Mouth/Throat BID    dexAMETHasone  4 mg Intravenous Daily    docusate  100 mg Per G Tube Daily    enoxparin  40 mg Subcutaneous Q24H (prophylaxis, 1700)    etomidate  20 mg Intravenous Once    famotidine  (PF)  20 mg Intravenous Q12H    fluticasone propionate  2 spray Each Nostril Daily    folic acid  1 mg Per OG tube Daily    furosemide (LASIX) injection  40 mg Intravenous Q12H    metoprolol tartrate  100 mg Per OG tube BID    perflutren lipid microspheres  1.3 mL Intravenous Once    piperacillin-tazobactam (Zosyn) IV (PEDS and ADULTS) (extended infusion is not appropriate)  4.5 g Intravenous Q8H    sodium chloride 0.9%  10 mL Intravenous Q6H       Current Intravenous Infusions   dexmedeTOMIDine (Precedex) infusion (titrating) 0.6 mcg/kg/hr (06/17/23 0308)    dextrose 5 % and 0.45 % NaCl      dextrose 5 % and 0.45 % NaCl      insulin regular 1 units/mL infusion orderable (DKA) 0.05 Units/kg/hr (06/17/23 0628)    loperamide      milrinone Stopped (06/09/23 1700)    NORepinephrine bitartrate-D5W 0.08 mcg/kg/min (06/17/23 0639)    propofoL 25 mcg/kg/min (06/15/23 0947)    vasopressin Stopped (06/16/23 1000)         Objective:       Intake/Output Summary (Last 24 hours) at 6/17/2023 0825  Last data filed at 6/17/2023 0539  Gross per 24 hour   Intake 2107.6 ml   Output 640 ml   Net 1467.6 ml         Vital Signs (Most Recent):  Temp: 99.6 °F (37.6 °C) (06/17/23 0400)  Pulse: 90 (06/17/23 0649)  Resp: (!) 34 (06/17/23 0649)  BP: 125/76 (06/17/23 0630)  SpO2: 99 % (06/17/23 0649)  Body mass index is 27.99 kg/m².  Weight: 88.5 kg (195 lb 1.6 oz) Vital Signs (24h Range):  Temp:  [98.5 °F (36.9 °C)-99.8 °F (37.7 °C)] 99.6 °F (37.6 °C)  Pulse:  [] 90  Resp:  [25-43] 34  SpO2:  [88 %-100 %] 99 %  BP: ()/(24-87) 125/76  Arterial Line BP: ()/(43-63) 137/55     Physical Exam  Gen: intubated, sedated  HENT- ATNC, MMM  CV- RRR  Resp- CTAB, tachypneic, oxygen saturations mid 90s on 55%/10  MSK- WWP, no LE edema  Neuro- sedated  Psych- unable to assess 2/2 intubation, AMS       Lines/Drains/Airways       Peripherally Inserted Central Catheter Line  Duration             PICC Triple Lumen 06/15/23 1319 right brachial 1  day              Drain  Duration                  Rectal Tube 06/09/23 0800 rectal tube w/ balloon (indicate number of mLs) 8 days         NG/OG Tube 06/11/23 0830 nasogastric 16 Fr. Right nostril 5 days         Urethral Catheter 06/15/23 0600 16 Fr. 2 days              Airway  Duration                  Airway - Non-Surgical 06/15/23 0925 Endotracheal Tube 1 day              Arterial Line  Duration             Arterial Line 06/15/23 1500 Left Brachial 1 day              Peripheral Intravenous Line  Duration                  Peripheral IV - Single Lumen 06/15/23 0445 Anterior;Left Forearm 2 days         Peripheral IV - Single Lumen 06/15/23 1328 18 G Anterior;Left Upper Arm 1 day                    Significant Labs:    Lab Results   Component Value Date    WBC 18.99 (H) 06/16/2023    HGB 12.5 (L) 06/16/2023    HCT 42.0 06/16/2023    MCV 80.2 06/16/2023     06/16/2023           BMP  Lab Results   Component Value Date     (H) 06/17/2023    K 5.2 (H) 06/17/2023    CHLORIDE 114 (H) 06/17/2023    CO2 16 (L) 06/17/2023    .7 (H) 06/17/2023    CREATININE 4.39 (H) 06/17/2023    CALCIUM 7.4 (L) 06/17/2023    AGAP 17.0 06/17/2023    EGFRNONAA 86 02/09/2022         ABG  Recent Labs   Lab 06/16/23 0311   PH 7.250*   PO2 196.0*   HCO3 18.9*   POCBASEDEF -8.00*       Mechanical Ventilation Support:  Vent Mode: A/C (06/17/23 0649)  Ventilator Initiated: Yes (06/15/23 0925)  Set Rate: 26 BPM (06/17/23 0649)  Vt Set: 450 mL (06/17/23 0649)  Pressure Support: 10 cmH20 (06/10/23 0854)  PEEP/CPAP: 14 cmH20 (06/17/23 0649)  Oxygen Concentration (%): 55 (06/17/23 0649)  Peak Airway Pressure: 21 cmH20 (06/17/23 0649)  Total Ve: 14.3 L/m (06/17/23 0649)  F/VT Ratio<105 (RSBI): (!) 80.57 (06/17/23 0649)      Significant Imaging:  I have reviewed the pertinent imaging within the past 24 hours.        Assessment/Plan:     Assessment  Status post CABG x3 on 06/01/2023.  LIMA to the LAD, saphenous vein graft to OM1 and  PDA.  Heart failure with reduced ejection fraction-EF 20-25%  Sepsis secondary to Suspected aspiration event with associated hypoxemic respiratory failure  Delirium postop possibly ICU related  Acute kidney injury with oliguria   Hyperglycemia    Plan  -BUN persistently >80, urine output continues to worsen with only 480mL of urine output in the last 24 hours  -Nephrology on board for worsening renal function, plan for dialysis today and tomorrow due to hyperkalemia with metabolic acidosis and pr becoming hypervolemic and oliguric  -oxygen requirements improved, now saturating mid 90s on 60% FiO2/PEEP 14  -sputum cultures 06/15/2023 with enterobacter growth, continue zosyn given sensitivities; MRSA nares negative, will DC vancomycin   -shock overall improved and vasopressin discontinued, norepinephrine currently at 0.1  -Will potentially need tracheostomy if ongoing care is desired given recurrent intubations, will discuss with CV surgery    DVT Prophylaxis: LMWH  GI Prophylaxis: famotidine     32 minutes of critical care was time spent personally by me on the following activities: development of treatment plan with patient or surrogate and bedside caregivers, discussions with consultants, evaluation of patient's response to treatment, examination of patient, ordering and performing treatments and interventions, ordering and review of laboratory studies, ordering and review of radiographic studies, pulse oximetry, re-evaluation of patient's condition.  This critical care time did not overlap with that of any other provider or involve time for any procedures.     Cleve Stratton MD  Pulmonary Critical Care Medicine, PGY1  [facility] - 7th Floor ICU

## 2023-06-17 NOTE — PROGRESS NOTES
06/17/23 1659   Post-Hemodialysis Assessment   Blood Volume Processed (Liters) 30 L   Dialyzer Clearance Clear   Duration of Treatment 120 minutes   Total UF (mL) 500 mL   Patient Response to Treatment nad   Post-Hemodialysis Comments vss

## 2023-06-17 NOTE — NURSING
Nurses Note -- 4 Eyes      6/17/2023   3:12 PM      Skin assessed during: Daily Assessment      [x] No Altered Skin Integrity Present    []Prevention Measures Documented      [] Yes- Altered Skin Integrity Present or Discovered   [] LDA Added if Not in Epic (Describe Wound)   [] New Altered Skin Integrity was Present on Admit and Documented in LDA   [] Wound Image Taken    Wound Care Consulted? No    Attending Nurse:  Arash Contreras RN     Second RN/Staff Member:  JAX

## 2023-06-17 NOTE — PROCEDURES
"Tom Anna is a 67 y.o. male patient.    Temp: 98.5 °F (36.9 °C) (06/17/23 0815)  Pulse: 108 (06/17/23 1100)  Resp: (!) 38 (06/17/23 1100)  BP: (!) 162/62 (06/17/23 1100)  SpO2: 97 % (06/17/23 1121)  Weight: 88.5 kg (195 lb 1.6 oz) (06/14/23 0547)  Height: 5' 10" (177.8 cm) (06/07/23 1325)       Central Line    Date/Time: 6/17/2023 1:08 PM  Performed by: Eliot Murphy DO  Authorized by: Eliot Murphy DO     Supervisor Name:  Herve Sue MD  Location procedure was performed:  [address] INTENSIVE CARE UNIT  Pre-operative diagnosis:  Renal failure  Post-operative diagnosis:  Renal failure  Consent Done ?:  Yes  Time out complete?: Verified correct patient, procedure, equipment, staff, and site/side    Indications:  Hemodialysis and vascular access  Anesthesia:  General anesthesia and see MAR for details  Preparation:  Skin prepped with ChloraPrep  Skin prep agent dried: Skin prep agent completely dried prior to procedure    Sterile barriers: All five maximal sterile barriers used - gloves, gown, cap, mask and large sterile sheet    Hand hygiene: Hand hygiene performed immediately prior to central venous catheter insertion    Location:  Left internal jugular  Catheter type:  Triple lumen  Catheter size:  7 Fr  Inserted Catheter Length (cm):  20  Ultrasound guidance: Yes    Vessel Caliber:  Medium   patent  Comprressibility:  Normal  Needle advanced into vessel with real time ultrasound guidance.    Guidewire confirmed in vessel.    Steril sheath on probe.    Sterile gel used.  Manometry: No    Number of attempts:  2  Securement:  Line sutured, chlorhexidine patch, sterile dressing applied and blood return through all ports  Technical Procedures Used:  LG technique  Complications: Yes (specify)    Estimated blood loss (mL):  1  Specimens: No    Implants: No    XRay:  Placement verified by x-ray  Adverse Events:  None  Other Complications:  Right internal jugular vein was assessed and accessed " however no flashback was achieved.  Doppler depicted low to no flow into the right IJ. attempted right IJ was aborted and access was gained on left IJ.   Right internal jugular vein was assessed and accessed however no flashback was achieved.  Doppler depicted low to no flow into the right IJ. attempted right IJ was aborted and access was gained on left IJ.    Eliot Murphy DO  [facility], HO-2  06/17/2023 6/17/2023

## 2023-06-18 NOTE — PROGRESS NOTES
Pharmacist Renal Dose Adjustment Note    Tom Anna is a 67 y.o. male being treated with the medication lovenox    Patient Data:    Vital Signs (Most Recent):  Temp: (!) 101.1 °F (38.4 °C) (06/17/23 1500)  Pulse: 90 (06/17/23 1800)  Resp: (!) 47 (06/17/23 1800)  BP: (!) 134/50 (06/17/23 1800)  SpO2: (!) 93 % (06/17/23 1800) Vital Signs (72h Range):  Temp:  [98 °F (36.7 °C)-101.7 °F (38.7 °C)]   Pulse:  []   Resp:  [19-65]   BP: ()/(20-97)   SpO2:  [78 %-100 %]   Arterial Line BP: ()/(32-73)      Recent Labs   Lab 06/17/23  0402 06/17/23  0805 06/17/23  1220   CREATININE 4.39* 4.77* 5.11*     Serum creatinine: 5.11 mg/dL (H) 06/17/23 1220  Estimated creatinine clearance: 16.1 mL/min (A)    Medication:lovenox dose: 40mg frequency q24h will be changed to medication:lovenox dose:30mg frequency:q24h based on Crcl = 16.1 mL/min and pt is currently on HD.    Pharmacist's Name: Ludin Anderson  Pharmacist's Extension: [phone]

## 2023-06-18 NOTE — NURSING
06/18/23 1237   Post-Hemodialysis Assessment   Blood Volume Processed (Liters) 43 L   Dialyzer Clearance Clear   Duration of Treatment 150 minutes   Total UF (mL) 200 mL   Patient Response to Treatment Tolerated   Post-Hemodialysis Comments Tx ended, Pt reinfused.

## 2023-06-18 NOTE — PLAN OF CARE
Problem: Adult Inpatient Plan of Care  Goal: Plan of Care Review  Outcome: Ongoing, Not Progressing  Goal: Patient-Specific Goal (Individualized)  Outcome: Ongoing, Not Progressing  Goal: Absence of Hospital-Acquired Illness or Injury  Outcome: Ongoing, Not Progressing  Goal: Optimal Comfort and Wellbeing  Outcome: Ongoing, Not Progressing  Goal: Readiness for Transition of Care  Outcome: Ongoing, Not Progressing     Problem: Infection  Goal: Absence of Infection Signs and Symptoms  Outcome: Ongoing, Not Progressing     Problem: Fall Injury Risk  Goal: Absence of Fall and Fall-Related Injury  Outcome: Ongoing, Not Progressing     Problem: Skin Injury Risk Increased  Goal: Skin Health and Integrity  Outcome: Ongoing, Not Progressing     Problem: Device-Related Complication Risk (Hemodialysis)  Goal: Safe, Effective Therapy Delivery  Outcome: Ongoing, Not Progressing     Problem: Hemodynamic Instability (Hemodialysis)  Goal: Effective Tissue Perfusion  Outcome: Ongoing, Not Progressing     Problem: Infection (Hemodialysis)  Goal: Absence of Infection Signs and Symptoms  Outcome: Ongoing, Not Progressing

## 2023-06-18 NOTE — PROGRESS NOTES
[facility] - 7th Floor ICU  Pulmonary Critical Care Note    Patient Name: Tom Anna  MRN: [id number]  Admission Date: 6/1/2023  Hospital Length of Stay: 17 days  Code Status: DNR  Attending Provider: Alejandro Allred MD  Primary Care Provider: Primary Doctor No     Subjective:     HPI:   67-year-old with a history of severe coronary artery disease mitral regurgitation, and ejection fraction 20-25% underwent CABG by Dr. Grossman on 6/1/2023.  Per Dr. Felix's notes CABG x3 with a LIMA to the LAD saphenous vein graft to OM1 and PDA.  Left atrial appendage was clipped with size 45 atrial clip.    Hospital Course/Significant events:  6/2 - Increased O2 requirements suspected aspiration placed on BiPAP and reupgraded to ICU.  6/4 - Downgraded to the floor but became more hypoxic and transferred back to ICU.  6/7 - Intubated  6/10/2023 - Extubated  6/12/23 - downgraded  6/14 increasing o2 requirements with fever and increasing o2 requirements      24 Hour Interval History:  Significant issues with atrial fibrillation with rapid ventricular response yesterday.  Reported overall poor tolerance of hemodialysis with worsening hemodynamic instability and hypoxemia. Febrile overnight and this morning with tmax 102.4.  Significantly tachypneic, currently sedated on Precedex.  Does not open eyes or follow commands.        Review of systems unobtainable due to intubation and sedation.        Past Medical History:   Diagnosis Date    Allergies     CHF (congestive heart failure)     Coronary artery disease     Essential (primary) hypertension     Fatigue     GERD (gastroesophageal reflux disease)     HLD (hyperlipidemia)     Personal history of colonic polyps     SOB (shortness of breath) on exertion     Stroke     Tobacco abuse     Weakness of both legs        Past Surgical History:   Procedure Laterality Date    COLONOSCOPY W/ BIOPSIES AND POLYPECTOMY  01/20/2020    Dr. Alf Covington    CORONARY ARTERY BYPASS GRAFT  (CABG) N/A 6/1/2023    Procedure: CORONARY ARTERY BYPASS GRAFT (CABG);  Surgeon: Xochitl Grossman MD;  Location: [facility] OR;  Service: Cardiothoracic;  Laterality: N/A;    ENDOSCOPY  01/20/2020    ESOPHAGOGASTRODUODENOSCOPY      PLANTAR'S WART EXCISION  07/17/2017       Social History     Socioeconomic History    Marital status:     Number of children: 4   Occupational History    Occupation: Disabled   Tobacco Use    Smoking status: Every Day     Packs/day: 1.00     Types: Cigarettes    Smokeless tobacco: Never   Substance and Sexual Activity    Alcohol use: Yes     Comment: Beer 6pk daily    Drug use: Never     Social Determinants of Health     Food Insecurity: Unknown    Worried About Running Out of Food in the Last Year: Never true   Transportation Needs: Unknown    Lack of Transportation (Medical): No   Housing Stability: Unknown    Unable to Pay for Housing in the Last Year: No       Current Outpatient Medications   Medication Instructions    albuterol (VENTOLIN HFA) 90 mcg/actuation inhaler 1 puff, Inhalation, Every 12 hours PRN, Rescue    amLODIPine (NORVASC) 10 mg, Oral, Daily    aspirin (ECOTRIN) 81 mg, Oral, Daily    atorvastatin (LIPITOR) 80 mg, Oral, Nightly    baclofen (LIORESAL) 10 mg, Oral, 3 times daily PRN    cetirizine 10 mg, Oral, Daily    clopidogreL (PLAVIX) 75 mg, Oral    ENTRESTO 49-51 mg per tablet 1 tablet, Oral, 2 times daily    fluticasone propionate (FLONASE) 50 mcg, Each Nostril, Daily    furosemide (LASIX) 20 mg, Oral, Daily    ketoconazole (NIZORAL) 2 % cream Topical (Top), 2 times daily    losartan (COZAAR) 50 mg, Oral, Daily    lovastatin (MEVACOR) 10 mg, Oral, Nightly    metoprolol succinate (TOPROL-XL) 100 mg, Oral, Daily    multivitamin capsule 1 capsule, Oral, Daily    omeprazole (PRILOSEC) 20 mg, Oral, Daily    sildenafiL (VIAGRA) 100 mg, Oral, As needed (PRN)    spironolactone (ALDACTONE) 25 mg, Oral, Daily       Current Inpatient Medications   amiodarone in dextrose  150  mg Intravenous Once    aspirin  81 mg Per OG tube Daily    calcium carbonate  1,000 mg Per OG tube TID    ceFEPime (MAXIPIME) IVPB  1 g Intravenous Q12H    chlorhexidine  15 mL Mouth/Throat BID    dexAMETHasone  4 mg Intravenous Daily    docusate  100 mg Per G Tube Daily    enoxparin  30 mg Subcutaneous Q24H (prophylaxis, 1700)    etomidate  20 mg Intravenous Once    famotidine (PF)  20 mg Intravenous Daily    fluticasone propionate  2 spray Each Nostril Daily    folic acid  1 mg Per OG tube Daily    furosemide (LASIX) injection  40 mg Intravenous Q12H    insulin detemir U-100  25 Units Subcutaneous BID    metoprolol tartrate  100 mg Per OG tube BID    perflutren lipid microspheres  1.3 mL Intravenous Once    sodium chloride 0.9%  10 mL Intravenous Q6H       Current Intravenous Infusions   amiodarone in dextrose 5% 0.5 mg/min (06/18/23 0455)    dexmedeTOMIDine (Precedex) infusion (titrating) 0.6 mcg/kg/hr (06/18/23 0453)    dextrose 5 % and 0.45 % NaCl      dextrose 5 % and 0.45 % NaCl      insulin regular 1 units/mL infusion orderable (DKA) Stopped (06/17/23 1235)    loperamide      milrinone Stopped (06/09/23 1700)    NORepinephrine bitartrate-D5W 0.12 mcg/kg/min (06/18/23 0453)    propofoL 25 mcg/kg/min (06/17/23 1121)    vasopressin Stopped (06/16/23 1000)       Objective:       Intake/Output Summary (Last 24 hours) at 6/18/2023 0859  Last data filed at 6/18/2023 0428  Gross per 24 hour   Intake 3026 ml   Output 830 ml   Net 2196 ml       Vital Signs (Most Recent):  Temp: (!) 102.4 °F (39.1 °C) (06/18/23 0827)  Pulse: 78 (06/18/23 0636)  Resp: (!) 44 (06/18/23 0636)  BP: (!) 122/57 (06/18/23 0500)  SpO2: (!) 94 % (06/18/23 0810)  Body mass index is 29.32 kg/m².  Weight: 92.7 kg (204 lb 5.9 oz) Vital Signs (24h Range):  Temp:  [99 °F (37.2 °C)-102.4 °F (39.1 °C)] 102.4 °F (39.1 °C)  Pulse:  [] 78  Resp:  [24-65] 44  SpO2:  [86 %-100 %] 94 %  BP: ()/(20-84) 122/57  Arterial Line BP: ()/()  126/38         Physical Exam  Gen: intubated, sedated  HENT- ATNC, MMM  CV- RRR  Resp- CTAB, tachypneic, oxygen saturations high 90s on 70% FiO2/PEEP 10  MSK- WWP, no LE edema  Neuro- sedated, no eye opening to verbal or tactile stimuli  Psych- unable to assess 2/2 intubation, AMS         Lines/Drains/Airways       Peripherally Inserted Central Catheter Line  Duration             PICC Triple Lumen 06/15/23 1319 right brachial 2 days              Central Venous Catheter Line  Duration                  Hemodialysis Catheter 06/17/23 1200 left internal jugular <1 day              Drain  Duration                  Rectal Tube 06/09/23 0800 rectal tube w/ balloon (indicate number of mLs) 9 days         NG/OG Tube 06/11/23 0830 nasogastric 16 Fr. Right nostril 7 days         Urethral Catheter 06/15/23 0600 16 Fr. 3 days              Airway  Duration                  Airway - Non-Surgical 06/15/23 0925 Endotracheal Tube 2 days              Arterial Line  Duration             Arterial Line 06/15/23 1500 Left Brachial 2 days              Peripheral Intravenous Line  Duration                  Peripheral IV - Single Lumen 06/15/23 0445 Anterior;Left Forearm 3 days         Peripheral IV - Single Lumen 06/15/23 1328 18 G Anterior;Left Upper Arm 2 days                    Significant Labs:  Lab Results   Component Value Date    WBC 12.89 (H) 06/17/2023    HGB 11.9 (L) 06/17/2023    HCT 37.4 (L) 06/17/2023    MCV 75.6 (L) 06/17/2023     06/17/2023     BMP  Lab Results   Component Value Date     06/18/2023    K 4.5 06/18/2023    CHLORIDE 104 06/18/2023    CO2 17 (L) 06/18/2023    .0 (H) 06/18/2023    CREATININE 4.74 (H) 06/18/2023    CALCIUM 8.0 (L) 06/18/2023    AGAP 19.0 06/18/2023    EGFRNONAA 86 02/09/2022         ABG  Recent Labs   Lab 06/16/23 0311   PH 7.250*   PO2 196.0*   HCO3 18.9*   POCBASEDEF -8.00*       Mechanical Ventilation Support:  Vent Mode: A/C (06/18/23 0810)  Ventilator Initiated: Yes  (06/15/23 0925)  Set Rate: 26 BPM (06/18/23 0810)  Vt Set: 450 mL (06/18/23 0810)  Pressure Support: 10 cmH20 (06/10/23 0854)  PEEP/CPAP: 10 cmH20 (06/18/23 0810)  Oxygen Concentration (%): 70 (06/18/23 0810)  Peak Airway Pressure: 17 cmH20 (06/18/23 0810)  Total Ve: 17.2 L/m (06/18/23 0810)  F/VT Ratio<105 (RSBI): 129.41 (06/18/23 0636)            Assessment/Plan:     Assessment  Status post CABG x3 on 06/01/2023.  LIMA to the LAD, saphenous vein graft to OM1 and PDA.  Heart failure with reduced ejection fraction-EF 20-25%  Sepsis secondary to Suspected aspiration event with associated hypoxemic respiratory failure  Delirium postop possibly ICU related  Acute kidney injury with oliguria   Hyperglycemia      Plan:  -HD initiation yesterday with 500 mL ultrafiltration, some worsening hemodynamic instability and tachyarrhythmias immediately prior and following dialysis session   -repeat HD session today with 1500 mL UF target, remains on 0.1 norepinephrine  -oxygen requirements transiently worsened yesterday, saturations high 90s on 70% FiO2/PEEP 10 this morning  -sputum cultures 06/15/2023 with Enterobacter aerogenous growth, today day 2/7 cefepime, changed from Zosyn given resistance noted per sensitivities--> febrile overnight, but only day 2 of therapy  -chest x-ray yesterday with some entry left-sided effusion, dialysis planned for today as noted above; if persistent fevers noted, will need to entertain drainage for possible infected fluid collection  -continue amiodarone for atrial fibrillation with rapid ventricular response, dysrhythmias overall improved  -will need tracheostomy and PEG tube placement following further stabilization, discussed with family in CV surgery and all are in agreement        DVT Prophylaxis: LMWH  GI Prophylaxis: famotidine         I spent 33 minutes providing critical care services to this patient.  This does not include time spent for separately billed procedures.         Herve PAYTON  MD Linette  Pulmonary Critical Care Medicine, PGY1  [facility] - 7th Floor ICU

## 2023-06-18 NOTE — PROGRESS NOTES
Pharmacist Renal Dose Adjustment Note    Tom Anna is a 67 y.o. male being treated with the medication famotidine    Patient Data:    Vital Signs (Most Recent):  Temp: (!) 101.1 °F (38.4 °C) (06/17/23 1500)  Pulse: 90 (06/17/23 1800)  Resp: (!) 47 (06/17/23 1800)  BP: (!) 134/50 (06/17/23 1800)  SpO2: (!) 93 % (06/17/23 1800) Vital Signs (72h Range):  Temp:  [98 °F (36.7 °C)-101.7 °F (38.7 °C)]   Pulse:  []   Resp:  [19-65]   BP: ()/(20-97)   SpO2:  [78 %-100 %]   Arterial Line BP: ()/(32-73)      Recent Labs   Lab 06/17/23  0402 06/17/23  0805 06/17/23  1220   CREATININE 4.39* 4.77* 5.11*     Serum creatinine: 5.11 mg/dL (H) 06/17/23 1220  Estimated creatinine clearance: 16.1 mL/min (A)    Medication:famotidine dose: 20mg frequency q12h will be changed to medication:famotidine dose:20mg frequency:q24h based on crcl = 16.1 mL/min and Pt is currently on HD.    Pharmacist's Name: Ludin Anderson  Pharmacist's Extension: [phone]

## 2023-06-18 NOTE — PROGRESS NOTES
Nephrology consult follow up note    HPI:      Tom Anna is a 67 y.o. male with coronary artery disease.  Past medical history significant for CHF, coronary artery disease, hypertension, GERD, hyperlipidemia, CVA, and tobacco abuse.  He presented to [facility] on 06/01/2023 for coronary artery disease and underwent CABG x3. His hospital course has been complicated by respiratory failure. He hes been intubated twice during this admission. He has had fairly normal renal function up until 6/15/23. He developed worsening respiratory status and hypotension requiring pressors. His labs were remarkable for hyperkalemia, metabolic acidosis and FLETCHER.     Interval history:     No acute events overnight.  Patient received 1st hemodialysis session yesterday.  Before and during dialysis patient had runs of SVT with heart rate up into the 160s.  Remains with high ventilator settings and on Levophed.     Review of Systems:     Unable to obtain ROS due to mental status/intubation.     Past medical, family, surgical, and social history reviewed and unchanged from initial consult note.     Objective:       VITAL SIGNS: 24 HR MIN & MAX LAST    Temp  Min: 99 °F (37.2 °C)  Max: 102.4 °F (39.1 °C)  (!) 102.4 °F (39.1 °C)        BP  Min: 72/52  Max: 166/57  (!) 122/57     Pulse  Min: 70  Max: 193  78     Resp  Min: 24  Max: 65  (!) 44    SpO2  Min: 86 %  Max: 100 %  (!) 94 %      GEN:  Ill-appearing AAM  HEENT:  ET tube in place  CV: RRR +S1,S2 without murmur  PULM:  Bilateral rhonchi, on ventilator 70% FiO2  ABD: Soft, NT/ND abdomen with hypoactive bowel sounds  EXT:  1+ lower extremity edema  SKIN: Warm and dry  PSYCH:  Unresponsive  Vascular access:  No hemodialysis access yet            Component Value Date/Time     06/18/2023 0424     06/18/2023 0025    K 4.5 06/18/2023 0424    K 4.6 06/18/2023 0025    CHLORIDE 104 06/18/2023 0424    CHLORIDE 105 06/18/2023 0025    CO2 17 (L) 06/18/2023 0424    CO2  17 (L) 06/18/2023 0025    .0 (H) 06/18/2023 0424    .1 (H) 06/18/2023 0025    CREATININE 4.74 (H) 06/18/2023 0424    CREATININE 4.45 (H) 06/18/2023 0025    CALCIUM 8.0 (L) 06/18/2023 0424    CALCIUM 6.9 (LL) 06/18/2023 0025    PHOS 8.1 (H) 06/16/2023 0400            Component Value Date/Time    WBC 12.89 (H) 06/17/2023 0805    WBC 18.99 (H) 06/16/2023 0404    HGB 11.9 (L) 06/17/2023 0805    HGB 12.5 (L) 06/16/2023 0404    HCT 37.4 (L) 06/17/2023 0805    HCT 42.0 06/16/2023 0404    HCT 21 (L) 06/01/2023 1113    HCT 26 (L) 06/01/2023 1051     06/17/2023 0805     06/16/2023 0404         Imaging reviewed      Assessment / Plan:       Active Hospital Problems    Diagnosis  POA    Hx of CABG [Z95.1]  Not Applicable     Patient is now status post coronary artery bypass grafting.  Postoperative course was complicated by mental status changes and necessity for re-intubation.        Resolved Hospital Problems   No resolved problems to display.       Acute kidney injury - Baseline Cr 0.8-1.  Started hemodialysis 06/17/2023.  Hyperkalemia - resolved  Metabolic acidosis  Shock - Sepsis vs cardiogenic shock. On levophed.  HFrEF     Plan:  Plan for hemodialysis session #2 today.  We will aim for 1.5 L UF as tolerated.  Plan for hemodialysis #3 tomorrow.  Overall patient does not appear to be clinically improving.  Very guarded prognosis.      Te Clark DO  Nephrology  [facility]  Clinic number: [phone]

## 2023-06-18 NOTE — NURSING
Nurses Note -- 4 Eyes      6/17/2023   7:58 PM      Skin assessed during: Daily Assessment      [x] No Altered Skin Integrity Present    [x]Prevention Measures Documented      [] Yes- Altered Skin Integrity Present or Discovered   [] LDA Added if Not in Epic (Describe Wound)   [] New Altered Skin Integrity was Present on Admit and Documented in LDA   [] Wound Image Taken    Wound Care Consulted? No    Attending Nurse:  Naida Terrazas RN     Second RN/Staff Member:  Dana Rock

## 2023-06-18 NOTE — NURSING
Nurses Note -- 4 Eyes      6/18/2023   2:48 PM      Skin assessed during: Daily Assessment      [x] No Altered Skin Integrity Present    []Prevention Measures Documented      [] Yes- Altered Skin Integrity Present or Discovered   [] LDA Added if Not in Epic (Describe Wound)   [] New Altered Skin Integrity was Present on Admit and Documented in LDA   [] Wound Image Taken    Wound Care Consulted? No    Attending Nurse:  Arash Contreras RN     Second RN/Staff Member:  DENISSE

## 2023-06-19 VITALS
RESPIRATION RATE: 1 BRPM | OXYGEN SATURATION: 92 % | TEMPERATURE: 100 F | BODY MASS INDEX: 29.26 KG/M2 | HEIGHT: 70 IN | DIASTOLIC BLOOD PRESSURE: 31 MMHG | WEIGHT: 204.38 LBS | SYSTOLIC BLOOD PRESSURE: 44 MMHG

## 2023-06-19 NOTE — DISCHARGE SUMMARY
[facility]  Death Summary      Admit Date: 2023    Death Date and Time:  2023 at 10:31PM    Attending Physician: Alejandro Allred MD     Reason for Admission: CAD w/ LVED of 20% s/p CABG with acute renal failure     Procedures Performed: Procedure(s) (LRB):  CORONARY ARTERY BYPASS GRAFT (CABG) (N/A)    Hospital Course:  Tom Anna was a 67 y.o. male with PMH of CHF, CAD, HTN, GERD, HLD, and prior CVA who was admitted on  following CABG. His hospital course was complicated by hypoxic respiratory failure and he was extubated and reintubated multiple times. He was initially downgraded to the floor on  but required reupgrade to ICU on  for increasing O2 requirements and fever. On 6/15 his respiratory status worsened and he was started on pressors for hypotension. He subsequently developed an FLETCHER with acute renal failure and was started on dialysis on . On the  and  patient began having worsening hemodynamic instability with tachyarrhythmias associated with dialysis sessions. He went into a-fib with RVR which temporarily improved his dysrhythmias. This afternoon he was noted to be severely acidotic with worsening hypotension. He was started on a Bicarb drip and was was maxed out on a levo and vaso drip. His family wished to discuss goals of care at which point they were informed of his poor prognosis and increasing pressor requirements with severe acidosis. After discussion amongst themselves they elected to withdraw care. His drips were stopped and he passed away within a couple of minutes at 10:31PM.     Discharged Condition:     Disposition:       ANA CRISTINA Stratton MD  Critical Care Medicine - PGY1  2023 10:39 PM

## 2023-06-19 NOTE — PHYSICIAN QUERY
PT Name: Tom Anna  MR #: [id number]     DOCUMENTATION CLARIFICATION     CDS/: Gisella Hsu RN CDIS            Contact information: [email or web address]    This form is a permanent document in the medical record.     Query Date: June 19, 2023    By submitting this query, we are merely seeking further clarification of documentation.  Please utilize your independent clinical judgment when addressing the question(s) below.  The Medical Record contains the following:  Indicators Supporting Clinical Findings Location in Medical Record   x Acute Illness (e.g. 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  LIMA to the LAD, saphenous vein graft to OM1 and PDA.  Heart failure with reduced ejection fraction-EF 20-25%  Sepsis secondary to Suspected aspiration event with associated hypoxemic respiratory failure    Acute kidney injury - Baseline Cr 0.8-1  Hyperkalemia  Metabolic acidosis  Shock - Sepsis vs cardiogenic shock. On levophed. CCM note 6/15            Nephrology consult 6/16    x Vital Signs  Vital Signs (24h Range):  Temp:  [98 °F (36.7 °C)-102 °F (38.9 °C)] 98 °F (36.7 °C)  Pulse:  [] 107  Resp:  [19-38] 19  SpO2:  [82 %-99 %] 95 %  BP: ()/(61-97) 112/70    Vital Signs (24h Range):  Temp:  [98.4 °F (36.9 °C)-101.5 °F (38.6 °C)] 98.9 °F (37.2 °C)  Pulse:  [] 91  Resp:  [20-49] 37  SpO2:  [80 %-100 %] 98 %  BP: ()/(37-81) 83/59  Arterial Line BP: ()/(50-73) 98/50    Vital Signs (24h Range):  Temp:  [98.5 °F (36.9 °C)-99.8 °F (37.7 °C)] 99.6 °F (37.6 °C)  Pulse:  [] 90  Resp:  [25-43] 34  SpO2:  [88 %-100 %] 99 %  BP: ()/(24-87) 125/76  Arterial Line BP: ()/(43-63) 137/55 CCM note 6/15               CCM note 6/16                 CCM note 6/17    Acidosis documented     x ABGs/Labs  Latest Reference Range & Units 06/12/23 01:31 06/13/23 01:57 06/14/23 04:04 06/15/23 00:56 06/15/23 03:13 06/16/23 04:04 06/17/23 08:05 06/18/23 20:24   WBC 4.50 -  11.50 x10(3)/mcL 21.83 (H) 21.42 (H) 21.91 (H) 23.42 (H) 22.23 (H) 18.99 (H) 12.89 (H) 4.44 (L)      Latest Reference Range & Units 06/15/23 00:56 06/15/23 03:13 06/16/23 09:36   Lactate, Reggie 0.5 - 2.2 mmol/L 2.2 2.2 3.6 (HH)      Latest Reference Range & Units 06/15/23 00:56 06/15/23 10:08 06/16/23 03:11 06/18/23 20:42   POC PH 7.350 - 7.450  7.490 (H) 7.180 (LL) 7.250 (LL) 7.160 (LL)   POC PO2 80.0 - 100.0 mmHg 99.0 84.0 196.0 (H) 61.0 (L)   POC HCO3 22.0 - 26.0 mmol/L 25.1 27.2 18.9 (L) 14.6 (LL)    Labs     Hypotension or Low Blood Pressure documented      Altered Mental Status or Confusion      Diaphoresis, Cold Extremities or Cyanosis      Oliguria     x Medication/Treatment  -Vasopressors  -Inotropic Drugs  -IV Fluids   -IV Antibiotics  -Cardiac Assist Devices  -Hemodynamic Monitoring  -Blood/Blood Products ceFEPIme (MAXIPIME) 1 g in dextrose 5 % in water (D5W) 5 % 100 mL IVPB (MB+)  Dose: 1 g  Freq: Every 12 hours (non-standard times) Route: IV  Indications of Use: lower respiratory infection  Start: 06/17/23 1230 End: 06/19/23 0626    piperacillin-tazobactam (ZOSYN) 4.5 g in dextrose 5 % in water (D5W) 5 % 100 mL IVPB (MB+)  Dose: 4.5 g  Freq: Every 8 hours (non-standard times) Route: IV  Indications of Use: lower respiratory infection  Start: 06/14/23 2000 End: 06/17/23 1127    milrinone 20mg in D5W 100 mL infusion  Rate: 9.7 mL/hr Dose: 0.375 mcg/kg/min  Weight Dosing Info: 86.5 kg (Dosing Weight)  Freq: Continuous Route: IV  Start: 06/07/23 1430 End: 06/19/23 0626    NORepinephrine 8 mg in dextrose 5% 250 mL infusion  Rate: 0-486.6 mL/hr Dose: 0-3 mcg/kg/min  Weight Dosing Info: 86.5 kg (Dosing Weight)  Freq: Continuous Route: IV  Start: 06/15/23 0945 End: 06/18/23 2026    vasopressin (PITRESSIN) 0.2 Units/mL in dextrose 5 % (D5W) 100 mL infusion  Rate: 12 mL/hr Dose: 0.04 Units/min  Freq: Continuous Route: IV  Start: 06/15/23 1200 End: 06/18/23 2111 MAR               MAR               MAR              MAR             MAR    x Other  overall decline in the patient's status including poor neurologic status since intubation in addition to worsening acute kidney injury with associated septic versus cardiogenic shock, worsening hypoxemic respiratory failure requiring peep of 14 and FiO2 100% with elevated peak pressures with marginal improvement in oxygenation with saturations anywhere in the mid to upper 80s now requiring norepinephrine and vasopressin.  CCM note 6/15      Provider, please clarify the TYPE of shock   [    ] Septic Shock   [    ] Cardiogenic Shock   [    ] Other Shock (please specify): __________   [  x ] Shock, Unspecified   [    ] Other Condition (please specify): _________             Please document in your progress notes daily for the duration of treatment until resolved and include in your discharge summary.     Form No. [id number]

## 2023-06-19 NOTE — NURSING
Nurses Note -- 4 Eyes      6/18/2023   8:50 PM      Skin assessed during: Daily Assessment      [x] No Altered Skin Integrity Present    [x]Prevention Measures Documented      [] Yes- Altered Skin Integrity Present or Discovered   [] LDA Added if Not in Epic (Describe Wound)   [] New Altered Skin Integrity was Present on Admit and Documented in LDA   [] Wound Image Taken    Wound Care Consulted? No    Attending Nurse:  Naida Terrazas RN     Second RN/Staff Member:  MEI Hartman

## 2023-06-19 NOTE — PLAN OF CARE
CRITICAL CARE MEDICINE PLAN OF CARE NOTE    Was called to patient's bedside by family to discuss goals of care. It was explained to them that his prognosis was very poor and that he was persistently hypotensive despite being maxed out on pressors. After discussion the family had a discussion amongst themselves and elected to withdraw care this evening. All questions were answered and they expressed understanding of the situation. The entire family was in agreement with the decision to with draw care.     ANA CRISTINA Stratton MD  Critical Care Medicine, PGY1

## 2023-06-20 LAB
BACTERIA BLD CULT: NORMAL
BACTERIA BLD CULT: NORMAL

## 2023-06-21 LAB — L PNEUMO AB SER QL: NEGATIVE

## 2023-06-21 NOTE — PHYSICIAN QUERY
PT Name: Tom Anna  MR #: [id number]     DOCUMENTATION CLARIFICATION      CDS/: Gisella Hsu RN CDIS            Contact information: [email or web address]    This form is a permanent document in the medical record.    Query Date: June 21, 2023    By submitting this query, we are merely seeking further clarification of documentation to reflect the severity of illness of your patient. Please utilize your independent clinical judgment when addressing the question(s) below.     The Medical Record contains the following:   Indicators   Supporting Clinical Findings Location in Medical Record   x Documentation of Sepsis, Septic Shock Sepsis secondary to Suspected aspiration event with associated hypoxemic respiratory failure Pulm note 6/18     Blood Culture      Respiratory Culture      Urine Culture     x Other Culture -sputum cultures 06/15/2023 with Enterobacter aerogenous growth, today day 2/7 cefepime, changed from Zosyn given resistance noted per sensitivities--> febrile overnight, but only day 2 of therapy Pulm note 6/18    Acute/Chronic Illness      Medication/Treatment      Other        Provider, please further specify the _Sepsis_ diagnosis:   [ x ] Due to Enterobacter aerogenous   [   ] Due to (please specify): __________________________________   [   ] Other specification (please specify): ____________________           Please document in your progress notes daily for the duration of treatment until resolved, and include in your discharge summary.  Form No. [id number]

## 2023-07-09 LAB
POCT GLUCOSE: 145 MG/DL (ref 70–110)
POCT GLUCOSE: 147 MG/DL (ref 70–110)
POCT GLUCOSE: 167 MG/DL (ref 70–110)
POCT GLUCOSE: 183 MG/DL (ref 70–110)
POCT GLUCOSE: 216 MG/DL (ref 70–110)
POCT GLUCOSE: 217 MG/DL (ref 70–110)
POCT GLUCOSE: 223 MG/DL (ref 70–110)
POCT GLUCOSE: 226 MG/DL (ref 70–110)
POCT GLUCOSE: 255 MG/DL (ref 70–110)
POCT GLUCOSE: 262 MG/DL (ref 70–110)
POCT GLUCOSE: 280 MG/DL (ref 70–110)
